# Patient Record
Sex: MALE | Race: WHITE
[De-identification: names, ages, dates, MRNs, and addresses within clinical notes are randomized per-mention and may not be internally consistent; named-entity substitution may affect disease eponyms.]

---

## 2023-04-27 ENCOUNTER — HOSPITAL ENCOUNTER (EMERGENCY)
Dept: HOSPITAL 97 - ER | Age: 63
Discharge: HOME | End: 2023-04-27
Payer: COMMERCIAL

## 2023-04-27 VITALS — SYSTOLIC BLOOD PRESSURE: 125 MMHG | OXYGEN SATURATION: 93 % | DIASTOLIC BLOOD PRESSURE: 91 MMHG

## 2023-04-27 VITALS — TEMPERATURE: 97 F
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
## 2023-04-27 DIAGNOSIS — R07.81: Primary | ICD-10-CM

## 2023-04-27 DIAGNOSIS — M54.9: ICD-10-CM

## 2023-04-27 LAB
BUN BLD-MCNC: 12 MG/DL (ref 7–18)
GLUCOSE SERPLBLD-MCNC: 115 MG/DL (ref 74–106)
HCT VFR BLD CALC: 47.1 % (ref 39.6–49)
LYMPHOCYTES # SPEC AUTO: 0.9 K/UL (ref 0.7–4.9)
MCV RBC: 84.9 FL (ref 80–100)
PMV BLD: 8.4 FL (ref 7.6–11.3)
POTASSIUM SERPL-SCNC: 4.8 MEQ/L (ref 3.5–5.1)
RBC # BLD: 5.54 M/UL (ref 4.33–5.43)

## 2023-04-27 PROCEDURE — 86850 RBC ANTIBODY SCREEN: CPT

## 2023-04-27 PROCEDURE — 80048 BASIC METABOLIC PNL TOTAL CA: CPT

## 2023-04-27 PROCEDURE — 36415 COLL VENOUS BLD VENIPUNCTURE: CPT

## 2023-04-27 PROCEDURE — 71250 CT THORAX DX C-: CPT

## 2023-04-27 PROCEDURE — 86900 BLOOD TYPING SEROLOGIC ABO: CPT

## 2023-04-27 PROCEDURE — 70450 CT HEAD/BRAIN W/O DYE: CPT

## 2023-04-27 PROCEDURE — 86901 BLOOD TYPING SEROLOGIC RH(D): CPT

## 2023-04-27 PROCEDURE — 85025 COMPLETE CBC W/AUTO DIFF WBC: CPT

## 2023-04-27 PROCEDURE — 72125 CT NECK SPINE W/O DYE: CPT

## 2023-04-27 NOTE — RAD REPORT
EXAM DESCRIPTION:  RAD - Ribs  Left - 4/27/2023 1:58 pm

 

CLINICAL HISTORY:  PAIN

 

COMPARISON:  No comparisons

 

TECHNIQUE:  Left ribs, 4 views.

 

FINDINGS:  No displaced rib fracture is evident.

 

No aggressive rib lesion. Suspected small left pleural effusion. No underlying pneumothorax,  infiltr
ate or pulmonary contusion.

 

IMPRESSION:  No acute displaced rib fractures. Suspected small left pleural effusion.

## 2023-04-27 NOTE — ER
Nurse's Notes                                                                                     

 [facility]                                                                 

Name: Blu Morrell                                                                              

Age: 62 yrs                                                                                       

Sex: Male                                                                                         

: 1960                                                                                   

MRN: [id number]                                                                                   

Arrival Date: 2023                                                                          

Time: 13:17                                                                                       

Account#: [id number]                                                                            

Bed DIS1                                                                                          

Private MD:                                                                                       

Diagnosis: Fall on same level, unspecified;Left sided rib pain;Back pain                          

                                                                                                  

Presentation:                                                                                     

                                                                                             

13:20 Chief complaint: Patient states: tripped and fell from standing position and hit L side ss  

      of ribs/ back on walker \T\ 0700 this am. Pt c/o L sided chest wall/ back pain.               

      Coronavirus screen: Client denies travel out of the [place] in the last 14 days. Ebola         

      Screen: Patient denies exposure to infectious person. Patient denies travel to an           

      Ebola-affected area in the 21 days before illness onset. Initial Sepsis Screen: Does        

      the patient meet any 2 criteria? No. Patient's initial sepsis screen is negative. Does      

      the patient have a suspected source of infection? No. Patient's initial sepsis screen       

      is negative. Risk Assessment: Do you want to hurt yourself or someone else? Patient         

      reports no desire to harm self or others. Onset of symptoms was 2023.             

13:20 Method Of Arrival: EMS: [facility]                                                     ss  

13:20 Acuity: OBI 3                                                                           ss  

                                                                                                  

Historical:                                                                                       

- Allergies:                                                                                      

13:25 No Known Allergies;                                                                     ss  

                                                                                                  

                                                                                                  

                                                                                                  

Screenin:35 [facility] ED Fall Risk Assessment (Adult) History of falling in the last 3 months,       nj1 

      including since admission Yes- single mechanical fall (1 pt) Confusion or                   

      Disorientation No (0 pts) Intoxicated or Sedated No (0 pts) Impaired Gait Yes (1 pt)        

      Mobility Assist Device Used Yes (1 pt) Altered Elimination No (0 pt) Score/Fall Risk        

      Level 3 or more points = High Risk Oriented to surroundings, Maintained a safe              

      environment, Educated pt \T\ family on fall prevention, incl call for assistance when       

      getting out of bed, Assessed \T\ reinforced patient's understanding of fall precautions,    

      Hourly rounding (assess needs \T\ fall precautionary measures) done, Offered frequent       

      toileting (1:1 observation).                                                                

13:35 Abuse screen: Denies threats or abuse. Denies injuries from another. Nutritional        nj1 

      screening: No deficits noted. Tuberculosis screening: No symptoms or risk factors           

      identified.                                                                                 

                                                                                                  

Assessment:                                                                                       

13:35 Reassessment:. General: Appears in no apparent distress. uncomfortable, Behavior is     nj1 

      calm, cooperative, appropriate for age.                                                     

13:35 Pain: Complains of pain in chest. Neuro: Level of Consciousness is awake, alert, obeys  nj1 

      commands, Oriented to person, place, time, situation. Cardiovascular: Reports chest         

      pain, Patient's skin is warm and dry. Respiratory: Airway is patent Respiratory effort      

      is even, unlabored.                                                                         

14:23 Reassessment: Patient appears in no apparent distress at this time. Patient and/or      nj1 

      family updated on plan of care and expected duration. Pain level reassessed. Patient is     

      alert, oriented x 3, equal unlabored respirations, skin warm/dry/pink. Pain: Complains      

      of pain in chest Pain currently is 10 out of 10 on a pain scale.                            

15:27 Reassessment: Patient appears in no apparent distress at this time. Patient and/or      nj1 

      family updated on plan of care and expected duration. Pain level reassessed. Patient is     

      alert, oriented x 3, equal unlabored respirations, skin warm/dry/pink. Pain: Complains      

      of pain in chest Pain currently is 7 out of 10 on a pain scale.                             

17:09 Reassessment: Patient appears in no apparent distress at this time. Pt asking if he can ss  

      be admitted to help him get into a nursing home. Dr. Olivares notified and states that          

      patient does not meet admission criteria at this time. Pt verbalizes understanding.         

      Reassessment: Pt placed in ER lobby in wheelchair awaiting for ride. Respiratory:           

      Airway is patent Respiratory effort is even, unlabored, Respiratory pattern is regular,     

      symmetrical. Derm: Skin is pink, warm \T\ dry. normal.                                      

                                                                                                  

Vital Signs:                                                                                      

13:20  / 83; Pulse 71; Resp 16; Temp 97(TE); Pulse Ox 99% on R/A; Weight 111.58 kg;     ss  

      Height 5 ft. 7 in. ; Pain 10/10;                                                            

14:48  / 91; Pulse 75; Resp 18; Pulse Ox 93% on R/A;                                    nj1 

15:27  / 91; Pulse 70; Resp 18; Pulse Ox 93% ; Pain 7/10;                               nj1 

13:20 Body Mass Index 38.53 (111.58 kg, 170.18 cm)                                              

13:20 Pain Scale: Adult                                                                       ss  

15:27 Pain Scale: Adult                                                                       nj1 

                                                                                                  

ED Course:                                                                                        

13:19 Patient arrived in ED.                                                                  ss  

13:21 Matthew Olivares DO is Attending Physician.                                                ms3 

13:25 Triage completed.                                                                       ss  

13:25 Arm band placed on right wrist.                                                         ss  

13:35 Patient has correct armband on for positive identification. Bed in low position. Call   nj1 

      light in reach. Side rails up X 1.                                                          

13:36 Gianna Quick, RN is Primary Nurse.                                                       nj1 

13:57 Radiology exam delayed due to pt unable to lay down for CT scan due to pain.            [id number]

14:01 Ribs Left XRAY In Process Unspecified.                                                  EDMS

14:17 Note: 22g diffusics to lt ac, labs drawn and sent by deborah in ct.                       sj  

14:40 CT Head C Spine In Process Unspecified.                                                 EDMS

14:40 Chest Wo Con CT In Process Unspecified.                                                 EDMS

14:44 Basic Metabolic Panel Sent.                                                             mb9 

14:44 Type And Screen Sent.                                                                   mb9 

15:51 Jamil Her DO is Referral Physician.                                                ms3 

17:09 No provider procedures requiring assistance completed. IV discontinued, intact,         ss  

      bleeding controlled, No redness/swelling at site. Pressure dressing applied.                

                                                                                                  

Administered Medications:                                                                         

13:39 Drug: HYDROcodone-acetaminophen PO 5 mg-325 mg 1 tabs Route: PO;                        nj1 

14:23 Follow up: Response: No adverse reaction; Pain is unchanged, physician notified         nj1 

14:23 Drug: morphine IVP or IV 4 mg Route: IVP; Infused Over: 4 mins; Site: left antecubital; nj1 

15:30 Follow up: Response: No adverse reaction; Pain is decreased                             nj1 

                                                                                                  

                                                                                                  

Outcome:                                                                                          

15:52 Discharge ordered by MD.                                                                ms3 

17:09 Discharged to home via wheelchair.                                                      ss  

17:09 Condition: good                                                                             

17:09 Discharge instructions given to patient, Instructed on discharge instructions, follow       

      up and referral plans. Demonstrated understanding of instructions, follow-up care,          

      Prescriptions given X 2.                                                                    

17:15 Patient left the ED.                                                                    ss  

                                                                                                  

Signatures:                                                                                       

Dispatcher [employer]                           Deborah Edwards Shelby, RN                      RN                                                      

Matthew Olivares DO                        DO   ms3                                                  

Reema Loera                               [id number]                                                 

Bertha Figueroa RN                 RN   mb9                                                  

Abdelrahman, Gianna, RN                         RN   nj1                                                  

                                                                                                  

Corrections: (The following items were deleted from the chart)                                    

14:52 14:48 Pulse 75bpm; Resp 18bpm; Pulse Ox 93% RA; nj1                                     nj1 

                                                                                                  

**************************************************************************************************

## 2023-04-27 NOTE — RAD REPORT
EXAM DESCRIPTION:  CT - CTHCSPWOC - 4/27/2023 2:38 pm

 

CLINICAL HISTORY:  TRAUMA

 

COMPARISON:  No comparisons

 

TECHNIQUE:  Axial thin cut noncontrast CT images of the head were obtained.

 

Axial thin cut noncontrast CT images of the cervical spine were obtained.

 

Multiplanar reformatted images were generated and reviewed.

 

All CT scans are performed using dose optimization technique as appropriate and may include automated
 exposure control or mA/KV adjustment according to patient size.

 

FINDINGS:  CT HEAD WITHOUT CONTRAST:

 

No acute hemorrhage, hydrocephalus or extra-axial collection is identified.No areas of brain edema or
 midline shift.

 

The paranasal sinuses and mastoids are clear.The calvarium is intact.

 

CT CERVICAL SPINE WITHOUT CONTRAST:

 

No fracture or subluxation.No prevertebral soft tissues swelling is identified.  Mild degenerative ch
anges with disc height loss and disc material mineralization at C4-5.

 

IMPRESSION:  No acute traumatic intracranial or cervical spine findings.

## 2023-04-27 NOTE — RAD REPORT
EXAM DESCRIPTION:  CT - Thorax Wo Con - 4/27/2023 2:38 pm

 

CLINICAL HISTORY:  Chest pain;Trauma

 

COMPARISON:  Head C Spine Mpr Wo Con dated 4/27/2023; Ribs  Left dated 4/27/2023

 

TECHNIQUE:  Axial thin cut images of the chest were obtained without IV contrast. Multiplanar reforma
ts were generated and reviewed.

 

All CT scans are performed using dose optimization technique as appropriate and may include automated
 exposure control or mA/KV adjustment according to patient size.

 

FINDINGS:  No mass or infiltrate in the lung parenchyma. Bibasilar atelectatic changes. No pleural th
ickening or pleural effusion. No pneumothorax.

 

No abnormal mediastinal or hilar masses or lymphadenopathy seen. No significant aortic or pulmonary a
rtery findings. Assessment is limited in the absence of IV contrast.

 

No chest wall mass or abnormal axillary lymphadenopathy.

 

Superior endplate compression deformities in the lower thoracic spine, with most severe height loss p
resent at T12. This is favored to be chronic given absence of prevertebral edematous changes.

 

Evaluation of the solid abdominal structures reveals no suspicious findings.

 

IMPRESSION:  No acute process within the chest.

 

Incidental findings as above.

## 2023-04-27 NOTE — XMS REPORT
Continuity of Care Document

                            Created on:2023



Patient:SAVAGE CONNORS

Sex:Male

:1960

External Reference #:[id number]





Demographics







                          Address                   [address]



                                                    [place] [address]

 

                          Home Phone                [phone]

 

                          Work Phone                [phone]

 

                          Mobile Phone              [phone]

 

                          Email Address             [email or web address]

 

                          Preferred Language        English

 

                          Marital Status            

 

                          Pentecostalism Affiliation     [id number]

 

                          Race                      White

 

                          Additional Race(s)        Unavailable



                                                    Other Race

 

                          Ethnic Group              Not  or 









Author







                          Organization              [facility]

t

 

                          Address                   [address]



                                                    [place] [address]

 

                          Phone                     [phone]









Support







                Name            Relationship    Address         Phone

 

                Luc Connors Mother          Unavailable     [phone]

[phone]

 

                JOHN CUEVAS OR              UNKNOWN         [phone]



                                                [place] [address] 

 

                ANDREW SHAFFER OT              UNKNOWN         [phone]



                                                [place] [id number] 

 

                REJI ALFARO               [address]  [phone]



                                                [place] [id number] 

 

                AMY SILVA R               Unavailable     [phone]

1

 

                SAVAGE CONNORS SA              [address] [phone]

71



                                                [address]          



                                                [place] [id number] 

 

                BHAVNA CONNORS                [address] [phone]

71



                                                [address] 



                                                [place] [id number] 

 

                BHAVNA MOSES OT              UNKNOWN         [phone]



                                                [place] [id number] 

 

                ALTON TRIPP   OR              [address] [phone]

[phone]



                                                [place] [id number] 

 

                MURIEL CONNORS CH              UNKNOWN         [phone]



                                                [place] [address] 

 

                UN              Unavailable     Unavailable     Unavailable

 

                Muriel Moses Friend          [address] Unava

ilable



                                                [place] [address] 

 

                Guille Griffin   Other           Unavailable     [phone]

 

                JOHN SHAFFER OtherRelationship [address]      Unavailable



                                                [place] [id number]  

 

                SAVAGE CONNORS Unavailable     [address]  [phone]



                                                [place] [id number] 









Care Team Providers







                    Name                Role                Phone

 

                    Pcp, Patient Does Not Have A Primary Care Physician [phone]



 

                    NONE, NONE          Attending Clinician Unavailable

 

                    AMARI MONTOYA        Attending Clinician Unavailable

 

                    Quentin Duenas      Attending Clinician Unavailable

 

                    LUAN MARES     Attending Clinician Unavailable

 

                    RAJAN DAVIS Attending Clinician Unavailable

 

                    DR VIJAY MARTINES    Attending Clinician Unavailable

 

                    MADI, DR CALLE   Attending Clinician Unavailable

 

                    ROHITH DAI      Attending Clinician Unavailable

 

                    TATE WAN     Attending Clinician Unavailable

 

                    Tate Wan MD  Attending Clinician [phone]

 

                    [email or web address]_A       Attending Clinician Unavailable

 

                    Gi EASTMAN, Marsha Attending Clinician [phone]

 

                    MARSHA TILLMAN    Attending Clinician Unavailable

 

                    MURIEL LANDAVERDE Attending Clinician Unavailable

 

                    TICO GUILLORY    Attending Clinician Unavailable

 

                    Steve BLANCO, Ayesha Fitzgerald Attending Clinician [phone]

[phone]

 

                    Boris BLANCO, Mustapha Strong Attending Clinician [phone]

 

                    Gretchen Wilson MD Attending Clinician [phone]

 

                    Benito Doran       Attending Clinician [phone]

 

                    Meliton DEGROOT, Suze   Attending Clinician Unavailable

 

                    Justus NP, Uzair Jasmine Attending Clinician [phone]

 

                    Rory EASTMAN, Kathia Song Attending Clinician [phone]

 

                    KATHIA AYON Attending Clinician Unavailable

 

                    [email or web address]_N  Attending Clinician Unavailable

 

                    Edgardo Fry MD Attending Clinician [phone]

 

                    EDGARDO FRY    Attending Clinician Unavailable

 

                    EDGARDO FRY    Attending Clinician Unavailable

 

                    YAN COLLADO        Attending Clinician Unavailable

 

                    Efren Dugan     Attending Clinician Unavailable

 

                    VICTORIANO MARIEE Attending Clinician Unavailable

 

                    KENDRA MEYERS   Attending Clinician Unavailable

 

                    Kole Ctaalan MD Attending Clinician [phone]

 

                    Anisa BLANCO, Arnaud Mckeon Attending Clinician [phone]

[phone]

 

                    Raymond Miller MD     Attending Clinician [phone]

 

                    BOBBY CAUSEY         Attending Clinician Unavailable

 

                    BENITO SHIPLEY      Attending Clinician Unavailable

 

                    Dylan RT, Amelia     Attending Clinician Unavailable

 

                    Georgina Pacheco MD Attending Clinician [phone]

 

                    GEORGINA PACHECO Attending Clinician Unavailable

 

                    Gilbert Haynes MD Attending Clinician [phone]

 

                    Gatito Hensley CRNA Attending Clinician [phone]

 

                    Antonella BLANCO, Gomez Waller Attending Clinician [phone]

 

                    GOMEZ BERMAN Attending Clinician Unavailable

 

                    Emilia Magallon MD Attending Clinician [phone]

 

                    Bertha White MD Attending Clinician [phone]

 

                    BERTHA WHITE Attending Clinician Unavailable

 

                    RYLEY FAIRCHILD    Attending Clinician Unavailable

 

                    Gilbert Casper MD Attending Clinician [phone]

 

                    GILBERT CASPER Attending Clinician Unavailable

 

                    Matthew Ortega DO Attending Clinician [phone]

 

                    MATTHEW ORTEGA   Attending Clinician Unavailable

 

                    Honorio Kahn DO      Attending Clinician [phone]

 

                    HE TAMAYO Attending Clinician Unavailable

 

                    AWA LORENZ (OOGA) Attending Clinician Unavailable

 

                    WHITNEY MEJIA   Attending Clinician Unavailable

 

                    MD ESTHER GREENFIELD Attending Clinician Unavailable

 

                    LAYLA JIMENEZ       Attending Clinician Unavailable

 

                    TAYA AUSTIN        Attending Clinician Unavailable

 

                    MD HUMBERTO RAM Attending Clinician Unavailable

 

                    ALDEN BENTON Attending Clinician Unavailable

 

                    KENNY MARRERO Attending Clinician Unavailable

 

                    KRISTIN HAIR       Attending Clinician Unavailable

 

                    AB BEDOYA       Attending Clinician Unavailable

 

                    DR BUDDY MCCLOUD   Attending Clinician Unavailable

 

                    DR RK TRIVEDI      Attending Clinician Unavailable

 

                    DR TOMY GUILLEN       Attending Clinician Unavailable

 

                    Figueroa            Attending Clinician Unavailable

 

                    DR JON NICOLAS Attending Clinician Unavailable

 

                    MR ELISABETH ARCEO Attending Clinician Unavailable

 

                    DR BERTHA FRENCH Attending Clinician Unavailable

 

                    DR ALICIA MCCLOUD     Attending Clinician Unavailable

 

                    DR LARISSA GREER Attending Clinician Unavailable

 

                    LISHA DELEON          Attending Clinician Unavailable

 

                    DR KOLE JOSHI  Attending Clinician Unavailable

 

                    DR PANCHITO BAPTISTE  Attending Clinician Unavailable

 

                    DR KAREY DO Attending Clinician Unavailable

 

                    DR JESS GRIFFIN    Attending Clinician Unavailable

 

                    DR JENNY YUN Attending Clinician Unavailable

 

                    VIKTORIYA, DR MCCOLLUM Attending Clinician Unavailable

 

                    KIRBY, DR ANGELES Attending Clinician Unavailable

 

                    AZAR, DR VEGA         Attending Clinician Unavailable

 

                    ABRAHAM, DR ELISABETH AMEZCUA Attending Clinician Unavailable

 

                    , DR ARANA    Attending Clinician Unavailable

 

                    VITO, DR YOGESH MCCLAIN Attending Clinician Unavailable

 

                    SAVANAH, DR PALOMINO  Attending Clinician Unavailable

 

                    XIN, DR HUMPHREYS Attending Clinician Unavailable

 

                    JESSE, DR SPEAR Attending Clinician Unavailable

 

                    , DR RUTH   Attending Clinician Unavailable

 

                    TOY, DR GONZALEZ    Attending Clinician Unavailable

 

                    NONE, NONE          Admitting Clinician Unavailable

 

                    LUAN MARES     Admitting Clinician Unavailable

 

                    RAJAN DAVIS Admitting Clinician Unavailable

 

                    CONRAD, DR VIJAY PETERSON    Admitting Clinician Unavailable

 

                    MADI, DR CALLE   Admitting Clinician Unavailable

 

                    ROHITH DAI      Admitting Clinician Unavailable

 

                    GC_[email or web address]_A       Admitting Clinician Unavailable

 

                    GRETCHEN WILSON Admitting Clinician Unavailable

 

                    [email or web address]_N  Admitting Clinician Unavailable

 

                    UNKNOWN             Admitting Clinician Unavailable

 

                    KENDRA MEYERS   Admitting Clinician Unavailable

 

                    ARNAUD RIDER     Admitting Clinician Unavailable

 

                    GEORGINA PACHECO Admitting Clinician Unavailable

 

                    LARISSA SKELTON   Admitting Clinician Unavailable

 

                    JOSEE ANTONIO      Admitting Clinician Unavailable

 

                    MD ESTHER GREENFIELD Admitting Clinician Unavailable

 

                    MAI CABRERA         Admitting Clinician Unavailable

 

                    MD MAI CABRERA      Admitting Clinician Unavailable

 

                    GAY OSBORNE   Admitting Clinician Unavailable

 

                    DAVID SKELTON Admitting Clinician Unavailable

 

                    DR BUDDY MCCLOUD   Admitting Clinician Unavailable

 

                    DR RK TRIVEDI      Admitting Clinician Unavailable

 

                    DR TOMY GUILLEN       Admitting Clinician Unavailable

 

                    Figueroa            Admitting Clinician Unavailable

 

                    DR JON NICOLAS Admitting Clinician Unavailable

 

                    MARIELOS, MR ELISABETH Admitting Clinician Unavailable

 

                    DR BERTHA FRENCH Admitting Clinician Unavailable

 

                    DR ALICIA MCCLOUD     Admitting Clinician Unavailable

 

                    DR LARISSA GREER Admitting Clinician Unavailable

 

                    DR KOLE JOSHI  Admitting Clinician Unavailable

 

                    DR PANCHITO BAPTISTE  Admitting Clinician Unavailable

 

                    DR KAREY DO Admitting Clinician Unavailable

 

                    DR JESS GRIFFIN    Admitting Clinician Unavailable

 

                    DR JENNY YUN Admitting Clinician Unavailable

 

                    VIKTORIYA, DR MCCOLLUM Admitting Clinician Unavailable

 

                    KIRBY, DR ANGELES Admitting Clinician Unavailable

 

                    AZAR, DR VEGA         Admitting Clinician Unavailable

 

                    ABRAHAM, DR ELISABETH AMEZCUA Admitting Clinician Unavailable

 

                    , DR ARANA    Admitting Clinician Unavailable

 

                    VITO, DR YOGESH MCCLAIN Admitting Clinician Unavailable

 

                    SAVANAH, DR PALOMINO  Admitting Clinician Unavailable

 

                    XIN, DR HUMPHREYS Admitting Clinician Unavailable

 

                    JESSE, DR SPEAR Admitting Clinician Unavailable

 

                    , DR RUTH   Admitting Clinician Unavailable

 

                    TOY, DR GONZALEZ    Admitting Clinician Unavailable









Payers







           Payer Name Policy Type Policy Number Effective Date Expiration Date S

hema

 

           0733                  [id number]  2020            



                                            00:00:00              

 

           [facility]            [id number]  2022            



           [employer]                       00:00:00              



           EXCEPT [facility]            [id number]  2015            



           PLAN                             00:00:00              

 

           [facility]            [id number]  2017            



           PLUS                             00:00:00              

 

           [employer]                [id number]                        



           [facility]                [id number]  2023            



           HEALTHCARE                       00:00:00              



           [facility] (MEDICAID                                             



           HMO)                                                   

 

           [facility]            [id number]  2015 



           [employer]                       00:00:00   00:00:00   

 

           UNITED     C1         [id number]                        Common



           HEALTHCARE                                             [facility]







Problems







       Condition Condition Condition Status Onset  Resolution Last   Treating Co

mments 

Source



       Name   Details Category        Date   Date   Treatment Clinician        



                                                 Date                 

 

       Major  Major  Problem Active                              [facility]



       depressive Depressive               4-20                               Me

dical



       disorder Disorder               00:00:                             



                                   00                                 

 

       Generalize Generalize Problem Active                              P

[facility]



       d anxiety d Anxiety               4-20                               Medi

franklin



       disorder Disorder               00:00:                             



                                   00                                 

 

       Congestive Congestive Problem Active                              P

[facility]



       heart  Heart                4-15                               Medical



       failure Failure               00:00:                             



                                   00                                 

 

       Anxiety Anxiety Problem Active                              [facility]



       disorder Disorder               4-15                               [facility]

l



                                   00:00:                             



                                   00                                 

 

       Depressive Depressive Problem Active                              P

[facility]



       disorder Disorder               4-15                               [facility]

l



                                   00:00:                             



                                   00                                 

 

       Chronic Chronic Disease Active                              [facility]



       low back low back               3-29                               st



       pain   pain                 00:00:                             Hospita



       without without               00                                 l



       sciatica, sciatica,                                                  



       unspecifie unspecifie                                                  



       d back d back                                                  



       pain   pain                                                    



       laterality laterality                                                  

 

       Back pain Back pain Disease Active                              Met

hodi



                                   3-29                               st



                                   00:00:                             Hospita



                                   00                                 l

 

       Right  Right  Disease Active                              [facility]



       sided  sided                9-20                               st



       numbness numbness               00:00:                             Hospit

a



                                   00                                 l

 

       Angina [facility] Disease Recurre                              [facility]

[facility]



       rest   rest          nce    [facility]                               [facility]



                                   [facility]



                                   [facility]                                 [facility]

 

       Shortness Shortness Disease Active                              [facility]

 St



       of breath of breath               7-14                               [facility]

s



                                   00[facility]:                             Medical



                                   00                                 Center

 

       Anasarca Anasarca Disease Active                              [facility]

t



                                   [facility]                               [facility]



                                   [facility]                             [facility]



                                   00                                 Center

 

       Acute deep Acute deep Disease Active                       Overview

: [facility]



       vein   vein                                         [facility]



       thrombosis thrombosis               00:00:                      g of this

 Hospita



       (DVT) of (DVT) of               00                          note   l



       femoral femoral                                           might be 



       vein of vein of                                           different 



       right  right                                            from the 



       lower  lower                                            original. 



       extremity extremity                                           Added  



                                                               automatic 



                                                               ally from 



                                                               request 



                                                               for    



                                                               surgery 



                                                               [id number] 

 

       Swelling Swelling Disease Active                       Overview: Me

thodi



       of     of                                           [facility]att[facility]



       extremity, extremity,               00:00:                      g of this

 Hospita



       right  right                00                          note   l



                                                               might be 



                                                               different 



                                                               from the 



                                                               original. 



                                                               Added  



                                                               automatic 



                                                               ally from 



                                                               request 



                                                               for    



                                                               surgery 



                                                               [id number] 

 

       Symptomati Symptomati Disease Active                              M

ethodi



       c anemia c anemia               5-30                               st



                                   00:00:                             Hospita



                                   00                                 l

 

       Abdominal Abdominal Disease Active                              Met

hodi



       wall   wall                 5-30                               st



       hematoma hematoma               00:00:                             Hospit

a



                                   00                                 l

 

       Retroperit Retroperit Disease Active                              M

ethodi



       valentin  valentin                5-24                               st



       hemorrhage hemorrhage               00:00:                             Ho

spita



       complicati complicati               00                                 l



       ng cardiac ng cardiac                                                  



       catheteriz catheteriz                                                  



       ation  ation                                                   

 

       CAD    CAD    Disease Active                              [facility]



       (coronary (coronary               5-24                               st



       artery artery               00:00:                             Hospita



       disease) disease)               00                                 l

 

       Atrial Atrial Disease Active                              [facility]



       fibrillati fibrillati               5-24                               st



       on     on                   00:00:                             Hospita



                                   00                                 l

 

       Essential Essential Disease Active                              Met

hodi



       hypertensi hypertensi               5-24                               st



       on     on                   00:00:                             Hospita



                                   00                                 l

 

       Hyperlipid Hyperlipid Disease Active                              M

ethodi



       emia   emia                 5-24                               st



                                   00:[facility]:                             [facility]



                                   00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             1{tbl}      1 tablet,         

  [facility]



     -acetaminop                                Oral, ONCE           i

ty of



     hen (NORCO)      04:45: 03:59                          NOW, 1           [facility]

as



      mg      00   :00                           dose, On           [facility]

l



     tablet 



     tablet                                         23 at           



                                                  2345,           



                                                  Routine           

 

     ketorolac      2023- No             15mg      15 mg,           Unive

rs



     (TORADOL)                                Slow IV           ity of



     injection      04:15: 03:20                          Push,           [place]



     15 mg      00   :00                           ONCE, 1           Medical



                                                  dose, On           [facility]



                                                  23 at           



                                                  2315,           



                                                  Routine           

 

     ipratropium      2023- No             3mL       3 mL,           Univ

ers



     -albuteroL                                Inhalation           it

y of



     (DUONEB)      04:00: 03:08                          , ONCE           [place]



     0.5 mg-3      00   :00                           NOW, 1           Medical



     mg(2.5 mg                                         dose, On           Branch



     base)/3 mL                                         Wed            



     nebulizer                                         23 at           



     solution 3                                         2300,           



     mL                                           Routine           

 

     dexamethaso       No             10mg      10 mg,           Uni

vers



     ne sod phos                                Slow IV           ity 

of



     PF        03:45: 03:50                          Push,           [place]



     injection      00   :00                           ONCE, 1           Medical



     10 mg                                         dose, On           Branch



                                                  23 at           



                                                  2245, 1 mL           

 

     furosemide      -0      Yes            20mg      Take 20 mg           U

nivers



     20 mg      4-26                               by mouth           ity of



     tablet      21:56:                               every           [place]



               02                                 morning           Medical



                                                  and            Branch



                                                  evening.           

 

     metoprolol      -0      Yes            25mg      Take 25 mg           U

nivers



     succinate      4-26                               by mouth 2           ity 

of



     XL 25 mg 24      21:56:                               (two)           [place]



     hr tablet      02                                 times           Medical



                                                  daily.           [facility]

 

     ARIPiprazol      -0      Yes            5mg       Take 5 mg           U

nivers



     e (ABILIFY)      4-26                               by mouth           ity 

of



     5 mg tablet      21:56:                               daily.           [place]

[facility]



                                                               [facility]



                                                                 [facility]

 

     clopidogrel      -0      Yes            75mg      Take 75 mg           

[facility]



     75 mg      4-26                               by mouth           ity of



     tablet      21:56:                               daily.           [address]

 

     amiodarone      2023- No             100mg QD   Take 1           [facility]



     (PACERONE)      4-05 04-05                          tablet           [facility]



     100 MG      20:47: 00:00                          (100 mg           [facility]



     tablet      05   :00                           total) by           Center



                                                  mouth in           



                                                  the            



                                                  morning.           

 

     amiodarone      0 - No             100mg QD   Take 1           [facility]



     (PACERONE)      4-05 04-05                          tablet           [facility]



     100 MG      20:47: 00:00                          (100 mg           [facility]



     tablet      05   :00                           total) by           Center



                                                  mouth in           



                                                  the            



                                                  morning.           

 

     apixaban      0      Yes            5mg  Q.5D Take 1           [facility]



     (Eliquis) 5      4-05                               tablet (5           [facility]

[facility]



     mg Tab      00:00:                               mg total)           [facility]

l



     tablet      00                                 by mouth           Center



                                                  in the           



                                                  morning           



                                                  and 1           



                                                  tablet (5           



                                                  mg total)           



                                                  before           



                                                  bedtime.           

 

     apixaban      0      Yes            5mg  Q.5D Take 1           [facility]



     (Eliquis) 5      4-05                               tablet (5           [facility]

[facility]



     mg Tab      00:00:                               mg total)           [facility]

l



     tablet      00                                 by mouth           Center



                                                  in the           



                                                  morning           



                                                  and 1           



                                                  tablet (5           



                                                  mg total)           



                                                  before           



                                                  bedtime.           

 

     amiodarone      2023- Yes            100mg QD   Take 1           [facility]



     (PACERONE)      -05                          tablet           [facility]



     100 MG      00:00: 23:59                          (100 mg           Medical



     tablet      00   :00                           total) by           Center



                                                  mouth in           



                                                  the            



                                                  morning           



                                                  for 30           



                                                  days.           

 

     DULoxetine      2023- Yes            40mg QD   Take 2           [facility]



     (CYMBALTA)       05-05                          capsules           [facility]

[facility]



     20 MG      00:00: 23:59                          (40 mg           Medical



     capsule      00   :00                           total) by           Center



                                                  mouth in           



                                                  the            



                                                  morning           



                                                  for 30           



                                                  days.           

 

     pregabalin      2023- Yes            300mg Q.5D Take 1           [facility]



     (LYRICA)      - 05-05                          capsule           [facility]



     300 MG      00:00: 23:59                          (300 mg           Medical



     capsule      00   :00                           total) by           Center



                                                  mouth in           



                                                  the            



                                                  morning           



                                                  and 1           



                                                  capsule           



                                                  (300 mg           



                                                  total)           



                                                  before           



                                                  bedtime.           



                                                  Do all           



                                                  this for           



                                                  30 days.           



                                                  Max Daily           



                                                  Amount:           



                                                  600 mg.           

 

     lidocaine      2023- Yes            1{patch Q24H Place 1           [facility]

[facility]



     (LIDODERM)       05-05                }         patch onto           [place]

[facility]



     5 % patch      00:00: 23:59                          the skin           Med

ical



               00   :00                           in the           Center



                                                  morning           



                                                  for 30           



                                                  days.           



                                                  Remove &           



                                                  Discard           



                                                  patch           



                                                  within 12           



                                                  hours or           



                                                  as             



                                                  directed           



                                                  by MD.           

 

     amiodarone      2023- Yes            100mg QD   Take 1           [facility]



     (PACERONE)                                tablet           [facility]



     100 MG      00:00: 23:59                          (100 mg           [facility]



     tablet      00   :00                           total) by           Center



                                                  mouth in           



                                                  the            



                                                  morning           



                                                  for 30           



                                                  days.           

 

     DULoxetine      2023- Yes            40mg QD   Take 2           [facility]



     (CYMBALTA)                                capsules           [facility]

[facility]



     20 MG      00:00: 23:59                          (40 mg           [facility]



     capsule      00   :00                           total) by           Center



                                                  mouth in           



                                                  the            



                                                  morning           



                                                  for 30           



                                                  days.           

 

     pregabalin      2023- Yes            300mg Q.5D Take 1           [facility]



     (LYRICA)                                capsule           [facility]



     300 MG      00:00: 23:59                          (300 mg           [facility]



     capsule      00   :00                           total) by           Center



                                                  mouth in           



                                                  the            



                                                  morning           



                                                  and 1           



                                                  capsule           



                                                  (300 mg           



                                                  total)           



                                                  before           



                                                  bedtime.           



                                                  Do all           



                                                  this for           



                                                  30 days.           



                                                  Max Daily           



                                                  Amount:           



                                                  600 mg.           

 

     lidocaine      2023- Yes            1{patch Q24H Place 1           [facility]



     (LIDODERM)                      }         patch onto           [place]

[facility]



     5 % patch      00:00: 23:59                          the skin           Med

ical



               00   :00                           in the           Center



                                                  morning           



                                                  for 30           



                                                  days.           



                                                  Remove &           



                                                  Discard           



                                                  patch           



                                                  within 12           



                                                  hours or           



                                                  as             



                                                  directed           



                                                  by MD.           

 

     acetaminoph      0      Yes            1000mg Q6H  Take 2           [facility]

[facility]



     en (Tylenol      3-31                               tablets           st



     Extra      00:00:                               (1,000 mg           [facility]



     Strength)      00                                 total) by           l



     500 MG                                         mouth           



     tablet                                         every 6           



                                                  (six)           



                                                  hours as           



                                                  needed for           



                                                  moderate           



                                                  pain for           



                                                  up to 20           



                                                  doses.           

 

     lidocaine 4      0      Yes                      Place 1           [facility]

[facility]



     %         3-31                               patch on           [facility]



               00:00:                               the skin           [facility]



               00                                 daily.           l



                                                  Remove and           



                                                  discard           



                                                  patch           



                                                  within 12           



                                                  hours or           



                                                  as             



                                                  directed           



                                                  by             



                                                  physician.           

 

     acetaminoph      0      Yes            1000mg Q6H  Take 2           [facility]

[facility]



     en (Tylenol      3-31                               tablets           st



     Extra      00:00:                               (1,000 mg           [facility]



     Strength)      00                                 total) by           l



     500 MG                                         mouth           



     tablet                                         every 6           



                                                  (six)           



                                                  hours as           



                                                  needed for           



                                                  moderate           



                                                  pain for           



                                                  up to 20           



                                                  doses.           

 

     lidocaine 4      -0      Yes                      Place 1           [facility]

[facility]



     %         3-31                               patch on           st



               00:00:                               the skin           [facility]



               00                                 daily.           l



                                                  Remove and           



                                                  discard           



                                                  patch           



                                                  within 12           



                                                  hours or           



                                                  as             



                                                  directed           



                                                  by             



                                                  physician.           

 

     bisacodyL      -0      Yes            10mg Q24H Insert 1           [facility]

[facility]



     (DULCOLAX)      3-29                               suppositor           st



     10 mg      13:35:                               y (10 mg           [facility]



     suppository      22                                 total)           l



                                                  into the           



                                                  rectum           



                                                  daily as           



                                                  needed for           



                                                  constipati           



                                                  on.            

 

     guaiFENesin      -0      Yes            600mg [id number] Take 1        

   [facility]



     (MUCINEX)      3-29                          [id number] tablet           st



     600 mg      13:35:                          3D   (600 mg           [facility]



     tablet      22                                 total) by           l



     extended                                         mouth 3           



     release                                         (three)           



     12hr                                         times a           



                                                  day as           



                                                  needed.           

 

     lactulose      -0      Yes            10g  Q24H Take 15 mL           [facility]

[facility]



     20 gram/30      3-                               (10 g           st



     mL solution      13:35:                               total) by           [facility]

osp[facility]



               22                                 mouth           l



                                                  daily as           



                                                  needed.           

 

     phenylephri      -0      Yes            10mg Q6H  Take 1           [facility]

[facility]



     ne (Sudafed      3-29                               tablet (10           st



     PE) 10 MG      13:35:                               mg total)           [facility]

[facility]



     tablet      22                                 by mouth           l



                                                  every 6           



                                                  (six)           



                                                  hours as           



                                                  needed for           



                                                  congestion           



                                                  .              

 

     albuterol      -0      Yes            2.5mg Q6H  Take 3 mL           [facility]

[facility]



     (ACCUNEB)      3-29                               (2.5 mg           st



     2.5 mg /3      13:35:                               total) by           [facility]

[facility]



     mL (0.083      22                                 nebulizati           l



     %)                                           on every 6           



     nebulizer                                         (six)           



     solution                                         hours as           



                                                  needed for           



                                                  wheezing.           

 

     magnesium      -0      Yes            148mL      Take 0.5           [facility]

[facility]



     citrate      3-29                               Bottles           st



     solution      13:35:                               (148 mL           [facility]

a



               22                                 total) by           l



                                                  mouth as           



                                                  needed.           

 

     benzonatate      -0      Yes            200mg [id number] Take 1        

   [facility]



     (TESSALON)      3-29                          [id number] capsule           

st



     200 MG      13:35:                          3D   (200 mg           [facility]



     capsule      22                                 total) by           l



                                                  mouth 3           



                                                  (three)           



                                                  times a           



                                                  day as           



                                                  needed for           



                                                  cough.           

 

     promethazin      -0      Yes            25mg Q6H  Take 1           [facility]

[facility]



     e         3-29                               tablet (25           st



     (PHENERGAN)      13:35:                               mg total)           [facility]

[facility]



     25 MG      22                                 by mouth           l



     tablet                                         every 6           



                                                  (six)           



                                                  hours as           



                                                  needed for           



                                                  nausea or           



                                                  vomiting.           

 

     bisacodyL      2023-0      Yes            10mg Q24H Insert 1           [facility]

[facility]



     (DULCOLAX)      3-29                               suppositor           st



     10 mg      13:35:                               y (10 mg           [facility]



     suppository      22                                 total)           l



                                                  into the           



                                                  rectum           



                                                  daily as           



                                                  needed for           



                                                  constipati           



                                                  on.            

 

     guaiFENesin      2023-0      Yes            600mg [id number] Take 1        

   [facility]



     (MUCINEX)      3-29                          [id number] tablet           st



     600 mg      13:35:                          3D   (600 mg           [facility]



     tablet      22                                 total) by           l



     extended                                         mouth 3           



     release                                         (three)           



     12hr                                         times a           



                                                  day as           



                                                  needed.           

 

     lactulose      3-0      Yes            10g  Q24H Take 15 mL           [facility]

[facility]



     20 gram/30      3-29                               (10 g           st



     mL solution      13:35:                               total) by           H

ospita



               22                                 mouth           l



                                                  daily as           



                                                  needed.           

 

     phenylephri      2023-0      Yes            10mg Q6H  Take 1           [facility]

[facility]



     ne (Sudafed      3-29                               tablet (10           st



     PE) 10 MG      13:35:                               mg total)           H[facility]

flo



     tablet      22                                 by mouth           l



                                                  every 6           



                                                  (six)           



                                                  hours as           



                                                  needed for           



                                                  congestion           



                                                  .              

 

     albuterol      3-0      Yes            2.5mg Q6H  Take 3 mL           [facility]

[facility]



     (ACCUNEB)      3-29                               (2.5 mg           st



     2.5 mg /3      13:35:                               total) by           [facility]

[facility]



     mL (0.083      22                                 nebulizati           l



     %)                                           on every 6           



     nebulizer                                         (six)           



     solution                                         hours as           



                                                  needed for           



                                                  wheezing.           

 

     magnesium      3-0      Yes            148mL      Take 0.5           [facility]

[facility]



     citrate      3-29                               Bottles           st



     solution      13:35:                               (148 mL           Hospit

a



               22                                 total) by           l



                                                  mouth as           



                                                  needed.           

 

     benzonatate      2023-0      Yes            200mg [id number] Take 1        

   [facility]



     (TESSALON)      3-                          [id number] capsule           

st



     200 MG      13:35:                          3D   (200 mg           [facility]



     capsule      22                                 total) by           l



                                                  mouth 3           



                                                  (three)           



                                                  times a           



                                                  day as           



                                                  needed for           



                                                  cough.           

 

     promethazin      2023-0      Yes            25mg Q6H  Take 1           [facility]

[facility]



     e         3-29                               tablet (25           st



     (PHENERGAN)      13:35:                               mg total)           H

[facility]



     25 MG      22                                 by mouth           l



     tablet                                         every 6           



                                                  (six)           



                                                  hours as           



                                                  needed for           



                                                  nausea or           



                                                  vomiting.           

 

     tamsulosin      2023- No             .4mg QD   Take 1           [facility]

[facility]



     (FLOMAX)      3-29 03-29                          capsule           st



     0.4 mg      13:04: 00:00                          (0.4 mg           [facility]



     capsule      51   :00                           total) by           l



                                                  mouth           



                                                  daily.           

 

     apixaban      2023- No             5mg  Q.5D Take 1           Method

i



     (ELIQUIS) 5      3-29 03-29                          tablet (5           st



     mg tablet      13:04: 00:00                          mg total)           [facility]

[facility]



               51   :00                           by mouth 2           l



                                                  (two)           



                                                  times a           



                                                  day.           

 

     atorvastati      2023- No             10mg QD   Take 1           [facility]

[facility]



     n (LIPITOR)      3-29 03-29                          tablet (10           s

t



     10 mg      13:04: 00:00                          mg total)           Hospit

a



     tablet      51   :00                           by mouth           l



                                                  daily.           

 

     aspirin      2023- No             81mg QD   Take 1           [facility]



     (ECOTRIN)      3-29 03-29                          tablet (81           st



     81 MG      13:04: 00:00                          mg total)           [facility]ospit

a



     enteric      51   :00                           by mouth           l



     coated                                         daily.           



     tablet                                                        

 

     ranolazine      2023- No             500mg Q.5D Take 1           [facility]

[facility]



     (RANEXA)      3-29 03-29                          tablet           st



     500 MG 12      13:04: 00:00                          (500 mg           [facility]



     hr ER      51   :00                           total) by           l



     tablet                                         mouth 2           



                                                  (two)           



                                                  times a           



                                                  day.           

 

     senna      2023- No             1{tbl} QD   Take 1           [facility]



     (SENOKOT)      3-29 03-29                          tablet by           st



     8.6 mg      13:04: 00:00                          mouth           [facility]



     tablet      51   :00                           daily.           l

 

     potassium      2023- No             10meq QD   Take 1           [facility]

[facility]



     chloride      3-29 03-29                          tablet (10           st



     (K-DUR) 10      13:04: 00:00                          mEq total)           

[facility]



     MEQ CR      51   :00                           by mouth           l



     tablet                                         daily.           

 

     calcium      2023- No                  Q.5D Chew 1           [facility]



     carbonate      3-29 03-29                          tablet           st



     (TUMS) 200      13:04: 00:00                          (500 mg of           

[facility]



     mg calcium      51   :00                           Calcium           l



     (500 mg)                                         Carbonate           



     chewable                                         total) 2           



     tablet                                         (two)           



                                                  times a           



                                                  day as           



                                                  needed for           



                                                  indigestio           



                                                  n or           



                                                  heartburn.           

 

     dicyclomine      2023- No             20mg Q.25D Take 1           [facility]

[facility]



     (BENTYL) 20      3-29 03-29                          tablet (20           s

t



     mg tablet      13:04: 00:00                          mg total)           [facility]

[facility]



               51   :00                           by mouth 4           l



                                                  (four)           



                                                  times a           



                                                  day.           

 

     ondansetron      2023- No             4mg  Q.5D Take 1           [facility]

[facility]



     (ZOFRAN) 4      3-29 03-29                          tablet (4           st



     MG tablet      13:04: 00:00                          mg total)           [facility]

[facility]



               51   :00                           by mouth 2           l



                                                  (two)           



                                                  times a           



                                                  day as           



                                                  needed for           



                                                  nausea or           



                                                  vomiting.           

 

     spironolact      2023- No             50mg QD   Take 1           [facility]

[facility]



     one       3-29 03-29                          tablet (50           st



     (ALDACTONE)      13:04: 00:00                          mg total)           

[facility]



     50 MG      51   :00                           by mouth           l



     tablet                                         daily.           

 

     cetirizine       No             10mg QD   Take 1           [facility]

[facility]



     (ZyrTEC) 10      3-29 03-29                          tablet (10           s

t



     MG tablet      13:04: 00:00                          mg total)           [facility]

[facility]



               51   :00                           by mouth           l



                                                  daily.           

 

     DULoxetine       No             40mg QD   Take 1           [facility]

[facility]



     (DRIZALMA)      3-29 03-29                          capsule           st



     40 mg      13:04: 00:00                          (40 mg           [facility]



     capsule      51   :00                           total) by           l



                                                  mouth           



                                                  daily.           

 

     morPHINE      2023- No        [id number] 15mg Q.5D Take 1           [facility]

[facility]



     (MS CONTIN)      3-29 03-29                          tablet (15           s

t



     15 MG 12 hr      13:04: 00:00                          mg total)           

[facility]



     tablet      51   :00                           by mouth 2           l



                                                  (two)           



                                                  times a           



                                                  day as           



                                                  needed for           



                                                  severe           



                                                  pain           



                                                  .chronic           



                                                  pain. Max           



                                                  Daily           



                                                  Amount: 30           



                                                  mg             

 

     pregabalin      2023- No             150mg Q.5D Take 2           [facility]

[facility]



     (LYRICA) 75      3-29 03-29                          capsules           st



     MG capsule      13:04: 00:00                          (150 mg           [facility]

[facility]



               51   :00                           total) by           l



                                                  mouth 2           



                                                  (two)           



                                                  times a           



                                                  day. Total           



                                                  300mg a           



                                                  day            

 

     HYDROcodone      2023- No        [id number] 1{tbl} Q4H  Take 1           

[facility]



     -acetaminop      3-29 03-29                          tablet by           st



     hen (NORCO)      13:04: 00:00                          mouth           [facility]



      mg      51   :00                           every 4           l



     per tablet                                         (four)           



                                                  hours           



                                                  .acute           



                                                  pain.           

 

     furosemide      2023- No             40mg Q.5D Take 1           [facility]

[facility]



     (LASIX) 40      3-29 03-29                          tablet (40           st



     mg tablet      13:04: 00:00                          mg total)           [facility]

[facility]



               51   :00                           by mouth 2           l



                                                  (two)           



                                                  times a           



                                                  day.           

 

     magnesium      2023- No             400mg QD   Take 1           [facility]

[facility]



     oxide      3-29 03-29                          tablet           st



     (MAG-OX)      13:04: 00:00                          (400 mg           [facility]

ta



     400 mg      51   :00                           total) by           l



     (241.3 mg                                         mouth           



     magnesium)                                         daily.           



     tablet                                                        

 

     fluocinolon      2023- No                  Q.5D 1              [facility]

[facility]



     e (SYNALAR)      3-29 03-29                          applicatio           s

t



     0.01 %      13:04: 00:00                          n 2 (two)           [facility]

ta



     external      51   :00                           times a           l



     solution                                         day.           

 

     alum-mag      2023- No             30mL Q.25D Take 30 mL           M

[facility]



     hydroxide-s      3-29 03-29                          by mouth 4           s

t



     imeth      13:04: 00:00                          (four)           [facility]



     (MAALOX      51   :00                           times a           l



     PLUS)                                         day as           



     200-200-20                                         needed for           



     mg/5 mL                                         heartburn.           



     suspension                                                        

 

     LORAZepam      2023- No             1mg  Q8H  Take 1           [facility]

[facility]



     (ATIVAN) 1      3-29 03-29                          tablet (1           st



     MG tablet      13:04: 00:00                          mg total)           [facility]

[facility]



               51   :00                           by mouth           l



                                                  every 8           



                                                  (eight)           



                                                  hours as           



                                                  needed for           



                                                  anxiety.           

 

     amiodarone      2023- No             1{tbl} QD   Take 1           [facility]

[facility]



     HCl       3-29 03-29                          tablet by           st



     (AMIODARONE      13:04: 00:00                          mouth           [facility]

[facility]



     ORAL)      51   :00                           daily.           l

 

     tamsulosin      2023- No             .4mg QD   Take 1           [facility]

[facility]



     (FLOMAX)      3-29 03-29                          capsule           st



     0.4 mg      13:04: 00:00                          (0.4 mg           [facility]



     capsule      51   :00                           total) by           l



                                                  mouth           



                                                  daily.           

 

     apixaban      2023- No             5mg  Q.5D Take 1           [facility]

i



     (ELIQUIS) 5      3-29 03-29                          tablet (5           st



     mg tablet      13:04: 00:00                          mg total)           [facility]

[facility]



               51   :00                           by mouth 2           l



                                                  (two)           



                                                  times a           



                                                  day.           

 

     atorvastati      2023- No             10mg QD   Take 1           [facility]

[facility]



     n (LIPITOR)      3-29 03-29                          tablet (10           s

t



     10 mg      13:04: 00:00                          mg total)           [facility]

a



     tablet      51   :00                           by mouth           l



                                                  daily.           

 

     aspirin      2023- No             81mg QD   Take 1           [facility]



     (ECOTRIN)      3-29 03-29                          tablet (81           st



     81 MG      13:04: 00:00                          mg total)           [facility]

a



     enteric      51   :00                           by mouth           l



     coated                                         daily.           



     tablet                                                        

 

     ranolazine      2023- No             500mg Q.5D Take 1           [facility]

[facility]



     (RANEXA)      3-29 03-29                          tablet           st



     500 MG 12      13:04: 00:00                          (500 mg           [facility]



     hr ER      51   :00                           total) by           l



     tablet                                         mouth 2           



                                                  (two)           



                                                  times a           



                                                  day.           

 

     senna      2023- No             1{tbl} QD   Take 1           [facility]



     (SENOKOT)      3-29 03-29                          tablet by           st



     8.6 mg      13:04: 00:00                          mouth           [facility]



     tablet      51   :00                           daily.           l

 

     potassium      2023- No             10meq QD   Take 1           [facility]

[facility]



     chloride      3-29 03-29                          tablet (10           st



     (K-DUR) 10      13:04: 00:00                          mEq total)           

[facility]



     MEQ CR      51   :00                           by mouth           l



     tablet                                         daily.           

 

     calcium      2023- No                  Q.5D Chew 1           [facility]



     carbonate      3-29 03-29                          tablet           st



     (TUMS) 200      13:04: 00:00                          (500 mg of           

[facility]



     mg calcium      51   :00                           Calcium           l



     (500 mg)                                         Carbonate           



     chewable                                         total) 2           



     tablet                                         (two)           



                                                  times a           



                                                  day as           



                                                  needed for           



                                                  indigestio           



                                                  n or           



                                                  heartburn.           

 

     dicyclomine      2023- No             20mg Q.25D Take 1           [facility]

[facility]



     (BENTYL) 20      3-29 03-29                          tablet (20           s

t



     mg tablet      13:04: 00:00                          mg total)           [facility]

[facility]



               51   :00                           by mouth 4           l



                                                  (four)           



                                                  times a           



                                                  day.           

 

     ondansetron      2023- No             4mg  Q.5D Take 1           [facility]

[facility]



     (ZOFRAN) 4      3-29 03-29                          tablet (4           st



     MG tablet      13:04: 00:00                          mg total)           [facility]

[facility]



               51   :00                           by mouth 2           l



                                                  (two)           



                                                  times a           



                                                  day as           



                                                  needed for           



                                                  nausea or           



                                                  vomiting.           

 

     spironolact      2023- No             50mg QD   Take 1           [facility]

[facility]



     one       3-29 03-29                          tablet (50           st



     (ALDACTONE)      13:04: 00:00                          mg total)           

[facility]



     50 MG      51   :00                           by mouth           l



     tablet                                         daily.           

 

     cetirizine       No             10mg QD   Take 1           [facility]

[facility]



     (ZyrTEC) 10      3-29 03-29                          tablet (10           s

t



     MG tablet      13:04: 00:00                          mg total)           [facility]

[facility]



               51   :00                           by mouth           l



                                                  daily.           

 

     DULoxetine       No             40mg QD   Take 1           [facility]

[facility]



     (DRIZALMA)      3-29 03-29                          capsule           st



     40 mg      13:04: 00:00                          (40 mg           [facility]



     capsule      51   :00                           total) by           l



                                                  mouth           



                                                  daily.           

 

     morPHINE      2023- No        [id number] 15mg Q.5D Take 1           [facility]

di



     (MS CONTIN)      3-29 03-29                          tablet (15           s

t



     15 MG 12 hr      13:04: 00:00                          mg total)           

[facility]osp[facility]



     tablet      51   :00                           by mouth 2           l



                                                  (two)           



                                                  times a           



                                                  day as           



                                                  needed for           



                                                  severe           



                                                  pain           



                                                  .chronic           



                                                  pain. Max           



                                                  Daily           



                                                  Amount: 30           



                                                  mg             

 

     pregabalin      2023- No             150mg Q.5D Take 2           [facility]

[facility]



     (LYRICA) 75      3-29 03-29                          capsules           st



     MG capsule      13:04: 00:00                          (150 mg           Hos

flo



               51   :00                           total) by           l



                                                  mouth 2           



                                                  (two)           



                                                  times a           



                                                  day. Total           



                                                  300mg a           



                                                  day            

 

     HYDROcodone      2023- No        37532 1{tbl} Q4H  Take 1           

[facility]



     -acetaminop      3-29 03-29                          tablet by           st



     hen (NORCO)      13:04: 00:00                          mouth           [facility]

[facility]



      mg      51   :00                           every 4           l



     per tablet                                         (four)           



                                                  hours           



                                                  .acute           



                                                  pain.           

 

     furosemide      2023- No             40mg Q.5D Take 1           [facility]

[facility]



     (LASIX) 40      3-29 03-29                          tablet (40           st



     mg tablet      13:04: 00:00                          mg total)           [facility]

[facility]



               51   :00                           by mouth 2           l



                                                  (two)           



                                                  times a           



                                                  day.           

 

     magnesium      2023- No             400mg QD   Take 1           [facility]

[facility]



     oxide      3-29 03-29                          tablet           st



     (MAG-OX)      13:04: 00:00                          (400 mg           [facility]

ta



     400 mg      51   :00                           total) by           l



     (241.3 mg                                         mouth           



     magnesium)                                         daily.           



     tablet                                                        

 

     fluocinolon      2023- No             1{appli Q.5D 1              Me

th[facility]



     e (SYNALAR)      3-29 03-29                cation}      applicatio         

  st



     0.01 %      13:04: 00:00                          n 2 (two)           [facility]

ta



     external      51   :00                           times a           l



     solution                                         day.           

 

     alum-mag      2023- No             30mL Q.25D Take 30 mL           [facility]

[facility]



     hydroxide-s      3-29 03-29                          by mouth 4           s

t



     imeth      13:04: 00:00                          (four)           [facility]osp[facility]



     (MAALOX      51   :00                           times a           l



     PLUS)                                         day as           



     200-200-20                                         needed for           



     mg/5 mL                                         heartburn.           



     suspension                                                        

 

     LORAZepam      2023- No             1mg  Q8H  Take 1           [facility]

[facility]



     (ATIVAN) 1      3-29 03-29                          tablet (1           st



     MG tablet      13:04: 00:00                          mg total)           [facility]

[facility]



               51   :00                           by mouth           l



                                                  every 8           



                                                  (eight)           



                                                  hours as           



                                                  needed for           



                                                  anxiety.           

 

     amiodarone      2023- No             1{tbl} QD   Take 1           [facility]

[facility]



     HCl       3-29 03-29                          tablet by           st



     (AMIODARONE      13:04: 00:00                          mouth           [facility]

[facility]



     ORAL)      51   :00                           daily.           l

 

     BUMETanide      -0 - No             2mg  QD   Take 1           [facility]

[facility]



     (BUMEX) 2      3-29 03-29                          tablet (2           st



     MG tablet      01:37: 00:00                          mg total)           [facility]

[facility]



               21   :00                           by mouth           l



                                                  daily.           

 

     BUMETanide      0 - No             2mg  QD   Take 1           [facility]

[facility]



     (BUMEX) 2      3-29 03-29                          tablet (2           st



     MG tablet      01:37: 00:00                          mg total)           [facility]

[facility]



               21   :00                           by mouth           l



                                                  daily.           

 

     morPHINE      2023-0      Yes       19760 15mg Q.5D Take 1           [facility]

i



     (MS CONTIN)      3-29                               tablet (15           st



     15 MG 12 hr      00:00:                               mg total)           H

ospita



     tablet      00                                 by mouth 2           l



                                                  (two)           



                                                  times a           



                                                  day as           



                                                  needed for           



                                                  severe           



                                                  pain           



                                                  .chronic           



                                                  pain. Max           



                                                  Daily           



                                                  Amount: 30           



                                                  mg             

 

     apixaban      2023-0      Yes            5mg  Q.5D Take 1           [facility]



     (ELIQUIS) 5      3-29                               tablet (5           st



     mg tablet      00:00:                               mg total)           [facility]

flo



               00                                 by mouth 2           l



                                                  (two)           



                                                  times a           



                                                  day.           

 

     HYDROcodone      2023-0      Yes       20939 1{tbl} Q4H  Take 1           M

[facility]



     -acetaminop      3-29                               tablet by           st



     hen (NORCO)      00:00:                               mouth           [facility]

ta



      mg      00                                 every 4           l



     per tablet                                         (four)           



                                                  hours           



                                                  .acute           



                                                  pain. Max           



                                                  Daily           



                                                  Amount: 6           



                                                  tablets           

 

     LORAZepam      2023-0      Yes            1mg  Q8H  Take 1           [facility]

i



     (ATIVAN) 1      3-29                               tablet (1           st



     MG tablet      00:00:                               mg total)           [facility]

[facility]



               00                                 by mouth           l



                                                  every 8           



                                                  (eight)           



                                                  hours as           



                                                  needed for           



                                                  anxiety.           

 

     pregabalin      2023-0      Yes            150mg Q.5D Take 1           [facility]

[facility]



     (LYRICA)      3-29                               capsule           st



     150 MG      00:00:                               (150 mg           [facility]



     capsule      00                                 total) by           l



                                                  mouth 2           



                                                  (two)           



                                                  times a           



                                                  day. Total           



                                                  300mg a           



                                                  day            

 

     morPHINE      2023-0      Yes       06174 15mg Q.5D Take 1           [facility]

i



     (MS CONTIN)      3-29                               tablet (15           st



     15 MG 12 hr      00:00:                               mg total)           H

ospita



     tablet      00                                 by mouth 2           l



                                                  (two)           



                                                  times a           



                                                  day as           



                                                  needed for           



                                                  severe           



                                                  pain           



                                                  .chronic           



                                                  pain. Max           



                                                  Daily           



                                                  Amount: 30           



                                                  mg             

 

     apixaban      2023-0      Yes            5mg  Q.5D Take 1           [facility]



     (ELIQUIS) 5      3-29                               tablet (5           st



     mg tablet      00:00:                               mg total)           [facility]

flo



               00                                 by mouth 2           l



                                                  (two)           



                                                  times a           



                                                  day.           

 

     HYDROcodone      2023-0      Yes       55359 1{tbl} Q4H  Take 1           [facility]

[facility]



     -acetaminop      3-29                               tablet by           st



     hen (NORCO)      00:00:                               mouth           [facility]

ta



      mg      00                                 every 4           l



     per tablet                                         (four)           



                                                  hours           



                                                  .acute           



                                                  pain. Max           



                                                  Daily           



                                                  Amount: 6           



                                                  tablets           

 

     LORAZepam            Yes            1mg  Q8H  Take 1           [facility]

i



     (ATIVAN) 1      3-29                               tablet (1           st



     MG tablet      00:00:                               mg total)           [facility]

[facility]



               00                                 by mouth           l



                                                  every 8           



                                                  (eight)           



                                                  hours as           



                                                  needed for           



                                                  anxiety.           

 

     pregabalin            Yes            150mg Q.5D Take 1           [facility]

[facility]



     (LYRICA)      3-29                               capsule           st



     150 MG      00:00:                               (150 mg           [facility]



     capsule      00                                 total) by           l



                                                  mouth 2           



                                                  (two)           



                                                  times a           



                                                  day. Total           



                                                  300mg a           



                                                  day            

 

     calcium      2023- Yes                 Q.5D Chew 1           [facility]



     carbonate      3-29 04-29                          tablet           st



     (TUMS) 200      00:00: 04:59                          (500 mg of           

[facility]osp[facility]



     mg calcium      00   :00                           Calcium           l



     (500 mg)                                         Carbonate           



     chewable                                         total) 2           



     tablet                                         (two)           



                                                  times a           



                                                  day as           



                                                  needed for           



                                                  indigestio           



                                                  n or           



                                                  heartburn           



                                                  for up to           



                                                  30 days.           

 

     cetirizine      2023- Yes            10mg QD   Take 1           [facility]

[facility]



     (ZyrTEC) 10      3-29 04-29                          tablet (10           s

t



     MG tablet      00:00: 04:59                          mg total)           [facility]

[facility]



               00   :00                           by mouth           l



                                                  daily for           



                                                  30 days.           

 

     dicyclomine      2023- Yes            20mg Q.25D Take 1           [facility]

[facility]



     (BENTYL) 20      3-29 04-29                          tablet (20           s

t



     mg tablet      00:00: 04:59                          mg total)           [facility]

[facility]



               00   :00                           by mouth 4           l



                                                  (four)           



                                                  times a           



                                                  day for 30           



                                                  days.           

 

     fluocinolon      2023- Yes                 Q.5D Apply 1           [facility]

[facility]



     e (SYNALAR)      3-29 04-29                          applicatio           s

t



     0.01 %      00:00: 04:59                          n.             [facility]



     external      00   :00                           topically           l



     solution                                         2 (two)           



                                                  times a           



                                                  day for 30           



                                                  days.           

 

     magnesium      2023- Yes            400mg QD   Take 1           [facility]

[facility]



     oxide      3-29 04-29                          tablet           st



     (MAG-OX)      00:00: 04:59                          (400 mg           [facility]

ta



     400 mg      00   :00                           total) by           l



     (241.3 mg                                         mouth           



     magnesium)                                         daily for           



     tablet                                         30 days.           

 

     ondansetron      2023- Yes            4mg  Q.5D Take 1           [facility]

[facility]



     (ZOFRAN) 4      3-29 04-29                          tablet (4           st



     MG tablet      00:00: 04:59                          mg total)           [facility]

[facility]



               00   :00                           by mouth 2           l



                                                  (two)           



                                                  times a           



                                                  day as           



                                                  needed for           



                                                  nausea or           



                                                  vomiting           



                                                  for up to           



                                                  30 days.           

 

     potassium      -2023- Yes            10meq QD   Take 1           [facility]

[facility]



     chloride      3-29 04-29                          tablet (10           st



     (K-DUR) 10      00:00: 04:59                          mEq total)           

[facility]



     MEQ CR      00   :00                           by mouth           l



     tablet                                         daily for           



                                                  30 days.           

 

     spironolact      2023- Yes            50mg QD   Take 1           [facility]

[facility]



     one       3-29 04-29                          tablet (50           st



     (ALDACTONE)      00:00: 04:59                          mg total)           

[facility]



     50 MG      00   :00                           by mouth           l



     tablet                                         daily for           



                                                  30 days.           

 

     amIODarone      2023- Yes            200mg QD   Take 1           [facility]

[facility]



     (PACERONE)      3-29 04-29                          tablet           st



     200 MG      00:00: 04:59                          (200 mg           [facility]



     tablet      00   :00                           total) by           l



                                                  mouth           



                                                  daily for           



                                                  30 days.           

 

     aspirin      2023- Yes            81mg QD   Take 1           [facility]



     (ECOTRIN)      3-29 04-29                          tablet (81           st



     81 MG      00:00: 04:59                          mg total)           Hospit

a



     enteric      00   :00                           by mouth           l



     coated                                         daily for           



     tablet                                         30 days.           

 

     atorvastati      2023- Yes            10mg QD   Take 1           [facility]

[facility]



     n (LIPITOR)      3-29 04-29                          tablet (10           s

t



     10 mg      00:00: 04:59                          mg total)           Hospit

a



     tablet      00   :00                           by mouth           l



                                                  daily for           



                                                  30 days.           

 

     DULoxetine      2023- Yes            40mg QD   Take 1           [facility]

[facility]



     (DRIZALMA)      3-29 04-29                          capsule           st



     40 mg      00:00: 04:59                          (40 mg           [facility]



     capsule      00   :00                           total) by           l



                                                  mouth           



                                                  daily for           



                                                  30 days.           

 

     furosemide      2023- Yes            40mg Q.5D Take 1           [facility]

[facility]



     (LASIX) 40      3-29 04-                          tablet (40           st



     mg tablet      00:00: 04:59                          mg total)           [facility]

[facility]



               00   :00                           by mouth 2           l



                                                  (two)           



                                                  times a           



                                                  day for 30           



                                                  days.           

 

     ranolazine      2023- Yes            500mg Q.5D Take 1           [facility]

[facility]



     (RANEXA)      3-29 04-29                          tablet           st



     500 MG 12      00:00: 04:59                          (500 mg           [facility]

[facility]



     hr ER      00   :00                           total) by           l



     tablet                                         mouth 2           



                                                  (two)           



                                                  times a           



                                                  day for 30           



                                                  days.           

 

     senna      2023- Yes            1{tbl} QD   Take 1           [facility]



     (SENOKOT)      3-29 04-29                          tablet by           st



     8.6 mg      00:00: 04:59                          mouth           [facility]



     tablet      00   :00                           daily for           l



                                                  30 days.           

 

     tamsulosin      2023- Yes            .4mg QD   Take 1           [facility]

[facility]



     (FLOMAX)      3-29 04-29                          capsule           st



     0.4 mg      00:00: :59                          (0.4 mg           [facility]



     capsule      00   :00                           total) by           l



                                                  mouth           



                                                  daily for           



                                                  30 days.           

 

     calcium      2023- Yes                 Q.5D Chew 1           [facility]



     carbonate      3-29 04-29                          tablet           st



     (TUMS) 200      00:00: :59                          (500 mg of           

[facility]osp[facility]



     mg calcium      00   :00                           Calcium           l



     (500 mg)                                         Carbonate           



     chewable                                         total) 2           



     tablet                                         (two)           



                                                  times a           



                                                  day as           



                                                  needed for           



                                                  indigestio           



                                                  n or           



                                                  heartburn           



                                                  for up to           



                                                  30 days.           

 

     cetirizine      2023- Yes            10mg QD   Take 1           [facility]

[facility]



     (ZyrTEC) 10      3-29 04-29                          tablet (10           s

t



     MG tablet      00:00: 04:59                          mg total)           [facility]

[facility]



               00   :00                           by mouth           l



                                                  daily for           



                                                  30 days.           

 

     dicyclomine      2023- Yes            20mg Q.25D Take 1           [facility]

[facility]



     (BENTYL) 20      3-29 04-29                          tablet (20           s

t



     mg tablet      00:00: 04:59                          mg total)           [facility]

[facility]



               00   :00                           by mouth 4           l



                                                  (four)           



                                                  times a           



                                                  day for 30           



                                                  days.           

 

     fluocinolon      2023- Yes            1{appli Q.5D Apply 1          

 [facility]



     e (SYNALAR)      3-29 04-29                cation}      applicatio         

  st



     0.01 %      00:00: 04:59                          n.             [facility]osp[facility]



     external      00   :00                           topically           l



     solution                                         2 (two)           



                                                  times a           



                                                  day for 30           



                                                  days.           

 

     magnesium      2023- Yes            400mg QD   Take 1           [facility]

[facility]



     oxide      3-29 04-29                          tablet           st



     (MAG-OX)      00:00: 04:59                          (400 mg           [facility]

ta



     400 mg      00   :00                           total) by           l



     (241.3 mg                                         mouth           



     magnesium)                                         daily for           



     tablet                                         30 days.           

 

     ondansetron      2023- Yes            4mg  Q.5D Take 1           [facility]

[facility]



     (ZOFRAN) 4      3-29 04-29                          tablet (4           st



     MG tablet      00:00: 04:59                          mg total)           [facility]

[facility]



               00   :00                           by mouth 2           l



                                                  (two)           



                                                  times a           



                                                  day as           



                                                  needed for           



                                                  nausea or           



                                                  vomiting           



                                                  for up to           



                                                  30 days.           

 

     potassium      2023- Yes            10meq QD   Take 1           [facility]

[facility]



     chloride      3-29 04-29                          tablet (10           st



     (K-DUR) 10      00:00: 04:59                          mEq total)           

[facility]osp[facility]



     MEQ CR      00   :00                           by mouth           l



     tablet                                         daily for           



                                                  30 days.           

 

     spironolact      2023- Yes            50mg QD   Take 1           [facility]

[facility]



     one       3-29 04-29                          tablet (50           st



     (ALDACTONE)      00:00: 04:59                          mg total)           

[facility]osp[facility]



     50 MG      00   :00                           by mouth           l



     tablet                                         daily for           



                                                  30 days.           

 

     amIODarone      2023- Yes            200mg QD   Take 1           [facility]

[facility]



     (PACERONE)      3-29 04-29                          tablet           st



     200 MG      00:00: 04:59                          (200 mg           [facility]



     tablet      00   :00                           total) by           l



                                                  mouth           



                                                  daily for           



                                                  30 days.           

 

     aspirin      2023- Yes            81mg QD   Take 1           [facility]



     (ECOTRIN)      3-29 04-29                          tablet (81           st



     81 MG      00:00: 04:59                          mg total)           Hospit

a



     enteric      00   :00                           by mouth           l



     coated                                         daily for           



     tablet                                         30 days.           

 

     atorvastati      2023- Yes            10mg QD   Take 1           [facility]

[facility]



     n (LIPITOR)      3-29 04-29                          tablet (10           s

t



     10 mg      00:00: 04:59                          mg total)           Hospit

a



     tablet      00   :00                           by mouth           l



                                                  daily for           



                                                  30 days.           

 

     DULoxetine      2023- Yes            40mg QD   Take 1           [facility]

[facility]



     (DRIZALMA)      3-29 04-29                          capsule           st



     40 mg      00:00: 04:59                          (40 mg           [facility]



     capsule      00   :00                           total) by           l



                                                  mouth           



                                                  daily for           



                                                  30 days.           

 

     furosemide      2023- Yes            40mg Q.5D Take 1           [facility]

[facility]



     (LASIX) 40      3-29 04-29                          tablet (40           st



     mg tablet      00:00: 04:59                          mg total)           [facility]

[facility]



               00   :00                           by mouth 2           l



                                                  (two)           



                                                  times a           



                                                  day for 30           



                                                  days.           

 

     ranolazine      2023- Yes            500mg Q.5D Take 1           [facility]

[facility]



     (RANEXA)      3-29 04-29                          tablet           st



     500 MG 12      00:00: 04:59                          (500 mg           [facility]

[facility]



     hr ER      00   :00                           total) by           l



     tablet                                         mouth 2           



                                                  (two)           



                                                  times a           



                                                  day for 30           



                                                  days.           

 

     senna      2023- Yes            1{tbl} QD   Take 1           [facility]



     (SENOKOT)      3-29 04-29                          tablet by           st



     8.6 mg      00:00: 04:59                          mouth           [facility]osp[facility]



     tablet      00   :00                           daily for           l



                                                  30 days.           

 

     tamsulosin      2023- Yes            .4mg QD   Take 1           [facility]

[facility]



     (FLOMAX)      3-29 04-29                          capsule           st



     0.4 mg      00:00: 04:59                          (0.4 mg           [facility]



     capsule      00   :00                           total) by           l



                                                  mouth           



                                                  daily for           



                                                  30 days.           

 

     alum-mag      2023- No             30mL Q.25D Take 30 mL           M

[facility]i



     hydroxide-s      3-29 04-09                          by mouth 4           s

t



     imeth      00:00: 04:59                          (four)           Hospita



     (MAALOX      00   :00                           times a           l



     PLUS)                                         day as           



     200-200-20                                         needed for           



     mg/5 mL                                         heartburn           



     suspension                                         for up to           



                                                  10 days.           

 

     alum-mag      2023- No             30mL Q.25D Take 30 mL           M

ethodi



     hydroxide-s      3-29 04-09                          by mouth 4           s

t



     imeth      00:00: 04:59                          (four)           [facility]osp[facility]



     (MAALOX      00   :00                           times a           l



     PLUS)                                         day as           



     200-200-20                                         needed for           



     mg/5 mL                                         heartburn           



     suspension                                         for up to           



                                                  10 days.           

 

     tamsulosin      0      Yes            .4mg QD   Take 1           [facility]

[facility]



     (FLOMAX)      3-27                               capsule           [facility]



     0.4 mg Cap      16:29:                               (0.4 mg           [facility]

[facility]



     24 hr      58                                 total) by           [facility]



     capsule                                         mouth in           



                                                  the            



                                                  morning.           

 

     levothyroxi      0      Yes            50ug      Take 1           [facility]



     ne        3-27                               tablet (50           [facility]



     (SYNTHROID,      16:29:                               mcg total)           

[facility]



     LEVOTHROID)      58                                 by mouth.           [facility]

[facility]



     50 MCG                                                        



     tablet                                                        

 

     HYDROcodone      3-0      Yes            1{tbl}      Take 1           [facility]



     -acetaminop      3-27                               tablet by           [facility]

[facility]



     hen (NORCO      16:29:                               mouth           [facility]

l



     )      58                                 every 6           [facility]



      mg                                         (six)           



     per tablet                                         hours as           



                                                  needed for           



                                                  Pain.           

 

     tamsulosin      3-0      Yes            .4mg QD   Take 1           [facility]

[facility]



     (FLOMAX)      3-27                               capsule           [facility]



     0.4 mg Cap      16:29:                               (0.4 mg           [facility]

[facility]



     24 hr      58                                 total) by           Center



     capsule                                         mouth in           



                                                  the            



                                                  morning.           

 

     levothyroxi      2023-0      Yes            50ug      Take 1           [facility]



     ne        3-27                               tablet (50           [facility]



     (SYNTHROID,      16:29:                               mcg total)           

[facility]



     LEVOTHROID)      58                                 by mouth.           [facility]

ter



     50 MCG                                                        



     tablet                                                        

 

     HYDROcodone      2023-0      Yes            1{tbl}      Take 1           [facility]



     -acetaminop      3-27                               tablet by           [facility]

[facility]



     hen (NORCO      16:29:                               mouth           [facility]

l



     )      58                                 every 6           [facility]



      mg                                         (six)           



     per tablet                                         hours as           



                                                  needed for           



                                                  Pain.           

 

     cyclobenzap      2023-0 2023- No             10mg      Take 1           [facility]



     rine      3-27 04-01                          tablet (10           [facility]



     (FLEXERIL)      00:00: 23:59                          mg total)           [facility]

[facility]



     10 MG      00   :00                           by mouth 3           Center



     tablet                                         (three)           



                                                  times           



                                                  daily as           



                                                  needed for           



                                                  Muscle           



                                                  spasms for           



                                                  up to 5           



                                                  days.           

 

     cyclobenzap      2023-0 2023- No             10mg      Take 1           [facility]



     rine      3-27 04-01                          tablet (10           [facility]



     (FLEXERIL)      00:00: 23:59                          mg total)           [facility]

[facility]



     10 MG      00   :00                           by mouth 3           Center



     tablet                                         (three)           



                                                  times           



                                                  daily as           



                                                  needed for           



                                                  Muscle           



                                                  spasms for           



                                                  up to 5           



                                                  days.           

 

     guaifenesin      2023-0      Yes            600mg      Take 1           [place]

[facility]



     (MUCINEX)      3-07                               Tablet by           [facility]

ge



     600 MG SR      11:53:                               mouth two           of



     tablet      04                                 times           [facility]



                                                  daily.           e

 

     diphenhydrA      3-0      Yes            10mg      Take 10 mg           

[facility]



     MINE-Phenyl      3-07                               by mouth           [facility]

e[facility]



     ephrine      11:53:                               as needed.           of



     (SUDAFED PE      04                                                [facility]



     DAY & NIGHT                                                        e



     OR)                                                         

 

     hydrocodone            Yes            1{tbl}      Take 1           [facility]

[facility]



     -acetaminop      3-07                               Tablet by           [facility]

[facility]



     hen (NORCO)      11:53:                               mouth           of



      MG      04                                 every 4           [facility]



     per tablet                                         hours as           e



                                                  needed for           



                                                  Pain.           

 

     Tamsulosin            Yes            .4mg      Take 0.4           [facility]

[facility]



     HCl 0.4 MG      3-07                               mg by           [place]



     CAPS      11:53:                               mouth           of



               04                                 daily.           [facility]



                                                                 e

 

     cetirizine,            Yes            10mg      Take 1           [facility]

[facility]



     ZYRTEC, 10      3-07                               Tablet by           [facility]

ege



     MG tablet      11:53:                               mouth           of



               04                                 once.           [facility]



                                                                 e

 

     cetirizine,      2022      Yes            10mg      Take 10 mg           

[facility]



     ZYRTEC,      1-08                               by mouth           [place]



     (ZYRTEC      08:21:                               once.           of



     ALLERGY) 10      29                                                [facility]



     MG tablet                                                        e

 

     guaifenesin      2022      Yes            600mg      Take 600           [facility]

[facility]



     (MUCINEX)      1-08                               mg by           [place]



     600 MG SR      08:20:                               mouth two           of



     tablet      45                                 times           [facility]



                                                  daily.           e

 

     diphenhydrA      2022      Yes            10mg      Take 10 mg           

[place]



     MINE-Phenyl      1-08                               by mouth           [facility]

[facility]



     ephrine      08:20:                               as needed.           of



     (SUDAFED PE      45                                                [facility]



     DAY & NIGHT                                                        e



     OR)                                                         

 

     hydrocodone      2022      Yes            1{tbl}      Take 1           [facility]

[facility]



     -acetaminop      1-08                               Tablet by           [facility]

lege



     hen (NORCO)      08:20:                               mouth           of



      MG      45                                 every 6           [facility]



     per tablet                                         hours as           e



                                                  needed for           



                                                  Pain.           

 

     Tamsulosin      2022      Yes            .4mg      Take 0.4           [facility]

[facility]



     HCl       1-08                               mg by           [place]



     (FLOMAX)      08:20:                               mouth           of



     0.4 MG CAPS      45                                 daily.           [facility]

n



                                                                 e

 

     carvedilol      2022      Yes            6.25mg      Take 6.25           

[facility]



     (COREG)      0-13                               mg by           [place]



     6.25 MG      00:00:                               mouth 2           of



     tablet      00                                 times           [facility]



                                                  daily           e



                                                  (with           



                                                  meals).           

 

     carvedilol      2022      Yes            6.25mg      Take 1           [place]

[facility]



     (COREG)      0-13                               Tablet by           [place]



     6.25 MG      00:00:                               mouth 2           of



     tablet      00                                 times           [facility]



                                                  daily           e



                                                  (with           



                                                  meals).           

 

     lamotrigine      2022      Yes            25mg      Take 25 mg           

[facility]



     (LAMICTAL)      0-09                               by mouth           [facility]

ge



     25 MG      00:00:                               daily.           of



     tablet      00                                                [facility]



                                                                 e

 

     lamotrigine      2022      Yes            25mg      Take 1           [facility]

[facility]



     (LAMICTAL)      0-09                               Tablet by           [facility]

e[facility]



     25 MG      00:00:                               mouth           of



     tablet      00                                 daily.           [facility]



                                                                 e

 

     lorazepam      2022      Yes            .5mg      Take 0.5           [facility]

[facility]



     (ATIVAN)      0-06                               mg by           [facility]



     tablet      00:00:                               mouth           of



               00                                 Every 6           [facility]



                                                  hours.           e

 

     lorazepam      2022      Yes            .5mg      Take 1           [facility]



     (ATIVAN)      0-06                               Tablet by           [facility]

e



     tablet      00:00:                               mouth           of



               00                                 Every 6           [facility]



                                                  hours.           e

 

     dicyclomine            Yes            20mg Q.25D Take 1           [facility]

[facility]



     (BENTYL) 20      -23                               tablet (20           st



     mg tablet      15:45:                               mg total)           Hos

flo



               04                                 by mouth 4           l



                                                  (four)           



                                                  times a           



                                                  day.           

 

     BUMETanide      0      Yes            2mg  QD   Take 1           [facility]

[facility]



     (BUMEX) 2      -23                               tablet (2           st



     MG tablet      15:45:                               mg total)           Hos

flo



               04                                 by mouth           l



                                                  daily.           

 

     ondansetron      0      Yes            4mg  Q.5D Take 1           [facility]

[facility]



     (ZOFRAN) 4      -23                               tablet (4           st



     MG tablet      15:45:                               mg total)           Hos

flo



               04                                 by mouth 2           l



                                                  (two)           



                                                  times a           



                                                  day as           



                                                  needed for           



                                                  nausea or           



                                                  vomiting.           

 

     spironolact      0      Yes            50mg QD   Take 1           [facility]

[facility]



     one       -                               tablet (50           st



     (ALDACTONE)      15:45:                               mg total)           [facility]

[facility]



     50 MG      04                                 by mouth           l



     tablet                                         daily.           

 

     cetirizine      0      Yes            10mg QD   Take 1           [facility]

[facility]



     (ZyrTEC) 10      -23                               tablet (10           st



     MG tablet      15:45:                               mg total)           [facility]

flo



               04                                 by mouth           l



                                                  daily.           

 

     magnesium      0      Yes            148mL      Take 0.5           [facility]

[facility]



     citrate                                     Bottles           st



     solution      15:45:                               (148 mL           [facility]

a



               04                                 total) by           l



                                                  mouth as           



                                                  needed.           

 

     DULoxetine      0      Yes            40mg QD   Take 1           [facility]

[facility]



     (DRIZALMA)                                     capsule           st



     40 mg      15:45:                               (40 mg           [facility]



     capsule      04                                 total) by           l



                                                  mouth           



                                                  daily.           

 

     benzonatate      -0      Yes            200mg [id number] Take 1        

   [facility]



     (TESSALON)                                [id number] capsule           

st



     200 MG      15:45:                          3D   (200 mg           [facility]



     capsule      04                                 total) by           l



                                                  mouth 3           



                                                  (three)           



                                                  times a           



                                                  day as           



                                                  needed for           



                                                  cough.           

 

     morPHINE      -0      Yes       48198 15mg Q.5D Take 1           [facility]

i



     (MS CONTIN)                                     tablet (15           st



     15 MG 12 hr      15:45:                               mg total)           [facility]

[facility]



     tablet      04                                 by mouth 2           l



                                                  (two)           



                                                  times a           



                                                  day as           



                                                  needed for           



                                                  severe           



                                                  pain           



                                                  .chronic           



                                                  pain. Max           



                                                  Daily           



                                                  Amount: 30           



                                                  mg             

 

     pregabalin      -0      Yes            75mg Q.5D Take 1           [facility]

[facility]



     (LYRICA) 75                                     capsule           st



     MG capsule      15:45:                               (75 mg           [facility]

ta



               04                                 total) by           l



                                                  mouth 2           



                                                  (two)           



                                                  times a           



                                                  day.           

 

     HYDROcodone      -0      Yes       50685 1{tbl} Q4H  Take 1           [facility]

[facility]



     -acetaminop                                     tablet by           st



     hen (NORCO)      15:45:                               mouth           [facility]

ta



      mg      04                                 every 4           l



     per tablet                                         (four)           



                                                  hours           



                                                  .acute           



                                                  pain. Max           



                                                  Daily           



                                                  Amount: 6           



                                                  tablets           

 

     furosemide      -0      Yes            40mg Q.5D Take 1           [facility]

[facility]



     (LASIX) 40                                     tablet (40           st



     mg tablet      15:45:                               mg total)           [facility]

flo



               04                                 by mouth 2           l



                                                  (two)           



                                                  times a           



                                                  day.           

 

     magnesium      -0      Yes            400mg QD   Take 1           [facility]

[facility]



     oxide                                     tablet           st



     (MAG-OX)      15:45:                               (400 mg           [facility]

a



     400 mg      04                                 total) by           l



     (241.3 mg                                         mouth           



     magnesium)                                         daily.           



     tablet                                                        

 

     fluocinolon      -0      Yes            1{appli Q.5D 1              [facility]

[facility]



     e (SYNALAR)                           cation}      applicatio          

 st



     0.01 %      15:45:                               n 2 (two)           [facility]

a



     external      04                                 times a           l



     solution                                         day.           

 

     alum-mag      -0      Yes            30mL Q.25D Take 30 mL           [facility]

[facility]



     hydroxide-s                                     by mouth 4           st



     imeth      15:45:                               (four)           [facility]



     (MAALOX      04                                 times a           l



     PLUS)                                         day as           



     200-200-20                                         needed for           



     mg/5 mL                                         heartburn.           



     suspension                                                        

 

     promethazin      -0      Yes            25mg Q6H  Take 1           [facility]

[facility]



     e         9-23                               tablet (25           st



     (PHENERGAN)      15:45:                               mg total)           [facility]

[facility]



     25 MG      04                                 by mouth           l



     tablet                                         every 6           



                                                  (six)           



                                                  hours as           



                                                  needed for           



                                                  nausea or           



                                                  vomiting.           

 

     LORAZepam      -0      Yes            1mg  Q8H  Take 1           [facility]

i



     (ATIVAN) 1      9-23                               tablet (1           st



     MG tablet      15:45:                               mg total)           [facility]

flo



               04                                 by mouth           l



                                                  every 8           



                                                  (eight)           



                                                  hours as           



                                                  needed for           



                                                  anxiety.           

 

     amiodarone      -0      Yes            1{tbl} QD   Take 1           [facility]

[facility]



     HCl       9-23                               tablet by           st



     (AMIODARONE      15:45:                               mouth           [facility]

ta



     ORAL)      04                                 daily.           l

 

     dicyclomine      -0      Yes            20mg Q.25D Take 1           [facility]

[facility]



     (BENTYL) 20      -23                               tablet (20           st



     mg tablet      15:45:                               mg total)           [facility]

flo



               04                                 by mouth 4           l



                                                  (four)           



                                                  times a           



                                                  day.           

 

     BUMETanide      -0      Yes            2mg  QD   Take 1           [facility]

[facility]



     (BUMEX) 2      -23                               tablet (2           st



     MG tablet      15:45:                               mg total)           [facility]

flo



               04                                 by mouth           l



                                                  daily.           

 

     ondansetron      -0      Yes            4mg  Q.5D Take 1           [facility]

[facility]



     (ZOFRAN) 4      -23                               tablet (4           st



     MG tablet      15:45:                               mg total)           [facility]

flo



               04                                 by mouth 2           l



                                                  (two)           



                                                  times a           



                                                  day as           



                                                  needed for           



                                                  nausea or           



                                                  vomiting.           

 

     spironolact      -0      Yes            50mg QD   Take 1           [facility]

[facility]



     one       -23                               tablet (50           st



     (ALDACTONE)      15:45:                               mg total)           [facility]

[facility]



     50 MG      04                                 by mouth           l



     tablet                                         daily.           

 

     cetirizine      2-0      Yes            10mg QD   Take 1           [facility]

di



     (ZyrTEC) 10      -23                               tablet (10           st



     MG tablet      15:45:                               mg total)           [facility]

flo



               04                                 by mouth           l



                                                  daily.           

 

     magnesium      -0      Yes            148mL      Take 0.5           [facility]

[facility]



     citrate      9-23                               Bottles           st



     solution      15:45:                               (148 mL           [facility]

a



               04                                 total) by           l



                                                  mouth as           



                                                  needed.           

 

     DULoxetine      -0      Yes            40mg QD   Take 1           [facility]

di



     (DRIZALMA)                                     capsule           st



     40 mg      15:45:                               (40 mg           [facility]



     capsule      04                                 total) by           l



                                                  mouth           



                                                  daily.           

 

     benzonatate      -0      Yes            200mg [id number] Take 1        

   [facility]



     (TESSALON)                                [id number] capsule           

st



     200 MG      15:45:                          3D   (200 mg           [facility]



     capsule      04                                 total) by           l



                                                  mouth 3           



                                                  (three)           



                                                  times a           



                                                  day as           



                                                  needed for           



                                                  cough.           

 

     morPHINE      -0      Yes       97065 15mg Q.5D Take 1           [facility]

i



     (MS CONTIN)                                     tablet (15           st



     15 MG 12 hr      15:45:                               mg total)           [facility]

[facility]



     tablet      04                                 by mouth 2           l



                                                  (two)           



                                                  times a           



                                                  day as           



                                                  needed for           



                                                  severe           



                                                  pain           



                                                  .chronic           



                                                  pain. Max           



                                                  Daily           



                                                  Amount: 30           



                                                  mg             

 

     pregabalin      -0      Yes            75mg Q.5D Take 1           [facility]

[facility]



     (LYRICA) 75                                     capsule           st



     MG capsule      15:45:                               (75 mg           [facility]

ta



               04                                 total) by           l



                                                  mouth 2           



                                                  (two)           



                                                  times a           



                                                  day.           

 

     HYDROcodone      -0      Yes       18976 1{tbl} Q4H  Take 1           [facility]

[facility]



     -acetaminop                                     tablet by           st



     hen (NORCO)      15:45:                               mouth           [facility]

ta



      mg      04                                 every 4           l



     per tablet                                         (four)           



                                                  hours           



                                                  .acute           



                                                  pain. Max           



                                                  Daily           



                                                  Amount: 6           



                                                  tablets           

 

     furosemide      -0      Yes            40mg Q.5D Take 1           [facility]

[facility]



     (LASIX) 40                                     tablet (40           st



     mg tablet      15:45:                               mg total)           [facility]

flo



               04                                 by mouth 2           l



                                                  (two)           



                                                  times a           



                                                  day.           

 

     magnesium      -0      Yes            400mg QD   Take 1           [facility]

[facility]



     oxide                                     tablet           st



     (MAG-OX)      15:45:                               (400 mg           [facility]

a



     400 mg      04                                 total) by           l



     (241.3 mg                                         mouth           



     magnesium)                                         daily.           



     tablet                                                        

 

     fluocinolon      -0      Yes            1{appli Q.5D 1              [facility]

[facility]



     e (SYNALAR)                           cation}      applicatio          

 st



     0.01 %      15:45:                               n 2 (two)           [facility]

a



     external      04                                 times a           l



     solution                                         day.           

 

     alum-mag      -0      Yes            30mL Q.25D Take 30 mL           [facility]

[facility]



     hydroxide-s      -23                               by mouth 4           st



     imeth      15:45:                               (four)           [facility]



     (MAALOX      04                                 times a           l



     PLUS)                                         day as           



     200-200-20                                         needed for           



     mg/5 mL                                         heartburn.           



     suspension                                                        

 

     promethazin      -0      Yes            25mg Q6H  Take 1           [facility]

[facility]



     e         9-23                               tablet (25           st



     (PHENERGAN)      15:45:                               mg total)           [facility]

[facility]



     25 MG      04                                 by mouth           l



     tablet                                         every 6           



                                                  (six)           



                                                  hours as           



                                                  needed for           



                                                  nausea or           



                                                  vomiting.           

 

     LORAZepam      -0      Yes            1mg  Q8H  Take 1           [facility]

i



     (ATIVAN) 1      9-23                               tablet (1           st



     MG tablet      15:45:                               mg total)           [facility]

flo



               04                                 by mouth           l



                                                  every 8           



                                                  (eight)           



                                                  hours as           



                                                  needed for           



                                                  anxiety.           

 

     amiodarone      -0      Yes            1{tbl} QD   Take 1           [facility]

[facility]



     HCl       9-23                               tablet by           st



     (AMIODARONE      15:45:                               mouth           [facility]

ta



     ORAL)      04                                 daily.           l

 

     dicyclomine      -0      Yes            20mg Q.25D Take 1           [facility]

[facility]



     (BENTYL) 20      9-23                               tablet (20           st



     mg tablet      15:45:                               mg total)           [facility]

flo



               04                                 by mouth 4           l



                                                  (four)           



                                                  times a           



                                                  day.           

 

     BUMETanide      -0      Yes            2mg  QD   Take 1           [facility]

[facility]



     (BUMEX) 2      -23                               tablet (2           st



     MG tablet      15:45:                               mg total)           [facility]

[facility]



               04                                 by mouth           l



                                                  daily.           

 

     ondansetron      -0      Yes            4mg  Q.5D Take 1           [facility]

[facility]



     (ZOFRAN) 4      9-23                               tablet (4           st



     MG tablet      15:45:                               mg total)           [facility]

flo



               04                                 by mouth 2           l



                                                  (two)           



                                                  times a           



                                                  day as           



                                                  needed for           



                                                  nausea or           



                                                  vomiting.           

 

     spironolact      -0      Yes            50mg QD   Take 1           [facility]

[facility]



     one       9-23                               tablet (50           st



     (ALDACTONE)      15:45:                               mg total)           [facility]

[facility]



     50 MG      04                                 by mouth           l



     tablet                                         daily.           

 

     cetirizine      -0      Yes            10mg QD   Take 1           [facility]

[facility]



     (ZyrTEC) 10      9-23                               tablet (10           st



     MG tablet      15:45:                               mg total)           [facility]

flo



               04                                 by mouth           l



                                                  daily.           

 

     magnesium      -0      Yes            148mL      Take 0.5           [facility]

[facility]



     citrate      9-23                               Bottles           st



     solution      15:45:                               (148 mL           [facility]

a



               04                                 total) by           l



                                                  mouth as           



                                                  needed.           

 

     DULoxetine      -0      Yes            40mg QD   Take 1           [facility]

di



     (DRIZALMA)                                     capsule           st



     40 mg      15:45:                               (40 mg           [facility]



     capsule      04                                 total) by           l



                                                  mouth           



                                                  daily.           

 

     benzonatate      2-0      Yes            200mg Q.[id number] Take 1        

   [facility]



     (TESSALON)                                [id number] capsule           

st



     200 MG      15:45:                          3D   (200 mg           [facility]



     capsule      04                                 total) by           l



                                                  mouth 3           



                                                  (three)           



                                                  times a           



                                                  day as           



                                                  needed for           



                                                  cough.           

 

     morPHINE      -0      Yes       13532 15mg Q.5D Take 1           [facility]

i



     (MS CONTIN)                                     tablet (15           st



     15 MG 12 hr      15:45:                               mg total)           [facility]

[facility]



     tablet      04                                 by mouth 2           l



                                                  (two)           



                                                  times a           



                                                  day as           



                                                  needed for           



                                                  severe           



                                                  pain           



                                                  .chronic           



                                                  pain. Max           



                                                  Daily           



                                                  Amount: 30           



                                                  mg             

 

     pregabalin      2-0      Yes            75mg Q.5D Take 1           [facility]

[facility]



     (LYRICA) 75                                     capsule           st



     MG capsule      15:45:                               (75 mg           [facility]

ta



               04                                 total) by           l



                                                  mouth 2           



                                                  (two)           



                                                  times a           



                                                  day.           

 

     HYDROcodone      -0      Yes       79142 1{tbl} Q4H  Take 1           [facility]

[facility]



     -acetaminop                                     tablet by           st



     hen (NORCO)      15:45:                               mouth           [facility]

ta



      mg      04                                 every 4           l



     per tablet                                         (four)           



                                                  hours           



                                                  .acute           



                                                  pain. Max           



                                                  Daily           



                                                  Amount: 6           



                                                  tablets           

 

     furosemide      2-0      Yes            40mg Q.5D Take 1           [facility]

[facility]



     (LASIX) 40                                     tablet (40           st



     mg tablet      15:45:                               mg total)           H[facility]

flo



               04                                 by mouth 2           l



                                                  (two)           



                                                  times a           



                                                  day.           

 

     magnesium      -0      Yes            400mg QD   Take 1           [facility]

[facility]



     oxide                                     tablet           st



     (MAG-OX)      15:45:                               (400 mg           [facility]

a



     400 mg      04                                 total) by           l



     (241.3 mg                                         mouth           



     magnesium)                                         daily.           



     tablet                                                        

 

     fluocinolon      -0      Yes            1{appli Q.5D 1              [facility]

[facility]



     e (SYNALAR)                           cation}      applicatio          

 st



     0.01 %      15:45:                               n 2 (two)           [facility]

a



     external      04                                 times a           l



     solution                                         day.           

 

     alum-mag      -0      Yes            30mL Q.25D Take 30 mL           [facility]

[facility]



     hydroxide-s                                     by mouth 4           st



     imeth      15:45:                               (four)           [facility]



     (MAALOX      04                                 times a           l



     PLUS)                                         day as           



     200-200-20                                         needed for           



     mg/5 mL                                         heartburn.           



     suspension                                                        

 

     promethazin      -0      Yes            25mg Q6H  Take 1           [facility]

k[facility]



     e         9-23                               tablet (25           st



     (PHENERGAN)      15:45:                               mg total)           [facility]

ospita



     25 MG      04                                 by mouth           l



     tablet                                         every 6           



                                                  (six)           



                                                  hours as           



                                                  needed for           



                                                  nausea or           



                                                  vomiting.           

 

     LORAZepam      -0      Yes            1mg  Q8H  Take 1           [facility]

i



     (ATIVAN) 1      9-23                               tablet (1           st



     MG tablet      15:45:                               mg total)           [facility]

flo



               04                                 by mouth           l



                                                  every 8           



                                                  (eight)           



                                                  hours as           



                                                  needed for           



                                                  anxiety.           

 

     amiodarone      -0      Yes            1{tbl} QD   Take 1           [facility]

[facility]



     HCl       9-23                               tablet by           st



     (AMIODARONE      15:45:                               mouth           [facility]

ta



     ORAL)      04                                 daily.           l

 

     dicyclomine      -0      Yes            20mg Q.25D Take 1           [facility]

[facility]



     (BENTYL) 20      9-23                               tablet (20           st



     mg tablet      15:45:                               mg total)           [facility]

flo



               04                                 by mouth 4           l



                                                  (four)           



                                                  times a           



                                                  day.           

 

     BUMETanide      -0      Yes            2mg  QD   Take 1           [facility]

[facility]



     (BUMEX) 2      9-23                               tablet (2           st



     MG tablet      15:45:                               mg total)           [facility]

flo



               04                                 by mouth           l



                                                  daily.           

 

     ondansetron      -0      Yes            4mg  Q.5D Take 1           [facility]

[facility]



     (ZOFRAN) 4      9-23                               tablet (4           st



     MG tablet      15:45:                               mg total)           [facility]

flo



               04                                 by mouth 2           l



                                                  (two)           



                                                  times a           



                                                  day as           



                                                  needed for           



                                                  nausea or           



                                                  vomiting.           

 

     spironolact      -0      Yes            50mg QD   Take 1           [facility]

[facility]



     one       9-23                               tablet (50           st



     (ALDACTONE)      15:45:                               mg total)           [facility]

[facility]



     50 MG      04                                 by mouth           l



     tablet                                         daily.           

 

     cetirizine      -0      Yes            10mg QD   Take 1           [facility]

[facility]



     (ZyrTEC) 10      9-23                               tablet (10           st



     MG tablet      15:45:                               mg total)           [facility]

flo



               04                                 by mouth           l



                                                  daily.           

 

     magnesium      -0      Yes            148mL      Take 0.5           [facility]

[facility]



     citrate      9-23                               Bottles           st



     solution      15:45:                               (148 mL           [facility]

a



               04                                 total) by           l



                                                  mouth as           



                                                  needed.           

 

     DULoxetine      -0      Yes            40mg QD   Take 1           [facility]

[facility]



     (DRIZALMA)                                     capsule           st



     40 mg      15:45:                               (40 mg           [facility]



     capsule      04                                 total) by           l



                                                  mouth           



                                                  daily.           

 

     benzonatate      2-0      Yes            200mg Q.[id number] Take 1        

   [facility]



     (TESSALON)                                [id number] capsule           

st



     200 MG      15:45:                          3D   (200 mg           [facility]



     capsule      04                                 total) by           l



                                                  mouth 3           



                                                  (three)           



                                                  times a           



                                                  day as           



                                                  needed for           



                                                  cough.           

 

     morPHINE      -0      Yes       62322 15mg Q.5D Take 1           [facility]

i



     (MS CONTIN)                                     tablet (15           st



     15 MG 12 hr      15:45:                               mg total)           [facility]

[facility]



     tablet      04                                 by mouth 2           l



                                                  (two)           



                                                  times a           



                                                  day as           



                                                  needed for           



                                                  severe           



                                                  pain           



                                                  .chronic           



                                                  pain. Max           



                                                  Daily           



                                                  Amount: 30           



                                                  mg             

 

     pregabalin      -0      Yes            75mg Q.5D Take 1           [facility]

[facility]



     (LYRICA) 75                                     capsule           st



     MG capsule      15:45:                               (75 mg           [facility]

ta



               04                                 total) by           l



                                                  mouth 2           



                                                  (two)           



                                                  times a           



                                                  day.           

 

     HYDROcodone      -0      Yes       67212 1{tbl} Q4H  Take 1           [facility]

[facility]



     -acetaminop                                     tablet by           st



     hen (NORCO)      15:45:                               mouth           [facility]

ta



      mg      04                                 every 4           l



     per tablet                                         (four)           



                                                  hours           



                                                  .acute           



                                                  pain. Max           



                                                  Daily           



                                                  Amount: 6           



                                                  tablets           

 

     furosemide      -0      Yes            40mg Q.5D Take 1           [facility]

[facility]



     (LASIX) 40                                     tablet (40           st



     mg tablet      15:45:                               mg total)           [facility]

flo



               04                                 by mouth 2           l



                                                  (two)           



                                                  times a           



                                                  day.           

 

     magnesium      -0      Yes            400mg QD   Take 1           [facility]

[facility]



     oxide                                     tablet           st



     (MAG-OX)      15:45:                               (400 mg           [facility]

a



     400 mg      04                                 total) by           l



     (241.3 mg                                         mouth           



     magnesium)                                         daily.           



     tablet                                                        

 

     fluocinolon      2-0      Yes            1{appli Q.5D 1              [facility]

[facility]



     e (SYNALAR)                           cation}      applicatio          

 st



     0.01 %      15:45:                               n 2 (two)           [facility]

a



     external      04                                 times a           l



     solution                                         day.           

 

     alum-mag      -0      Yes            30mL Q.25D Take 30 mL           [facility]

[facility]



     hydroxide-s                                     by mouth 4           st



     imeth      15:45:                               (four)           [facility]



     (MAALOX      04                                 times a           l



     PLUS)                                         day as           



     200-200-20                                         needed for           



     mg/5 mL                                         heartburn.           



     suspension                                                        

 

     promethazin      -0      Yes            25mg Q6H  Take 1           [facility]

[facility]



     e         9-23                               tablet (25           st



     (PHENERGAN)      15:45:                               mg total)           [facility]

[facility]



     25 MG      04                                 by mouth           l



     tablet                                         every 6           



                                                  (six)           



                                                  hours as           



                                                  needed for           



                                                  nausea or           



                                                  vomiting.           

 

     LORAZepam      -0      Yes            1mg  Q8H  Take 1           [facility]

i



     (ATIVAN) 1      9-23                               tablet (1           st



     MG tablet      15:45:                               mg total)           [facility]

flo



               04                                 by mouth           l



                                                  every 8           



                                                  (eight)           



                                                  hours as           



                                                  needed for           



                                                  anxiety.           

 

     amiodarone      -0      Yes            1{tbl} QD   Take 1           [facility]

[facility]



     HCl       9-23                               tablet by           st



     (AMIODARONE      15:45:                               mouth           [facility]

ta



     ORAL)      04                                 daily.           l

 

     dicyclomine      -0      Yes            20mg Q.25D Take 1           [facility]

[facility]



     (BENTYL) 20      9-23                               tablet (20           st



     mg tablet      15:45:                               mg total)           [facility]

flo



               04                                 by mouth 4           l



                                                  (four)           



                                                  times a           



                                                  day.           

 

     BUMETanide      -0      Yes            2mg  QD   Take 1           [facility]

[facility]



     (BUMEX) 2      9-23                               tablet (2           st



     MG tablet      15:45:                               mg total)           [facility]

flo



               04                                 by mouth           l



                                                  daily.           

 

     ondansetron      -0      Yes            4mg  Q.5D Take 1           [facility]

[facility]



     (ZOFRAN) 4      9-23                               tablet (4           st



     MG tablet      15:45:                               mg total)           [facility]

flo



               04                                 by mouth 2           l



                                                  (two)           



                                                  times a           



                                                  day as           



                                                  needed for           



                                                  nausea or           



                                                  vomiting.           

 

     spironolact      -0      Yes            50mg QD   Take 1           [facility]

[facility]



     one       9-23                               tablet (50           st



     (ALDACTONE)      15:45:                               mg total)           [facility]

[facility]



     50 MG      04                                 by mouth           l



     tablet                                         daily.           

 

     cetirizine      -0      Yes            10mg QD   Take 1           [facility]

[facility]



     (ZyrTEC) 10      9-23                               tablet (10           st



     MG tablet      15:45:                               mg total)           [facility]

flo



               04                                 by mouth           l



                                                  daily.           

 

     magnesium      -0      Yes            148mL      Take 0.5           [facility]

[facility]



     citrate      9-23                               Bottles           st



     solution      15:45:                               (148 mL           [facility]

a



               04                                 total) by           l



                                                  mouth as           



                                                  needed.           

 

     DULoxetine      -0      Yes            40mg QD   Take 1           [facility]

[facility]



     (DRIZALMA)                                     capsule           st



     40 mg      15:45:                               (40 mg           [facility]



     capsule      04                                 total) by           l



                                                  mouth           



                                                  daily.           

 

     benzonatate      -0      Yes            200mg Q.[id number] Take 1        

   [facility]



     (TESSALON)                                [id number] capsule           

st



     200 MG      15:45:                          3D   (200 mg           [facility]



     capsule      04                                 total) by           l



                                                  mouth 3           



                                                  (three)           



                                                  times a           



                                                  day as           



                                                  needed for           



                                                  cough.           

 

     morPHINE      -0      Yes       28964 15mg Q.5D Take 1           [facility]

i



     (MS CONTIN)                                     tablet (15           st



     15 MG 12 hr      15:45:                               mg total)           [facility]

[facility]



     tablet      04                                 by mouth 2           l



                                                  (two)           



                                                  times a           



                                                  day as           



                                                  needed for           



                                                  severe           



                                                  pain           



                                                  .chronic           



                                                  pain. Max           



                                                  Daily           



                                                  Amount: 30           



                                                  mg             

 

     pregabalin      -0      Yes            75mg Q.5D Take 1           [facility]

[facility]



     (LYRICA) 75                                     capsule           st



     MG capsule      15:45:                               (75 mg           [facility]

ta



               04                                 total) by           l



                                                  mouth 2           



                                                  (two)           



                                                  times a           



                                                  day.           

 

     HYDROcodone      -0      Yes       22097 1{tbl} Q4H  Take 1           [facility]

[facility]



     -acetaminop                                     tablet by           st



     hen (NORCO)      15:45:                               mouth           [facility]

ta



      mg      04                                 every 4           l



     per tablet                                         (four)           



                                                  hours           



                                                  .acute           



                                                  pain. Max           



                                                  Daily           



                                                  Amount: 6           



                                                  tablets           

 

     furosemide      -0      Yes            40mg Q.5D Take 1           [facility]

[facility]



     (LASIX) 40                                     tablet (40           st



     mg tablet      15:45:                               mg total)           [facility]

flo



               04                                 by mouth 2           l



                                                  (two)           



                                                  times a           



                                                  day.           

 

     magnesium      -0      Yes            400mg QD   Take 1           [facility]

di



     oxide                                     tablet           st



     (MAG-OX)      15:45:                               (400 mg           [facility]

a



     400 mg      04                                 total) by           l



     (241.3 mg                                         mouth           



     magnesium)                                         daily.           



     tablet                                                        

 

     fluocinolon      -0      Yes            1{appli Q.5D 1              [facility]

[facility]



     e (SYNALAR)                           cation}      applicatio          

 st



     0.01 %      15:45:                               n 2 (two)           [facility]

a



     external      04                                 times a           l



     solution                                         day.           

 

     alum-mag      -0      Yes            30mL Q.25D Take 30 mL           [facility]

[facility]



     hydroxide-s      9-23                               by mouth 4           st



     imeth      15:45:                               (four)           [facility]



     (MAALOX      04                                 times a           l



     PLUS)                                         day as           



     200-200-20                                         needed for           



     mg/5 mL                                         heartburn.           



     suspension                                                        

 

     promethazin      -0      Yes            25mg Q6H  Take 1           [facility]

[facility]



     e         -                               tablet (25           st



     (PHENERGAN)      15:45:                               mg total)           [facility]

ospita



     25 MG      04                                 by mouth           l



     tablet                                         every 6           



                                                  (six)           



                                                  hours as           



                                                  needed for           



                                                  nausea or           



                                                  vomiting.           

 

     LORAZepam      -0      Yes            1mg  Q8H  Take 1           [facility]

i



     (ATIVAN) 1                                     tablet (1           st



     MG tablet      15:45:                               mg total)           H[facility]

flo



               04                                 by mouth           l



                                                  every 8           



                                                  (eight)           



                                                  hours as           



                                                  needed for           



                                                  anxiety.           

 

     amiodarone      -0      Yes            1{tbl} QD   Take 1           [facility]

[facility]



     HCl       -                               tablet by           st



     (AMIODARONE      15:45:                               mouth           [facility]

ta



     ORAL)      04                                 daily.           l

 

     bisacodyL      -0      Yes            10mg Q24H Insert 1           [facility]

[facility]



     (DULCOLAX)                                     suppositor           st



     10 mg      15:45:                               y (10 mg           [facility]



     suppository      03                                 total)           l



                                                  into the           



                                                  rectum           



                                                  daily as           



                                                  needed for           



                                                  constipati           



                                                  on.            

 

     guaiFENesin      -0      Yes            600mg [id number] Take 1        

   [facility]



     (MUCINEX)                                [id number] tablet           st



     600 mg      15:45:                          3D   (600 mg           [facility]



     tablet      03                                 total) by           l



     extended                                         mouth 3           



     release                                         (three)           



     12hr                                         times a           



                                                  day as           



                                                  needed.           

 

     lactulose      -0      Yes            10g  Q24H Take 15 mL           [facility]

[facility]



     20 gram/30                                     (10 g           st



     mL solution      15:45:                               total) by           [facility]

[facility]



               03                                 mouth           l



                                                  daily as           



                                                  needed.           

 

     phenylephri      -0      Yes            10mg Q6H  Take 1           [facility]

[facility]



     ne (Sudafed      -                               tablet (10           st



     PE) 10 MG      15:45:                               mg total)           [facility]

flo



     tablet      03                                 by mouth           l



                                                  every 6           



                                                  (six)           



                                                  hours as           



                                                  needed for           



                                                  congestion           



                                                  .              

 

     ranolazine      -0      Yes            500mg Q.5D Take 1           [facility]

[facility]



     (RANEXA)      -                               tablet           st



     500 MG 12      15:45:                               (500 mg           [facility]

ta



     hr ER      03                                 total) by           l



     tablet                                         mouth 2           



                                                  (two)           



                                                  times a           



                                                  day.           

 

     albuterol      -0      Yes            2.5mg Q6H  Take 3 mL           [facility]

[facility]



     (ACCUNEB)                                     (2.5 mg           st



     2.5 mg /3      15:45:                               total) by           [facility]

flo



     mL (0.083      03                                 nebulizati           l



     %)                                           on every 6           



     nebulizer                                         (six)           



     solution                                         hours as           



                                                  needed for           



                                                  wheezing.           

 

     senna      202-0      Yes            1{tbl} QD   Take 1           [facility]



     (SENOKOT)                                     tablet by           st



     8.6 mg      15:45:                               mouth           [facility]



     tablet      03                                 daily.           l

 

     potassium      2022-0      Yes            10meq QD   Take 1           [facility]

[facility]



     chloride                                     tablet (10           st



     (K-DUR) 10      15:45:                               mEq total)           [facility]

[facility]



     MEQ CR      03                                 by mouth           l



     tablet                                         daily.           

 

     calcium      2022-0      Yes                 Q.5D Chew 1           [facility]



     carbonate                                     tablet           st



     (TUMS) 200      15:45:                               (500 mg of           H

osp[facility]



     mg calcium      03                                 Calcium           l



     (500 mg)                                         Carbonate           



     chewable                                         total) 2           



     tablet                                         (two)           



                                                  times a           



                                                  day as           



                                                  needed for           



                                                  indigestio           



                                                  n or           



                                                  heartburn.           

 

     bisacodyL      -0      Yes            10mg Q24H Insert 1           [facility]

[facility]



     (DULCOLAX)                                     suppositor           st



     10 mg      15:45:                               y (10 mg           [facility]



     suppository      03                                 total)           l



                                                  into the           



                                                  rectum           



                                                  daily as           



                                                  needed for           



                                                  constipati           



                                                  on.            

 

     guaiFENesin      -0      Yes            600mg [id number] Take 1        

   [facility]



     (MUCINEX)                                [id number] tablet           st



     600 mg      15:45:                          3D   (600 mg           [facility]



     tablet      03                                 total) by           l



     extended                                         mouth 3           



     release                                         (three)           



     12hr                                         times a           



                                                  day as           



                                                  needed.           

 

     lactulose      -0      Yes            10g  Q24H Take 15 mL           [facility]

[facility]



     20 gram/30                                     (10 g           st



     mL solution      15:45:                               total) by           [facility]

[facility]



               03                                 mouth           l



                                                  daily as           



                                                  needed.           

 

     phenylephri      2022-0      Yes            10mg Q6H  Take 1           [facility]

[facility]



     ne (Sudafed                                     tablet (10           st



     PE) 10 MG      15:45:                               mg total)           [facility]

flo



     tablet      03                                 by mouth           l



                                                  every 6           



                                                  (six)           



                                                  hours as           



                                                  needed for           



                                                  congestion           



                                                  .              

 

     ranolazine      2022-0      Yes            500mg Q.5D Take 1           [facility]

[facility]



     (RANEXA)      9-23                               tablet           st



     500 MG 12      15:45:                               (500 mg           [facility]

ta



     hr ER      03                                 total) by           l



     tablet                                         mouth 2           



                                                  (two)           



                                                  times a           



                                                  day.           

 

     albuterol      -0      Yes            2.5mg Q6H  Take 3 mL           [facility]

[facility]



     (ACCUNEB)                                     (2.5 mg           st



     2.5 mg /3      15:45:                               total) by           [facility]

flo



     mL (0.083      03                                 nebulizati           l



     %)                                           on every 6           



     nebulizer                                         (six)           



     solution                                         hours as           



                                                  needed for           



                                                  wheezing.           

 

     senna      -0      Yes            1{tbl} QD   Take 1           [facility]



     (SENOKOT)                                     tablet by           st



     8.6 mg      15:45:                               mouth           [facility]



     tablet      03                                 daily.           l

 

     potassium      -0      Yes            10meq QD   Take 1           [facility]

di



     chloride                                     tablet (10           st



     (K-DUR) 10      15:45:                               mEq total)           [facility]

[facility]



     MEQ CR      03                                 by mouth           l



     tablet                                         daily.           

 

     calcium      -0      Yes                 Q.5D Chew 1           [facility]



     carbonate                                     tablet           st



     (TUMS) 200      15:45:                               (500 mg of           [facility]

[facility]



     mg calcium      03                                 Calcium           l



     (500 mg)                                         Carbonate           



     chewable                                         total) 2           



     tablet                                         (two)           



                                                  times a           



                                                  day as           



                                                  needed for           



                                                  indigestio           



                                                  n or           



                                                  heartburn.           

 

     bisacodyL      -0      Yes            10mg Q24H Insert 1           [facility]

[facility]



     (DULCOLAX)                                     suppositor           st



     10 mg      15:45:                               y (10 mg           [facility]



     suppository      03                                 total)           l



                                                  into the           



                                                  rectum           



                                                  daily as           



                                                  needed for           



                                                  constipati           



                                                  on.            

 

     guaiFENesin      -0      Yes            600mg [id number] Take 1        

   [facility]



     (MUCINEX)                                [id number] tablet           st



     600 mg      15:45:                          3D   (600 mg           [facility]



     tablet      03                                 total) by           l



     extended                                         mouth 3           



     release                                         (three)           



     12hr                                         times a           



                                                  day as           



                                                  needed.           

 

     lactulose      -0      Yes            10g  Q24H Take 15 mL           [facility]

[facility]



     20 gram/30                                     (10 g           st



     mL solution      15:45:                               total) by           [facility]

[facility]



               03                                 mouth           l



                                                  daily as           



                                                  needed.           

 

     phenylephri      -0      Yes            10mg Q6H  Take 1           [facility]

[facility]



     ne (Sudafed                                     tablet (10           st



     PE) 10 MG      15:45:                               mg total)           [facility]

flo



     tablet      03                                 by mouth           l



                                                  every 6           



                                                  (six)           



                                                  hours as           



                                                  needed for           



                                                  congestion           



                                                  .              

 

     ranolazine      202-0      Yes            500mg Q.5D Take 1           [facility]

[facility]



     (RANEXA)      -                               tablet           st



     500 MG 12      15:45:                               (500 mg           [facility]

ta



     hr ER      03                                 total) by           l



     tablet                                         mouth 2           



                                                  (two)           



                                                  times a           



                                                  day.           

 

     albuterol      -0      Yes            2.5mg Q6H  Take 3 mL           [facility]

[facility]



     (ACCUNEB)                                     (2.5 mg           st



     2.5 mg /3      15:45:                               total) by           [facility]

flo



     mL (0.083      03                                 nebulizati           l



     %)                                           on every 6           



     nebulizer                                         (six)           



     solution                                         hours as           



                                                  needed for           



                                                  wheezing.           

 

     senna      -0      Yes            1{tbl} QD   Take 1           [facility]



     (SENOKOT)                                     tablet by           st



     8.6 mg      15:45:                               mouth           [facility]



     tablet      03                                 daily.           l

 

     potassium      -0      Yes            10meq QD   Take 1           [facility]

[facility]



     chloride                                     tablet (10           st



     (K-DUR) 10      15:45:                               mEq total)           [facility]

[facility]



     MEQ CR      03                                 by mouth           l



     tablet                                         daily.           

 

     calcium      202-0      Yes                 Q.5D Chew 1           [facility]



     carbonate                                     tablet           st



     (TUMS) 200      15:45:                               (500 mg of           [facility]

[facility]



     mg calcium      03                                 Calcium           l



     (500 mg)                                         Carbonate           



     chewable                                         total) 2           



     tablet                                         (two)           



                                                  times a           



                                                  day as           



                                                  needed for           



                                                  indigestio           



                                                  n or           



                                                  heartburn.           

 

     bisacodyL      -0      Yes            10mg Q24H Insert 1           [facility]

[facility]



     (DULCOLAX)                                     suppositor           st



     10 mg      15:45:                               y (10 mg           [facility]



     suppository      03                                 total)           l



                                                  into the           



                                                  rectum           



                                                  daily as           



                                                  needed for           



                                                  constipati           



                                                  on.            

 

     guaiFENesin      -0      Yes            600mg Q.[id number] Take 1        

   [facility]



     (MUCINEX)      -                          [id number] tablet           st



     600 mg      15:45:                          3D   (600 mg           [facility]



     tablet      03                                 total) by           l



     extended                                         mouth 3           



     release                                         (three)           



     12hr                                         times a           



                                                  day as           



                                                  needed.           

 

     lactulose      -0      Yes            10g  Q24H Take 15 mL           [facility]

[facility]



     20 gram/30                                     (10 g           st



     mL solution      15:45:                               total) by           [facility]

[facility]



               03                                 mouth           l



                                                  daily as           



                                                  needed.           

 

     phenylephri      202-0      Yes            10mg Q6H  Take 1           [facility]

[facility]



     ne (Sudafed      -23                               tablet (10           st



     PE) 10 MG      15:45:                               mg total)           [facility]

[facility]



     tablet      03                                 by mouth           l



                                                  every 6           



                                                  (six)           



                                                  hours as           



                                                  needed for           



                                                  congestion           



                                                  .              

 

     ranolazine      2022-0      Yes            500mg Q.5D Take 1           [facility]

[facility]



     (RANEXA)      -                               tablet           st



     500 MG 12      15:45:                               (500 mg           [facility]

ta



     hr ER      03                                 total) by           l



     tablet                                         mouth 2           



                                                  (two)           



                                                  times a           



                                                  day.           

 

     albuterol      2022-0      Yes            2.5mg Q6H  Take 3 mL           [facility]

[facility]



     (ACCUNEB)                                     (2.5 mg           st



     2.5 mg /3      15:45:                               total) by           [facility]

flo



     mL (0.083      03                                 nebulizati           l



     %)                                           on every 6           



     nebulizer                                         (six)           



     solution                                         hours as           



                                                  needed for           



                                                  wheezing.           

 

     senna      -0      Yes            1{tbl} QD   Take 1           [facility]



     (SENOKOT)                                     tablet by           st



     8.6 mg      15:45:                               mouth           [facility]



     tablet      03                                 daily.           l

 

     potassium      2022-0      Yes            10meq QD   Take 1           [facility]

di



     chloride      -                               tablet (10           st



     (K-DUR) 10      15:45:                               mEq total)           [facility]

[facility]



     MEQ CR      03                                 by mouth           l



     tablet                                         daily.           

 

     calcium      2022-0      Yes                 Q.5D Chew 1           [facility]



     carbonate      -                               tablet           st



     (TUMS) 200      15:45:                               (500 mg of           [facility]

[facility]



     mg calcium      03                                 Calcium           l



     (500 mg)                                         Carbonate           



     chewable                                         total) 2           



     tablet                                         (two)           



                                                  times a           



                                                  day as           



                                                  needed for           



                                                  indigestio           



                                                  n or           



                                                  heartburn.           

 

     bisacodyL      2022-0      Yes            10mg Q24H Insert 1           [facility]

[facility]



     (DULCOLAX)                                     suppositor           st



     10 mg      15:45:                               y (10 mg           [facility]



     suppository      03                                 total)           l



                                                  into the           



                                                  rectum           



                                                  daily as           



                                                  needed for           



                                                  constipati           



                                                  on.            

 

     guaiFENesin      2-0      Yes            600mg [id number] Take 1        

   [facility]



     (MUCINEX)                                [id number] tablet           st



     600 mg      15:45:                          3D   (600 mg           [facility]



     tablet      03                                 total) by           l



     extended                                         mouth 3           



     release                                         (three)           



     12hr                                         times a           



                                                  day as           



                                                  needed.           

 

     lactulose      2022-0      Yes            10g  Q24H Take 15 mL           Me

[facility]



     20 gram/30      -                               (10 g           st



     mL solution      15:45:                               total) by           [facility]

ospita



               03                                 mouth           l



                                                  daily as           



                                                  needed.           

 

     phenylephri      2022-0      Yes            10mg Q6H  Take 1           [facility]

[facility]



     ne (Sudafed      -                               tablet (10           st



     PE) 10 MG      15:45:                               mg total)           H[facility]

flo



     tablet      03                                 by mouth           l



                                                  every 6           



                                                  (six)           



                                                  hours as           



                                                  needed for           



                                                  congestion           



                                                  .              

 

     ranolazine      -0      Yes            500mg Q.5D Take 1           [facility]

[facility]



     (RANEXA)      -                               tablet           st



     500 MG 12      15:45:                               (500 mg           [facility]

ta



     hr ER      03                                 total) by           l



     tablet                                         mouth 2           



                                                  (two)           



                                                  times a           



                                                  day.           

 

     albuterol      -0      Yes            2.5mg Q6H  Take 3 mL           [facility]

[facility]



     (ACCUNEB)                                     (2.5 mg           st



     2.5 mg /3      15:45:                               total) by           H[facility]

flo



     mL (0.083      03                                 nebulizati           l



     %)                                           on every 6           



     nebulizer                                         (six)           



     solution                                         hours as           



                                                  needed for           



                                                  wheezing.           

 

     senna      -0      Yes            1{tbl} QD   Take 1           [facility]



     (SENOKOT)      -                               tablet by           st



     8.6 mg      15:45:                               mouth           [facility]



     tablet      03                                 daily.           l

 

     potassium      -0      Yes            10meq QD   Take 1           [facility]

[facility]



     chloride      -                               tablet (10           st



     (K-DUR) 10      15:45:                               mEq total)           [facility]

[facility]



     MEQ CR      03                                 by mouth           l



     tablet                                         daily.           

 

     calcium      -0      Yes                 Q.5D Chew 1           [facility]



     carbonate      -                               tablet           st



     (TUMS) 200      15:45:                               (500 mg of           [facility]

[facility]ita



     mg calcium      03                                 Calcium           l



     (500 mg)                                         Carbonate           



     chewable                                         total) 2           



     tablet                                         (two)           



                                                  times a           



                                                  day as           



                                                  needed for           



                                                  indigestio           



                                                  n or           



                                                  heartburn.           

 

     albuterol      -0      Yes            2.5mg      2.5 mg by           [facility]

[facility]



     (PROVENTIL)      -                               Inhalation           [facility]

l[facility]



     (2.5 mg/3      00:00:                               route           of



     mL) 0.083%      00                                 once.           [facility]



     nebulizer                                                        e



     solution                                                        

 

     potassium      -0      Yes            10meq      Take 10           [facility]

[facility]



     chloride SA      -                               mEq by           [facility]

e



     (K-DUR,      00:00:                               mouth           of



     KLOR-CON      00                                 daily.           [facility]



     M10) 10 MEQ                                                        e



     tablet                                                        

 

     ondansetron      0      Yes            4mg       Take 4 mg           [facility]

[facility]



     (ZOFRAN) 4      -                               by mouth           [facility]

ge



     MG tablet      00:00:                               every 8           of



               00                                 hours as           [facility]



                                                  needed.           e

 

     DULoxetine      -0      Yes            40mg      Take 40 mg           [facility]

[facility]



     HCl 40 MG      9-23                               by mouth           [facility]

e



     CSDR      00:00:                               daily.           of



               00                                                [facility]



                                                                 e

 

     pregabalin      -0      Yes            75mg      Take 75 mg           [facility]

[facility]



     (LYRICA) 75      -23                               by mouth           [facility]

e[facility]



     MG capsule      00:00:                               two times           of



               00                                 daily.           [facility]



                                                                 e

 

     furosemide      -0      Yes            40mg      Take 40 mg           [facility]

[facility]



     (LASIX) 40      -                               by mouth           [facility]

ge



     MG tablet      00:00:                               daily.           of



               00                                                [facility]



                                                                 e

 

     magnesium      0      Yes            400mg      Take 400           [facility]

[facility]



     oxide      9-23                               mg by           [place]



     (MAG-OX)      00:00:                               mouth           of



     400 MG      00                                 daily.           [facility]



     tablet                                                        e

 

     fluocinolon      -0      Yes            1{appli      1              [place]

[facility]



     e acetonide      -                     cation}      applicatio          

 [place]



     (SYNALAR)      00:00:                               n two           of



     0.01 %      00                                 times           [facility]



     external                                         daily.           e



     solution                                                        

 

     aluminum &      -0      Yes            30mL      Take 30 mL           [facility]

[facility]



     magnesium      -                               by mouth           [facility]

e



     hydroxide      00:00:                               every 6           of



     simethicone      00                                 hours as           [facility]

[facility]



     (MAALOX)                                         needed.           e



     suspension                                                        

 

     promethazin      0      Yes            25mg      Take 25 mg           

[facility]



     e         9-23                               by mouth           [place]



     (PHENERGAN)      00:00:                               every 6           of



     25 MG      00                                 hours as           [facility]



     tablet                                         needed.           e

 

     Ranolazine      0      Yes            500mg      Take 500           [facility]

[facility]



     500 MG TB12      9-23                               mg by           [place]



               00:00:                               mouth two           of



               00                                 times           [facility]



                                                  daily.           e

 

     senna      -0      Yes            8.6mg      Take 8.6           [facility]



     (SENOKOT)      9-23                               mg by           [place]



     8.6 MG      00:00:                               mouth           of



     tablet      00                                 daily.           [facility]



                                                                 e

 

     albuterol      -0      Yes            2.5mg      1 Ampule           [place]

[facility]



     (PROVENTIL)      9-23                               by             [place]



     (2.5 mg/3      00:00:                               Inhalation           of



     mL) 0.083%      00                                 route           [facility]



     nebulizer                                         once.           e



     solution                                                        

 

     potassium      -0      Yes            10meq      Take 1           [facility]

r



     chloride SA      9-23                               Tablet by           [facility]

lege



     (K-DUR,      00:00:                               mouth           of



     KLOR-CON      00                                 daily.           [facility]



     M10) 10 MEQ                                                        e



     tablet                                                        

 

     ondansetron      -0      Yes            4mg       Take 1           [facility]

[facility]



     (ZOFRAN) 4      9-23                               Tablet by           [facility]

e[facility]



     MG tablet      00:00:                               mouth           of



               00                                 every 8           [facility]



                                                  hours as           e



                                                  needed.           

 

     DULoxetine      -0      Yes            40mg      Take 40 mg           [facility]

[facility]



     HCl 40 MG      9-23                               by mouth           [facility]

e



     CSDR      00:00:                               daily.           of



               00                                                [facility]



                                                                 e

 

     pregabalin      -0      Yes            75mg      Take 1           [facility]

[facility]



     (LYRICA) 75      9-23                               capsule by           Co

llege



     MG capsule      00:00:                               mouth two           of



               00                                 times           [facility]



                                                  daily.           e

 

     furosemide      -0      Yes            40mg      Take 1           [facility]

r



     (LASIX) 40      9-23                               Tablet by           [facility]

e[facility]



     MG tablet      00:00:                               mouth           of



               00                                 daily.           [facility]



                                                                 e

 

     magnesium      0      Yes            400mg      Take 1           [facility]

r



     oxide      9-23                               Tablet by           [place]



     (MAG-OX)      00:00:                               mouth           of



     400 MG      00                                 daily.           [facility]



     tablet                                                        e

 

     fluocinolon      -0      Yes            1{appli      1              [place]

[facility]



     e acetonide      9-23                     cation}      applicatio          

 [place]



     (SYNALAR)      00:00:                               n two           of



     0.01 %      00                                 times           [facility]



     external                                         daily.           e



     solution                                                        

 

     aluminum &      -0      Yes            30mL      Take 30 mL           [facility]

[facility]



     magnesium      9-23                               by mouth           [facility]

e



     hydroxide      00:00:                               every 6           of



     simethicone      00                                 hours as           [facility]

rio



     (MAALOX)                                         needed.           e



     suspension                                                        

 

     promethazin      0      Yes            25mg      Take 1           [facility]

or



     e         9-23                               Tablet by           [place]



     (PHENERGAN)      00:00:                               mouth           of



     25 MG      00                                 every 6           [facility]



     tablet                                         hours as           e



                                                  needed.           

 

     Ranolazine      -0      Yes            500mg      Take 500           [facility]

[facility]



     500 MG TB12      9-23                               mg by           [place]



               00:00:                               mouth two           of



               00                                 times           [facility]



                                                  daily.           e

 

     senna      -0      Yes            8.6mg      Take 8.6           [facility]



     (SENOKOT)      9-23                               mg by           [place]



     8.6 MG      00:00:                               mouth           of



     tablet      00                                 daily.           [facility]



                                                                 e

 

     tamsulosin      2022-0      Yes            .4mg QD   Take 0.4           [facility]

[facility]



     (FLOMAX)      9-22                               mg by           st



     0.4 mg      15:46:                               mouth           [facility]



     capsule      13                                 daily.           l

 

     apixaban      2022-0      Yes            5mg  Q.5D Take 5 mg           [facility]

[facility]



     (ELIQUIS) 5      9-22                               by mouth 2           st



     mg tablet      15:46:                               (two)           [facility]



               13                                 times a           l



                                                  day.           

 

     atorvastati      2022-0      Yes            10mg QD   Take 10 mg           

[facility]



     n (LIPITOR)      9-22                               by mouth           st



     10 mg      15:46:                               daily.           [facility]



     tablet      13                                                l

 

     aspirin      2022-0      Yes            81mg QD   Take 81 mg           [facility]

[facility]



     (ECOTRIN)      9-22                               by mouth           st



     81 MG      15:46:                               daily.           [facility]



     enteric      13                                                l



     coated                                                        



     tablet                                                        

 

     tamsulosin      2022-0      Yes            .4mg QD   Take 0.4           [facility]

[facility]



     (FLOMAX)      9-22                               mg by           st



     0.4 mg      15:46:                               mouth           [facility]



     capsule      13                                 daily.           l

 

     apixaban      2022-0      Yes            5mg  Q.5D Take 5 mg           [facility]

[facility]



     (ELIQUIS) 5      9-22                               by mouth 2           st



     mg tablet      15:46:                               (two)           [facility]



               13                                 times a           l



                                                  day.           

 

     atorvastati      2022-0      Yes            10mg QD   Take 10 mg           

[facility]



     n (LIPITOR)      9-22                               by mouth           st



     10 mg      15:46:                               daily.           [facility]



     tablet      13                                                l

 

     aspirin      2022-0      Yes            81mg QD   Take 81 mg           [facility]

[facility]



     (ECOTRIN)      9-22                               by mouth           st



     81 MG      15:46:                               daily.           [facility]



     enteric      13                                                l



     coated                                                        



     tablet                                                        

 

     tamsulosin      2022-0      Yes            .4mg QD   Take 0.4           [facility]

[facility]



     (FLOMAX)      9-22                               mg by           st



     0.4 mg      15:46:                               mouth           [facility]



     capsule      13                                 daily.           l

 

     apixaban      2022-0      Yes            5mg  Q.5D Take 5 mg           [facility]

[facility]



     (ELIQUIS) 5      9-22                               by mouth 2           st



     mg tablet      15:46:                               (two)           [facility]



               13                                 times a           l



                                                  day.           

 

     atorvastati      2022-0      Yes            10mg QD   Take 10 mg           

[facility]



     n (LIPITOR)      9-22                               by mouth           st



     10 mg      15:46:                               daily.           [facility]



     tablet      13                                                l

 

     aspirin      2022-0      Yes            81mg QD   Take 81 mg           [facility]

[facility]



     (ECOTRIN)      9-22                               by mouth           st



     81 MG      15:46:                               daily.           [facility]



     enteric      13                                                l



     coated                                                        



     tablet                                                        

 

     tamsulosin      2-0      Yes            .4mg QD   Take 0.4           [facility]

[facility]



     (FLOMAX)      9-22                               mg by           st



     0.4 mg      15:46:                               mouth           [facility]



     capsule      13                                 daily.           l

 

     apixaban      2022-0      Yes            5mg  Q.5D Take 5 mg           [facility]

[facility]



     (ELIQUIS) 5      9-22                               by mouth 2           st



     mg tablet      15:46:                               (two)           [facility]



               13                                 times a           l



                                                  day.           

 

     atorvastati      2022-0      Yes            10mg QD   Take 10 mg           

[facility]



     n (LIPITOR)      9-22                               by mouth           st



     10 mg      15:46:                               daily.           [facility]



     tablet      13                                                l

 

     aspirin      2-0      Yes            81mg QD   Take 81 mg           [facility]

[facility]



     (ECOTRIN)      9-22                               by mouth           st



     81 MG      15:46:                               daily.           [facility]



     enteric      13                                                l



     coated                                                        



     tablet                                                        

 

     tamsulosin      2-0      Yes            .4mg QD   Take 0.4           [facility]

[facility]



     (FLOMAX)      9-22                               mg by           st



     0.4 mg      15:46:                               mouth           [facility]



     capsule      13                                 daily.           l

 

     apixaban      2-0      Yes            5mg  Q.5D Take 5 mg           [facility]

[facility]



     (ELIQUIS) 5      9-22                               by mouth 2           st



     mg tablet      15:46:                               (two)           [facility]



               13                                 times a           l



                                                  day.           

 

     atorvastati      2-0      Yes            10mg QD   Take 10 mg           

[facility]



     n (LIPITOR)      9-22                               by mouth           st



     10 mg      15:46:                               daily.           [facility]



     tablet      13                                                l

 

     aspirin      2-0      Yes            81mg QD   Take 81 mg           [facility]

[facility]



     (ECOTRIN)      9-22                               by mouth           st



     81 MG      15:46:                               daily.           [facility]



     enteric      13                                                l



     coated                                                        



     tablet                                                        

 

     Aspirin 81      2-0      Yes            81mg      Take 81 mg           [facility]

[facility]



     MG tablet      9-22                               by mouth           [facility]

[facility]



               00:00:                               once.           of



                                                               [facility]



                                                                 e

 

     Aspirin 81      2-0      Yes            81mg      Take 81 mg           [facility]

[facility]



     MG tablet      -22                               by mouth           [facility]

e



               00:00:                               once.           of



               00                                                [facility]



                                                                 e

 

     losartan      -0 2- No             25mg QD   Take 25 mg           [facility]

[facility]



     (COZAAR) 25      9-20 09-20                          by mouth           st



     MG tablet      20:18: 00:00                          daily.           [facility]

ta



               14   :00                                          l

 

     losartan      -2022- No             25mg QD   Take 25 mg           [facility]

[facility]



     (COZAAR) 25      9- 09-20                          by mouth           st



     MG tablet      20:18: 00:00                          daily.           [facility]

ta



               14   :00                                          l

 

     losartan      -0 2- No             25mg QD   Take 25 mg           [facility]

[facility]



     (COZAAR) 25      9-20 09-20                          by mouth           st



     MG tablet      20:18: 00:00                          daily.           [facility]

ta



               14   :00                                          l

 

     losartan      -0 - No             25mg QD   Take 25 mg           [facility]

[facility]



     (COZAAR) 25      9- 09-20                          by mouth           st



     MG tablet      20:18: 00:00                          daily.           [facility]

ta



               14   :00                                          l

 

     losartan      -2022- No             25mg QD   Take 25 mg           [facility]

[facility]



     (COZAAR) 25      9- 09-20                          by mouth           st



     MG tablet      20:18: 00:00                          daily.           [facility]

ta



               14   :00                                          l

 

     losartan      -2022- No             25mg QD   Take 25 mg           [facility]

[facility]



     (COZAAR) 25      9--20                          by mouth           st



     MG tablet      20:18: 00:00                          daily.           [facility]

ta



               14   :00                                          l

 

     losartan      -0 - No             25mg QD   Take 25 mg           [facility]

[facility]



     (COZAAR) 25      9- 09-20                          by mouth           st



     MG tablet      20:18: 00:00                          daily.           [facility]

ta



               14   :00                                          l

 

     LORAZepam      -2022- No             1mg  Q.5D Take 1 mg           [facility]

[facility]



     (ATIVAN) 1      -20                          by mouth 2           st



     MG tablet      20:18: 00:00                          (two)           [facility]

a



               11   :00                           times a           l



                                                  day as           



                                                  needed for           



                                                  anxiety.           

 

     LORAZepam      -2022- No             1mg  Q.5D Take 1 mg           [facility]

[facility]



     (ATIVAN) 1      --20                          by mouth 2           st



     MG tablet      20:18: 00:00                          (two)           [facility]

a



               11   :00                           times a           l



                                                  day as           



                                                  needed for           



                                                  anxiety.           

 

     LORAZepam      -0 2- No             1mg  Q.5D Take 1 mg           [facility]

[facility]



     (ATIVAN) 1      --20                          by mouth 2           st



     MG tablet      20:18: 00:00                          (two)           [facility]

a



               11   :00                           times a           l



                                                  day as           



                                                  needed for           



                                                  anxiety.           

 

     LORAZepam      2022- No             1mg  Q.5D Take 1 mg           [facility]

[facility]



     (ATIVAN) 1      --20                          by mouth 2           st



     MG tablet      20:18: 00:00                          (two)           [facility]

a



               11   :00                           times a           l



                                                  day as           



                                                  needed for           



                                                  anxiety.           

 

     LORAZepam      2022- No             1mg  Q.5D Take 1 mg           [facility]

[facility]



     (ATIVAN) 1      --20                          by mouth 2           st



     MG tablet      20:18: 00:00                          (two)           [facility]

a



               11   :00                           times a           l



                                                  day as           



                                                  needed for           



                                                  anxiety.           

 

     LORAZepam      2022- No             1mg  Q.5D Take 1 mg           [facility]

[facility]



     (ATIVAN) 1      --20                          by mouth 2           st



     MG tablet      20:18: 00:00                          (two)           [facility]

a



               11   :00                           times a           l



                                                  day as           



                                                  needed for           



                                                  anxiety.           

 

     LORAZepam      2022- No             1mg  Q.5D Take 1 mg           [facility]

[facility]



     (ATIVAN) 1      -20                          by mouth 2           st



     MG tablet      20:18: 00:00                          (two)           [facility]

a



               11   :00                           times a           l



                                                  day as           



                                                  needed for           



                                                  anxiety.           

 

     levothyroxi      -2022- No             50ug QD   Take 50           [facility]

[facility]



     ne        9-20 09-20                          mcg by           st



     (SYNTHROID)      20:18: 00:00                          mouth           [facility]

[facility]



     50 mcg      08   :00                           daily.           l



     tablet                                                        

 

     levothyroxi      2022- No             50ug QD   Take 50           [facility]

[facility]



     ne        9-20 09-20                          mcg by           st



     (SYNTHROID)      20:18: 00:00                          mouth           [facility]

[facility]



     50 mcg      08   :00                           daily.           l



     tablet                                                        

 

     levothyroxi      -2022- No             50ug QD   Take 50           [facility]

[facility]



     ne        9-20 09-20                          mcg by           st



     (SYNTHROID)      20:18: 00:00                          mouth           [facility]

[facility]



     50 mcg      08   :00                           daily.           l



     tablet                                                        

 

     levothyroxi      -0 - No             50ug QD   Take 50           [facility]

[facility]



     ne        9-20 09-20                          mcg by           st



     (SYNTHROID)      20:18: 00:00                          mouth           [facility]

[facility]



     50 mcg      08   :00                           daily.           l



     tablet                                                        

 

     levothyroxi      -2022- No             50ug QD   Take 50           [facility]

[facility]



     ne        9-20 09-20                          mcg by           st



     (SYNTHROID)      20:18: 00:00                          mouth           [facility]

[facility]



     50 mcg      08   :00                           daily.           l



     tablet                                                        

 

     levothyroxi      -2022- No             50ug QD   Take 50           [facility]

[facility]



     ne        9-20 09-20                          mcg by           st



     (SYNTHROID)      20:18: 00:00                          mouth           [facility]

[facility]



     50 mcg      08   :00                           daily.           l



     tablet                                                        

 

     levothyroxi      -2- No             50ug QD   Take 50           [facility]

[facility]



     ne        9-20 09-20                          mcg by           st



     (SYNTHROID)      20:18: 00:00                          mouth           [facility]

[facility]



     50 mcg      08   :00                           daily.           l



     tablet                                                        

 

     carvediloL      2022- No             6.25mg Q.5D Take 6.25          

 [facility]



     (COREG)      9-20 09-20                          mg by           st



     6.25 MG      20:18: 00:00                          mouth 2           [facility]

a



     tablet      05   :00                           (two)           l



                                                  times a           



                                                  day with           



                                                  meals.           

 

     carvediloL      -2022- No             6.25mg Q.5D Take 6.25          

 [facility]



     (COREG)      9-20 09-20                          mg by           st



     6.25 MG      20:18: 00:00                          mouth 2           [facility]

a



     tablet      05   :00                           (two)           l



                                                  times a           



                                                  day with           



                                                  meals.           

 

     carvediloL      -2022- No             6.25mg Q.5D Take 6.25          

 [facility]



     (COREG)      9-20 09-20                          mg by           st



     6.25 MG      20:18: 00:00                          mouth 2           [facility]

a



     tablet      05   :00                           (two)           l



                                                  times a           



                                                  day with           



                                                  meals.           

 

     carvediloL      2022- No             6.25mg Q.5D Take 6.25          

 [facility]



     (COREG)      9-20 09-20                          mg by           st



     6.25 MG      20:18: 00:00                          mouth 2           [facility]

a



     tablet      05   :00                           (two)           l



                                                  times a           



                                                  day with           



                                                  meals.           

 

     carvediloL      -2022- No             6.25mg Q.5D Take 6.25          

 [facility]



     (COREG)      9-20 09-20                          mg by           st



     6.25 MG      20:18: 00:00                          mouth 2           [facility]

a



     tablet      05   :00                           (two)           l



                                                  times a           



                                                  day with           



                                                  meals.           

 

     carvediloL      2022- No             6.25mg Q.5D Take 6.25          

 [facility]



     (COREG)      9-20 09-20                          mg by           st



     6.25 MG      20:18: 00:00                          mouth 2           [facility]

a



     tablet      05   :00                           (two)           l



                                                  times a           



                                                  day with           



                                                  meals.           

 

     carvediloL      2022- No             6.25mg Q.5D Take 6.25          

 [facility]



     (COREG)       09-20                          mg by           st



     6.25 MG      20:18: 00:00                          mouth 2           [facility]

a



     tablet      05   :00                           (two)           l



                                                  times a           



                                                  day with           



                                                  meals.           

 

     busPIRone      2022- No             5mg  [id number] Take 5 mg       

    [facility]



     (BUSPAR) 5      --                     [id number] by mouth 3       

    st



     MG tablet      20:17: 00:00                     3D   (three)           [facility]

[facility]



               59   :00                           times a           l



                                                  day.           

 

     busPIRone      2022- No             5mg  [id number] Take 5 mg       

    [facility]



     (BUSPAR) 5      --                     [id number] by mouth 3       

    st



     MG tablet      20:17: 00:00                     3D   (three)           [facility]

[facility]



               59   :00                           times a           l



                                                  day.           

 

     busPIRone      -2022- No             5mg  [id number] Take 5 mg       

    [facility]



     (BUSPAR) 5      --                     [id number] by mouth 3       

    st



     MG tablet      20:17: 00:00                     3D   (three)           [facility]

[facility]



               59   :00                           times a           l



                                                  day.           

 

     busPIRone      2022- No             5mg  [id number] Take 5 mg       

    [facility]



     (BUSPAR) 5      --                     [id number] by mouth 3       

    st



     MG tablet      20:17: 00:00                     3D   (three)           [facility]

[facility]



               59   :00                           times a           l



                                                  day.           

 

     busPIRone      2022- No             5mg  [id number] Take 5 mg       

    [facility]



     (BUSPAR) 5      --                     [id number] by mouth 3       

    st



     MG tablet      20:17: 00:00                     3D   (three)           [facility]

[facility]



               59   :00                           times a           l



                                                  day.           

 

     busPIRone      -2022- No             5mg  [id number] Take 5 mg       

    [facility]



     (BUSPAR) 5      --                     [id number] by mouth 3       

    st



     MG tablet      20:17: 00:00                     3D   (three)           [facility]

[facility]



               59   :00                           times a           l



                                                  day.           

 

     busPIRone      -0 - No             5mg  [id number] Take 5 mg       

    [facility]



     (BUSPAR) 5      9-20 09-20                     [id number] by mouth 3       

    st



     MG tablet      20:17: 00:00                     3D   (three)           [facility]

[facility]



               59   :00                           times a           l



                                                  day.           

 

     pregabalin      -0 2022- No             300mg Q.5D Take 300           [facility]

[facility]



     (LYRICA)      9-20 09-20                          mg by           st



     300 MG      20:14: 00:00                          mouth 2           [facility]



     capsule      05   :00                           (two)           l



                                                  times a           



                                                  day.           

 

     pregabalin      -0 - No             300mg Q.5D Take 300           [facility]

[facility]



     (LYRICA)      9-20 09-20                          mg by           st



     300 MG      20:14: 00:00                          mouth 2           [facility]



     capsule      05   :00                           (two)           l



                                                  times a           



                                                  day.           

 

     pregabalin      -0 - No             300mg Q.5D Take 300           [facility]

[facility]



     (LYRICA)      9-20 09-20                          mg by           st



     300 MG      20:14: 00:00                          mouth 2           [facility]



     capsule      05   :00                           (two)           l



                                                  times a           



                                                  day.           

 

     pregabalin      -0 - No             300mg Q.5D Take 300           [facility]

[facility]



     (LYRICA)      9-20 09-20                          mg by           st



     300 MG      20:14: 00:00                          mouth 2           [facility]



     capsule      05   :00                           (two)           l



                                                  times a           



                                                  day.           

 

     pregabalin      -0 2- No             300mg Q.5D Take 300           [facility]

[facility]



     (LYRICA)      9-20 09-20                          mg by           st



     300 MG      20:14: 00:00                          mouth 2           [facility]



     capsule      05   :00                           (two)           l



                                                  times a           



                                                  day.           

 

     pregabalin      -0 2- No             300mg Q.5D Take 300           [facility]

[facility]



     (LYRICA)      9-20 09-20                          mg by           st



     300 MG      20:14: 00:00                          mouth 2           [facility]



     capsule      05   :00                           (two)           l



                                                  times a           



                                                  day.           

 

     pregabalin      -0 2- No             300mg Q.5D Take 300           [facility]

[facility]



     (LYRICA)      9-20 09-20                          mg by           st



     300 MG      20:14: 00:00                          mouth 2           [facility]



     capsule      05   :00                           (two)           l



                                                  times a           



                                                  day.           

 

     HYDROcodone      2022-0      Yes            1{tbl}      Take 1           [facility]



     -acetaminop      9-09                               tablet by           [facility]

[facility]



     hen ([facility]      11:17:                               mouth           [facility]

[facility]



     )      03                                 every 6           [facility]



      mg                                         (six)           



     per tablet                                         hours as           



                                                  needed for           



                                                  Pain.           

 

     amiodarone      0      Yes            100mg QD   Take 100           [facility]



     (PACERONE)      9-09                               mg by           [facility]



     100 MG      11:17:                               mouth           [facility]



     tablet      03                                 daily.           [facility]

 

     POTASSIUM            Yes            10ug QD   Take 10           [facility]

[facility]



     CHLORIDE      9-09                               mcg by           [facility]



     ORAL      11:17:                               mouth           [facility]



               03                                 daily.           [facility]

 

     HYDROcodone            Yes            1{tbl}      Take 1           [facility]



     -acetaminop      9-09                               tablet by           [facility]

[facility]



     hen ([facility]CO      11:17:                               mouth           [facility]

l



     )      03                                 every 6           [facility]



      mg                                         (six)           



     per tablet                                         hours as           



                                                  needed for           



                                                  Pain.           

 

     amiodarone            Yes            100mg QD   Take 100           [facility]



     (PACERONE)      9-09                               mg by           [facility]



     100 MG      11:17:                               mouth           [facility]



     tablet      03                                 daily.           Center

 

     POTASSIUM            Yes            10ug QD   Take 10           [facility]

[facility]



     CHLORIDE      9-09                               mcg by           [facility]



     ORAL      11:17:                               mouth           [facility]



               03                                 daily.           Center

 

     HYDROcodone            Yes            1{tbl}      Take 1           [facility]



     -acetaminop      9-09                               tablet by           [facility]

[facility]



     hen ([facility]      11:17:                               mouth           [facility]

l



     )      03                                 every 6           [facility]



      mg                                         (six)           



     per tablet                                         hours as           



                                                  needed for           



                                                  Pain.           

 

     amiodarone            Yes            100mg QD   Take 100           [facility]



     (PACERONE)      9-09                               mg by           [facility]



     100 MG      11:17:                               mouth           [facility]



     tablet      03                                 daily.           Center

 

     POTASSIUM            Yes            10ug QD   Take 10           [facility]

t



     CHLORIDE      9-09                               mcg by           [facility]



     ORAL      11:17:                               mouth           [facility]



               03                                 daily.           Center

 

     HYDROcodone      0      Yes            1{tbl}      Take 1           [facility]



     -acetaminop      9-09                               tablet by           [facility]

[facility]



     hen ([facility]      11:17:                               mouth           [facility]

l



     )      03                                 every 6           Center



      mg                                         (six)           



     per tablet                                         hours as           



                                                  needed for           



                                                  Pain.           

 

     amiodarone      0      Yes            100mg QD   Take 100           [facility]



     (PACERONE)      9-09                               mg by           [facility]



     100 MG      11:17:                               mouth           [facility]



     tablet      03                                 daily.           [place]

 

     POTASSIUM            Yes            10ug QD   Take 10           [facility]



     CHLORIDE      9-09                               mcg by           [facility]



     ORAL      11:17:                               mouth           [facility]



               03                                 daily.           [place]

 

     HYDROcodone            Yes            1{tbl}      Take 1           [facility]



     -acetaminop      9-09                               tablet by           [facility]

[facility]



     hen ([facility]      11:17:                               mouth           [facility]

l



     )      03                                 every 6           [facility]



      mg                                         (six)           



     per tablet                                         hours as           



                                                  needed for           



                                                  Pain.           

 

     amiodarone            Yes            100mg QD   Take 100           [facility]



     (PACERONE)      9-09                               mg by           [facility]



     100 MG      11:17:                               mouth           [facility]



     tablet      03                                 daily.           [place]

 

     POTASSIUM            Yes            10ug QD   Take 10           [facility]

[facility]



     CHLORIDE      9-09                               mcg by           [facility]



     ORAL      11:17:                               mouth           [facility]



               03                                 daily.           [place]

 

     HYDROcodone            Yes            1{tbl}      Take 1           [facility]



     -acetaminop      9-09                               tablet by           [facility]

[facility]



     hen (NORCO      11:17:                               mouth           [facility]

l



     )      03                                 every 6           [facility]



      mg                                         (six)           



     per tablet                                         hours as           



                                                  needed for           



                                                  Pain.           

 

     amiodarone            Yes            100mg QD   Take 100           [facility]



     (PACERONE)      9-09                               mg by           [facility]



     100 MG      11:17:                               mouth           [facility]



     tablet      03                                 daily.           [place]

 

     POTASSIUM            Yes            10ug QD   Take 10           [facility]

[facility]



     CHLORIDE      9-09                               mcg by           [facility]



     ORAL      11:17:                               mouth           [facility]



               03                                 daily.           [place]

 

     POTASSIUM            Yes            10ug QD   Take 10           [facility]

[facility]



     CHLORIDE      9-09                               mcg by           [facility]



     ORAL      11:17:                               mouth           [facility]



               03                                 daily.           [place]

 

     POTASSIUM            Yes            10ug QD   Take 10           [facility]

[facility]



     CHLORIDE      9-09                               mcg by           [facility]



     ORAL      11:17:                               mouth           [facility]



               03                                 daily.           [place]

 

     tamsulosin            Yes            .4mg QD   Take 0.4           [facility]



     (FLOMAX)      9-08                               mg by           [facility]



     0.4 mg Cap      11:19:                               mouth           [facility]

[facility]



     24 hr      03                                 daily.           [place]



     capsule                                                        

 

     levothyroxi            Yes            50ug      Take 50           [facility]



     ne        9-08                               mcg by           [facility]



     (SYNTHROID,      11:19:                               mouth.           [facility]

[facility]



     LEVOTHROID)      03                                                Center



     50 MCG                                                        



     tablet                                                        

 

     tamsulosin      2022-0      Yes            .4mg QD   Take 0.4           [facility]



     (FLOMAX)      9-08                               mg by           [facility]



     0.4 mg Cap      11:19:                               mouth           [facility]

l



     24 hr      03                                 daily.           [facility]



     capsule                                                        

 

     levothyroxi      2-0      Yes            50ug      Take 50           [facility]



     ne        9-08                               mcg by           [facility]



     (SYNTHROID,      11:19:                               mouth.           [facility]

[facility]



     LEVOTHROID)      03                                                [facility]



     50 MCG                                                        



     tablet                                                        

 

     tamsulosin      2-0      Yes            .4mg QD   Take 0.4           [facility]



     (FLOMAX)      9-08                               mg by           [facility]



     0.4 mg Cap      11:19:                               mouth           [facility]

l



     24 hr      03                                 daily.           [facility]



     capsule                                                        

 

     levothyroxi      2-0      Yes            50ug      Take 50           [facility]



     ne        9-08                               mcg by           [facility]



     (SYNTHROID,      11:19:                               mouth.           [facility]

[facility]



     LEVOTHROID)      03                                                Center



     50 MCG                                                        



     tablet                                                        

 

     tamsulosin      2-0      Yes            .4mg QD   Take 0.4           [facility]



     (FLOMAX)      9-08                               mg by           [facility]



     0.4 mg Cap      11:19:                               mouth           [facility]

l



     24 hr      03                                 daily.           [facility]



     capsule                                                        

 

     levothyroxi      2-0      Yes            50ug      Take 50           [facility]



     ne        9-08                               mcg by           [facility]



     (SYNTHROID,      11:19:                               mouth.           [facility]

[facility]



     LEVOTHROID)      03                                                Center



     50 MCG                                                        



     tablet                                                        

 

     tamsulosin      2-0      Yes            .4mg QD   Take 0.4           [facility]



     (FLOMAX)      9-08                               mg by           [facility]



     0.4 mg Cap      11:19:                               mouth           [facility]

l



     24 hr      03                                 daily.           [facility]



     capsule                                                        

 

     levothyroxi      2-0      Yes            50ug      Take 50           [facility]



     ne        9-08                               mcg by           [facility]



     (SYNTHROID,      11:19:                               mouth.           [facility]

[facility]



     LEVOTHROID)      03                                                Center



     50 MCG                                                        



     tablet                                                        

 

     tamsulosin      2-0      Yes            .4mg QD   Take 0.4           [facility]



     (FLOMAX)      9-08                               mg by           [facility]



     0.4 mg Cap      11:19:                               mouth           [facility]

l



     24 hr      03                                 daily.           [facility]



     capsule                                                        

 

     levothyroxi      2-0      Yes            50ug      Take 50           [facility]



     ne        9-08                               mcg by           [facility]



     (SYNTHROID,      11:19:                               mouth.           [facility]

[facility]



     LEVOTHROID)      03                                                Center



     50 MCG                                                        



     tablet                                                        

 

     levothyroxi      2-0      Yes            50ug      Take 50           [place]

[facility]



     ne        9-08                               mcg by           [facility]



     (SYNTHROID)      00:00:                               mouth           of



     50 MCG      00                                 daily.           [facility]



     tablet                                                        e

 

     levothyroxi      2022-0      Yes            50ug      Take 1           [facility]

[facility]



     ne                                       Tablet by           [place]



     (SYNTHROID)      00:00:                               mouth           of



     50 MCG      00                                 daily.           [facility]



     tablet                                                        e

 

     lactulose      -0      Yes            30g  Q.[id number] Take 45          

 [facility]



     (CHRONULAC)      -                          [id number] mLs (30 g        

   [facility]



     10 gram/15      00:00:                          3D   total) by           [facility]

[facility]



     mL solution      00                                 mouth 3           [facility]

[facility]



                                                  (three)           



                                                  times           



                                                  daily           



                                                  Until           



                                                  patient           



                                                  has a bm.           

 

     lactulose      -0      Yes            30g  Q.[id number] Take 45          

 [facility]



     (CHRONULAC)      -                          [id number] mLs (30 g        

   [facility]



     10 gram/15      00:00:                          3D   total) by           [facility]

[facility]



     mL solution      00                                 mouth 3           [facility]

[facility]



                                                  (three)           



                                                  times           



                                                  daily           



                                                  Until           



                                                  patient           



                                                  has a bm.           

 

     lactulose      -0      Yes            30g  Q.[id number] Take 45          

 [facility]



     (CHRONULAC)      -                          [id number] mLs (30 g        

   [facility]



     10 gram/15      00:00:                          3D   total) by           [facility]

[facility]



     mL solution      00                                 mouth 3           [facility]

[facility]



                                                  (three)           



                                                  times           



                                                  daily           



                                                  Until           



                                                  patient           



                                                  has a bm.           

 

     lactulose      -0      Yes            30g  Q.[id number] Take 45          

 [facility]



     (CHRONULAC)      -                          [id number] mLs (30 g        

   [facility]



     10 gram/15      00:00:                          3D   total) by           [facility]

[facility]



     mL solution      00                                 mouth 3           [facility]

[facility]



                                                  (three)           



                                                  times           



                                                  daily           



                                                  Until           



                                                  patient           



                                                  has a bm.           

 

     lactulose      -0      Yes            30g  Q.[id number] Take 45          

 [facility]



     (CHRONULAC)      -                          [id number] mLs (30 g        

   [facility]



     10 gram/15      00:00:                          3D   total) by           [facility]

[facility]



     mL solution      00                                 mouth 3           [facility]

[facility]



                                                  (three)           



                                                  times           



                                                  daily           



                                                  Until           



                                                  patient           



                                                  has a bm.           

 

     lactulose      -0      Yes            30g  Q.[id number] Take 45          

 [facility]



     (CHRONULAC)      -                          [id number] mLs (30 g        

   [facility]



     10 gram/15      00:00:                          3D   total) by           [facility]

[facility]



     mL solution      00                                 mouth 3           [facility]

[facility]



                                                  (three)           



                                                  times           



                                                  daily           



                                                  Until           



                                                  patient           



                                                  has a bm.           

 

     lactulose      -0      Yes            30g  Q.[id number] Take 45          

 [facility]



     (CHRONULAC)      9-                          [id number] mLs (30 g        

   [facility]



     10 gram/15      00:00:                          3D   total) by           [facility]

[facility]



     mL solution      00                                 mouth 3           [facility]

[facility]



                                                  (three)           



                                                  times           



                                                  daily           



                                                  Until           



                                                  patient           



                                                  has a bm.           

 

     lactulose            Yes            30g  Q.[id number] Take 45          

 [facility]



     (CHRONULAC)      9-05                          [id number] mLs (30 g        

   [facility]



     10 gram/15      00:00:                          3D   total) by           [facility]

[facility]



     mL solution      00                                 mouth 3           [facility]

[facility]



                                                  (three)           



                                                  times           



                                                  daily           



                                                  Until           



                                                  patient           



                                                  has a bm.           

 

     polymyxin B      2022- No             1[drp]      Apply 1           

[facility]



     sulf-trimet      --11                          drop to           [facility]



     hoprim      00:00: 23:59                          eye(s)           [facility]



     10,000      00   :00                           every 4           Center



     unit- 1                                         (four)           



     mg/mL Drop                                         hours for           



                                                  7 days.           

 

     polymyxin B      2022- No             1[drp]      Apply 1           

[facility]



     sulf-trimet      --11                          drop to           [facility]



     hoprim      00:00: 23:59                          eye(s)           [facility]



     10,000      00   :00                           every 4           [facility]



     unit- 1                                         (four)           



     mg/mL Drop                                         hours for           



                                                  7 days.           

 

     polymyxin B      2022- No             1[drp]      Apply 1           

[facility]



     sulf-trimet      --11                          drop to           [facility]



     hoprim      00:00: 23:59                          eye(s)           [facility]



     10,000      00   :00                           every 4           [facility]



     unit- 1                                         (four)           



     mg/mL Drop                                         hours for           



                                                  7 days.           

 

     polymyxin B      2022- No             1[drp]      Apply 1           

[facility]



     sulf-trimet      --11                          drop to           [facility]



     [facility]rim      00:00: 23:59                          eye(s)           [facility]



     10,000      00   :00                           every 4           [facility]



     unit- 1                                         (four)           



     mg/mL Drop                                         hours for           



                                                  7 days.           

 

     polymyxin B      2022- No             1[drp]      Apply 1           

[facility]



     sulf-trimet      --11                          drop to           [facility]



     hoprim      00:00: 23:59                          eye(s)           [facility]



     10,000      00   :00                           every 4           [facility]



     unit- 1                                         (four)           



     mg/mL Drop                                         hours for           



                                                  7 days.           

 

     polymyxin B      2022- No             1[drp]      Apply 1           

[facility]



     sulf-trimet      --11                          drop to           [facility]



     hoprim      00:00: 23:59                          eye(s)           [facility]



     10,000      00   :00                           every 4           [facility]



     unit- 1                                         (four)           



     mg/mL Drop                                         hours for           



                                                  7 days.           

 

     polymyxin B      2022- No             1[drp]      Apply 1           

[facility]



     sulf-trimet      --                          drop to           [facility]



     hoprim      00:00: 23:59                          eye(s)           [facility]



     10,000      00   :00                           every 4           [facility]



     unit- 1                                         (four)           



     mg/mL Drop                                         hours for           



                                                  7 days.           

 

     polymyxin B      0 - No             1[drp]      Apply 1           

[facility]



     sulf-trimet      --                          drop to           [facility]



     hoprim      00:00: 23:59                          eye(s)           [facility]



     10,000      00   :00                           every 4           [facility]



     unit- 1                                         (four)           



     mg/mL Drop                                         hours for           



                                                  7 days.           

 

     promethazin      2022- No             25mg      Take 25 mg          

 [facility]



     e                                   by mouth           [facility]



     (PHENERGAN)      12:08: 00:00                          every 6           [facility]

[facility]



     25 MG      52   :00                           (six)           Center



     tablet                                         hours as           



                                                  needed for           



                                                  Nausea.           

 

     promethazin      -2022- No             25mg      Take 25 mg          

 [facility]



     e                                   by mouth           [facility]



     (PHENERGAN)      12:08: 00:00                          every 6           [facility]

[facility]



     25 MG      52   :00                           (six)           Center



     tablet                                         hours as           



                                                  needed for           



                                                  Nausea.           

 

     promethazin      2022- No             25mg      Take 25 mg          

 [facility]



     e                                   by mouth           [facility]



     (PHENERGAN)      12:08: 00:00                          every 6           [facility]

[facility]



     25 MG      52   :00                           (six)           Center



     tablet                                         hours as           



                                                  needed for           



                                                  Nausea.           

 

     promethazin      -2022- No             25mg      Take 25 mg          

 [facility]



     e                                   by mouth           [facility]



     (PHENERGAN)      12:08: 00:00                          every 6           [facility]

[facility]



     25 MG      52   :00                           (six)           [facility]



     tablet                                         hours as           



                                                  needed for           



                                                  Nausea.           

 

     promethazin      2022- No             25mg      Take 25 mg          

 [facility]



     e                                   by mouth           [facility]



     (PHENERGAN)      12:08: 00:00                          every 6           [facility]

[facility]



     25 MG      52   :00                           (six)           [facility]



     tablet                                         hours as           



                                                  needed for           



                                                  Nausea.           

 

     promethazin      2-0 2022- No             25mg      Take 25 mg          

 [facility]



     e                                   by mouth           [facility]



     (PHENERGAN)      12:08: 00:00                          every 6           [facility]

[facility]



     25 MG      52   :00                           (six)           Center



     tablet                                         hours as           



                                                  needed for           



                                                  Nausea.           

 

     promethazin      2-0 2022- No             25mg      Take 25 mg          

 [facility]



     e                                   by mouth           [facility]



     (PHENERGAN)      12:08: 00:00                          every 6           [facility]

[facility]



     25 MG      52   :00                           (six)           Center



     tablet                                         hours as           



                                                  needed for           



                                                  Nausea.           

 

     promethazin      2-0 2022- No             25mg      Take 25 mg          

 [facility]



     e                                   by mouth           [facility]



     (PHENERGAN)      12:08: 00:00                          every 6           Me

dical



     25 MG      52   :00                           (six)           Center



     tablet                                         hours as           



                                                  needed for           



                                                  Nausea.           

 

     losartan      2-0 2022- No             25mg      Take 25 mg           [facility]



     (COZAAR) 25                                by mouth.           [facility]



     MG tablet      12:08: 00:00                                         Medical



               43   :00                                          [place]

 

     losartan      2022-0 2022- No             25mg      Take 25 mg           [facility]



     (COZAAR) 25                                by mouth.           [facility]



     MG tablet      12:08: 00:00                                         Medical



               43   :00                                          [place]

 

     losartan      2022-0 2022- No             25mg      Take 25 mg           [facility]



     (COZAAR) 25                                by mouth.           [facility]



     MG tablet      12:08: 00:00                                         Medical



               43   :00                                          [place]

 

     losartan      2022-0 2022- No             25mg      Take 25 mg           [facility]



     (COZAAR) 25                                by mouth.           [facility]



     MG tablet      12:08: 00:00                                         Medical



               43   :00                                          [place]

 

     losartan      2022-0 2022- No             25mg      Take 25 mg           [facility]



     (COZAAR) 25                                by mouth.           [facility]



     MG tablet      12:08: 00:00                                         Medical



               43   :00                                          [place]

 

     losartan      2022-0 2022- No             25mg      Take 25 mg           [facility]



     (COZAAR) 25                                by mouth.           [facility]



     MG tablet      12:08: 00:00                                         Medical



               43   :00                                          Center

 

     losartan      -0 - No             25mg      Take 25 mg           [facility]



     (COZAAR)                           by mouth.           [facility]



     MG tablet      12:08: 00:00                                         Medical



               43   :00                                          Center

 

     losartan      -0 - No             25mg      Take 25 mg           [facility]



     (COZAAR) 25                                by mouth.           [facility]



     MG tablet      12:08: 00:00                                         Medical



               43   :00                                          Center

 

     methylPREDN      -0 - No                       follow           [facility]



     ISolone      2-26 03-05                          package           [facility]



     (MEDROL      00:00: 23:59                          directions           Med

ical



     DOSEPACK) 4      00   :00                           .              Center



     mg tablet                                                        

 

     methylPREDN      -0 - No                       follow           [facility]



     ISolone      2-26 03-05                          package           [facility]



     (MEDROL      00:00: 23:59                          directions           Med

ical



     DOSEPACK) 4      00   :00                           .              Center



     mg tablet                                                        

 

     methylPREDN      -0 - No                       follow           [facility]



     ISolone      2-26 03-05                          package           [facility]



     (MEDROL      00:00: 23:59                          directions           Med

ical



     DOSEPACK) 4      00   :00                           .              Center



     mg tablet                                                        

 

     methylPREDN      -0 - No                       follow           [facility]



     ISolone      2-26 03-05                          package           [facility]



     (MEDROL      00:00: 23:59                          directions           Med

ical



     DOSEPACK) 4      00   :00                           .              Center



     mg tablet                                                        

 

     methylPREDN      2-0 - No                       follow           [facility]



     ISolone      2-26 03-05                          package           [facility]



     (MEDROL      00:00: 23:59                          directions           Med

ical



     DOSEPACK) 4      00   :00                           .              Center



     mg tablet                                                        

 

     HYDROcodone      -2022- No             1{tbl}      Take 1           [facility]



     -acetaminop      --                          tablet by           [facility] ([facility]      00:00: 23:59                          mouth           Medic

al



     )      00   :00                           every 4           Center



      mg                                         (four)           



     per tablet                                         hours as           



                                                  needed for           



                                                  Pain for           



                                                  up to 3           



                                                  days. Max           



                                                  Daily           



                                                  Amount: 6           



                                                  tablets           

 

     HYDROcodone      -0 - No             1{tbl}      Take 1           [facility]



     -acetaminop      --                          tablet by           [facility] ([facility]CO      00:00: 23:59                          mouth           Medic

al



     )      00   :00                           every 4           Center



      mg                                         (four)           



     per tablet                                         hours as           



                                                  needed for           



                                                  Pain for           



                                                  up to 3           



                                                  days. Max           



                                                  Daily           



                                                  Amount: 6           



                                                  tablets           

 

     HYDROcodone      2022- No             1{tbl}      Take 1           [facility]



     -acetaminop                                tablet by           [facility] ([facility]      00:00: 23:59                          mouth           Medic

al



     )      00   :00                           every 4           Center



      mg                                         (four)           



     per tablet                                         hours as           



                                                  needed for           



                                                  Pain for           



                                                  up to 3           



                                                  days. Max           



                                                  Daily           



                                                  Amount: 6           



                                                  tablets           

 

     HYDROcodone      2022- No             1{tbl}      Take 1           [facility]



     -acetaminop                                tablet by           [facility] ([facility]      00:00: 23:59                          mouth           Medic

al



     )      00   :00                           every 4           Center



      mg                                         (four)           



     per tablet                                         hours as           



                                                  needed for           



                                                  Pain for           



                                                  up to 3           



                                                  days. Max           



                                                  Daily           



                                                  Amount: 6           



                                                  tablets           

 

     atorvastati      2021      Yes                                     [facility]



     n (LIPITOR)      0-13                                              [facility]



     10 MG      00:00:                                              Medical



     tablet      00                                                [place]

 

     atorvastati      2021      Yes                                     [facility]



     n (LIPITOR)      0-13                                              [facility]



     10 MG      00:00:                                              Medical



     tablet      00                                                [place]

 

     atorvastati      2021      Yes                                     [facility]



     n (LIPITOR)      0-13                                              [facility]



     10 MG      00:00:                                              Medical



     tablet      00                                                [place]

 

     atorvastati      2021      Yes                                     [facility]



     n (LIPITOR)      0-13                                              [facility]



     10 MG      00:00:                                              Medical



     tablet      00                                                [place]

 

     atorvasta      2021      Yes                                     [facility]



     n (LIPITOR)      0-13                                              [facility]



     10 MG      00:00:                                              Medical



     tablet      00                                                [place]

 

     atorvastati      2021      Yes                                     [facility]



     n (LIPITOR)      0-13                                              [facility]



     10 MG      00:00:                                              Medical



     tablet      00                                                [place]

 

     atorvastati      2021      Yes                                     [facility]



     n (LIPITOR)      0-13                                              [facility]



     10 MG      00:00:                                              Medical



     tablet      00                                                [place]

 

     atorvastati      2021      Yes                                     [facility]



     n (LIPITOR)      0-13                                              [facility]



     10 MG      00:00:                                              Medical



     tablet      00                                                [place]

 

     atorvastati      2021      Yes            10mg      Take 10 mg           

[facility]



     n (LIPITOR)      0-13                               by mouth           [facility]

[facility]



     10 MG      00:00:                               daily.           of



     tablet      00                                                [facility]



                                                                 e

 

     [facility]      2021-1      Yes            10mg      Take 1           [facility]

or



     n (LIPITOR)      0-13                               Tablet by           [facility]

lege



     10 MG      00:00:                               mouth           of



     tablet      00                                 daily.           [facility]



                                                                 e

 

     bumetanide      2021- No             2mg  QD   Take 1           [facility]



     (BUMEX) 2      0-13 10-13                          tablet (2           [facility]

[facility]



     MG tablet      00:00: 23:59                          mg total)           Me

dical



               00   :00                           by mouth           Center



                                                  daily.           

 

     carvediloL      2021- No             25mg      Take 1           [facility]



     (COREG) 25      0-13 10-13                          tablet (25           [facility]



     MG tablet      00:00: 23:59                          mg total)           Me

dical



               00   :00                           by mouth 2           Center



                                                  (two)           



                                                  times           



                                                  daily with           



                                                  breakfast           



                                                  and            



                                                  dinner.           

 

     spironolact      2021- No             25mg QD   Take 1           [facility]



     one       0-13 10-13                          tablet (25           [facility]



     (ALDACTONE)      00:00: 23:59                          mg total)           

Medical



     25 MG      00   :00                           by mouth           Center



     tablet                                         daily.           

 

     bumetanide      2021- No             2mg  QD   Take 1           [facility]



     (BUMEX) 2      0-13 10-13                          tablet (2           [facility]

[facility]



     MG tablet      00:00: 23:59                          mg total)           Me

dical



               00   :00                           by mouth           Center



                                                  daily.           

 

     carvediloL      2021- No             25mg      Take 1           [facility]



     (COREG) 25      0-13 10-13                          tablet (25           [facility]



     MG tablet      00:00: 23:59                          mg total)           Me

dical



               00   :00                           by mouth 2           Center



                                                  (two)           



                                                  times           



                                                  daily with           



                                                  breakfast           



                                                  and            



                                                  dinner.           

 

     spironolact      2021- No             25mg QD   Take 1           [facility]



     one       0-13 10-13                          tablet (25           [facility]



     (ALDACTONE)      00:00: 23:59                          mg total)           

Medical



     25 MG      00   :00                           by mouth           Center



     tablet                                         daily.           

 

     bumetanide      2021- No             2mg  QD   Take 1           [facility]



     (BUMEX) 2      0-13 10-13                          tablet (2           [facility]

[facility]



     MG tablet      00:00: 23:59                          mg total)           Me

dical



               00   :00                           by mouth           Center



                                                  daily.           

 

     carvediloL      2021- No             25mg      Take 1           [facility]



     (COREG) 25      0-13 10-13                          tablet (25           [facility]



     MG tablet      00:00: 23:59                          mg total)           Me

dical



               00   :00                           by mouth 2           Center



                                                  (two)           



                                                  times           



                                                  daily with           



                                                  breakfast           



                                                  and            



                                                  dinner.           

 

     spironolact      2021- No             25mg QD   Take 1           [facility]



     one       0-13 10-13                          tablet (25           [facility]



     (ALDACTONE)      00:00: 23:59                          mg total)           

Medical



     25 MG      00   :00                           by mouth           Center



     tablet                                         daily.           

 

     bumetanide      2021- No             2mg  QD   Take 1           [facility]



     (BUMEX) 2      0-13 10-13                          tablet (2           [facility]

[facility]



     MG tablet      00:00: 23:59                          mg total)           Me

dical



               00   :00                           by mouth           Center



                                                  daily.           

 

     carvediloL      2021- No             25mg      Take 1           [facility]



     (COREG) 25      0-13 10-13                          tablet (25           [facility]

[facility]



     MG tablet      00:00: 23:59                          mg total)           Me

dical



               00   :00                           by mouth 2           Center



                                                  (two)           



                                                  times           



                                                  daily with           



                                                  breakfast           



                                                  and            



                                                  dinner.           

 

     spironolact      2021- No             25mg QD   Take 1           [facility]



     one       0-13 10-13                          tablet (25           [facility]



     (ALDACTONE)      00:00: 23:59                          mg total)           

Medical



     25 MG      00   :00                           by mouth           Center



     tablet                                         daily.           

 

     bumetanide      2021- No             2mg  QD   Take 1           [facility]



     (BUMEX) 2      0-13 10-13                          tablet (2           [facility]

[facility]



     MG tablet      00:00: 23:59                          mg total)           Me

dical



               00   :00                           by mouth           Center



                                                  daily.           

 

     carvediloL      2021- No             25mg      Take 1           [facility]



     (COREG) 25      0-13 10-13                          tablet (25           [facility]



     MG tablet      00:00: 23:59                          mg total)           Me

dical



               00   :00                           by mouth 2           Center



                                                  (two)           



                                                  times           



                                                  daily with           



                                                  breakfast           



                                                  and            



                                                  dinner.           

 

     spironolact      2021- No             25mg QD   Take 1           [facility]



     one       0-13 10-13                          tablet (25           [facility]



     (ALDACTONE)      00:00: 23:59                          mg total)           

Medical



     25 MG      00   :00                           by mouth           Center



     tablet                                         daily.           

 

     bumetanide      2021- No             2mg  QD   Take 1           [facility]



     (BUMEX) 2      0-13 10-13                          tablet (2           [facility]

[facility]



     MG tablet      00:00: 23:59                          mg total)           Me

dical



               00   :00                           by mouth           Center



                                                  daily.           

 

     carvediloL      2021- No             25mg      Take 1           [facility]



     (COREG) 25      0-13 10-13                          tablet (25           [facility]



     MG tablet      00:00: 23:59                          mg total)           Me

dical



               00   :00                           by mouth 2           Center



                                                  (two)           



                                                  times           



                                                  daily with           



                                                  breakfast           



                                                  and            



                                                  dinner.           

 

     spironolact      2021- No             25mg QD   Take 1           [facility]



     one       0-13 10-13                          tablet (25           [facility]



     (ALDACTONE)      00:00: 23:59                          mg total)           

Medical



     25 MG      00   :00                           by mouth           Center



     tablet                                         daily.           

 

     bumetanide      2021- No             2mg  QD   Take 1           [facility]



     (BUMEX) 2      0-13 10-13                          tablet (2           [facility]

[facility]



     MG tablet      00:00: 23:59                          mg total)           Me

dical



               00   :00                           by mouth           Center



                                                  daily.           

 

     carvediloL      2021- No             25mg      Take 1           [facility]



     (COREG) 25      0-13 10-13                          tablet (25           [facility]



     MG tablet      00:00: 23:59                          mg total)           Me

dical



               00   :00                           by mouth 2           Center



                                                  (two)           



                                                  times           



                                                  daily with           



                                                  breakfast           



                                                  and            



                                                  dinner.           

 

     spironolact      2021- No             25mg QD   Take 1           [facility]



     one       0-13 10-13                          tablet (25           [facility]



     (ALDACTONE)      00:00: 23:59                          mg total)           

Medical



     25 MG      00   :00                           by mouth           Center



     tablet                                         daily.           

 

     bumetanide      2021- No             2mg  QD   Take 1           [facility]



     (BUMEX) 2      0-13 10-13                          tablet (2           [facility]

[facility]



     MG tablet      00:00: 23:59                          mg total)           Me

dical



               00   :00                           by mouth           Center



                                                  daily.           

 

     carvediloL      2021- No             25mg      Take 1           [facility]



     (COREG) 25      0-13 10-13                          tablet (25           [facility]

[facility]



     MG tablet      00:00: 23:59                          mg total)           Me

dical



               00   :00                           by mouth 2           [facility]



                                                  (two)           



                                                  times           



                                                  daily with           



                                                  breakfast           



                                                  and            



                                                  dinner.           

 

     spironolact      2021- No             25mg QD   Take 1           [facility]



     one       0-13 10-13                          tablet (25           [facility]



     (ALDACTONE)      00:00: 23:59                          mg total)           

Medical



     25 MG      00   :00                           by mouth           [facility]



     tablet                                         daily.           

 

     DULoxetine      2021      Yes                                     [facility]



     (CYMBALTA)      0-11                                              [facility]



     20 MG      00:00:                                              Medical



     capsule      00                                                [place]

 

     DULoxetine      2021      Yes                                     [facility]



     (CYMBALTA)      0-11                                              [facility]



     20 MG      00:00:                                              Medical



     capsule      00                                                [place]

 

     DULoxetine      2021      Yes                                     [facility]



     (CYMBALTA)      0-11                                              [facility]



     20 MG      00:00:                                              Medical



     capsule      00                                                [place]

 

     DULoxetine      2021      Yes                                     [facility]



     (CYMBALTA)      0-11                                              [facility]



     20 MG      00:00:                                              Medical



     capsule      00                                                [place]

 

     DULoxetine      2021      Yes                                     [facility]



     (CYMBALTA)      0-11                                              [facility]



     20 MG      00:00:                                              Medical



     capsule      00                                                [place]

 

     DULoxetine      2021      Yes                                     [facility]



     (CYMBALTA)      0-11                                              [facility]



     20 MG      00:00:                                              Medical



     capsule      00                                                [place]

 

     DULoxetine      2021- No                                      [facility]



     (CYMBALTA)      0-11 04-05                                         [facility]



     20 MG      00:00: 00:00                                         Medical



     capsule      00   :00                                          [place]

 

     DULoxetine      2021- No                                      [facility]



     (CYMBALTA)      0-11 04-05                                         [facility]



     20 MG      00:00: 00:00                                         Medical



     capsule      00   :00                                          [place]

 

     HYDROcodone      2021- No                                      [facility]

[facility]



     -acetaminop      0-11 02-26                                         [facility]



     [facility] ([facility]      00:00: 00:00                                         [facility]

l



     )      00   :00                                          [place]



      mg                                                        



     per tablet                                                        

 

     HYDROcodone      2021- No                                      [facility]

[facility]



     -acetaminop      0-11 02-26                                         [facility]



     [facility] ([facility]      00:00: 00:00                                         [facility]

l



     )      00   :00                                          [facility]



      mg                                                        



     per tablet                                                        

 

     HYDROcodone      2021- No                                      [facility]

[facility]



     -acetaminop      0-11 02-26                                         [facility]



     [facility] ([facility]      00:00: 00:00                                         [facility]

l



     )      00   :00                                          [facility]



      mg                                                        



     per tablet                                                        

 

     HYDROcodone      2021- No                                      [facility]

[facility]



     -acetaminop      0-11 02-26                                         [facility]



     [facility] ([facility]      00:00: 00:00                                         [facility]

l



     )      00   :00                                          [place]



      mg                                                        



     per tablet                                                        

 

     lactulose      2021- No                       as needed           [facility]



     (CHRONULAC)      0-07 09-05                          .              [facility]



     10 gram/15      00:00: 00:00                                         [facility]

l



     mL solution      00   :00                                          [facility]

 

     lactulose      2021- No                       as needed           [facility]



     (CHRONULAC)      0- 09-05                          .              [facility]



     10 gram/15      00:00: 00:00                                         [facility]

l



     mL solution      00   :00                                          [facility]

 

     lactulose      2021- No                       as needed           [facility]



     (CHRONULAC)      0-07 09-05                          .              [facility]



     10 gram/15      00:00: 00:00                                         [facility]

l



     mL solution      00   :00                                          [facility]

 

     lactulose      2021- No                       as needed           [facility]



     (CHRONULAC)      0-07 09-05                          .              [facility]



     10 gram/15      00:00: 00:00                                         [facility]

l



     mL solution      00   :00                                          [facility]

 

     lactulose      2021- No                       as needed           [facility]



     (CHRONULAC)      0 09-05                          .              [facility]



     10 gram/15      00:00: 00:00                                         [facility]

l



     mL solution      00   :00                                          [facility]

 

     lactulose      2021- No                       as needed           [facility]



     (CHRONULAC)      0 09-05                          .              [facility]



     10 gram/15      00:00: 00:00                                         [facility]

l



     mL solution      00   :00                                          [facility]

 

     lactulose      2021- No                       as needed           [facility]



     (CHRONULAC)      0 09-05                          .              [facility]



     10 gram/15      00:00: 00:00                                         [facility]

l



     mL solution      00   :00                                          [facility]

 

     lactulose      2021- No                       as needed           [facility]



     (CHRONULAC)      0 09-05                          .              [facility]



     10 gram/15      00:00: 00:00                                         [facility]

l



     mL solution      00   :00                                          [place]

 

     ranolazine      2022- No             500mg Q.5D Take 1           [facility]



     (RANEXA)      7-20 07-20                          tablet           [facility]



     500 MG 12      00:00: 23:59                          (500 mg           [facility]

[facility]



     hr tablet      00   :00                           total) by           [facility]

[facility]



                                                  mouth 2           



                                                  (two)           



                                                  times           



                                                  daily.           

 

     ranolazine      2022- No             500mg Q.5D Take 1           [facility]



     (RANEXA)      7-20 07-20                          tablet           [facility]



     500 MG 12      00:00: 23:59                          (500 mg           [facility]

[facility]



     hr tablet      00   :00                           total) by           [facility]

[facility]



                                                  mouth 2           



                                                  (two)           



                                                  times           



                                                  daily.           

 

     ranolazine      2022- No             500mg Q.5D Take 1           [facility]



     (RANEXA)      7-20 07-20                          tablet           [facility]



     500 MG 12      00:00: 23:59                          (500 mg           [facility]

[facility]



     hr tablet      00   :00                           total) by           [facility]

[facility]



                                                  mouth 2           



                                                  (two)           



                                                  times           



                                                  daily.           

 

     ranolazine      2022- No             500mg Q.5D Take 1           [facility]



     (RANEXA)      7-20 07-20                          tablet           [facility]



     500 MG 12      00:00: 23:59                          (500 mg           [facility]

[facility]



     hr tablet      00   :00                           total) by           [facility]

[facility]



                                                  mouth 2           



                                                  (two)           



                                                  times           



                                                  daily.           

 

     ranolazine      2022- No             500mg Q.5D Take 1           [facility]



     (RANEXA)      7-20 07-20                          tablet           [facility]



     500 MG 12      00:00: 23:59                          (500 mg           [facility]

[facility]



     hr tablet      00   :00                           total) by           [facility]

[facility]



                                                  mouth 2           



                                                  (two)           



                                                  times           



                                                  daily.           

 

     ranolazine      2022- No             500mg Q.5D Take 1           [facility]



     (RANEXA)      7-20 07-20                          tablet           [facility]



     500 MG 12      00:00: 23:59                          (500 mg           [facility]

[facility]



     hr tablet      00   :00                           total) by           [facility]

[facility]



                                                  mouth 2           



                                                  (two)           



                                                  times           



                                                  daily.           

 

     ranolazine      2022- No             500mg Q.5D Take 1           [facility]



     (RANEXA)      7-20 07-20                          tablet           [facility]



     500 MG 12      00:00: 23:59                          (500 mg           [facility]

[facility]



     hr tablet      00   :00                           total) by           [facility]

[facility]



                                                  mouth 2           



                                                  (two)           



                                                  times           



                                                  daily.           

 

     ranolazine      2022- No             500mg Q.5D Take 1           [facility]



     (RANEXA)      7-20 07-20                          tablet           [facility]



     500 MG 12      00:00: 23:59                          (500 mg           [facility]

[facility]



     hr tablet      00   :00                           total) by           [facility]

[facility]



                                                  mouth 2           



                                                  (two)           



                                                  times           



                                                  daily.           

 

     LORazepam            Yes                      TAKE ONE           [facility]



     (ATIVAN) 1      2-09                               (1) TABLET           [facility]

[facility]



     MG tablet      00:00:                               BY MOUTH           [facility]

[facility]



               00                                 EVERY 12           [facility]



                                                  HOURS AS           



                                                  NEEDED FOR           



                                                  ANXIETY           



                                                  (MAX DAILY           



                                                  AMOUNT           



                                                  2MG)           

 

     LORazepam            Yes                      TAKE ONE           [facility]



     (ATIVAN) 1      2-09                               (1) TABLET           [facility]

[facility]



     MG tablet      00:00:                               BY MOUTH           [facility]

[facility]



               00                                 EVERY 12           [facility]



                                                  HOURS AS           



                                                  NEEDED FOR           



                                                  ANXIETY           



                                                  (MAX DAILY           



                                                  AMOUNT           



                                                  2MG)           

 

     LORazepam      -0      Yes                      TAKE ONE           [facility]



     (ATIVAN) 1      2-09                               (1) TABLET           [facility]

[facility]



     MG tablet      00:00:                               BY MOUTH           [facility]

[facility]



               00                                 EVERY 12           [facility]



                                                  HOURS AS           



                                                  NEEDED FOR           



                                                  ANXIETY           



                                                  (MAX DAILY           



                                                  AMOUNT           



                                                  2MG)           

 

     LORazepam      -0      Yes                      TAKE ONE           [facility]



     (ATIVAN) 1      2-09                               (1) TABLET           [facility]

[facility]



     MG tablet      00:00:                               BY MOUTH           [facility]

[facility]



               00                                 EVERY 12           [facility]



                                                  HOURS AS           



                                                  NEEDED FOR           



                                                  ANXIETY           



                                                  (MAX DAILY           



                                                  AMOUNT           



                                                  2MG)           

 

     LORazepam      -0      Yes                      TAKE ONE           [facility]



     (ATIVAN) 1      2-                               (1) TABLET           [facility]

[facility]



     MG tablet      00:00:                               BY MOUTH           [facility]

[facility]



               00                                 EVERY 12           [facility]



                                                  HOURS AS           



                                                  NEEDED FOR           



                                                  ANXIETY           



                                                  (MAX DAILY           



                                                  AMOUNT           



                                                  2MG)           

 

     LORazepam      -0      Yes                      TAKE ONE           [facility]



     (ATIVAN) 1      2-                               (1) TABLET           [facility]

[facility]



     MG tablet      00:00:                               BY MOUTH           [facility]

[facility]



               00                                 EVERY 12           [facility]



                                                  HOURS AS           



                                                  NEEDED FOR           



                                                  ANXIETY           



                                                  (MAX DAILY           



                                                  AMOUNT           



                                                  2MG)           

 

     LORazepam      -0      Yes                      TAKE ONE           [facility]



     (ATIVAN) 1      2-                               (1) TABLET           [facility]

[facility]



     MG tablet      00:00:                               BY MOUTH           [facility]

[facility]



               00                                 EVERY 12           Center



                                                  HOURS AS           



                                                  NEEDED FOR           



                                                  ANXIETY           



                                                  (MAX DAILY           



                                                  AMOUNT           



                                                  2MG)           

 

     LORazepam      -0      Yes                      TAKE ONE           [facility]



     (ATIVAN) 1      2-09                               (1) TABLET           [facility]

[facility]



     MG tablet      00:00:                               BY MOUTH           [facility]

[facility]



               00                                 EVERY 12           [facility]



                                                  HOURS AS           



                                                  NEEDED FOR           



                                                  ANXIETY           



                                                  (MAX DAILY           



                                                  AMOUNT           



                                                  2MG)           

 

     pregabalin      -0      Yes            300mg Q.5D Take 1           [facility]



     (LYRICA)      1-25                               capsule           [facility]



     300 MG      00:00:                               (300 mg           [facility]



     capsule      00                                 total) by           [facility]



                                                  mouth 2           



                                                  (two)           



                                                  times           



                                                  daily. Max           



                                                  Daily           



                                                  Amount:           



                                                  600 mg           

 

     pregabalin      2021-0      Yes            300mg Q.5D Take 1           [facility]



     (LYRICA)      1-25                               capsule           [facility]



     300 MG      00:00:                               (300 mg           [facility]



     capsule      00                                 total) by           [facility]



                                                  mouth 2           



                                                  (two)           



                                                  times           



                                                  daily. Max           



                                                  Daily           



                                                  Amount:           



                                                  600 mg           

 

     pregabalin      2021-0      Yes            300mg Q.5D Take 1           [facility]



     (LYRICA)      1-25                               capsule           [facility]



     300 MG      00:00:                               (300 mg           [facility]



     capsule      00                                 total) by           [facility]



                                                  mouth 2           



                                                  (two)           



                                                  times           



                                                  daily. Max           



                                                  Daily           



                                                  Amount:           



                                                  600 mg           

 

     pregabalin      2021-0      Yes            300mg Q.5D Take 1           [facility]



     (LYRICA)      1-25                               capsule           [facility]



     300 MG      00:00:                               (300 mg           [facility]



     capsule      00                                 total) by           [facility]



                                                  mouth 2           



                                                  (two)           



                                                  times           



                                                  daily. Max           



                                                  Daily           



                                                  Amount:           



                                                  600 mg           

 

     pregabalin      2021-0      Yes            300mg Q.5D Take 1           [facility]



     (LYRICA)      1-25                               capsule           [facility]



     300 MG      00:00:                               (300 mg           [facility]



     capsule      00                                 total) by           [facility]



                                                  mouth 2           



                                                  (two)           



                                                  times           



                                                  daily. Max           



                                                  Daily           



                                                  Amount:           



                                                  600 mg           

 

     pregabalin      2021-0      Yes            300mg Q.5D Take 1           [facility]



     (LYRICA)      1-25                               capsule           [facility]



     300 MG      00:00:                               (300 mg           [facility]



     capsule      00                                 total) by           Center



                                                  mouth 2           



                                                  (two)           



                                                  times           



                                                  daily. Max           



                                                  Daily           



                                                  Amount:           



                                                  600 mg           

 

     pregabalin      2021-0 2023- No             300mg Q.5D Take 1           [facility]



     (LYRICA)      1-25 04-05                          capsule           [facility]



     300 MG      00:00: 00:00                          (300 mg           [facility]



     capsule      00   :00                           total) by           Center



                                                  mouth 2           



                                                  (two)           



                                                  times           



                                                  daily. Max           



                                                  Daily           



                                                  Amount:           



                                                  600 mg           

 

     pregabalin      2021-0 2023- No             300mg Q.5D Take 1           [facility]



     (LYRICA)      1-25 04-05                          capsule           [facility]



     300 MG      00:00: 00:00                          (300 mg           [facility]



     capsule      00   :00                           total) by           Center



                                                  mouth 2           



                                                  (two)           



                                                  times           



                                                  daily. Max           



                                                  Daily           



                                                  Amount:           



                                                  600 mg           

 

     apixaban      2021-0      Yes            5mg  Q.5D Take 1           [facility]



     (Eliquis) 5      1-18                               tablet (5           [facility]

[facility]



     mg Tab      00:00:                               mg total)           [facility]

l



     tablet      00                                 by mouth 2           [facility]



                                                  (two)           



                                                  times           



                                                  daily.           

 

     apixaban      2021-0      Yes            5mg  Q.5D Take 1           [facility]



     (Eliquis) 5      1-18                               tablet (5           [facility]

[facility]



     mg Tab      00:00:                               mg total)           [facility]

l



     tablet      00                                 by mouth 2           [facility]



                                                  (two)           



                                                  times           



                                                  daily.           

 

     apixaban      2021-0      Yes            5mg  Q.5D Take 1           [facility]



     (Eliquis) 5      1-18                               tablet (5           [facility]

[facility]



     mg Tab      00:00:                               mg total)           [facility]

l



     tablet      00                                 by mouth 2           [facility]



                                                  (two)           



                                                  times           



                                                  daily.           

 

     apixaban      2021-0      Yes            5mg  Q.5D Take 1           [facility]



     (Eliquis) 5      1-18                               tablet (5           [facility]

[facility]



     mg Tab      00:00:                               mg total)           [facility]

l



     tablet      00                                 by mouth 2           [facility]



                                                  (two)           



                                                  times           



                                                  daily.           

 

     apixaban            Yes            5mg  Q.5D Take 1           [facility]



     (Eliquis) 5      1-18                               tablet (5           [facility]

[facility]



     mg Tab      00:00:                               mg total)           [facility]

l



     tablet      00                                 by mouth 2           [facility]



                                                  (two)           



                                                  times           



                                                  daily.           

 

     apixaban            Yes            5mg  Q.5D Take 1           [facility]



     (Eliquis) 5      1-18                               tablet (5           [facility]

[facility]



     mg Tab      00:00:                               mg total)           [facility]

l



     tablet      00                                 by mouth 2           [facility]



                                                  (two)           



                                                  times           



                                                  daily.           

 

     apixaban      2023- No             5mg  Q.5D Take 1           [facility]



     (Eliquis) 5      1-18 04-05                          tablet (5           [place]

[facility]



     mg Tab      00:00: 00:00                          mg total)           [facility]

al



     tablet      00   :00                           by mouth 2           [facility]



                                                  (two)           



                                                  times           



                                                  daily.           

 

     apixaban      2023- No             5mg  Q.5D Take 1           [facility]



     (Eliquis) 5      1-18 04-05                          tablet (5           [place]

[facility]



     mg Tab      00:00: 00:00                          mg total)           [facility]

al



     tablet      00   :00                           by mouth 2           [facility]



                                                  (two)           



                                                  times           



                                                  daily.           

 

     aspirin 81      -      Yes            81mg QD   Take 1           [facility]

[facility]



     MG EC      2-04                               tablet (81           [facility]



     tablet      00:00:                               mg total)           [facility]

l



               00                                 by mouth           Center



                                                  daily.           

 

     aspirin 81      2020      Yes            81mg QD   Take 1           [facility]

[facility]



     MG EC      2-04                               tablet (81           [facility]



     tablet      00:00:                               mg total)           [facility]

l



               00                                 by mouth           Center



                                                  daily.           

 

     aspirin 81      2020-      Yes            81mg QD   Take 1           [facility]

t



     MG EC      2-04                               tablet (81           [facility]



     tablet      00:00:                               mg total)           [facility]

[facility]



               00                                 by mouth           Center



                                                  daily.           

 

     aspirin 81      2020-      Yes            81mg QD   Take 1           [facility]

[facility]



     MG EC      2-04                               tablet (81           [facility]



     tablet      00:00:                               mg total)           [facility]

l



               00                                 by mouth           Center



                                                  daily.           

 

     aspirin 81      2020-      Yes            81mg QD   Take 1           [facility]

t



     MG EC      2-04                               tablet (81           [facility]



     tablet      00:00:                               mg total)           [facility]

l



               00                                 by mouth           Center



                                                  daily.           

 

     aspirin 81      2020-      Yes            81mg QD   Take 1           [facility]

t



     MG EC      2-04                               tablet (81           [facility]



     tablet      00:00:                               mg total)           [facility]

l



               00                                 by mouth           Center



                                                  daily.           

 

     aspirin 81      2020      Yes            81mg QD   Take 1           [facility]

[facility]



     MG EC      2-04                               tablet (81           [facility]



     tablet      00:00:                               mg total)           [facility]

[facility]



               00                                 by mouth           Center



                                                  in the           



                                                  morning.           

 

     aspirin 81      2020      Yes            81mg QD   Take 1           [facility]

t



     MG EC      2-04                               tablet (81           [facility]



     tablet      00:00:                               mg total)           [facility]

[facility]



               00                                 by mouth           Center



                                                  in the           



                                                  morning.           

 

     ARIPiprazol      2020- No             5mg  QD   Take 5 mg           

[facility]



     e (ABILIFY)                                by mouth           [facility]

[facility]



     5 MG tablet      00:00: 00:00                          daily .           Me

dical



               00   :00                                          [place]

 

     ARIPiprazol      2020- No             5mg  QD   Take 5 mg           

[facility]



     e (ABILIFY)                                by mouth           [facility]

[facility]



     5 MG tablet      00:00: 00:00                          daily .           Me

dical



               00   :00                                          [place]

 

     ARIPiprazol      2020- No             5mg  QD   Take 5 mg           

[facility]



     e (ABILIFY)                                by mouth           [place]

[facility]



     5 MG tablet      00:00: 00:00                          daily .           Me

dical



               00   :00                                          [place]

 

     ARIPiprazol      2020- No             5mg  QD   Take 5 mg           

[facility]



     e (ABILIFY)                                by mouth           [place]

[facility]



     5 MG tablet      00:00: 00:00                          daily .           Me

dical



               00   :00                                          [place]

 

     ARIPiprazol      2020- No             5mg  QD   Take 5 mg           

[facility]



     e (ABILIFY)                                by mouth           [place]

[facility]



     5 MG tablet      00:00: 00:00                          daily .           Me

dical



               00   :00                                          [place]

 

     ARIPiprazol      2020- No             5mg  QD   Take 5 mg           

[facility]



     e (ABILIFY)                                by mouth           [place]

[facility]



     5 MG tablet      00:00: 00:00                          daily .           Me

dical



               00   :00                                          [place]

 

     ARIPiprazol      2020- No             5mg  QD   Take 5 mg           

[facility]



     e (ABILIFY)                                by mouth           [facility]

[facility]



     5 MG tablet      00:00: 00:00                          daily .           Me

dical



               00   :00                                          [place]

 

     ARIPiprazol      2020 No             5mg  QD   Take 5 mg           

[facility]



     e (ABILIFY)                                by mouth           [place]

[facility]



     5 MG tablet      00:00: 00:00                          daily .           Me

dical



               00   :00                                          [place]

 

     Tamsulosin Tamsulosin 2020- No             1{capsu QD   Tamsulosin  

         



     HCl 0.4 MG HCl 0.4 MG                 le}       HCl 0.4 MG       

    



               00:00: 00:00                                         



               00   :00                                          

 

     Tamsulosin Tamsulosin 2020- No             1{capsu QD   Tamsulosin  

         



     HCl 0.4 MG HCl 0.4 MG                 le}       HCl 0.4 MG       

    



               00:00: 00:00                                         



               00   :00                                          

 

     busPIRone      2022- No             7.5mg QD   Take 7.5           [facility]



     (BUSPAR)                                mg by           [facility]



     7.5 MG      00:00: 00:00                          mouth           [facility]



     tablet      00   :00                           daily .           [place]

 

     busPIRone      2022- No             7.5mg QD   Take 7.5           [facility]



     (BUSPAR)                                mg by           [facility]



     7.5 MG      00:00: 00:00                          mouth           [facility]



     tablet      00   :00                           daily .           [place]

 

     busPIRone      2022- No             7.5mg QD   Take 7.5           [facility]



     (BUSPAR)                                mg by           [facility]



     7.5 MG      00:00: 00:00                          mouth           [facility]



     tablet      00   :00                           daily .           [place]

 

     busPIRone      2022- No             7.5mg QD   Take 7.5           [facility]



     (BUSPAR)      -                          mg by           [facility]



     7.5 MG      00:00: 00:00                          mouth           [facility]



     tablet      00   :00                           daily .           [place]

 

     busPIRone      2022- No             7.5mg QD   Take 7.5           [facility]



     (BUSPAR)      -                          mg by           [facility]



     7.5 MG      00:00: 00:00                          mouth           [facility]



     tablet      00   :00                           daily .           [place]

 

     busPIRone      2022- No             7.5mg QD   Take 7.5           [facility]



     (BUSPAR)      -                          mg by           [facility]



     7.5 MG      00:00: 00:00                          mouth           [facility]



     tablet      00   :00                           daily .           [place]

 

     busPIRone       No             7.5mg QD   Take 7.5           [facility]



     (BUSPAR)      -                          mg by           [facility]



     7.5 MG      00:00: 00:00                          mouth           [facility]



     tablet      00   :00                           daily .           [place]

 

     busPIRone       No             7.5mg QD   Take 7.5           [facility]



     (BUSPAR)                                mg by           [facility]



     7.5 MG      00:00: 00:00                          mouth           [facility]



     tablet      00   :00                           daily .           [place]

 

     methocarbam            Yes            500mg      Take 500           [facility]

[facility]



     ol        9-18                               mg by           [facility]



     (ROBAXIN)      10:03:                               mouth 4           [place]



     500 mg      54                                 (four)           [facility]



     tablet                                         times           [facility]



                                                  daily.           

 

     simvastatin            Yes            20mg      Take 20 mg           

[facility]



     (ZOCOR) 20      9-18                               by mouth           [facility] o

f



     mg tablet      10:03:                               at             [place]



               54                                 bedtime.           [facility]



                                                                 [facility]

 

     amLODIPine            Yes            5mg       Take 5 mg           [facility]

[facility]



     (NORVASC) 5      9-18                               by mouth           [facility] 

of



     mg tablet      10:03:                               daily.           [place]



               54                                                [facility]



                                                                 [facility]

 

     pregabalin            Yes            100mg      Take 100           [facility]

[facility]



     (LYRICA)      9-18                               mg by           [facility]



     100 mg      10:03:                               mouth 2           [place]



     capsule      54                                 (two)           [facility]



                                                  times           [facility]



                                                  daily.           

 

     traMADOL            Yes       [id number] 50mg      Take 1 Tab         

  [facility]



     (ULTRAM) 50      9-26                               by mouth           [facility] 

of



     mg tablet      00:00:                               every 6           [place]



               00                                 (six)           [facility]



                                                  hours as           [facility]



                                                  needed for           



                                                  Pain           



                                                  (scale           



                                                  7-10).           

 

     cyclobenzap            Yes            10mg      Take 10 mg           

[facility]



     rine      8-26                               by mouth 3           [facility]



     (FLEXERIL)      09:54:                               (three)           [place]

[facility]



     10 mg      24                                 times           [facility]



     tablet                                         daily.           [facility]

 

     esomeprazol            Yes            20mg      Take 20 mg           

[facility]



     e (NEXIUM)      8-26                               by mouth 2           [facility]

 of



     20 mg      09:54:                               (two)           [place]



     capsule      24                                 times           [facility]



                                                  daily.           [facility]

 

     lisinopril            Yes            20mg      Take 20 mg           [facility]

[facility]



     (PRINIVIL,Z      8-26                               by mouth           [facility] 

of



     ESTRIL) 20      09:54:                               daily.           [place]



     mg tablet      24                                                [facility]



                                                                 [facility]

 

     HYDROcodone      -      Yes       [id number] 1{tbl}      Take 1 Tab    

       [facility]



     -acetaminop      8-26                               by mouth           [facility] 

of



     hen (NORCO)      00:00:                               every 4           [facility]

[facility]



      mg      00                                 (four)           [facility]



     tablet                                         hours as           [facility]



                                                  needed for           



                                                  Pain           



                                                  (scale           



                                                  4-6) or           



                                                  Pain           



                                                  (scale           



                                                  7-10).           

 

     DULoxetine            Yes            60mg      Take 1 Cap           [facility]

[facility]



     (CYMBALTA)      8-20                               by mouth           [facility] o

f



     60 mg      00:00:                               daily.           [place]



     capsule      00                                                Medical



                                                                 [facility]

 

     Eliquis 5 Eliquis 5           No                       Eliquis 5           



     MG   MG                                      MG             

 

     Tylenol # 3 Tylenol # 3           No                       Tylenol #       

    



     300/30mg 300/30mg                                    3 300/30mg           

 

     Losartan Losartan           No             1{table QD   Losartan           



     Potassium Potassium                          t}        Potassium           



     100  MG                                    100 MG           

 

     Tamsulosin Tamsulosin           No             1{capsu QD   Tamsulosin     

      



     HCl 0.4 MG HCl 0.4 MG                          le}       HCl 0.4 MG        

   

 

     Lorazepam 1 Lorazepam 1           No             1{table QD   Lorazepam    

       



     MG   MG                            t_at_be      1 MG           



                                        dtime_a                     



                                        s_neede                     



                                        d}                       

 

     Sulfamethox Sulfamethox           No                       Sulfametho      

     



     azole-TMP azole-TMP                                    xazole-TMP          

 



     DS   DS                                      DS             

 

     Eliquis 5 Eliquis 5           No                       Eliquis 5           



     MG   MG                                      MG             

 

     Tylenol # 3 Tylenol # 3           No                       Tylenol #       

    



     300/30mg 300/30mg                                    3 300/30mg           

 

     Losartan Losartan           No             1{table QD   Losartan           



     Potassium Potassium                          t}        Potassium           



     100  MG                                    100 MG           

 

     Tamsulosin Tamsulosin           No             1{capsu QD   Tamsulosin     

      



     HCl 0.4 MG HCl 0.4 MG                          le}       HCl 0.4 MG        

   

 

     Lorazepam 1 Lorazepam 1           No             1{table QD   Lorazepam    

       



     MG   MG                            t_at_be      1 MG           



                                        dtime_a                     



                                        s_neede                     



                                        d}                       

 

     Sulfamethox Sulfamethox           No                       Sulfametho      

     



     azole-TMP azole-TMP                                    xazole-TMP          

 



     DS   DS                                      DS             

 

     Eliquis 5 Eliquis 5           No                       Eliquis 5           



     MG   MG                                      MG             

 

     Tylenol # 3 Tylenol # 3           No                       Tylenol #       

    



     300/30mg 300/30mg                                    3 300/30mg           

 

     Losartan Losartan           No             1{table QD   Losartan           



     Potassium Potassium                          t}        Potassium           



     100  MG                                    100 MG           

 

     Tamsulosin Tamsulosin           No             1{capsu QD   Tamsulosin     

      



     HCl 0.4 MG HCl 0.4 MG                          le}       HCl 0.4 MG        

   

 

     Lorazepam 1 Lorazepam 1           No             1{table QD   Lorazepam    

       



     MG   MG                            t_at_be      1 MG           



                                        dtime_a                     



                                        s_neede                     



                                        d}                       

 

     Sulfamethox Sulfamethox           No                       Sulfametho      

     



     azole-TMP azole-TMP                                    xazole-TMP          

 



     DS   DS                                      DS             

 

     amiodarone amiodarone           No             1    Q1D  amiodarone        

   [facility]



     100 mg 100 mg                                    100 mg           [facility]



     tablet Take tablet Take                                    tablet          

 



     1 tablet 1 tablet                                    Take 1           



     every day every day                                    tablet           



     by oral by oral                                    every day           



     route for route for                                    by oral           



     30 days. 30 days.                                    route for           



                                                  30 days.           

 

     amiodarone amiodarone           No                       amiodarone        

   [facility]



     200 mg 200 mg                                    200 mg           [facility]



     tablet take tablet take                                    tablet          

 



     0.5 tablets 0.5 tablets                                    take 0.5        

   



     QD   QD                                      tablets QD           

 

     amoxicillin amoxicillin           No             1    Q12H amoxicilli      

     [facility]



     875  875                                     n 875           [facility]



     mg-potassiu mg-potassiu                                    mg-potassi      

     



     m    m                                       um             



     clavulanate clavulanate                                    clavulanat      

     



     125 mg 125 mg                                    e 125 mg           



     tablet Take tablet Take                                    tablet          

 



     1 tablet 1 tablet                                    Take 1           



     every 12 every 12                                    tablet           



     hours by hours by                                    every 12           



     oral route oral route                                    hours by          

 



     for 10 for 10                                    oral route           



     days. days.                                    for 10           



                                                  days.           

 

     aspirin 81 aspirin 81           No                       aspirin 81        

   [facility]



     mg   mg                                      mg             [facility]



     tablet,jackelyn tablet,jackelyn                                    tablet,del      

     



     yed release yed release                                    ayed           



     TAKE 1 TAKE 1                                    release           



     TABLET BY TABLET BY                                    TAKE 1           



     MOUTH EVERY MOUTH EVERY                                    TABLET BY       

    



     DAY FOR 30 DAY FOR 30                                    MOUTH           



     DAYS DAYS                                    EVERY DAY           



                                                  FOR 30           



                                                  DAYS           

 

     atorvastati atorvastati           No             1    Q1D  atorvastat      

     [facility]



     n 10 mg n 10 mg                                    in 10 mg           Med[facility]

al



     tablet Take tablet Take                                    tablet          

 



     1 tablet 1 tablet                                    Take 1           



     every day every day                                    tablet           



     by oral by oral                                    every day           



     route at route at                                    by oral           



     bedtime for bedtime for                                    route at        

   



     30 days. 30 days.                                    bedtime           



                                                  for 30           



                                                  days.           

 

     bumetanide bumetanide           No                       bumetanide        

   [facility]



     2 mg tablet 2 mg tablet                                    2 mg           M

edical



                                                  tablet           

 

     dexamethaso dexamethaso           No                       dexamethas      

     [facility]



     ne 4 mg ne 4 mg                                    one 4 mg           [facility]

al



     tablet tablet                                    tablet           

 

     duloxetine duloxetine           No             2capsul Q1D  duloxetine     

      [facility]



     20 mg 20 mg                          e(s)      20 mg           [facility]



     capsule,del capsule,del                                    capsule,de      

     



     ayed ayed                                    layed           



     release release                                    release           



     Take 2 Take 2                                    Take 2           



     capsules capsules                                    capsules           



     every day every day                                    every day           



     by oral by oral                                    by oral           



     route for route for                                    route for           



     30 days. 30 days.                                    30 days.           

 

     Eliquis 5 Eliquis 5           No             1    BID  Eliquis 5           

[facility]



     mg tablet mg tablet                                    mg tablet           

[facility]



     Take 1 Take 1                                    Take 1           



     tablet tablet                                    tablet           



     twice a day twice a day                                    twice a         

  



     by oral by oral                                    day by           



     route for route for                                    oral route          

 



     30 days. 30 days.                                    for 30           



                                                  days.           

 

     Flonase Flonase           No             1spray( Q1D  Flonase           [facility]

via



     Allergy Allergy                          s)        Allergy           [facility]

l



     Relief 50 Relief 50                                    Relief 50           



     mcg/actuati mcg/actuati                                    mcg/actuat      

     



     on nasal on nasal                                    ion nasal           



     spray,suspe spray,suspe                                    spray,susp      

     



     nsion Spray nsion Spray                                    ension          

 



     1 spray 1 spray                                    Spray 1           



     every day every day                                    spray           



     by   by                                      every day           



     intranasal intranasal                                    by             



     route. route.                                    intranasal           



                                                  route.           

 

     guaifenesin guaifenesin           No             1    Q12H guaifenesi      

     [facility]



      mg  mg                                    n            M

edical



     tablet, tablet,                                    mg tablet,           



     extended extended                                    extended           



     release 12 release 12                                    release 12        

   



     hr Take 1 hr Take 1                                    hr Take 1           



     tablet tablet                                    tablet           



     every 12 every 12                                    every 12           



     hours by hours by                                    hours by           



     oral route oral route                                    oral route        

   



     for 7 days. for 7 days.                                    for 7           



                                                  days.           

 

     tamsulosin tamsulosin           No             1capsul Q1D  tamsulosin     

      [facility]



     0.4 mg 0.4 mg                          e(s)      0.4 mg           Medical



     capsule capsule                                    capsule           



     Take 1 Take 1                                    Take 1           



     capsule capsule                                    capsule           



     every day every day                                    every day           



     by oral by oral                                    by oral           



     route for route for                                    route for           



     30 days. 30 days.                                    30 days.           







Immunizations







           Ordered Immunization Filled Immunization Date       Status     Commen

ts   Source



           Name       Name                                        

 

           FLUCELVAX QUAD             2022 Completed             [facility]

[facility]



                                 00:00:00                         [facility]

 

           FLUCELVAX QUAD             2022 Completed             [facility]

[facility]



                                 00:00:00                         [facility]

 

           FLUCELVAX QUAD             2022 Completed             [facility]

[facility]



                                 00:00:00                         [facility]

 

           FLUCELVAX QUAD             2022 Completed             [facility]

st



                                 00:00:00                         [facility]

 

           FLUCELVAX QUAD             2022 Completed             [facility]



                                 00:[facility]:00                         [facility]

 

           FLUCELVAX QUAD PF            2022 Completed             [facility]



                                 00:[facility]:00                         [facility]

 

           FLUCELVAX QUAD PF            2022 Completed             [facility]

[facility]



                                 00:00:00                         [facility]

 

           Influenza Four-QIV            2020 Completed             [facility]



           PF 3YR+ ([id number])            00:00:00                         [facility]

 

           Influenza Four-QIV            2020 Completed             [facility]



           PF 3YR+ ([id number])            00:00:00                         [facility]

 

           Influenza Four-QIV            2020 Completed             [facility]



           PF 3YR+ ([id number])            00:00:00                         [facility]

 

           Influenza Four-QIV            2020 Completed             [facility]



           PF 3YR+ ([id number])            00:00:00                         [facility]

 

           Influenza Four-QIV            2020 Completed             [facility]



           PF 3YR+ ([id number])            00:00:00                         [facility]

 

           Influenza Four-QIV            2020 Completed             [facility]



           PF 3YR+ ([id number])            00:00:00                         [facility]

 

           Influenza Four-QIV            2020 Completed             [facility]



           PF 3YR+ ([id number])            00:00:00                         [facility]

 

           Influenza Four-QIV            2020 Completed             [facility]



           PF 3YR+ ([id number])            00:00:00                         [facility]







Vital Signs







             Vital Name   Observation Time Observation Value Comments     Source

 

             HEIGHT       2020 02:00:00 167.6 cm                  

 

             WEIGHT       2020 02:00:00 112.855 kg                

 

             Heart rate   2023 05:00:00 89 /min                   [facility]

 

             Oxygen saturation in 2023 05:00:00 98 /min                   

[facility]



             Arterial blood by                                        [facility]

[facility]



             Pulse oximetry                                        [facility]

 

             Systolic blood 2023 04:53:00 123 mm[Hg]                [facility]

[facility]



             pressure                                            [facility]

 

             Diastolic blood 2023 04:53:00 78 mm[Hg]                 [facility]

[facility]

 

             Respiratory rate 2023 04:53:00 20 /min                   [facility]

[facility]

 

             Body temperature 2023 02:55:00 36.56 Magui                 [facility]

 

             Body height  2023 02:55:00 170.2 cm                  [facility]

 

             Body weight  2023 02:55:00 111.585 kg                [facility]

 

             BMI          2023 02:55:00 38.53 kg/m2               [facility]

 

             Height       2023 00:00:00 67 [in_i]                 [facility]

[facility]

 

             BMI (Body Mass 2023 00:00:00 40.7 kg/m2                [facility]

 Medical



             Index)                                              

 

             Body Weight  2023 00:00:00 4160 [oz_av]              [facility]

[facility]

 

             BP Diastolic 2023 00:00:00 79 mm[Hg]                 [facility]

[facility]

 

             Height       2023 00:00:00 67 [in_i]                 [facility]

[facility]

 

             BMI (Body Mass 2023 00:00:00 40.9 kg/m2                [facility]

 Medical



             Index)                                              

 

             BP Systolic  2023 00:00:00 126 mm[Hg]                [facility]

[facility]

 

             Body Weight  2023 00:00:00 4176 [oz_av]              [facility]

[facility]

 

             Systolic blood 2023 17:52:00 113 mm[Hg]                [facility]



             pressure                                            Medicine

 

             Diastolic blood 2023 17:52:00 74 mm[Hg]                 [facility]



             pressure                                            Medicine

 

             Heart rate   2023 17:52:00 71 /min                   [facility]

 

             Body temperature 2023 17:52:00 36 Magui                    [facility]

 

             Respiratory rate 2023 17:52:00 16 /min                   [facility]

 

             Body height  2023 17:52:00 170.2 cm                  [facility]



                                                                 [facility]

 

             Body weight  2023 17:52:00 120.657 kg                [facility]

 

             BMI          2023 17:52:00 41.66 kg/m2               [facility]

 

             Systolic blood 2022 14:02:00 138 mm[Hg]                [facility]



             pressure                                            Medicine

 

             Diastolic blood 2022 14:02:00 86 mm[Hg]                 [facility]

[employer]



             pressure                                            [facility]

 

             Heart rate   2022 14:02:00 64 /min                   [facility]

 

             Body temperature 2022 14:02:00 36.39 Magui                 [facility]

 

             Respiratory rate 2022 14:02:00 16 /min                   [facility]

 

             Body height  2022 14:02:00 170.2 cm                  [facility]

 

             Body weight  2022 14:02:00 127.461 kg                [facility]

 

             BMI          2022 14:02:00 44.01 kg/m2               [facility]

 

             WEIGHT       2022 06:26:00 131.543 kg                

 

             HEIGHT       2022 06:26:00 167.6 cm                  

 

             HEIGHT       2022 12:21:00 170.2 cm                  

 

             WEIGHT       2022 12:21:00 131.543 kg                

 

             WEIGHT       2022 06:26:00 131.543 kg                

 

             HEIGHT       2022 06:26:00 167.6 cm                  

 

             HEIGHT       2022 12:21:00 170.2 cm                  

 

             WEIGHT       2022 12:21:00 131.543 kg                

 

             WEIGHT       2022 06:26:00 131.543 kg                

 

             HEIGHT       2022 06:26:00 167.6 cm                  

 

             HEIGHT       2022 12:21:00 170.2 cm                  

 

             WEIGHT       2022 12:21:00 131.543 kg                

 

             HEIGHT       2022 15:45:00 167.6 cm                  

 

             WEIGHT       2022 15:45:00 131.543 kg                

 

             HEIGHT       2022 15:45:00 167.6 cm                  

 

             WEIGHT       2022 15:45:00 131.543 kg                

 

             HEIGHT       2022 15:45:00 167.6 cm                  

 

             WEIGHT       2022 15:45:00 131.543 kg                

 

             HEIGHT       2022 15:58:00 167.6 cm                  

 

             WEIGHT       2022 15:58:00 131.543 kg                

 

             HEIGHT       2022 15:58:00 167.6 cm                  

 

             WEIGHT       2022 15:58:00 131.543 kg                

 

             HEIGHT       2022 15:58:00 167.6 cm                  

 

             WEIGHT       2022 15:58:00 131.543 kg                

 

             HEIGHT       2022 12:10:00 167.6 cm                  

 

             WEIGHT       2022 12:10:00 127.007 kg                

 

             HEIGHT       2022 12:10:00 167.6 cm                  

 

             WEIGHT       2022 12:10:00 127.007 kg                

 

             HEIGHT       2022-01-10 16:37:00 172.7 cm                  

 

             WEIGHT       2022-01-10 16:37:00 127.007 kg                

 

             HEIGHT       2022-01-10 16:37:00 172.7 cm                  

 

             WEIGHT       2022-01-10 16:37:00 127.007 kg                

 

             HEIGHT       2021-10-11 14:52:00 167.6 cm                  

 

             WEIGHT       2021-10-11 14:52:00 130.636 kg                

 

             WEIGHT       2021 06:00:00 125.102 kg                

 

             WEIGHT       2021 09:00:00 126 kg                    

 

             WEIGHT       2021 05:58:00 128.822 kg                

 

             HEIGHT       2021 08:02:00 167.6 cm                  

 

             WEIGHT       2021 08:02:00 127.007 kg                

 

             WEIGHT       2021 06:00:00 125.102 kg                

 

             WEIGHT       2021 09:00:00 126 kg                    

 

             WEIGHT       2021 05:58:00 128.822 kg                

 

             HEIGHT       2021 08:02:00 167.6 cm                  

 

             WEIGHT       2021 08:02:00 127.007 kg                

 

             HEIGHT       2021 08:45:00 167.6 cm                  

 

             WEIGHT       2021 08:45:00 113.399 kg                

 

             HEIGHT       2021 16:42:00 167.6 cm                  

 

             WEIGHT       2021 16:42:00 111.131 kg                

 

             HEIGHT       2021 16:42:00 167.6 cm                  

 

             WEIGHT       2021 16:42:00 111.131 kg                

 

             HEIGHT       2020 20:44:00 169 cm                    

 

             WEIGHT       2020 20:44:00 110.678 kg                

 

             HEIGHT       2020 20:44:00 169 cm                    

 

             WEIGHT       2020 20:44:00 110.678 kg                

 

             HEIGHT       2020 02:00:00 167.6 cm                  

 

             WEIGHT       2020 02:00:00 112.855 kg                

 

             WEIGHT       2020 20:51:00 109.2 kg                  

 

             HEIGHT       2020 14:29:00 167.6 cm                  

 

             WEIGHT       2020 14:29:00 106.142 kg                

 

             WEIGHT       2020 20:51:00 109.2 kg                  

 

             HEIGHT       2020 14:29:00 167.6 cm                  

 

             WEIGHT       2020 14:29:00 106.142 kg                

 

             WEIGHT       2020 09:37:00 106.142 kg                

 

             height       2020 09:00:00 66 [in_i]                 [facility]

 

             weight       2020 09:00:00 241 [lb_av]               [facility]

 

             temperature  2020 09:00:00 95.0 [degF]               [facility]

 

             bmi          2020 09:00:00 38.89 kg/m2               [facility]

 

             oximetry     2020 09:00:00 95 %                      [facility]

 

             blood pressure 2020 09:00:00 142 mm[Hg]                [facility]



             systolic                                            [facility]

 

             blood pressure 2020 09:00:00 71 mm[Hg]                 [facility]



             diastolic                                           [facility]

 

             height       2020 13:30:00 66 [in_i]                 [facility]

 

             weight       2020 13:30:00 241 [lb_av]               [facility]

 

             temperature  2020 13:30:00 97.8 [degF]               [facility]

 

             bmi          2020 13:30:00 38.89 kg/m2               [facility]

 

             oximetry     2020 13:30:00 94 %                      [facility]

 

             blood pressure 2020 13:30:00 164 mm[Hg]                [facility]



             systolic                                            [facility]

 

             blood pressure 2020 13:30:00 83 mm[Hg]                 [facility]



             diastolic                                           [facility]

 

             Height       2020 13:06:00 167.64 CM                 

 

             Weight       2020 13:06:00 106.59 KG                 

 

             Height       2020 05:11:00 167.64 CM                 

 

             Weight       2020 05:11:00 104.32 KG                 

 

             Height       2020 10:47:00 167.64 CM                 

 

             Weight       2020 10:47:00 104.32 KG                 

 

             Height       2020 15:40:00 170.18 CM                 

 

             Weight       2020 15:40:00 6.8 KG                    

 

             Height       2020 13:20:00 167.64 CM                 

 

             Weight       2020 13:20:00 108.86 KG                 

 

             Height       2020 10:30:00 167.64 CM                 

 

             Weight       2020 10:30:00 108.86 KG                 

 

             Height       2020 22:38:00 165.1 CM                  

 

             Weight       2020 22:38:00 113.39 KG                 

 

             Height       2020 14:30:00 170.18 CM                 

 

             Weight       2020 14:30:00 117.93 KG                 

 

             Height       2020 20:59:00 167.64 CM                 

 

             Weight       2020 20:59:00 117.93 KG                 

 

             Height       2020 13:30:00 167.64 CM                 

 

             Height       2020 17:16:00 170.18 CM                 

 

             Weight       2020 17:16:00 117.93 KG                 

 

             Height       2020 02:23:00 165.1 CM                  

 

             Weight       2020 02:23:00 117.93 KG                 

 

             Height       2020 16:10:00 167.64 CM                 

 

             Weight       2020 16:10:00 117.93 KG                 

 

             Height       2020 13:54:00 170.18 CM                 

 

             Weight       2020 13:54:00 104.32 KG                 

 

             Weight       2020 23:28:00 108.4 KG                  

 

             Height       2020 18:24:00 167.64 CM                 

 

             Weight       2020 18:24:00 122.46 KG                 

 

             Height       2020 16:25:00 167.64 CM                 

 

             Weight       2020 16:25:00 122.46 KG                 

 

             Height       2020 20:09:00 167.64 CM                 

 

             Weight       2020 20:09:00 122.46 KG                 

 

             Height       2020-06-15 12:41:00 167.64 CM                 

 

             Weight       2020-06-15 12:41:00 99.79 KG                  

 

             Height       2020 11:29:00 167.64 CM                 

 

             Weight       2020 11:29:00 122.46 KG                 

 

             Height       2020-06-10 17:03:00 167.64 CM                 

 

             Weight       2020-06-10 17:03:00 117.93 KG                 

 

             Height       2020 11:46:00 167.64 CM                 

 

             Weight       2020 11:46:00 86.18 KG                  

 

             Height       2020 13:34:00 167.64 CM                 

 

             Weight       2020 13:34:00 117.93 KG                 

 

             Height       2020 11:56:00 167.64 CM                 

 

             Weight       2020 11:56:00 122.46 KG                 

 

             Height       2020 11:56:00 167.64 CM                 

 

             Weight       2020 11:56:00 122.46 KG                 

 

             Height       2020-04-15 13:42:00 167.64 CM                 

 

             Weight       2020-04-15 13:42:00 122.46 KG                 

 

             Height       2020 14:36:00 167.64 CM                 

 

             Weight       2020 14:36:00 122.46 KG                 

 

             Height       2020 06:29:00 167.64 CM                 

 

             Weight       2020 06:29:00 122.46 KG                 

 

             Height       2020 20:10:00 167.64 CM                 

 

             Weight       2020 20:10:00 118.6 KG                  

 

             Height       2020 11:41:00 170.18 CM                 

 

             Weight       2020 11:41:00 122.01 KG                 

 

             Height       2020 10:53:00 170.18 CM                 

 

             Weight       2020 10:53:00 117.93 KG                 

 

             Systolic blood 2023 18:30:00 104 mm[Hg]                [facility]

 

             Diastolic blood 2023 18:30:00 66 mm[Hg]                 [facility]

 

             Heart rate   2023 18:30:00 82 /min                   [facility]

 

             Respiratory rate 2023 18:30:00 18 /min                   [facility]

 

             Oxygen saturation in 2023 18:30:00 94 /min                   

[facility]



             Arterial blood by                                        Medical Ce

nter



             Pulse oximetry                                        

 

             Systolic blood 2023 01:33:00 132 mm[Hg]                [facility]



             pressure                                            

 

             Diastolic blood 2023 01:33:00 75 mm[facility]Hg]                 [facility]

[facility]



             pressure                                            

 

             Heart rate   2023 01:33:00 78 /min                   [facility]

 

             Respiratory rate 2023 01:33:00 18 /min                   [facility]

 

             Oxygen saturation in 2023 01:33:00 95 /min                   

[facility]



             Arterial blood by                                        



             Pulse oximetry                                        

 

             Body temperature 2023 21:15:03 36.5 Magui                  [facility]

 

             Body height  2023 21:11:00 170.2 cm                  [facility]

 

             Body weight  2023 21:11:00 120.657 kg                [facility]

 

             BMI          2023 21:11:00 41.66 kg/m2               [facility]

 

             Body temperature 2023 13:15:00 36.78 Magui                 [facility]

 

             Systolic blood 2022 16:47:52 128 mm[Hg]                [facility]

ist [facility]



             pressure                                            

 

             Diastolic blood 2022 16:47:52 73 mm[Hg]                 [facility]



             pressure                                            

 

             Heart rate   2022 16:47:52 67 /min                   [facility]

 

             Body temperature 2022 16:47:52 35.94 Magui                 [facility]

 

             Respiratory rate 2022 16:47:52 17 /min                   [facility]

 

             Oxygen saturation in 2022 16:47:52 93 /min                   

[facility]



             Arterial blood by                                        



             Pulse oximetry                                        

 

             Body weight  2022 09:24:38 127.007 kg                [facility]

 

             BMI          2022 09:24:38 45.19 kg/m2               [facility]

 

             Body height  2022 01:28:45 167.6 cm                  [facility]

 

             Systolic blood 2022 09:45:00 113 mm[Hg]                [facility]

 

             Diastolic blood 2022 09:45:00 62 mm[Hg]                 [facility]

[facility]

 

             Heart rate   2022 09:45:00 66 /min                   [facility]

 

             Body temperature 2022 09:45:00 36.56 Magui                 [facility]

 

             Respiratory rate 2022 09:45:00 15 /min                   [facility]

 

             Oxygen saturation in 2022 09:45:00 90 /min                   

[facility]



             Arterial blood by                                        Medical Ce

nter



             Pulse oximetry                                        

 

             Body height  2022 06:26:00 167.6 cm                  [facility]

 

             Body weight  2022 06:26:00 131.543 kg                [facility]

 

             BMI          2022 06:26:00 46.81 kg/m2               [facility]







Procedures







                Procedure       Date / Time     Performing Clinician Source



                                Performed                       

 

                MAGNESIUM       2023 03:50:00 Tate Wan [facility]

 

                COMP. METABOLIC PANEL 2023 03:50:00 Tate Wan [facility]



                ([id number]

 

                TROPONIN I      2023 03:10:00 Tate Wan [facility]

 

                CBC WITH DIFF   2023 03:10:00 [facility] ARJUN [facility]

 

                N-TERMINAL PRO-BNP 2023 03:10:00 Tate Wan [facility]

 

                ECG 12-LEAD     2023 13:08:28 [facility]

 

                COVID-19 QUALITATIVE 2023 05:44:00 Laase, Laura Ellen [facility]



                RT-PCR                                          

 

                CT LUMBAR SPINE WO 2023 03:19:48 Laase, Laura Ellen [facility]

[facility]



                CONTRAST                                        

 

                CBC WITH PLATELET AND 2023 01:40:00 Laase, Laura Ellen [facility]



                DIFFERENTIAL                                    

 

                BASIC METABOLIC PANEL 2023 01:40:00 Laase, Laura Ellen [facility]

 

                ESTIMATED GFR   2023 01:40:00 Laase, Laura Ellen [facility]

 

                ECG ED PRELIMINARY 2023 00:06:41 Laase, Laura Ellen [facility]



                INTERPRETATION                                  

 

                XR SPINE CERVICAL 2 OR 3 2023 [facility]:04[facility]00 [facility]



                VIEWS                           [facility]        Center

 

                XR SPINE LUMBAR COMPLETE 2023 18:04:00 Rory [facility]



                4 VIEWS MIN                     [facility]        Center

 

                XR SCOLIOSIS 2 VIEWS 2023 18:37:00 Edgardo Fry [facility]

 

                XR LUMBAR SPINE COMP WITH 2023 13:02:00 Edgardo Fry

[facility]



                FLEX & EXT                                      Center

 

                XR SPINE SCOLIOSIS STUDY 2023 12:37:00 Edgardo Fry [facility]

I [facility]

 

                XR SCOLIOSIS 2 VIEWS 2023 12:37:00                 [facility]

 

                TTE COMPLETE, WO 2022 01:28:00 Arnaud Rider H

ospital



                CONTRAST, W KAT Mckeon          



                ([id number])                                         

 

                ZZCOVID-19 ANTI-SPIKE IGG 2022 20:30:00 [facility]



                ANTIBODY TITER                                  

 

                ZZCOVID-19 SEROLOGY 2022 20:30:00 [facility]



                PATIENT SURVEILLANCE                                 

 

                SEDIM[facility]ATION RATE [facility]22 [facility]30[facility]00 [facility]

 

                C-REACTIVE PROTEIN 2022 20:30:[facility]

 

                TROPONIN T      2022 08:26:00 Arnaud Rider Ho

spital



                                                Mike          

 

                CBC WITH PLATELET AND 2022 08:26:00 Arnaud Rider [facility]



                DIFFERENTIAL                    Mike          

 

                COMPREHENSIVE METABOLIC 2022 08:26:00 Arnaud Rider [facility]



                PANEL                           Mike          

 

                ESTIMATED GFR   2022 08:26:00 Arnaud Rider Ho

spital



                                                Mike          

 

                TROPONIN T      2022 04:42:00 Arnaud Rider Ho

spital



                                                Mike          

 

                COVID-19 QUALITATIVE 2022 00:45:00 Julien Wise [facility]



                RT-PCR                                          

 

                CT ANGIOGRAM NECK W WO 2022 23:41:52 Julien Wise [facility]



                CONTRAST                                        

 

                CT ANGIOGRAM HEAD W WO 2022 23:39:44 Julien Wise [facility]



                CONTRAST                                        

 

                CT HEAD WO CONTRAST 2022 23:29:03 Julien Wise [facility]

 

                ECG ED PRELIMINARY 2022 22:46:23 Julien Wise [facility]



                INTERPRETATION                                  

 

                CBC WITH PLATELET AND 2022 22:24:00 [facility]



                DIFFERENTIAL                                    

 

                COMPREHENSIVE METABOLIC 2022 22:24:00 [facility]



                PANEL                                           

 

                ESTIMATED GFR   2022 22:24:00 [facility]

 

                TROPONIN T      2022 22:24:00 Arnaud Rider [facility]

segun Keeenan          

 

                B NATRIURETIC PEPTIDE 2022 22:24:00 Julien Wise [facility]

 

                ECG 12-LEAD     2022 21:54:19 [facility]

 

                REPORT OF PROCEDURE - 2022 08:56:43 Georgina Pacheco [facility]



                ENDOSCOPY URL                                   Center

 

                REPORT OF PROCEDURE - 2022 08:50:44 Georgina Pacheco [facility]



                ENDOSCOPY URL                                   Center

 

                TISSUE EXAM     2022 08:11:00 Georgina Pacheco [facility]

 

                COLONOSCOPY, WITH 2022 07:41:00 Georgina Pacheco [facility]



                POLYPECTOMY                                     Center

 

                ENDOSCOPY, UPPER GI 2022 07:41:00 Georgina Pacheco [facility]



                TRACT, WITH BIOPSY                                 Center

 

                EKG-SCANNED     2022 00:00:00 Provider, [facility]



                                                Scanning        Center

 

                XR ABDOMEN/KUB 1 VIEW 2022 17:57:00 Gomez Berman [facility]



                PORTABLE                        Christin          Center

 

                CT CHEST WITHOUT IV 2022 20:03:00 Bertha White     [facility]



                CONTRAST                        Radha       [place]

 

                CT ABDOMEN/PELVIS WITHOUT 2022 20:03:00 [facility]



                [facility] CONTR[facility]                     [facility]

 

                XR CHEST 1 VIEW PORTABLE 2022 18:16:00 [facility]



                / BEDSIDE                       Di          Center

 

                CBC W/PLT COUNT & AUTO 2022 18:07:00 [facility]



                DIFFERENTIAL                    [place]

 

                BASIC METABOLIC PANEL 2022 18:07:00 [facility]

 

                TROPONIN I      2022 18:07:00 [facility]

 

                HEPATIC FUNCTION PANEL 2022 18:07:00 [facility]



                                                [facility]

 

                LIPASE          2022 18:07:00 [facility]



                                                [facility]

 

                CBC W/PLT COUNT & AUTO 2022 18:07:00 [facility]



                DIFFERENTIAL                    [facility]

 

                ED ECG INTERPRETATION 2022 16:36:00 [facility]

 

                EKG-SCANNED     2022 00:00:00 Robert Hazel [facility]



                                                Scanning        [place]

 

                B-TYPE NATRIURETIC FACTOR 2022 13:14:00 [facility]



                (BNP)                           [facility]

 

                CBC W/PLT COUNT & AUTO 2022 13:14:00 PennieGilbert glasgow   [facility]



                DIFFERENTIAL                    [facility]

 

                COMPREHENSIVE METABOLIC 2022 13:14:00 [facility]   [facility]



                PANEL                           [facility]

 

                D-DIMER         2022 13:14:00 [facility]   [facility]

 

                LIPASE          2022 13:14:00 [facility]   [facility]

 

                TROPONIN I      2022 13:14:00 [facility]   [facility]

 

                CBC W/PLT COUNT & AUTO 2022 13:14:00 [facility]



                DIFFERENTIAL                    [facility]

 

                XR CHEST 1 VIEW PORTABLE 2022 12:44:00 [facility]



                / BEDSIDE                       [facility]

 

                EKG-SCANNED     2022 00:00:00 [facility]el [facility]

es Medical



                                                Scanning        Center

 

                CT LUMBAR SPINE WITHOUT 2022-01-10 22:37:00 Marsha Tillman [facility]



                IV CONTRAST                     [facility]

 

                CBC W/PLT COUNT & AUTO 2022-01-10 17:50:00 Marsha Tillman [facility]



                DIFFERENTIAL                    [facility]

 

                COMPREHENSIVE METABOLIC 2022-01-10 17:50:00 Marsha Tillman [facility] 

Medical



                PANEL                           [facility]

 

                CBC W/PLT COUNT & AUTO 2022-01-10 17:50:00 [facility]



                DIFFERENTIAL                    [facility]

 

                INSRT INFUS DEVC SUP VENA 2020-07-10 00:00:00                 [facility]

[facility]



                CAVA PERQ                                       Center

 

                FLUORO SUP VENA CAVA LW 2020-07-10 00:00:00                 [facility] CT GUID                                     Center







Plan of Care







             Planned Activity Planned Date Details      Comments     Source

 

             Future Scheduled 2032   Screening for              [facility]

es



             Test         00:00:00     malignant neoplasm of              Medica

l Center



                                       colon (procedure)              



                                       [code = 839396317]              

 

             Future Scheduled 2032   Screening for              [facility]

es



             Test         00:00:00     malignant neoplasm of              Medica

l Center



                                       colon (procedure)              



                                       [code = 548845572]              

 

             Future Scheduled 2032   Screening for              [facility]

es



             Test         00:00:00     malignant neoplasm of              Medica

l Center



                                       colon (procedure)              



                                       [code = 892661926]              

 

             Future Scheduled 2032   Screening for              [facility]

es



             Test         00:00:00     malignant neoplasm of              Medica

l Center



                                       colon (procedure)              



                                       [code = 734714531]              

 

             Future Scheduled 2032   Screening for              [facility]

es



             Test         00:00:00     malignant neoplasm of              Medica

l Center



                                       colon (procedure)              



                                       [code = 589373197]              

 

             Future Scheduled 2032   Screening for              [facility]

es



             Test         00:00:00     malignant neoplasm of              Medica

l Center



                                       colon (procedure)              



                                       [code = 302440877]              

 

             Future Scheduled 2032   Screening for              [facility]

es



             Test         00:00:00     malignant neoplasm of              Medica

l Center



                                       colon (procedure)              



                                       [code = 560726994]              

 

             Future Scheduled 2032   Screening for              [facility]

es



             Test         00:00:00     malignant neoplasm of              Medica

l Center



                                       colon (procedure)              



                                       [code = 473838864]              

 

             Future Scheduled 2032   Screening for              [facility]

es



             Test         00:00:00     malignant neoplasm of              Medica

l Center



                                       colon (procedure)              



                                       [code = 132548911]              

 

             Future Scheduled 2032   Screening for              [facility]

es



             Test         00:00:00     malignant neoplasm of              Medica

l Center



                                       colon (procedure)              



                                       [code = 637798061]              

 

             Future Scheduled 2032   Screening for              [facility]

es



             Test         00:00:00     malignant neoplasm of              Medica

l Center



                                       colon (procedure)              



                                       [code = 485772224]              

 

             Future Scheduled 2032   Screening for              [facility]

es



             Test         00:00:00     malignant neoplasm of              Medica

l Center



                                       colon (procedure)              



                                       [code = 767467966]              

 

             Future Scheduled 2032   Screening for              [facility]

es



             Test         00:00:00     malignant neoplasm of              Medica

l Center



                                       colon (procedure)              



                                       [code = 833026204]              

 

             Future Scheduled 2032   Screening for              [facility]

es



             Test         00:00:00     malignant neoplasm of              Medica

l Center



                                       colon (procedure)              



                                       [code = 557387661]              

 

             Future Scheduled 2032   Screening for              [facility]

es



             Test         00:00:00     malignant neoplasm of              Medica

l Center



                                       colon (procedure)              



                                       [code = 170051339]              

 

             Future Scheduled 2032   Screening for              [facility]

es



             Test         00:00:00     malignant neoplasm of              Medica

l Center



                                       colon (procedure)              



                                       [code = 594646685]              

 

             Future Scheduled 2024-07-15   Lipid panel               [facility]

s



             Test         00:00:00     (procedure) [code =              [facility]



                                       [id number]]                 

 

             Future Scheduled 2024-07-15   Lipid panel               [facility]

s



             Test         00:00:00     (procedure) [code =              [facility]



                                       [id number]]                 

 

             Future Scheduled 2024-07-15   Lipid panel               [facility]

s



             Test         00:00:00     (procedure) [code =              [facility]



                                       [id number]]                 

 

             Future Scheduled 2024-07-15   Lipid panel               [facility]

s



             Test         00:00:00     (procedure) [code =              [facility]



                                       [id number]]                 

 

             Future Scheduled 2024-07-15   Lipid panel               [facility]

s



             Test         00:00:00     (procedure) [code =              [facility]



                                       [id number]]                 

 

             Future Scheduled 2024-07-15   Lipid panel               [facility]

s



             Test         00:00:00     (procedure) [code =              [facility]



                                       [id number]]                 

 

             Future Scheduled 2024-07-15   Lipid panel               [facility]

s



             Test         00:00:00     (procedure) [code =              [facility]



                                       [id number]]                 

 

             Future Scheduled 2024-07-15   Lipid panel               [facility]

s



             Test         00:00:00     (procedure) [code =              [facility]



                                       [id number]]                 

 

             Future Scheduled 2024   Tobacco Cessation              [facility]



             Test         00:00:00     [facility]

r



                                       Screening (12+) [code              



                                       = Tobacco Cessation              



                                       Counseling and              



                                       Screening (12+)]              

 

             Future Scheduled 2024   Tobacco Cessation              [facility]



             Test         00:00:00     [facility]

r



                                       Screening (12+) [code              



                                       = Tobacco Cessation              



                                       Counseling and              



                                       Screening (12+)]              

 

             Future Scheduled 2023   Tobacco Cessation              [facility]



             Test         00:00:00     [facility]              [facility]

r



                                       Screening (12+) [code              



                                       = Tobacco Cessation              



                                       Counseling and              



                                       Screening (12+)]              

 

             Future Scheduled 2023   Tobacco Cessation              [facility]



             Test         00:00:00     [facility]              [facility]

r



                                       Screening (12+) [code              



                                       = Tobacco Cessation              



                                       Counseling and              



                                       Screening (12+)]              

 

             Future Scheduled 2023   Tobacco Cessation              [facility]



             Test         00:00:00     [facility]              [facility] Cent[facility]

r



                                       Screening (12+) [code              



                                       = Tobacco Cessation              



                                       Counseling and              



                                       Screening (12+)]              

 

             Future Scheduled 2023   Tobacco Cessation              [facility]



             Test         00:00:00     [facility]              [facility]

r



                                       Screening (12+) [code              



                                       = Tobacco Cessation              



                                       Counseling and              



                                       Screening (12+)]              

 

             Future Scheduled 2023   Pneumococcal Vaccine:              [facility]

[facility]



             Test         13:21:30     Pediatrics (0 to 5              Hospital



                                       Years) and At-Risk              



                                       Patients (6 to 64              



                                       Years) (1 - PCV) [code              



                                       = Pneumococcal              



                                       Vaccine: Pediatrics (0              



                                       to 5 Years) and              



                                       At-Risk Patients (6 to              



                                       64 Years) (1 - PCV)]              

 

             Future Scheduled 2023   Hepatitis C screening              [facility]

[facility]



             Test         13:21:30     (procedure) [code =              Hospital



                                       [id number]]                

 

             Future Scheduled 2023   COLONOSCOPY SCREENING              [facility]

[facility]



             Test         13:21:30     [code = COLONOSCOPY              Hospital



                                       SCREENING]                

 

             Future Scheduled 2023   SHINGLES VACCINES (1              [facility]

[facility]



             Test         13:21:30     of 2) [code = SHINGLES              [facility]ospi

[facility]



                                       VACCINES (1 of 2)]              

 

             Future Scheduled 2023   COVID-19 VACCINE (3 -              [facility]

[facility]



             Test         13:21:30     Booster for Moderna              Hospital



                                       series) [code =              



                                       COVID-19 VACCINE (3 -              



                                       Booster for Moderna              



                                       series)]                  

 

             Future Scheduled 2023   INFLUENZA VACCINE              [facility]

ist



             Test         13:21:30     [code = INFLUENZA              Hospital



                                       VACCINE]                  

 

             Future Scheduled 2023   Pneumococcal Vaccine:              [facility]

[facility]



             Test         11:41:57     Pediatrics (0 to 5              Hospital



                                       Years) and At-Risk              



                                       Patients (6 to 64              



                                       Years) (1 - PCV) [code              



                                       = Pneumococcal              



                                       Vaccine: Pediatrics (0              



                                       to 5 Years) and              



                                       At-Risk Patients (6 to              



                                       64 Years) (1 - PCV)]              

 

             Future Scheduled 2023   Hepatitis C screening              [facility]

[facility]



             Test         11:41:57     (procedure) [code =              Hospital



                                       [id number]]                

 

             Future Scheduled 2023   COLONOSCOPY SCREENING              [facility]

[facility]



             Test         11:41:57     [code = COLONOSCOPY              Hospital



                                       SCREENING]                

 

             Future Scheduled 2023   SHINGLES VACCINES (1              [facility]

[facility]



             Test         11:41:57     of 2) [code = SHINGLES              Hospi

shay



                                       VACCINES (1 of 2)]              

 

             Future Scheduled 2023   COVID-19 VACCINE (3 -              [facility]

[facility]



             Test         11:41:57     Booster for Moderna              Hospital



                                       series) [code =              



                                       COVID-19 VACCINE (3 -              



                                       Booster for Moderna              



                                       series)]                  

 

             Future Scheduled 2023   INFLUENZA VACCINE              Method

ist



             Test         11:41:57     [code = INFLUENZA              Hospital



                                       VACCINE]                  

 

             Diagnostic Test 2023   Vasopressin               [facility]

al



             Pending      00:00:00     [Mass/volume] in Serum              



                                       or Plasma [code =              



                                       3126-0]                   

 

             Diagnostic Test 2023   Grapefruit IgE Ab              [facility]



             Pending      00:00:00     [Units/volume] in              



                                       Serum [code = 6131-7]              

 

             Diagnostic Test 2023   magnesium, serum or              [facility]

[facility]



             Pending      00:00:00     plasma [code =              



                                       magnesium, serum or              



                                       plasma]                   

 

             Diagnostic Test 2023   drug screen, blood              [facility]



             Pending      00:00:00     [code = drug screen,              



                                       blood]                    

 

             Future Scheduled 2023   COVID-19 Vaccine (#1)              [facility]

[facility]



             Test         08:38:31     [code = COVID-19              of Medicine



                                       Vaccine (#1)]              

 

             Future Scheduled 2023   TETANUS SHOT (ADULT)              [place]

[facility]



             Test         08:38:31     [code = TETANUS SHOT              of Medi

cine



                                       (ADULT)]                  

 

             Future Scheduled 2023   BMI Follow Up Plan              [facility]



             Test         08:38:31     [code = BMI Follow Up              of Med

icine



                                       Plan]                     

 

             Future Scheduled 2023   Human immunodeficiency              [facility]

[facility]



             Test         08:38:31     virus screening              of Medicine



                                       (procedure) [code =              



                                       537572223]                

 

             Future Scheduled 2023   Hepatitis C screening              [facility]

[facility]



             Test         08:38:31     (procedure) [code =              of Medic

ine



                                       [id number]]                

 

             Future Scheduled 2023   ZOSTER VACCINE (1 of              [facility]



             Test         08:38:31     2) [code = ZOSTER              of Medicin

e



                                       VACCINE (1 of 2)]              

 

             Future Scheduled 2023   FLU VACCINE > 6 MONTHS              B

[facility]



             Test         08:38:31     [code = FLU VACCINE >              of Med

icine



                                       6 MONTHS]                 

 

             Future Scheduled 2023   Screening for              [facility]

lege



             Test         08:38:31     malignant neoplasm of              of Med

icine



                                       colon (procedure)              



                                       [code = 025553786]              

 

             Future Scheduled 2022   Pneumococcal Vaccine:              [facility]

[facility]



             Test         02:07:04     Pediatrics (0 to 5              Hospital



                                       Years) and At-Risk              



                                       Patients (6 to 64              



                                       Years) (1 - PCV) [code              



                                       = Pneumococcal              



                                       Vaccine: Pediatrics (0              



                                       to 5 Years) and              



                                       At-Risk Patients (6 to              



                                       64 Years) (1 - PCV)]              

 

             Future Scheduled 2022   Hepatitis C screening              [facility]

[facility]



             Test         02:07:04     (procedure) [code =              Hospital



                                       [id number]]                

 

             Future Scheduled 2022   COLONOSCOPY SCREENING              [facility]

[facility]



             Test         02:07:04     [code = COLONOSCOPY              Hospital



                                       SCREENING]                

 

             Future Scheduled 2022   SHINGLES VACCINES (1              [facility]

[facility]



             Test         02:07:04     of 2) [code = SHINGLES              Hospi

shay



                                       VACCINES (1 of 2)]              

 

             Future Scheduled 2022   COVID-19 VACCINE (3 -              [facility]

[facility]



             Test         02:07:04     Booster for Moderna              Hospital



                                       series) [code =              



                                       COVID-19 VACCINE (3 -              



                                       Booster for Moderna              



                                       series)]                  

 

             Future Scheduled 2022   Pneumococcal Vaccine:              [facility]

[facility]



             Test         02:07:04     Pediatrics (0 to 5              Hospital



                                       Years) and At-Risk              



                                       Patients (6 to 64              



                                       Years) (1 - PCV) [code              



                                       = Pneumococcal              



                                       Vaccine: Pediatrics (0              



                                       to 5 Years) and              



                                       At-Risk Patients (6 to              



                                       64 Years) (1 - PCV)]              

 

             Future Scheduled 2022   Hepatitis C screening              [facility]

[facility]



             Test         02:07:04     (procedure) [code =              Hospital



                                       [id number]]                

 

             Future Scheduled 2022   COLONOSCOPY SCREENING              [facility]

[facility]



             Test         02:07:04     [code = COLONOSCOPY              Hospital



                                       SCREENING]                

 

             Future Scheduled 2022   SHINGLES VACCINES (1              [facility]

[facility]



             Test         02:07:04     of 2) [code = SHINGLES              Hospi

shay



                                       VACCINES (1 of 2)]              

 

             Future Scheduled 2022   COVID-19 VACCINE (3 -              [facility]

[facility]



             Test         02:07:04     Booster for Moderna              Hospital



                                       series) [code =              



                                       COVID-19 VACCINE (3 -              



                                       Booster for Moderna              



                                       series)]                  

 

             Future Scheduled 2022   XR LUMBAR SPINE AP 1 Occurrences [facility]

[facility]



             Test         08:18:50     LATERAL OBLIQUES starting     of Medicine



                                       FLEXION AND EXTENSION 2022 until 



                                       [code = 60725-4] 2023   

 

             Future Scheduled 2022   COVID-19 Vaccine (#1)              [facility]

[employer]



             Test         08:06:42     [code = COVID-19              of Medicine



                                       Vaccine (#1)]              

 

             Future Scheduled 2022   TETANUS SHOT (ADULT)              [place]

[employer]



             Test         08:06:42     [code = TETANUS SHOT              of Medi

cine



                                       (ADULT)]                  

 

             Future Scheduled 2022   Hepatitis C screening              [facility]

[employer]



             Test         08:06:42     (procedure) [code =              of Medic

ine



                                       [id number]]                

 

             Future Scheduled 2022   [facility]              [facility]



             Test         08:06:42     virus screening              of Medicine



                                       (procedure) [code =              



                                       001420502]                

 

             Future Scheduled 2022   ZOSTER VACCINE (1 of              [facility]



             Test         08:06:42     2) [code = ZOSTER              of Medicin

e



                                       VACCINE (1 of 2)]              

 

             Future Scheduled 2022   FLU VACCINE > 6 MONTHS              B

[facility]



             Test         08:06:42     [code = FLU VACCINE >              of Med

icine



                                       6 MONTHS]                 

 

             Future Scheduled 2022   Screening for              [facility]

lege



             Test         08:06:42     malignant neoplasm of              of Med

icine



                                       colon (procedure)              



                                       [code = 356770102]              

 

             Future Scheduled 2022-10-13   HEPATITIS B VACCINES              [facility]

[facility]



             Test         14:37:38     (1 of 3 - 3-dose              Hospital



                                       series) [code =              



                                       HEPATITIS B VACCINES              



                                       (1 of 3 - 3-dose              



                                       series)]                  

 

             Future Scheduled 2022-10-13   Pneumococcal Vaccine:              [facility]

[facility]



             Test         14:37:38     Pediatrics (0 to 5              Hospital



                                       Years) and At-Risk              



                                       Patients (6 to 64              



                                       Years) (1 - PCV) [code              



                                       = Pneumococcal              



                                       Vaccine: Pediatrics (0              



                                       to 5 Years) and              



                                       At-Risk Patients (6 to              



                                       64 Years) (1 - PCV)]              

 

             Future Scheduled 2022-10-13   Hepatitis C screening              [facility]

[facility]



             Test         14:37:38     (procedure) [code =              Hospital



                                       [id number]]                

 

             Future Scheduled 2022-10-13   COLONOSCOPY SCREENING              [facility]

[facility]



             Test         14:37:38     [code = COLONOSCOPY              Hospital



                                       SCREENING]                

 

             Future Scheduled 2022-10-13   SHINGLES VACCINES (1              Met

[facility]ist



             Test         14:37:38     of 2) [code = SHINGLES              Hospi

shay



                                       VACCINES (1 of 2)]              

 

             Future Scheduled 2022-10-13   COVID-19 VACCINE (3 -              [facility]

[facility]



             Test         14:37:38     Booster for Moderna              Hospital



                                       series) [code =              



                                       COVID-19 VACCINE (3 -              



                                       Booster for Moderna              



                                       series)]                  

 

             Future Scheduled 2022-10-13   HEPATITIS B VACCINES              [facility]

[facility]



             Test         14:37:38     (1 of 3 - 3-dose              Hospital



                                       series) [code =              



                                       HEPATITIS B VACCINES              



                                       (1 of 3 - 3-dose              



                                       series)]                  

 

             Future Scheduled 2022-10-13   Pneumococcal Vaccine:              [facility]

[facility]



             Test         14:37:38     Pediatrics (0 to 5              Hospital



                                       Years) and At-Risk              



                                       Patients (6 to 64              



                                       Years) (1 - PCV) [code              



                                       = Pneumococcal              



                                       Vaccine: Pediatrics (0              



                                       to 5 Years) and              



                                       At-Risk Patients (6 to              



                                       64 Years) (1 - PCV)]              

 

             Future Scheduled 2022-10-13   Hepatitis C screening              [facility]

[facility]



             Test         14:37:38     (procedure) [code =              Hospital



                                       [id number]]                

 

             Future Scheduled 2022-10-13   COLONOSCOPY SCREENING              [facility]

[facility]



             Test         14:37:38     [code = COLONOSCOPY              Hospital



                                       SCREENING]                

 

             Future Scheduled 2022-10-13   SHINGLES VACCINES (1              [facility]

[facility]



             Test         14:37:38     of 2) [code = SHINGLES              Hospi

shay



                                       VACCINES (1 of 2)]              

 

             Future Scheduled 2022-10-13   COVID-19 VACCINE (3 -              [facility]

[facility]



             Test         14:37:38     Booster for Moderna              Hospital



                                       series) [code =              



                                       COVID-19 VACCINE (3 -              



                                       Booster for Moderna              



                                       series)]                  

 

             Future Scheduled 2022-10-06   HEPATITIS B VACCINES              [facility]

[facility]



             Test         15:28:41     (1 of 3 - 3-dose              Hospital



                                       series) [code =              



                                       HEPATITIS B VACCINES              



                                       (1 of 3 - 3-dose              



                                       series)]                  

 

             Future Scheduled 2022-10-06   Pneumococcal Vaccine:              [facility]

[facility]



             Test         15:28:41     Pediatrics (0 to 5              Hospital



                                       Years) and At-Risk              



                                       Patients (6 to 64              



                                       Years) (1 - PCV) [code              



                                       = Pneumococcal              



                                       Vaccine: Pediatrics (0              



                                       to 5 Years) and              



                                       At-Risk Patients (6 to              



                                       65 Years) (1 - PCV)]              

 

             Future Scheduled 2022-10-06   Hepatitis C screening              [facility]

[facility]



             Test         15:28:41     (procedure) [code =              Hospital



                                       [id number]]                

 

             Future Scheduled 2022-10-06   COLONOSCOPY SCREENING              [facility]

[facility]



             Test         15:28:41     [code = COLONOSCOPY              Hospital



                                       SCREENING]                

 

             Future Scheduled 2022-10-06   SHINGLES VACCINES (1              [facility]

[facility]



             Test         15:28:41     of 2) [code = SHINGLES              Hospi

shay



                                       VACCINES (1 of 2)]              

 

             Future Scheduled 2022-10-06   COVID-19 VACCINE (3 -              [facility]

[facility]



             Test         15:28:41     Booster for Moderna              Hospital



                                       series) [code =              



                                       COVID-19 VACCINE (3 -              



                                       Booster for Moderna              



                                       series)]                  

 

             Future Scheduled 2022   INFLUENZA VACCINE (#1)              C

[facility]



             Test         00:00:00     [code = INFLUENZA              Medical Ce

nter



                                       VACCINE (#1)]              

 

             Future Scheduled 2022   INFLUENZA VACCINE (#1)              C

[facility]



             Test         00:00:00     [code = INFLUENZA              Medical Ce

nter



                                       VACCINE (#1)]              

 

             Future Scheduled 2022   INFLUENZA VACCINE (#1)              C

[facility]



             Test         00:00:00     [code = INFLUENZA              Medical Ce

nter



                                       VACCINE (#1)]              

 

             Future Scheduled 2022   INFLUENZA VACCINE (#1)              C

[facility]



             Test         00:00:00     [code = INFLUENZA              Medical Ce

nter



                                       VACCINE (#1)]              

 

             Future Scheduled 2022   INFLUENZA VACCINE (#1)              C

[facility]



             Test         00:00:00     [code = INFLUENZA              Medical Ce

nter



                                       VACCINE (#1)]              

 

             Future Scheduled 2022   INFLUENZA VACCINE (#1)              C

[facility]



             Test         00:00:00     [code = INFLUENZA              Medical Ce

nter



                                       VACCINE (#1)]              

 

             Future Scheduled 2011   SHINGLES VACCINES (1              [facility]



             Test         00:00:00     of 2) [code = SHINGLES              [facility]

[facility] Center



                                       VACCINES (1 of 2)]              

 

             Future Scheduled 2011   SHINGLES VACCINES (1              [facility]



             Test         00:00:00     of 2) [code = SHINGLES              [facility]

al Center



                                       VACCINES (1 of 2)]              

 

             Future Scheduled 2011   SHINGLES VACCINES (1              [facility]



             Test         00:00:00     of 2) [code = SHINGLES              [facility]

al Center



                                       VACCINES (1 of 2)]              

 

             Future Scheduled 2011   SHINGLES VACCINES (1              [facility]



             Test         00:00:00     of 2) [code = SHINGLES              [facility]

al Center



                                       VACCINES (1 of 2)]              

 

             Future Scheduled 2011   SHINGLES VACCINES (1              [facility]



             Test         00:00:00     of 2) [code = SHINGLES              [facility]

al Center



                                       VACCINES (1 of 2)]              

 

             Future Scheduled 2011   SHINGLES VACCINES (1              [facility]



             Test         00:00:00     of 2) [code = SHINGLES              [facility]

al Center



                                       VACCINES (1 of 2)]              

 

             Future Scheduled 2011   SHINGLES VACCINES (1              [facility]



             Test         00:00:00     of 2) [code = SHINGLES              [facility]

al Center



                                       VACCINES (1 of 2)]              

 

             Future Scheduled 2011   SHINGLES VACCINES (1              [facility]



             Test         00:00:00     of 2) [code = SHINGLES              [facility]

al Center



                                       VACCINES (1 of 2)]              

 

             Future Scheduled 1980   DTAP/TDAP/TD VACCINES              [facility]



             Test         00:00:00     (1 - Tdap) [code =              Medical C

enter



                                       DTAP/TDAP/TD VACCINES              



                                       (1 - Tdap)]               

 

             Future Scheduled 1980   DTAP/TDAP/TD VACCINES              [facility]



             Test         00:00:00     (1 - Tdap) [code =              Medical C

enter



                                       DTAP/TDAP/TD VACCINES              



                                       (1 - Tdap)]               

 

             Future Scheduled 1980   DTAP/TDAP/TD VACCINES              [facility]



             Test         00:00:00     (1 - Tdap) [code =              Medical C

enter



                                       DTAP/TDAP/TD VACCINES              



                                       (1 - Tdap)]               

 

             Future Scheduled 1980   DTAP/TDAP/TD VACCINES              [facility]



             Test         00:00:00     (1 - Tdap) [code =              Medical C

enter



                                       DTAP/TDAP/TD VACCINES              



                                       (1 - Tdap)]               

 

             Future Scheduled 1980   DTAP/TDAP/TD VACCINES              [facility]



             Test         00:00:00     (1 - Tdap) [code =              Medical C

enter



                                       DTAP/TDAP/TD VACCINES              



                                       (1 - Tdap)]               

 

             Future Scheduled 1980   DTAP/TDAP/TD VACCINES              [facility]



             Test         00:00:00     (1 - Tdap) [code =              Medical C

enter



                                       DTAP/TDAP/TD VACCINES              



                                       (1 - Tdap)]               

 

             Future Scheduled 1980   DTAP/TDAP/TD VACCINES              [facility]



             Test         00:00:00     (1 - Tdap) [code =              Medical C

enter



                                       DTAP/TDAP/TD VACCINES              



                                       (1 - Tdap)]               

 

             Future Scheduled 1980   DTAP/TDAP/TD VACCINES              [facility]



             Test         00:00:00     (1 - Tdap) [code =              Medical C

enter



                                       DTAP/TDAP/TD VACCINES              



                                       (1 - Tdap)]               

 

             Future Scheduled 1979   HEPATITIS C SCREENING              [facility]



             Test         00:00:00     [code = HEPATITIS C              Medical 

Center



                                       SCREENING]                

 

             Future Scheduled 1979   HEPATITIS C SCREENING              [facility]



             Test         00:00:00     [code = HEPATITIS C              Medical 

Center



                                       SCREENING]                

 

             Future Scheduled 1979   HEPATITIS C SCREENING              [facility]



             Test         00:00:00     [code = HEPATITIS C              Medical 

Center



                                       SCREENING]                

 

             Future Scheduled 1979   HEPATITIS C SCREENING              [facility]



             Test         00:00:00     [code = HEPATITIS C              Medical 

Center



                                       SCREENING]                

 

             Future Scheduled 1979   HEPATITIS C SCREENING              [facility]



             Test         00:00:00     [code = HEPATITIS C              Medical 

Center



                                       SCREENING]                

 

             Future Scheduled 1979   HEPATITIS C SCREENING              [facility]



             Test         00:00:00     [code = HEPATITIS C              Medical 

Center



                                       SCREENING]                

 

             Future Scheduled 1979   HEPATITIS C SCREENING              [facility]



             Test         00:00:00     [code = HEPATITIS C              Medical 

Center



                                       SCREENING]                

 

             Future Scheduled 1979   HEPATITIS C SCREENING              [facility]



             Test         00:00:00     [code = HEPATITIS C              Medical 

Center



                                       SCREENING]                

 

             Future Scheduled 1967   PNEUMOCOCCAL VACCINE              [facility]



             Test         00:00:00     0-64 YRS (1 - PCV)              Medical C

enter



                                       [code = PNEUMOCOCCAL              



                                       VACCINE 0-64 YRS (1 -              



                                       PCV)]                     

 

             Future Scheduled 1967   PNEUMOCOCCAL VACCINE              [facility]



             Test         00:00:00     0-64 YRS (1 - PCV)              Medical C

enter



                                       [code = PNEUMOCOCCAL              



                                       VACCINE 0-64 YRS (1 -              



                                       PCV)]                     

 

             Future Scheduled 1967   PNEUMOCOCCAL VACCINE              [facility]



             Test         00:00:00     0-64 YRS (1 - PCV)              Medical C

enter



                                       [code = PNEUMOCOCCAL              



                                       VACCINE 0-64 YRS (1 -              



                                       PCV)]                     

 

             Future Scheduled 1967   PNEUMOCOCCAL VACCINE              [facility]



             Test         00:00:00     0-64 YRS (1 - PCV)              Medical C

enter



                                       [code = PNEUMOCOCCAL              



                                       VACCINE 0-64 YRS (1 -              



                                       PCV)]                     

 

             Future Scheduled 1967   PNEUMOCOCCAL VACCINE              [facility]



             Test         00:00:00     0-64 YRS (1 - PCV)              Medical C

enter



                                       [code = PNEUMOCOCCAL              



                                       VACCINE 0-64 YRS (1 -              



                                       PCV)]                     

 

             Future Scheduled 1967   PNEUMOCOCCAL VACCINE              [facility]



             Test         00:00:00     0-64 YRS (1 - PCV)              Medical C

enter



                                       [code = PNEUMOCOCCAL              



                                       VACCINE 0-64 YRS (1 -              



                                       PCV)]                     

 

             Future Scheduled 1967   PNEUMOCOCCAL VACCINE              [facility]



             Test         00:00:00     0-64 YRS (1 - PCV)              Medical C

enter



                                       [code = PNEUMOCOCCAL              



                                       VACCINE 0-64 YRS (1 -              



                                       PCV)]                     

 

             Future Scheduled 1967   PNEUMOCOCCAL VACCINE              [facility]



             Test         00:00:00     0-64 YRS (1 - PCV)              Medical C

enter



                                       [code = PNEUMOCOCCAL              



                                       VACCINE 0-64 YRS (1 -              



                                       PCV)]                     

 

             Future Scheduled 1961   COVID-19 VACCINE (#1)              [facility]



             Test         00:00:00     [code = COVID-19              Medical You

ter



                                       VACCINE (#1)]              

 

             Future Scheduled 1961   COVID-19 VACCINE (#1)              [facility]



             Test         00:00:00     [code = COVID-19              [facility] You

ter



                                       VACCINE (#1)]              

 

             Future Scheduled 1961   COVID-19 VACCINE (#1)              [facility]



             Test         00:00:00     [code = COVID-19              Medical You

ter



                                       VACCINE (#1)]              

 

             Future Scheduled 1961   COVID-19 VACCINE (#1)              [facility]



             Test         00:00:00     [code = COVID-19              [facility] You

ter



                                       VACCINE (#1)]              

 

             Future Scheduled 1961   COVID-19 VACCINE (#1)              [facility]



             Test         00:00:00     [code = COVID-19              [facility] You

ter



                                       VACCINE (#1)]              

 

             Future Scheduled 1961   COVID-19 VACCINE (#1)              [facility]



             Test         00:00:00     [code = COVID-19              [facility] You

ter



                                       VACCINE (#1)]              

 

             Future Scheduled 1961   COVID-19 VACCINE (#1)              [facility]



             Test         00:00:00     [code = COVID-19              [facility] You

ter



                                       VACCINE (#1)]              

 

             Future Scheduled 1961   COVID-19 VACCINE (#1)              [facility]



             Test         00:00:00     [code = COVID-19              [facility]

ter



                                       VACCINE (#1)]              

 

             Future Scheduled 1961   [facility]



             Test         00:00:00     (combo) [code = CT              Medical C

enter



                                       Colonography (combo)]              

 

             Future Scheduled 1961   Screening for              [facility]

es



             Test         00:00:00     malignant neoplasm of              [facility]

[facility]



                                       colon (procedure)              



                                       [code = 790262829]              

 

             Future Scheduled 1961   Screening for              [facility]

es



             Test         00:00:00     malignant neoplasm of              [facility]

[facility]



                                       colon (procedure)              



                                       [code = 262130924]              

 

             Future Scheduled 1961   Sigmoidoscopy [code =              [facility]



             Test         00:00:00     Sigmoidoscopy]              [facility]

r

 

             Future Scheduled 1961   CT [facility]



             Test         00:00:00     (combo) [code = CT              [facility] C

enter



                                       Colonography (combo)]              

 

             Future Scheduled 1961   Screening for              [facility]

es



             Test         00:00:00     malignant neoplasm of              [facility]

[facility]



                                       colon (procedure)              



                                       [code = 630383726]              

 

             Future Scheduled 1961   Screening for              [facility]

es



             Test         00:00:00     malignant neoplasm of              [facility]

[facility]



                                       colon (procedure)              



                                       [code = 672853093]              

 

             Future Scheduled 1961   Sigmoidoscopy [code =              [facility]



             Test         00:00:00     Sigmoidoscopy]              [facility]

r

 

             Future Scheduled 1961   CT [facility]



             Test         00:00:00     (combo) [code = CT              Medical C

enter



                                       Colonography (combo)]              

 

             Future Scheduled 1961   Screening for              [facility]

es



             Test         00:00:00     malignant neoplasm of              [facility]

[facility]



                                       colon (procedure)              



                                       [code = 636427921]              

 

             Future Scheduled 1961   Screening for              [facility]

es



             Test         00:00:00     malignant neoplasm of              [facility]

[facility]



                                       colon (procedure)              



                                       [code = 903445576]              

 

             Future Scheduled 1961   Sigmoidoscopy [code =              [facility]



             Test         00:00:00     Sigmoidoscopy]              [facility]

r

 

             Future Scheduled 1961   CT [facility]ography              [facility]



             Test         00:00:00     (combo) [code = CT              Medical C

enter



                                       Colonography (combo)]              

 

             Future Scheduled 1961   Screening for              [facility]

es



             Test         00:00:00     malignant neoplasm of              [facility]

[facility]



                                       colon (procedure)              



                                       [code = 708738493]              

 

             Future Scheduled 1961   Screening for              [facility]

es



             Test         00:00:00     malignant neoplasm of              [facility]

[facility]



                                       colon (procedure)              



                                       [code = 293366239]              

 

             Future Scheduled 1961   Sigmoidoscopy [code =              [facility]



             Test         00:00:00     Sigmoidoscopy]              [facility]

r

 

             Future Scheduled 1961   CT [facility]ography              [facility]



             Test         00:00:00     (combo) [code = CT              Medical C

enter



                                       Colonography (combo)]              

 

             Future Scheduled 1961   Screening for              [facility]

es



             Test         00:00:00     malignant neoplasm of              [facility]

[facility]



                                       colon (procedure)              



                                       [code = 402089901]              

 

             Future Scheduled 1961   Screening for              [facility]

es



             Test         00:00:00     malignant neoplasm of              [facility]

[facility]



                                       colon (procedure)              



                                       [code = 780278901]              

 

             Future Scheduled 1961   Sigmoidoscopy [code =              [facility]



             Test         00:00:00     Sigmoidoscopy]              [facility]

r

 

             Future Scheduled 1961   CT [facility]ography              [facility]



             Test         00:00:00     (combo) [code = CT              Medical C

enter



                                       Colonography (combo)]              

 

             Future Scheduled 1961   Screening for              [facility]

es



             Test         00:00:00     malignant neoplasm of              [facility]

[facility]



                                       colon (procedure)              



                                       [code = 309287242]              

 

             Future Scheduled 1961   Screening for              [facility]

es



             Test         00:00:00     malignant neoplasm of              [facility]

[facility]



                                       colon (procedure)              



                                       [code = 745203111]              

 

             Future Scheduled 1961   Sigmoidoscopy [code =              [facility]



             Test         00:00:00     Sigmoidoscopy]              [facility]

r

 

             Future Scheduled 1961   CT [facility]ography              [facility]



             Test         00:00:00     (combo) [code = CT              [facility] C

enter



                                       Colonography (combo)]              

 

             Future Scheduled 1961   Screening for              [facility]

es



             Test         00:00:00     malignant neoplasm of              [facility]

[facility]



                                       colon (procedure)              



                                       [code = 413340478]              

 

             Future Scheduled 1961   Screening for              [facility]

es



             Test         00:00:00     malignant neoplasm of              [facility]

[facility]



                                       colon (procedure)              



                                       [code = 422429067]              

 

             Future Scheduled 1961   Sigmoidoscopy [code =              [facility]



             Test         00:00:00     Sigmoidoscopy]              [facility]

r

 

             Future Scheduled 1961   CT [facility]



             Test         00:00:00     (combo) [code = CT              Medical C

enter



                                       Colonography (combo)]              

 

             Future Scheduled 1961   Screening for              [facility]

es



             Test         00:00:00     [facility]



                                       colon (procedure)              



                                       [code = 335762497]              

 

             Future Scheduled 1961   Screening for              [facility]

es



             Test         00:00:00     malignant neoplasm of              [facility]

[facility] Center



                                       colon (procedure)              



                                       [code = 409628157]              

 

             Future Scheduled 1961   Sigmoidoscopy [code =              [facility]



             Test         00:00:00     Sigmoidoscopy]              [facility] Cent[facility]

r







Encounters







        Start   End     Encounter Admission Attending Care    Care    Encounter 

Source



        Date/Time Date/Time Type    Type    Clinicians Facility Department ID   

   

 

        2023         Outpatient                 [facility]     [facility]     [id number]

 [facility]



        16:17:03                                                 [id number]  [facility]

 

        2023         Outpatient                 [facility]     [facility]     [id number]

 [facility]



        08:52:58                                                 [id number]  [facility]

 

        2022-10-12         Outpatient                 UTH     [facility]     [id number]

 [facility]



        03:19:25                                                 [id number]  [facility]

 

        2022-10-11         Outpatient                 UTH     UTH     [id number]

 [place]



        16:17:32                                                 [id number]  [facility]

 

        2022-10-07         Outpatient                 UTH     UTH     [id number]

 [place]



        13:04:57                                                 [id number]  [facility]

 

        2022         Inpatient C       NONE, NONE [facility] [id number]

[facility]



        11:52:00                                         [facility]

 

        2022         Outpatient                 UTH     UTH     [id number]

 [place]



        12:29:56                                                 [id number]  [facility]

 

        2022         Outpatient                 [facility]     [id number]

 [place]



        14:42:17                                                 [id number] [facility]

 

        2022         Outpatient                 UTH     UTH     [id number]

 [place]



        15:39:42                                                 [id number]  [facility]

 

        2022         Outpatient                 [facility]     [id number]

 [place]



        14:10:06                                                 [id number] [facility]

 

        2022         Outpatient                 UTH     UTH     [id number]

 [place]



        16:22:10                                                 [id number]  [facility]

 

        2022         [facility]         [facility]     [id number]

 [place]



        08:48:23                         AMARI                   [id number]  [facility]

 

        2022         Inpatient         Yulissa [facility]   [id number]

 [facility]



        17:45:00                         Amjad                   36      

 

        20[facility]  [id number]

 Common



        12:06:01                                                 [id number]   [facility]

 

        2021         Inpatient         Yulissa [facility]   [id number]

 [facility]



        12:05:00                         Amjad                   81      

 

        2021-10-08         Inpatient ER      SUZAN SLEGOLDIE    Internal [id number]

[id number] [facility]



        20:51:31                         LUAN            Med             

 

        2021         Inpatient         Yulissa [facility]   [id number]

 [facility]



        12:10:00                         Amjad                   82      

 

        2020         Inpatient C       ,  [facility]     RAD     [id number] 

[facility]



        14:26:00                         [facility]

 

        2020         Inpatient C       VIJAY MARTINES [facility]     PT      [id number]

447 [facility]



        13:00:00                                                         [facility]

 

        2019         Inpatient C       MADI,   [facility]     DIET    [id number] 

[facility]



        13:30:00                         LUC                         [facility]

[facility]

 

        2019         Inpatient C       MADI,   [facility]     PT      [id number] 

[facility]



        09:00:00                         LUC                         [facility]

[facility]

 

        2019         Inpatient C       MADI,   [facility]     RAD     [id number] 

[facility]



        14:15:00                         LUC                         [facility]

[facility]

 

        2018         Inpatient C       SUHAS, [facility]     RAD     [id number]

 [facility]



        06:00:00                         [facility]

 

        2018         Inpatient C       MADI   [facility]     PT      [id number] 

[facility]



        15:00:00                         [place]                         Medica

[facility]

 

        2017         Inpatient C       MADI,   [facility]     PT      [id number] 

[facility]



        13:00:00                         LUC                         [facility]

[facility]

 

        2017-10-19         Inpatient C       MADI,   [facility]     RAD     [id number] 

[facility]



        13:00:00                         LUC                         [facility]

[facility]

 

        2023 Emergency X       [facility],   [facility]    ERT     [id number]

80 [facility]



        21:52:00 01:48:00                 TATE leon of



                                                                        [facility]

 

        2023 Emergency         Soco,   [facility]    [id number]

[id number] Univers



        21:52:00 01:48:00                 Tate NICHOLS 350.1.13.10        

 [facility] 4.2.7.2.686         [facility]  [phone]         Medi

franklin



                                                        084             Branch

 

        2023 Outpatient         [facility] PRIV    PRIV    272

[id number] [facility]



        00:00:00 00:00:00                 _A                      [id number] Med[facility]

l

 

        2023 Ming                 PRIV    [facility] 202

[id number] [facility]



        00:00:00 00:00:00 r                               Health -         Medic

al



                        Weiner,                         [facility]         



                        DO:                          [facility]                                         



                        [address], [place], [place]                                         



                        [address]                                         



                        , Ph.                                           



                        [phone]                                         

 

        2023 Outpatient         [facility] PRIV    PRIV    272

[id number] [facility]



        00:00:00 00:00:00                 _A                      [id number] Medica

l

 

        2023 Outpatient         [facility] PRIV    PRIV    272

[id number] [facility]



        00:00:00 00:00:00                 _A                      [id number] Medica

l

 

        2023 Emergency         [facility]   [id number]

[id number] [facility]



        17:42:00 21:59:00                 [facility]

 

        2023 Emergency ER      TILLMAN, \[facility]\""    Emergency 2058

[id number] [facility]



        17:42:00 21:59:00                 [place]                          

 

        2023 Emergency         [facility]   [id number]

[id number] [facility]



        17:42:00 21:59:00                 [facility]

 

        2023 Emergency E       AKHIL,   FB    FB    7525    

FB



        18:40:00 01:05:00                 MURIEL                         

 

        2023 Outpatient E       KAHLIL, [facility]    MED     7524 

   [facility]



        04:08:00 17:00:00                 TICO                            

 

        2023 Emergency         Steve, 1.2.840.1 [id number] 2100

758601 Methodi



        17:06:00 21:56:00                 Ayesha 26367.1.1         [address]     st



                                        Amilcar 3.430.2.7                 Hospit

a



                                                .3.511696                 l



                                                .8                      

 

        2023 Emergency         Wilson, 1.2.840.1 [id number] 

[id number] [facility]



        17:06:00 21:56:00                 Ayesha 54788.1.1         [address]     st



                                        Amilcar 3.430.2.7                 Hospit

a



                                                .3.869506                 l



                                                .8                      

 

        2023 Outpatient                 PRIV    PRIV    [id number]

8-2 [facility]



        00:00:00 00:00:00                                         [id number] Medica

l

 

        2023 Outpatient                 PRIV    PRIV    [id number]

8-2 [facility]



        00:00:00 00:00:00                                         [id number] Med[facility]

l

 

        2023 Travel                  1.2.840.1 1.2.551.525 7635

714557 Methodi



        00:00:00 00:00:00                         57865.1.1 350.1.13.43 764     

st



                                                3.430.2.7 0.2.7.3.698         Ho

spita



                                                .3.327975 084.8           l



                                                .8                      

 

        2023 Travel                  1.2.840.1 1.2.645.379 0337

614679 [facility]



        00:00:00 00:00:00                         28146.1.1 350.1.13.43 764     

st



                                                3.430.2.7 0.2.7.3.698         Ho

spita



                                                .3.227181 084.8           l



                                                .8                      

 

        2023 Outpatient         [facility] PRIV    PRIV    272

[id number] [facility]



        00:00:00 00:00:00                 _A                      [id number] Medica

l

 

        2023 Ming                 PRIV    [facility]

[id number] [facility]



        00:00:00 00:00:00 r                               Health -         Medic

al



                        Weiner,                         [facility]         



                        DO: 14423                         [facility]                                         



                        [address], [place], [place]                                         



                        [address]                                         



                        , Ph.                                           



                        [phone]                                         

 

        2023 Emergency         Boris Mustapha Strong 1.2.840.1 

[id number] 

[id number]                              [facility]i



        18:15:00 13:35:00                 Gretchen Wilson 20923.1.1        

 [address]     st



                                        Doran, Benito 3.430.2.7                 

Hospita



                                                .3.687525                 l



                                                .8                      

 

        2023 Emergency         Mustapha Escalante Tae 1.2.840.1 

[id number] 

[id number]                              Methodi



        18:15:00 13:35:00                 Gretchen Wilson 05712.1.1        

 802     st



                                        Doran, Benito 3.430.2.7                 

Hospita



                                                .3.819357                 l



                                                .8                      

 

        2023 Telephone         Meliton, 1.2.840.1 [id number] 210

[id number] [facility]



        00:00:00 00:00:00                 Suze   70418.1.1         060     st



                                                3.430.2.7                 Hospit

a



                                                .3.460638                 l



                                                .8                      

 

        2023 Telephone         Meliton, 1.2.840.1 [id number] 210

[id number] Methodi



        00:00:00 00:00:00                 Suze   64758.1.1         060     st



                                                3.430.2.7                 Hospit

a



                                                .3.741133                 l



                                                .8                      

 

        2023 Emergency         Uzair Jerome [facility]   

[id number] 

[id number]                              [facility]



        16:35:00 16:14:00                 [facility]

 

        2023 Emergency ER      [facility], \[facility]\""    Emergency 7

[id number] \[facility]\""



        16:35:00 16:14:00                 [place]                           

 

        2023 Emergency         Uzair Jerome [facility]   

[id number] 

[id number]                              [facility]



        16:35:00 16:14:00                 [facility]

 

        2023 Travel                  1.2.840.1 1.2.439.685 5997

223054 [facility]



        00:00:00 00:00:00                         01151.1.1 350.1.13.43 383     

st



                                                3.430.2.7 0.2.7.3.698         Ho

spita



                                                .3.665790 084.8           l



                                                .8                      

 

        2023 Travel                  1.2.840.1 1.2.303.218 6273

293401 [facility]



        00:00:00 00:00:00                         17069.1.1 350.1.13.43 383     

st



                                                3.430.2.7 0.2.7.3.698         Ho

spita



                                                .3.682426 084.8           l



                                                .8                      

 

        2023 Travel                  [facility]   [id number]

 [facility]



        00:00:00 00:00:00                                                 [facility]

 

        2023 Travel                  [facility]   [id number]

 [facility]



        00:00:00 00:00:00                                                 [facility]

 

        2023 Outpatient         GC_NEMD_Edo PRIV    PRIV    270

[id number] [facility]



        00:00:00 00:00:00                 kpayi_N                 [id number] Med[facility]

l

 

        2023 Outpatient         GC_NEMD_Edo PRIV    PRIV    270

21297-1 [facility]



        00:00:00 00:00:00                 kpayi_N                 [id number] [facility]

l

 

        2023 [facility]   [id number]

[address] [facility]



        12:18:22 23:59:00 Encounter         Edgardo STORY

[facility]

 

        2023 [facility]   [id number]

[address] [facility]



        12:18:22 23:59:00 Encounter         Edgardo STORY

[facility]

 

        2023 Outpatient       [facility]    [id number]

912 [facility]



        12:18:22 23:59:00                 EDGARDO                         

 

        2023 Office          DELMY FRY     1.2.840.114 [id number]

[address] [facility]



        11:47:38 15:37:10 Visit           EDGARDO AMBULATOR 350.1.13.21         

College



                                                Y       0.2.7.2.686         [facility].[id number]         Medi

rio



                                                        300             e

 

        2023 Outpatient REJI VARGAS    [facility]    [id number]

534 [facility]



        12:18:08 12:17:00                 EDGARDO                         

 

        2023 [facility]   [id number]

[address] [facility]



        11:10:00 12:17:00 Encounter         Edgardo STORY

[facility]

 

        2023 [facility]   [id number]

[address] [facility]



        11:10:00 12:17:00 Encounter         Edgardo BJose STORY

[facility]

 

        2023 [facility]   [id number]

[address] [facility]



        00:00:00 00:00:00 Only            Edgardo GUZMAN                         [facility]

 

        2023 Orders          [facility]   [id number]

[address] [facility]



        00:00:00 00:00:00 Only            Edgardo THOMAS                         [facility]

 

        2023 Outpatient BONI COLLADO, [facility]    MED     7523   

 FB



        18:02:00 19:15:00                 AMIR                            

 

        2022 [facility]   [id number]

[address] [facility]



        12:00:00 12:00:00 Encounter         Edgardo DÍAZJose STORY

[facility]

 

        2022 [facility]   [id number]

[address] [facility]



        12:00:00 12:00:00 Encounter         Edgardo STORY

[facility]

 

        2022 Outpatient REJI VARGAS    SLE    [id number]

301 [facility]



        00:00:00 00:00:00                 EDGARDO                         

 

        2022 Office          DELMY RFY     1.2.840.114 [id number]

[address] [facility]



        07:54:02 13:51:14 Visit           EDGARDO AMBULATOR 350.1.13.21         

College



                                                Y       0.2.7.2.686         



                                                        [phone]         Medi

rio



                                                        300             e

 

        2022 [facility]          [facility]   [id number]

[address] [facility]



        00:00:00 00:00:00 Only            Edgardo GUZMAN                         Rachele

[facility]

 

        2022 [facility]          [facility]   [id number]

[address] [facility]



        00:00:00 00:00:00 Only            Edgardo THOMAS Jeffrey

[facility]

 

        2022 Outpatient PHONG SibleyNC   CTII    [id number]

608 [facility]



        16:33:00 16:33:00                 [address]

 

        2022-10-28 2022-10-28 Outpatient         [facility]    MED     752

2    [facility]



        08:27:00 14:30:00                 VICTORIANO                          

 

        2022-10-08 2022-10-09 Inpatient E       KENDRA MEYERS [facility]    MED     7521  

  [facility]



        09:19:00 16:53:00                                                 

 

        2022 Emergency         Kole Catalan 1.2.840.1 1041

85263 

[id number]                              Methodi



        16:50:00 15:45:00                 Arnaud Rider 83739.1.1      

   507     [facility] [place] 3.430.2.7                 H

ospita



                                                .3.401287                 l



                                                .8                      

 

        2022 Emergency         Kole Catalan 1.2.840.1 1041

25688 

[id number]                              Methodi



        16:50:00 15:45:00                 Arnaud Rider 11926.1.1      

   507     



                                        [place], [place] 3.430.2.7                 H

ospita



                                                .3.026913                 l



                                                .8                      

 

        2022 Outpatient         BOBBY CAUSEY [facility]     140

[id number] [facility]



        09:45:00 09:45:00                                                 Health

 

        2022 Outpatient         O'DELL, [facility]     [id number]

55 [facility]



        09:00:00 09:00:00                 [facility]

 

        2022 Orders          Dylan, 1.2.840.1 [id number]

3842 Methodi



        00:00:00 00:00:00 Only            Amelia    87335.1.1         [address]     st



                                                3.430.2.7                 Hospit

a



                                                .3.215373                 l



                                                .8                      

 

        2022 Orders          Dylan, 1.2.840.1 [id number]

3842 Methodi



        00:00:00 00:00:00 Only            Amelia    45250.1.1         737     st



                                                3.430.2.7                 Hospit

a



                                                .3.283341                 l



                                                .8                      

 

        2022 Travel                  1.2.840.1 1.2.732.891 5214

047894 Methodi



        00:00:00 00:00:00                         94940.1.1 350.1.13.43 053     

st



                                                3.430.2.7 0.2.7.3.698         Ho

spita



                                                .3.455472 084.8           l



                                                .8                      

 

        2022 Travel                  1.2.840.1 1.2.576.991 1398

053339 [facility]



        00:00:00 00:00:00                         97674.1.1 350.1.13.43 053     

st



                                                3.430.2.7 0.2.7.3.698         Ho

spita



                                                .3.012236 084.8           l



                                                .8                      

 

        2022 [facility]      [facility]   [id number]

[address] [facility]



        06:00:00 11:17:00 Encounter         [facility]

 

        2022 Outpatient RODRIGUE PACHECO \[facility]\""    Surgery [id number]

5011 \[facility]\""



        06:00:00 11:17:00                 GEORGINA                         

 

        2022 \[facility]\""   [id number]

[address] [facility]



        06:00:00 11:17:00 Encounter         [facility]

 

        2022 Anesthesia         HaynesGilbert levi [facility]   102

[id number] 

[id number]                              [facility]



        07:41:00 08:45:00 Event           Gatito Hensley [facility]

 

        2022 Anesthesia         Gilbert Haynes [facility]   102

[id number] 

[id number]                              [facility]



        07:41:00 08:45:00 Event           [facility]

 

        2022 Surgery         Tara, [facility]   [id number]

[address] [facility]



        07:30:00 08:15:00                 [facility]

 

        2022 Surgery         Tara, [facility]   [id number]

[address] [facility]



        07:30:00 08:15:00                 [facility]

 

        2022 Travel                  [facility]   [id number]

 [facility]



        00:00:00 00:00:00                                                 [facility]

 

        2022 Travel                  [facility]   [id number]

 [facility]



        00:00:00 00:00:00                                                 [facility]

 

        2022 Outpatient EL              [facility]    [id number]

146 [facility]



        00:00:00 00:00:00                                                 

 

        2022 Emergency         Antonella, [facility]   [id number]

[id number] [facility]



        16:40:00 18:38:00                 [facility]

 

        2022 Emergency ER      [facility]    Emergency 2049

[id number] [facility]



        16:40:00 18:38:00                 [facility]                          

 

        2022 Emergency         Range, [facility]   [id number] 204

[id number] [facility]



        16:40:00 18:38:00                 [facility]

 

        2022 Emergency ER      Emilia Magallon [facility]   10

[id number] 

[id number]                              [facility]



        16:03:00 01:02:00                 [facility]

 

        2022 Emergency ER      BERTHA WHITE \[facility] [facility]\""    Emergency 20

[id number] [facility]



        16:03:00 01:02:00                                                 

 

        2022 Emergency         Emilia Magallon [facility]   10

[id number] 

[id number]                              [facility]



        16:03:00 01:02:00                 [facility]

 

        2022 Travel                  [facility]   [id number]

 [facility]



        00:00:00 00:00:00                                                 [facility]

 

        2022 Travel                  [facility]   [id number]

 [facility]



        00:00:00 00:00:00                                                 [facility]

 

        2022 [facility]         [facility]     [id number]

16 [place]



        09:45:00 09:45:00                 RYLEY                         Heal

th

 

        2022 Emergency ER      Pennie, [facility]   [id number]

[id number] [facility]



        11:59:00 18:30:00                 [facility]

 

        2022 Emergency ER      PENNIE, \[facility]\""    Emergency [id number]

68555 \[facility]\""



        11:59:00 18:30:00                 GILBERT                            

 

        2022 Outpatient         KYRIRUBIADES, [facility]    [facility]    205

2    [facility]



        08:31:00 16:30:00                 VICTORIANO                          

 

        2022-01-10 2022 Emergency ER      Ortega,   [facility]   [id number]

[address] [facility]



        18:08:00 01:43:00                 Matthew Owens                         [facility]

 

        2022-01-10 2022 Emergency ER      [facility]    Emergency [id number]

2469 [facility]



        18:08:00 01:43:00                 MATTHEW                          

 

        2022-01-10 2022-01-10 Travel                  [facility]   [id number]

 [facility]



        00:00:00 00:00:00                                                 [facility]

 

        2022 Telephone         Honorio Kahn [facility] [id number] 

[id number] UT



        00:00:00 00:00:00                         ORTHO .[id number]         

Health



                                                SPINE   [id number]         



                                                MEDICAL [id number]         



                                                PLACHIDI   1               

 

        2021 Outpatient         RONI, [facility]    [facility]    [id number]   

 [facility]



        08:36:00 08:36:00                 HE                           

 

        2021-10-18 2021-10-18 Outpatient                 [facility]   [id number]

[address] [facility]



        10:36:25 10:48:25                                                 [facility]

 

        2021-10-11 2021-10-11 Outpatient         GERDA   [facility]   [id number]

[address] [facility]



        14:18:45 15:20:25                 QUOCDAI                         [facility]

 

        2021 Emergency ER              [facility]    Emergency [id number]

9745 [facility]



        07:54:00 07:54:00                                                 

 

        2021 Inpatient         ROBERTO, [facility]     089     [id number]

[address] [place]



        00:00:00 00:00:00                 WHITNEY                 248     [address]

i



                                                                        st

 

        2021 Emergency ER              [facility]    Emergency [id number]

7440 [facility]



        13:58:00 13:58:00                                                 

 

        2021 Outpatient         NORMAN,  [facility]     064     [id number]

[address] [place] [address]



        00:00:00 00:00:00                 CHIMI                   131     [address]

i



                                                                        st

 

        2021 Emergency ER              [facility]    Emergency [id number]

9421 [facility]



        08:36:00 08:36:00                                                 

 

        2021 Emergency ER              [facility]    Emergency [id number]

0370 [facility]



        16:26:00 16:26:00                                                 

 

        2021 (TEL)                   [facility]  [id number] Co

mmon



        00:00:00 00:00:00                                                 Spirit



                                                                        - [facility]



                                                                        [facility]

 

        2021 Outpatient         KENNY MARRERO [facility]   [id number]

[address] [facility]



        00:00:00 00:00:00                                                 [facility]

 

        2020 Emergency ER              [facility]    Emergency [id number]

1551 [facility]



        20:24:00 20:24:00                                                 

 

        2020 Outpatient         HE, KENNY [facility]   [id number]

[address] [facility]



        00:00:00 00:00:00                                                 [facility]

 

        2020-12-15 2020-12-15 Outpatient         REGINALDO [facility]   [id number]

[address] [facility]



        00:00:00 00:00:00                 [facility]

 

        2020 Emergency ER              [facility]    Emergency [id number]

5628 [facility]



        14:26:00 14:26:00                                                 

 

        2020 Outpatient         REGINALDO [facility]   [id number]

[address] [facility]



        00:00:00 00:00:00                 [facility]

 

        2020 Outpatient         HE, KENNY [facility]   [id number]

[address]12 [facility]



        00:00:00 00:00:00                                                 [facility]

 

        2020 Outpatient         HE, KENNY [facility]   [id number]

[address]40 [facility]



        00:00:00 00:00:00                                                 [facility]

 

        2020 Outpatient         HE, KENNY [facility]   [id number]

[address] [facility]



        00:00:00 00:00:00                                                 [facility]

 

        2020 OFFICE                  [facility]  [facility]  [id number] Co

mmon



        00:00:00 00:00:00 VISIT EST                                         Spir

it



                        PT LEVEL 3                                         - CHI



                                                                        [facility]

 

        2020 OFFICE                  [facility]  [facility]  [id number] Co

mmon



        00:00:00 00:00:00 VISIT NEW                                         Spir

it



                        PT LEVEL 3                                         - CHI



                                                                        [facility]

 

        2020 Emergency E       BUDDY MCCLOUD [facility]     ECC     1000

[id number] [address]



        13:03:00 15:26:00                                                 Medica

[facility]

 

        2020 Emergency E       TRIVEDI RK [facility]     ECC     1000

[id number] [address]



        05:06:00 05:55:00                                                 Medica

l



                                                                        [place]

 

        2020 Emergency E       TOMY GUILLEN [facility]     ECC     [id number]

50559 [address]



        10:41:00 12:33:00                                                 Medica

l



                                                                        [place]

 

        2020 Outpatient         Molina_J [facility]     [facility]     [id number]

4[facility]



        10:09:00 10:09:00                                         [id number]  Family



                                                                        Practic



                                                                        e

 

        2020 Outpatient         KENNY MARRERO [facility]   [id number]

90611 [facility]



        00:00:00 00:00:00                                                 [facility]

 

        2020 Emergency E       FIDENCIO [facility]     1000

[id number] [address]



        15:40:00 18:56:00                 OJN                           Medica

l



                                                                        [place]

 

        2020 Emergency E       TOMY GUILLEN [facility]     ECC     [id number]

21231 [address]



        13:17:00 15:45:00                                                 Medica

l



                                                                        [place]

 

        2020 Emergency E       TOMY GUILLEN [facility]     ECC     [id number]

35909 [address]



        10:29:00 11:09:00                                                 Medica

l



                                                                        [place]

 

        2020 Emergency E       MARIELOS [facility]     ECC     [id number]

1704 [address]



        22:14:00 22:52:00                 ELISABETH                          Medica

l



                                                                        [place]

 

        2020 Emergency E       GILLIAN, [facility]     ECC     [id number]

1609 [address]



        14:16:00 16:31:00                 BERTHA                            Medica

l



                                                                        [place]

 

        2020 Emergency E       ROGERS [facility]     ECC     [id number]

23 [address]



        20:59:00 22:48:00                 ALICIA                            Medica

l



                                                                        [place]

 

        2020 Emergency E       ZOEY  [facility]     ECC     [id number]

08 [address]



        13:13:00 13:45:00                 LARISSA                         Medica

l



                                        [facility]

 

        2020 Emergency E       ROGERS [facility]     ECC     [id number]

46 [address]



        17:14:00 17:50:00                 ALICIA                            Medica

l



                                                                        [place]

 

        2020 Emergency E       FIDENCIO [facility]     ECC     1000

[id number] [address]



        02:13:00 03:57:00                 JON                           Medica

l



                                                                        [place]

 

        2020 Emergency E       GILLIAN [facility]     ECC     [id number]

0415 [address]



        16:00:00 19:17:00                 BERTHA                            Medica

l



                                                                        [place]

 

        2020 Emergency E       ROGERS [facility]     ECC     [id number]

29 [address]



        13:46:00 15:01:00                 ALICIA                            Medica

l



                                                                        [place]

 

        2020 Emergency         LISHA DELEON [facility]     064     [id number]

[address] [place]



        00:00:00 00:00:00                                         423     Method

i



                                                                        st

 

        2020 Inpatient E       VIJAY MARTINES [facility]     TELE    1000

107815 [address]



        18:19:00 16:28:00                                                 Medica

l



                                                                        [place]

 

        2020 2020-07-15 Inpatient E       ADI,  [facility]     TELE    [id number]

90 [address]



        18:29:00 13:06:00                 KOLE                          Medica

l



                                                                        [place]

 

        2020 Emergency E       MCCLOUD, [facility]     ECC     [id number]

28 [address]



        20:02:00 21:45:00                 ALICIA                            Medica

l



                                                                        [place]

 

        2020-06-15 2020-06-15 Emergency E       FRENCH, [facility]     ECC     [id number]

3283 [address]



        12:41:00 15:00:00                 BERTHA                            Medica

l



                                                                        [place]

 

        2020 Emergency E       EMILE, [facility]     ECC     [id number]

3025 [address]



        11:29:00 16:10:00                 PANCHITO                             Medica

l



                                                                        [place]

 

        2020-06-10 2020 Emergency E       BUDDY MCCLOUD [facility]     ECC     1000

[id number] [address]



        16:37:00 03:53:00                                                 Medica

l



                                                                        [place]

 

        2020 Emergency E       FRENCH, [facility]     ECC     [id number]

7982 [address]



        11:37:00 15:00:00                 BERTHA                            Medica

l



                                                                        [place]

 

        2020 Outpatient E       JOSHI,  [facility]     TELE    [id number]

210 [address]



        13:25:00 18:31:00                 KOLE                          Medica

l



                                                                        [place]

 

        2020 Inpatient E       DAI,  [facility]     TELE    [id number]

65 [address]



        11:49:00 17:29:00                 KOLE                          Medica

l



                                                                        [place]

 

        2020 Emergency E       ZOEY  [facility]     ECC     [id number]

28 [address]



        11:49:00 12:15:00                 LARISSA                         Medica

l



                                        [facility]

 

        2020 Outpatient C       ,  [facility]     RAD     [id number]

599 [address]



        14:23:00 23:59:00                 RAJAN                          Medica

l



                                                                        [place]

 

        2020-04-15 2020-04-15 Emergency E       GILLIAN, [facility]     ECC     [id number]

0460 [address]



        13:39:00 14:22:00                 BERTHA                            Medica

l



                                                                        [place]

 

        2020 Emergency E       TRIVEDIRK ELKINS [facility]     ECC     1000

999924 [address]



        14:23:00 17:50:00                                                 Medica

l



                                                                        [place]

 

        2020 Emergency E       TRIVEDIRK ELKINS [facility]     ECC     1000

[id number] [address]



        06:29:00 09:16:00                                                 Medica

l



                                                                        [place]

 

        2020 Emergency E       ZOEY,  [facility]     ECC     [id number]

33 [address]



        20:00:00 21:05:00                 LARISSA                         Medica

l



                                        [facility]

 

        2020 Emergency E       GILLIAN, [facility]     ECC     [id number]

7334 [address]



        11:35:00 15:50:00                 [facility]                            Medica

l



                                                                        [place]

 

        2020 Emergency E       HI, [facility]     ECC     [id number]

79 [address]



        10:53:00 13:03:00                 KAREY                          Medica

l



                                                                        [place]

 

        2019 Emergency E       ZOEY,  [facility]     ECC     [id number]

90 [address]



        14:41:00 17:05:00                 LARISSA                         Medica

l



                                        [facility]

 

        2019-10-21 2019-10-21 Emergency E       OEI,    [facility]     ECC     [id number]

27 [address]



        11:00:00 13:29:00                 JESS                         Medic

al



                                                                        [place]

 

        2019-10-15 2019-10-15 Emergency E       OEI,    [facility]     ECC     [id number]

86 [address]



        12:04:00 17:12:00                 JESS                         Medic

al



                                                                        [place]

 

        2019-10-13 2019-10-13 Emergency E       TRIVEDIRK ELKINS [facility]     ECC     1000

[id number] [address]



        20:48:00 23:55:00                                                 Medica

l



                                                                        [place]

 

        2019-10-13 2019-10-13 Emergency E       MCCLOUD, [facility]     ECC     [id number]

49 [address]



        10:15:00 11:13:00                 ALICIA                            Medica

l



                                                                        [place]

 

        2019 Outpatient E       MADI,   [facility]     TELE    [id number]

592 [address]



        17:28:00 14:10:00                 LCU                         Medic

al



                                                                        [place]

 

        2019 Emergency E       GILLIAN, [facility]     ECC     [id number]

0230 [address]



        15:39:00 16:34:00                 BERTHA                            Medica

l



                                                                        [place]

 

        2019 Emergency E       GILLIAN, [facility]     ECC     [id number]

0133 [address]



        12:27:00 15:00:00                 BERTHA                            Medica

l



                                                                        [place]

 

        2019 Emergency E       MADI,   [facility]     TELE    [id number]

67 [address]



        18:19:00 19:00:00                 LUC                         Medic

al



                                                                        [place]

 

        2019 Emergency E       ROGERS, [facility]     ECC     [id number]

94 [address]



        23:38:00 01:49:00                 ALICIA                            Medica

l



                                                                        [place]

 

        2019-03-15 2019-03-15 Emergency E       COURT, [facility]   [id number]

5169 [address]



        09:53:00 10:15:00                 JENNY                            Medica

l



                                                                        [place]

 

        2019 Emergency E       ENNABI, [facility]     ECC     [id number]

51 [address]



        16:03:00 17:15:00                 CHUN                         [facility]

 

        2019 Emergency E       [facility]   [id number]

72 [address]



        14:37:00 15:30:00                 BRIDGETTE                         Medic

al



                                                                        [place]

 

        2018-12-10 2018-12-10 Emergency E       BA, GARY [facility]   [id number]

691 [address]



        20:17:00 21:05:00                                                 Medica

l



                                                                        [place]

 

        2018 Emergency E       [facility]   [id number]

2241 [address]



        15:43:00 17:47:00                 ELISABETH                          Medica

l



                                                                        [place]

 

        2018-10-30 2018-10-30 Emergency E       ZOEY,  [facility]     ECC     [id number]

42 [address]



        12:36:00 13:16:00                 LARISSA                         Medica

l



                                        LANCE                           [place]

 

        2018-10-29 2018-10-29 Emergency E       , [facility]     ECC     [id number]

26 [address]



        14:22:00 15:27:00                 WASIM                           Medica

l



                                                                        [place]

 

        2018 Emergency E       , [facility]     ECC     [id number]

39 [address]



        07:22:00 09:39:00                 WASIM                           Medica

l



                                                                        [place]

 

        2018-09-15 2018-09-15 Emergency E       ABRAHAM, [facility]     ECC     [id number]

3596 [address]



        23:14:00 23:57:00                 ELISABETH                          Medica

l



                                                                        [place]

 

        2018 Emergency E       BUDDY MCCLOUD [facility]     ECC     1000

[id number] [address]



        11:19:00 12:12:00                                                 Medica

l



                                                                        [place]

 

        2018 Emergency E       BUDDY MCCLOUD [facility]     ECC     1000

026524 [address]



        09:26:00 11:32:00                                                 Medica

l



                                                                        [place]

 

        2018 Emergency E       HOSSAIN, [facility]     WWC   [id number]

674 [address]



        21:27:00 23:35:00                 ZUÑIGA                         Medic

al



                                                                        [place]

 

        2018 Emergency E       SAVANAH, [facility]     WWC   [id number]

91 [address]



        01:38:00 02:18:00                 TATE                         Medica

[facility]

 

        2018 Emergency E       [place], [facility] [place]     [facility]   [id number]

[address]



        18:50:00 20:30:00                 EBELECHUKWU                         Me

dical



                                                                        [place]

 

        2018 Emergency E       OEI,    [facility] [place]     [facility]     [id number]

[address]



        15:28:00 17:31:00                 JESS                         Medic

al



                                                                        [place]

 

        2018 Outpatient RHONDA [place]OLINA, [facility] [place]     [facility]     [id number]

[address]



        08:06:00 12:45:00                 RAINER                         [facility]

 

        2018 Emergency E       COURT, [facility] [place]     [facility]     [id number]

[address]



        08:31:00 09:48:00                 JENNY                            Medica

[facility]

 

        2018 Emergency E       GILLIAN, [facility] [place]     [facility]     [id number]

[address]



        09:46:00 10:55:00                 BERTHA                            Medica

[facility]

 

        2018 Outpatient RHONDA MOLINA, [facility] [place]     [facility]     [id number]

[address]



        08:06:00 13:30:00                 RAINER                         [facility]

 

        2018 Emergency E       KIRBY, [facility] [place]     [facility]   [id number]

[address]



        00:40:00 02:43:00                 BRIDGETTE                         Medic

al



                                                                        [place]

 

        2018 Emergency E       GILLIAN, [facility] [place]     [facility]     [id number]

[address]



        12:38:00 15:30:00                 BERTHA                            Medica

l



                                                                        [place]

 

        2018 Emergency E       XIN, [facility] [place]     [facility]     [id number]

[address]



        11:13:00 13:12:00                 EBELECHUKWU                         Me

dical



                                                                        [place]

 

        2018 Outpatient C       MADI,   [facility] [place]     RAD     [id number]

[address]



        08:15:00 23:59:00                 LUC                         Medic

al



                                                                        [place]

 

        2018 Emergency E       GILLIAN, [facility] [place]     [facility]     [id number]

[address]



        10:46:00 11:56:00                 BERTHA                            Medica

l



                                                                        [place]

 

        2018 Emergency E       COURT, [facility] [place]     [facility]     [id number]

[address]



        17:20:00 20:00:00                 JENNY                            Medica

l



                                                                        [place]

 

        2018 Emergency E       COURT [facility] [place]     [facility]   [id number]

[address]



        07:43:00 08:04:00                 JENNY                            Medica

l



                                                                        [place]

 

        2018 Outpatient C       MADI,   [facility] [place]     [facility]     [id number]

[address]



        07:39:00 23:59:00                 LUC                         Medic

al



                                                                        [place]

 

        2018 Outpatient C       SUHAS, [facility] [place]     RAD     [id number]

[address]



        12:10:00 23:59:00                 ROHTIH                           Medica

l



                                                                        [place]

 

        2018 Emergency E        [facility] [place]     [facility]     [id number]

[address]



        13:15:00 15:40:00                 FLORY                          Medica

l



                                                                        [place]

 

        2017 Emergency E       SHEIKH [facility] [place]     [facility]     [id number]

[address]



        10:41:00 12:44:00                 WASIM                           Medica

l



                                                                        [place]

 

        2017 Emergency E       TRIVEDIRK ELKINS [facility] [place]     [facility]CC     [id number]

[id number]



        15:07:00 01:31:00                                                 Medica

l



                                                                        Center







Results







           Test Description Test Time  Test Comments Results    Result Comments 

Source









                    MAGNESIUM           2023 04:09:19 









                      Test Item  Value      Reference Range Interpretation Comme

nts









             MAGNESIUM (test code = 5889943494) 2.0 mg/dL    1.7-2.4            

       

 

             Lab Interpretation (test code = 74439-2) Normal                    

             



[id number] 
04:09:18





             Test Item    Value        Reference Range Interpretation Comments

 

             NA (test code = 133 mmol/L   135-145      L            



             [id number])                                         

 

             K (test code = 4.2 mmol/L   3.5-5.0                   



             7320660823)                                         

 

             CL (test code = 105 mmol/L                       



             3371805331)                                         

 

             CO2 TOTAL (test code = 22 mmol/L    23-31        L            



             5686350402)                                         

 

             AGAP (test code = 6            2-16                      



             5823732046)                                         

 

             BUN (test code = 10 mg/dL     7-23                      



             8357363743)                                         

 

             GLUCOSE (test code = 105 mg/dL                        



             6833619751)                                         

 

             CREATININE (test code = 0.66 mg/dL   0.60-1.25                 



             9754610352)                                         

 

             TOTAL BILI (test code = 0.5 mg/dL    0.1-1.1                   



             7769014139)                                         

 

             CALCIUM (test code = 8.5 mg/dL    8.6-10.6     L            



             9556970135)                                         

 

             T PROTEIN (test code = 6.5 g/dL     6.3-8.2                   



             3725341245)                                         

 

             ALBUMIN (test code = 3.6 g/dL     3.5-5.0                   



             2730496575)                                         

 

             ALK PHOS (test code = 117 U/L                          



             0070235735)                                         

 

             ALTv (test code = 134 U/L      5-50         H            



             1742-6)                                             

 

             AST(SGOT) (test code = 58 U/L       13-40        H            



             7711373827)                                         

 

             eGFR (test code = 122.7        mL/min/1.73m2              



             5587521273)                                         

 

             KIMMY (test code = KIMMY) Association of                           



                          Glomerular Filtration                           



                          Rate (GFR) and Staging                           



                          of Kidney Disease*                           



                          +---------------------                           



                          --+-------------------                           



                          --+-------------------                           



                          ------+| GFR                           



                          (mL/min/1.73 m2) ?|                           



                          With Kidney Damage ?|                           



                          ?Without Kidney                           



                          Damage+---------------                           



                          --------+-------------                           



                          --------+-------------                           



                          ------------+| ?>90 ?                           



                          ? ? ? ? ? ? ? ?|                           



                          ?Stage one ? ? ? ? ?|                           



                          ? Normal ? ? ? ? ? ? ?                           



                          ?+--------------------                           



                          ---+------------------                           



                          ---+------------------                           



                          -------+| ?60-89 ? ? ?                           



                          ? ? ? ? ?| ?Stage two                           



                          ? ? ? ? ?| ? Decreased                           



                          GFR ? ? ? ?                            



                          +---------------------                           



                          --+-------------------                           



                          --+-------------------                           



                          ------+| ?30-59 ? ? ?                           



                          ? ? ? ? ?| ?Stage                           



                          three ? ? ? ?| ? Stage                           



                          three ? ? ? ? ?                           



                          +---------------------                           



                          --+-------------------                           



                          --+-------------------                           



                          ------+| ?15-29 ? ? ?                           



                          ? ? ? ? ?| ?Stage four                           



                          ? ? ? ? | ? Stage four                           



                          ? ? ? ? ?                              



                          ?+--------------------                           



                          ---+------------------                           



                          ---+------------------                           



                          -------+| ?<15 (or                           



                          dialysis) ? ?| ?Stage                           



                          five ? ? ? ? | ? Stage                           



                          five ? ? ? ? ?                           



                          ?+--------------------                           



                          ---+------------------                           



                          ---+------------------                           



                          -------+ *Each stage                           



                          assumes the associated                           



                          GFR level has been in                           



                          effect for at least                           



                          three months. ?Stages                           



                          1 to 5, with or                           



                          without kidney                           



                          disease, indicate                           



                          chronic kidney                           



                          disease. Notes:                           



                          Determination of                           



                          stages one and two                           



                          (with eGFR                             



                          >59mL/min/1.73 m2)                           



                          requires estimation of                           



                          kidney damage for at                           



                          least three months as                           



                          defined by structural                           



                          or functional                           



                          abnormalities of the                           



                          kidney, manifested by                           



                          either:Pathological                           



                          abnormalities or                           



                          Markers of kidney                           



                          damage (including                           



                          abnormalities in the                           



                          composition of the                           



                          blood or urine or                           



                          abnormalities in                           



                          imaging tests).                           

 

             Lab Interpretation Abnormal                               



             (test code = 91684-1)                                        



[facility] panel - Blood by Automated [id number]
00:00:00





             Test Item    Value        Reference Range Interpretation Comments

 

             WBC (test code = WBC) 6.6 10       3.7-12.0                  

 

             RBC (test code = RBC) 5.14 10      3.60-5.50                 

 

             HGB (test code = HGB) 14.0 g/dL    13.5-17.5                 

 

             HCT (test code = HCT) 42.8 %       35.0-53.0                 

 

             MCV (test code = MCV) 83.3 um      80.0-102.0                

 

             MCH (test code = MCH) 27.2 pg      25.0-34.1                 

 

             MCHC (test code = MCHC) 32.7 g/dL    29.0-35.0                 

 

             RDW (test code = RDW) 15.4 %       10.9-16.9                 

 

             plt (test code = plt) 241 10       136-392                   

 

             MPV (test code = MPV) 9.1 um       7.4-11.1                  

 

             gran % (test code = gran %) 72.4 %       36.0-78.0                 

 

             lymph % (test code = lymph %) 15.7 %       12.0-48.0               

  

 

             mono % (test code = mono %) 8.2 %        0.0-13.0                  

 

             eos % (test code = eos %) 1 %          0-8                       

 

             baso % (test code = baso %) 3 %          0-2          H            

 

             gran # (test code = gran #) 4.8 10       1.2-6.8                   

 

             lymph # (test code = lymph #) 1.0 10       1.2-3.2      L          

  

 

             mono # (test code = mono #) 0.5 10       0.3-0.8                   

 

             eos # (test code = eos #) 0.0 10       0.0-0.2                   

 

             baso # (test code = baso #) 0.2 10       0.0-0.2                   



[facility] MedicalMagnesium [Mass/volume] in Serum or [id number] 00:00:00





             Test Item    Value        Reference Range Interpretation Comments

 

             magnesium (test code = magnesium) 2.0 mg/dL    1.6-2.6             

      



[facility] or [id number] 00:00:00





             Test Item    Value        Reference Range Interpretation Comments

 

             sodium (test code = 136 mmol/L   136-145                   



             sodium)                                             

 

             potassium (test code = 4.3 mmol/L   3.5-5.5                   



             potassium)                                          

 

             chloride (test code = 98 mmol/L                        



             chloride)                                           

 

             CO2 (test code = CO2) 23 mmol/ L   23-31                     

 

             glucose (test code = 109 mg/dL    70-99        H            



             glucose)                                            

 

             BUN (test code = BUN) 11 mg/dL     6-20                      

 

             creatinine (test code = 0.9 mg/dL    0.7-1.2                   



             creatinine)                                         

 

             BUN/creatinine ratio 12.2 calc    10.0-28.0                 



             (test code =                                        



             BUN/creatinine ratio)                                        

 

             eGFR non-african american 91.179 mL/min/1.73A? >60.000             

      



             (test code = eGFR                                        



             non-)                                        

 

             eGFR  109.415      >60.000                   



             (test code = eGFR african mL/min/1.73A?                           



             american)                                           



[facility] 12 [id number] 04:29:36





             Test Item    Value        Reference Range Interpretation Comments

 

             Ventricular rate (test 66                                     



             code = 253)                                         

 

             Atrial rate (test code 66                                     



             = 255)                                              

 

             CA interval (test code 180                                    



             = 266)                                              

 

             QRSD interval (test 86                                     



             code = 260)                                         

 

             QT interval (test code 432                                    



             = 264)                                              

 

             QTC interval (test code 452                                    



             = 265)                                              

 

             P axis 1 (test code = 108                                    



             267)                                                

 

             QRS axis 1 (test code = 52                                     



             268)                                                

 

             T wave axis (test code 60                                     



             = 270)                                              

 

             EKG impression (test Normal sinus                           



             code = 273)  rhythm-Early                           



                          Tranisition                            



                          -Nonspecific ST and T                           



                          wave                                   



                          abnormality-Abnormal                           



                          ECG-In automated                           



                          comparison with ECG of                           



                          20-SEP-2022 16:54,-No                           



                          significant change was                           



                          found-Electronically                           



                          Signed By Alexis Lantigua MD ([id number]) on                           



                          3/29/2023 11:29:33 PM                           



[facility] 12 [id number] 04:29:36





             Test Item    Value        Reference Range Interpretation Comments

 

             Ventricular rate (test 66                                     



             code = 253)                                         

 

             Atrial rate (test code 66                                     



             = 255)                                              

 

             CA interval (test code 180                                    



             = 266)                                              

 

             QRSD interval (test 86                                     



             code = 260)                                         

 

             QT interval (test code 432                                    



             = 264)                                              

 

             QTC interval (test code 452                                    



             = 265)                                              

 

             P axis 1 (test code = 108                                    



             267)                                                

 

             QRS axis 1 (test code = 52                                     



             268)                                                

 

             T wave axis (test code 60                                     



             = 270)                                              

 

             EKG impression (test Normal sinus                           



             code = 273)  rhythm-Early                           



                          Tranisition                            



                          -Nonspecific ST and T                           



                          wave                                   



                          abnormality-Abnormal                           



                          ECG-In automated                           



                          comparison with ECG of                           



                          20-SEP-2022 16:54,-No                           



                          significant change was                           



                          found-Electronically                           



                          Signed By Alexis Lantigua MD ([id number]) on                           



                          3/29/2023 11:29:33 PM                           



[facility]Presence] in Respiratory specimen 
by SHANDA with probe [id number] 02:02:08





             Test Item    Value        Reference Range Interpretation Comments

 

             SARS-CoV-2 (COVID-19) RNA Not detected                           



             [Presence] in Respiratory                                        



             specimen by SHANDA with probe                                        



             detection (test code = 88040-1)                                    

    

 

             Whether patient is employed in a Unknown                           

     



             healthcare setting (test code =                                    

    



             82404-6)                                            

 

             Whether the patient has symptoms Unknown                           

     



             related to condition of interest                                   

     



             (test code = 73195-0)                                        

 

             Whether the patient was Unknown                                



             hospitalized for condition of                                      

  



             interest (test code = 42233-1)                                     

   

 

             Whether the patient was admitted Unknown                           

     



             to intensive care unit (ICU) for                                   

     



             condition of interest (test code                                   

     



             = 52735-7)                                          

 

             Whether patient resides in a Unknown                               

 



             congregate care setting (test                                      

  



             code = 85028-8)                                        

 

             Pregnancy status (test code = Unknown                              

  



             33315-4)                                            

 

             Date and time of symptom onset Unknown                             

   



             (test code = 16332-1)                                        



[facility] 3 [id number]
 18:08:00Reason for exam:-&gt;BACK PAINPt. Was at [facility] and had 
not had pain medication for his pain for chronic back pain per EMS for 8 hrs. . 
Pt. Was sent to ER for pain.
************************************************************[facility] CENTERName: SAVAGE CONNORS : 1960 Sex: 
M************************************************************FINAL REPORT 
PATIENT ID: [id number] Clinical history: Pain TECHNIQUE: Three views are obtained 
of the cervical spine COMPARISON: 2020 IMPRESSION:There is straightening 
of the normal cervical lordosis, similar to the prior examination. There is no 
evidence for acute fracture or dislocation. Vertebral body heights are 
maintained. The dens as well as lateral masses are unremarkable. There are 
multileveldegenerative changes present, most prominent at C4-C5 where there is 
intervertebral disc space narrowing, similar to the prior examination. The 
prevertebral soft tissues and visualized lung apices are unremarkable. Signed: 
Raman Bell [facility]ort Verified Date/Time: 2023 18:08:40 Electronically si
gned by: RAMAN BELL MD on 2023 06:08 PMRAD, SPINE, LUMBAR, COMPLETE 
(MIN 4 VIEWS)2023 18:08:00Reason for exam:-&gt;BACK PAINPt. Was at [facility] and had not had pain medication for his pain for chronic back pain
 per EMS for 8 hrs. . Pt. Was sent to ER for pain.
[facility] 1960 Sex: 
M************************************************************FINAL REPORT 
PATIENT ID: [id number] TECHNIQUE: RAD, SPINE, LUMBAR, COMPLETE (MIN 4 VIEWS) 
INDICATION: BACKPAIN. COMPARISON: 3/7/2023 and CT lumbar spine 1/10/2022 
FINDINGS:Chronic anterior wedging of the U2ksdhdwbzc body. Osteopenia. 
Multilevel endplate degenerative changes. Posterior osseous fusion with bone 
graft, as before. Mild widening of the anterior disc spaces L4/L5, as before. 
Sacrum partially obscured by overlying stool and bowel gas. IMPRESSION:Chronic 
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 Osteopenia in 
osseous fusion with bone graft material posteriorly, as before. No acute 
fracture or subluxation. Signed: Mak Londono [facility] Verified 
Date/Time: 2023 18:08:54 Electronically signed by: MAK LONDONO MD on 2023 06:08 PMRAD, SPINE, LUMBAR, COMPLETE, WITH [id number] 
16:45:00Release to patient-&gt;ImmediateWhich [facility] radiology 
location is preferred?-&gt;[employer] ID = [id number]; Insurance 
Company Name = [employer]; Insurance Company Phone Number = ; Policy 
Number = [id number]
************************************************************[facility] : 1960 Sex: 
M************************************************************FINAL REPORT 
PATIENT ID: [id number] RAD, SPINE, SCOLIOSIS STUDY, 2 OR 3 VIEWS, RAD, SPINE, 
LUMBAR, COMPLETE, WITH FLEX \T\ EXTENSION - MIN. 6 VIEWS INDICATION: 
M96.1\S\Postlaminectomy syndrome, not elsewhereclassified COMPARISON: 2020. 2020 TECHNIQUE: Radiographs of the spine for scoliosis. 
10 images of the spine. Radiographs of the lumbar spine five views with 
bilateral obliquesFINDINGS:The examination is of limited by body habitus and 
degraded capability to stand upright. IMPRESSION: Exaggeration of thoracic 
kyphosis due to severe height loss/vertebra plana at the T12 vertebral body. 
Thoracolumbar fusion with bone graft material. Degenerative endplate changes are
 present throughout the thoracic and lumbar spine. Flowing syndesmophytes. There
 is slight dextroconvex scoliotic curvature in the upper thoracic spine, with 
Glover angle of approximately 5 degrees. This is essentially unchanged. Signed: 
Jae Ford MDR[facility]ort Verified Date/Time: 2023 16:45:14 Electronically si
gned by: JAE FORD on 2023 04:45 PMRAD, SPINE, SCOLIOSIS STUDY, 2 OR 3
 [id number] 16:45:00Release to patient-&gt;ImmediateWhich [facility] radiology location is preferred?-&gt;[employer] ID = [id number]; 
Insurance Company Name = [employer]; Insurance Company Phone Number = ; 
Policy Number = [id number]
************************************************************[facility] CENTERName: SAVAGE CONNORS : 1960 Sex: 
M************************************************************FINAL REPORT 
PATIENT ID: [id number] RAD, SPINE, SCOLIOSIS STUDY, 2 OR 3 VIEWS, RAD, SPINE, 
LUMBAR, COMPLETE, WITH FLEX \T\ EXTENSION - MIN. 6 VIEWS INDICATION: 
M96.1\S\Postlaminectomy syndrome, not elsewhereclassified COMPARISON: 2020. 2020 TECHNIQUE: Radiographs of the spine for scoliosis. 
10 images of the spine. Radiographs of the lumbar spine five views with 
bilateral obliquesFINDINGS:The examination is of limited by body habitus and 
degraded capability to stand upright. IMPRESSION: Exaggeration of thoracic 
kyphosis due to severe height loss/vertebra plana at the T12 vertebral body. 
Thoracolumbar fusion with bone graft material. Degenerative endplate changes are
 present throughout the thoracic and lumbar spine. Flowing syndesmophytes. There
 is slight dextroconvex scoliotic curvature in the upper thoracic spine, with 
Glover angle of approximately 5 degrees. This is essentially unchanged. Signed: 
Jae Ford [facility] Verified Date/Time: 2023 16:45:14 Electronically si
gned by: JAE FORD on 2023 04:45 PMTransthoracic Echocardiogram 
Complete, (w Contrast, Strain and 3D if needed)2022 02:08:58





             Test Item    Value        Reference Range Interpretation Comments

 

             Ao Root Diameter 2.70 cm                                



             (test code =                                        



             4737261802)                                         

 

             AoV Area, Vmax 2.65 cm2     >=1.5                     



             (test code =                                        



             5701200812)                                         

 

             AoV Area, VTI 3.16 cm2                               



             (test code =                                        



             3200388862)                                         

 

             AoV Mean PG (test 4.84         mmHg                      



             code = 1433892942)                                        

 

             AoV Peak PG (test 7.02         mmHg                      



             code = 0676265551)                                        

 

             AoV Vmax (test 1.32 m/s                               



             code = 4660687192)                                        

 

             AoV VTI (test code 0.29 m                                 



             = 7927259044)                                        

 

             IVS,d (test code = 1.0 cm       0.6-1                     



             8960171197)                                         

 

             IVS/LVPW,2D (test 1.48                                   



             code = 0108680033)                                        

 

             LV,d (test code = 4.09 cm                                



             6952821979)                                         

 

             LV EF,2D (test 74.10 %                                



             code = 6187496549)                                        

 

             LV,s (test code = 2.61 cm                                



             2249749860)                                         

 

             LVOT area (test 3.20 cm2                               



             code = 1792295194)                                        

 

             LVOT Diam,S (test 2.02 cm                                



             code = 5483021993)                                        

 

             LVOT Vmax (test 1.09 m/s                               



             code = 9196565392)                                        

 

             LVOT VTI (test 0.29 m                                 



             code = 0608121905)                                        

 

             LVPWD,d (test code 1.00 cm      0.60-1.19                 



             = 2641524224)                                        

 

             PV Pk Grad (test 4.85         mmHg                      



             code = 3026508146)                                        

 

             PV VMAX (test code 1.10 m/s                               



             = 8958625539)                                        

 

             RVOT Vmax (test 0.92 m/s                               



             code = 1881720238)                                        

 

             MV E A ratio (test 1.51                                   



             code = 4491553353)                                        

 

             E wave decelartion 196.02       See_Comment                [Automat

ed



             time (test code =                                        message] T

he



             8737073718)                                         system which



                                                                 generated this



                                                                 result



                                                                 transmitted



                                                                 reference range

:



                                                                 200 msec. The



                                                                 reference range



                                                                 was not used to



                                                                 interpret this



                                                                 result as



                                                                 normal/abnormal

.

 

             MV Peak A Pablo 0.55 m/s                               



             (test code =                                        



             5057941625)                                         

 

             MV valve area p 3.87 cm2                               



             1/2 method (test                                        



             code = 7337183159)                                        

 

             MV Peak E Pablo 0.83 m/s                               



             (test code =                                        



             4321085151)                                         

 

             MV stenosis  56.85 ms                               



             pressure 1/2 time                                        



             (test code =                                        



             5126149253)                                         

 

             LVOT stroke volume 0.93 cm3                               



             (test code =                                        



             1155742597)                                         

 

             AV LVOT peak 4.79         mmHg                      



             gradient (test                                        



             code = 8262448640)                                        

 

             Ascending aorta 3.79 cm                                



             (test code =                                        



             3721987799)                                         

 

             Ao Root Diameter 2.70 cm                                



             (test code =                                        



             6846706095)                                         

 

             LV SYS VOL (test 24.74 ml     21-61                     



             code = 2833564679)                                        

 

             LV FELDMAN VOL (test 73.69 ml                         



             code = 1622842610)                                        

 

             LA area s A4C 18.01 cm2                              



             (test code =                                        



             5829664796)                                         

 

             LV SV Teich 2D 48.95 ml                               



             (test code =                                        



             8856234098)                                         

 

             LV Vol s Teich 24.74 ml                               



             PSAX (test code =                                        



             8781547097)                                         

 

             RVOT pk grad (test 3.38         mmHg                      



             code = 1145082033)                                        

 

             AoV Vmn (test code 1.06 m/s                               



             = 7908479921)                                        

 

             LV FS Teich 2D 36.25                                  



             (test code =                                        



             2380480141)                                         

 

             MV AE ratio (test 0.66                                   



             code = 5901078305)                                        

 

             LV FS Cube 2D 36.25                                  



             (test code =                                        



             9154077889)                                         

 

             LVOT Vmn (test 0.75                                   



             code = 4389247790)                                        

 

             Aov area Vmn (test 2.25 cm2                               



             code = 0022690442)                                        

 

             LVOT mean grad 2.56         mmHg                      



             (test code =                                        



             0794127031)                                         

 

             MAX Pred HR (test 158.77                                 



             code = 8170970920)                                        

 

             85 of MPHR (test 134.96                                 



             code = 0610724674)                                        

 

             Calc MPHR (test 158.77       bpm                       



             code = 1750959469)                                        

 

             LV SV Cube 2D 50.60 ml                               



             (test code =                                        



             5574095462)                                         

 

             LV vol d cube 2D 68.29 ml                               



             (test code =                                        



             7298774783)                                         

 

             LV vol s cube 2D 17.69 ml                               



             (test code =                                        



             6057223733)                                         

 

             MV Decel slope 4.25 m/s2                              



             (test code =                                        



             7667552186)                                         

 

             Pred Exer Dur R1 8.52                                   



             (test code =                                        



             5670314419)                                         

 

             Pred METS R1 (test 8.82                                   



             code = 8727492780)                                        

 

             LA Vol MOD A4C 53.34 ml                               



             (test code =                                        



             9358345970)                                         

 

             E prine sept (test 0.09                                   



             code = 9878898654)                                        

 

             E prime lat (test 0.08                                   



             code = 8540394967)                                        

 

             Velocity Ratio 0.83 m/s                               



             (V1/V2) (test code                                        



             = 4689)                                             

 

             EF (test code = 66 %                                   



             2987318062)                                         

 

             E/A ratio (test 1.51                                   



             code = 9332409321)                                        

 

             LVOT VTI (CM) 29.00 cm                               



             (test code =                                        



             5265575764)                                         

 

                          KIMMY (test code =          

                                                           



             KIMMY)          Left Ventricle:                           



                          Normal wall motion.                           



                          Normal systolic                           



                          function with a                           



                          visually estimated                           



                          EF of 60 - 65%.                           



                          Normal left                            



                          ventricular size                           



                          with preserved                           



                          systolic and                           



                          diastolic function                           



                          and no regionality.                           



                          Trace mitral and                           



                          tricuspid                              



                          regurgitation.                           



                          Normal right sided                           



                          chambers. Left                           



                          VentricleLeft                           



                          ventricle size is                           



                          normal. Normal wall                           



                          thickness. Normal                           



                          wall motion. Normal                           



                          systolic function                           



                          with a visually                           



                          estimated EF of 60 -                           



                          65%. Normal                            



                          diastolic function.                           



                          Normal left                            



                          ventricular filling                           



                          pressure.Right                           



                          VentricleRight                           



                          ventricle size is                           



                          normal. Normal                           



                          systolic                               



                          function.Left                           



                          AtriumLeft atrium                           



                          size is normal.Right                           



                          AtriumRight atrium                           



                          size is                                



                          normal.Mitral                           



                          ValveValve structure                           



                          is normal. Trace                           



                          valvular                               



                          regurgitation. No                           



                          stenosis.Tricuspid                           



                          ValveValve structure                           



                          is normal. Trace                           



                          valvular                               



                          regurgitation. No                           



                          stenosis.Aortic                           



                          ValveValve structure                           



                          is normal. No                           



                          significant valvular                           



                          regurgitation. No                           



                          stenosis.Pulmonic                           



                          ValveValve structure                           



                          is normal. No                           



                          significant valvular                           



                          regurgitation. No                           



                          stenosis. Main                           



                          pulmonary artery                           



                          size is                                



                          normal.PericardiumTh                           



                          ere is no                              



                          pericardial effusion                           



                          present.AortaNormal                           



                          sized aortic                           



                          root.Study                             



                          DetailsStudy quality                           



                          was good. A complete                           



                          2D, color flow                           



                          Doppler and spectral                           



                          Doppler                                



                          echocardiogram was                           



                          performed.The                           



                          apical, parasternal                           



                          and subcostal views                           



                          were obtained. The                           



                          suprasternal view                           



                          was limited. Patient                           



                          exhibited sinus                           



                          rhythm.Wall Scoring                           



                          BaselineScore Index:                           



                          1.00The left                           



                          ventricular wall                           



                          motion is normal.                           



[facility], Strain and
3D if needed)2022 02:08:58





             Test Item    Value        Reference Range Interpretation Comments

 

             Ao Root Diameter 2.70 cm                                



             (test code =                                        



             4135096000)                                         

 

             AoV Area, Vmax 2.65 cm2     >=1.5                      [Automated



             (test code =                                        message] The



             1216616959)                                         system which



                                                                 generated this



                                                                 result



                                                                 transmitted



                                                                 reference range

:



                                                                 >=1.5. The



                                                                 reference range



                                                                 was not used to



                                                                 interpret this



                                                                 result as



                                                                 normal/abnormal

.

 

             AoV Area, VTI 3.16 cm2                               



             (test code =                                        



             3562567364)                                         

 

             AoV Mean PG (test 4.84         mmHg                      



             code = 1093548526)                                        

 

             AoV Peak PG (test 7.02         mmHg                      



             code = 8627506319)                                        

 

             AoV Vmax (test 1.32 m/s                               



             code = 9517875476)                                        

 

             AoV VTI (test code 0.29 m                                 



             = 5742857004)                                        

 

             IVS,d (test code = 1.0 cm       0.6-1                     



             3828741608)                                         

 

             IVS/LVPW,2D (test 1.48                                   



             code = 2592165371)                                        

 

             LV,d (test code = 4.09 cm                                



             8666164183)                                         

 

             LV EF,2D (test 74.10 %                                



             code = 2958008680)                                        

 

             LV,s (test code = 2.61 cm                                



             4636894429)                                         

 

             LVOT area (test 3.20 cm2                               



             code = 4793326857)                                        

 

             LVOT Diam,S (test 2.02 cm                                



             code = 1988603994)                                        

 

             LVOT Vmax (test 1.09 m/s                               



             code = 6410068441)                                        

 

             LVOT VTI (test 0.29 m                                 



             code = 0242238520)                                        

 

             LVPWD,d (test code 1.00 cm      0.60-1.19                 



             = 3132181110)                                        

 

             PV Pk Grad (test 4.85         mmHg                      



             code = 8192726641)                                        

 

             PV VMAX (test code 1.10 m/s                               



             = 4951966238)                                        

 

             RVOT Vmax (test 0.92 m/s                               



             code = 9747209701)                                        

 

             MV E A ratio (test 1.51                                   



             code = 5863205617)                                        

 

             E wave decelartion 196.02       See_Comment                [Automat

ed



             time (test code =                                        message] T

he



             9681221407)                                         system which



                                                                 generated this



                                                                 result



                                                                 transmitted



                                                                 reference range

:



                                                                 200 msec. The



                                                                 reference range



                                                                 was not used to



                                                                 interpret this



                                                                 result as



                                                                 normal/abnormal

.

 

             MV Peak A Pablo 0.55 m/s                               



             (test code =                                        



             3395100902)                                         

 

             MV valve area p 3.87 cm2                               



             1/2 method (test                                        



             code = 7971190754)                                        

 

             MV Peak E Pablo 0.83 m/s                               



             (test code =                                        



             7242218893)                                         

 

             MV stenosis  56.85 ms                               



             pressure 1/2 time                                        



             (test code =                                        



             7671570226)                                         

 

             LVOT stroke volume 0.93 cm3                               



             (test code =                                        



             9790354067)                                         

 

             AV LVOT peak 4.79         mmHg                      



             gradient (test                                        



             code = 3941087462)                                        

 

             Ascending aorta 3.79 cm                                



             (test code =                                        



             1901462473)                                         

 

             Ao Root Diameter 2.70 cm                                



             (test code =                                        



             3072874878)                                         

 

             LV SYS VOL (test 24.74 ml     21-61                     



             code = 4895518469)                                        

 

             LV FELDMAN VOL (test 73.69 ml                         



             code = 2308258138)                                        

 

             LA area s A4C 18.01 cm2                              



             (test code =                                        



             5702248541)                                         

 

             LV SV Teich 2D 48.95 ml                               



             (test code =                                        



             9674337090)                                         

 

             LV Vol s Teich 24.74 ml                               



             PSAX (test code =                                        



             3482835311)                                         

 

             RVOT pk grad (test 3.38         mmHg                      



             code = 2696532884)                                        

 

             AoV Vmn (test code 1.06 m/s                               



             = 7764424434)                                        

 

             LV FS Teich 2D 36.25                                  



             (test code =                                        



             8487916945)                                         

 

             MV AE ratio (test 0.66                                   



             code = 4618968154)                                        

 

             LV FS Cube 2D 36.25                                  



             (test code =                                        



             8904757545)                                         

 

             LVOT Vmn (test 0.75                                   



             code = 9195863068)                                        

 

             Aov area Vmn (test 2.25 cm2                               



             code = 1522238827)                                        

 

             LVOT mean grad 2.56         mmHg                      



             (test code =                                        



             1429395864)                                         

 

             MAX Pred HR (test 158.77                                 



             code = 9242613509)                                        

 

             85 of MPHR (test 134.96                                 



             code = 8131324165)                                        

 

             Calc MPHR (test 158.77       bpm                       



             code = 1033482350)                                        

 

             LV SV Cube 2D 50.60 ml                               



             (test code =                                        



             2952992014)                                         

 

             LV vol d cube 2D 68.29 ml                               



             (test code =                                        



             5644521443)                                         

 

             LV vol s cube 2D 17.69 ml                               



             (test code =                                        



             4296270280)                                         

 

             MV Decel slope 4.25 m/s2                              



             (test code =                                        



             4603785724)                                         

 

             Pred Exer Dur R1 8.52                                   



             (test code =                                        



             1917751262)                                         

 

             Pred METS R1 (test 8.82                                   



             code = 9388187594)                                        

 

             LA Vol MOD A4C 53.34 ml                               



             (test code =                                        



             1795870910)                                         

 

             E prine sept (test 0.09                                   



             code = 9855681073)                                        

 

             E prime lat (test 0.08                                   



             code = 3115993775)                                        

 

             Velocity Ratio 0.83 m/s                               



             (V1/V2) (test code                                        



             = 4689)                                             

 

             EF (test code = 66 %                                   



             7239302164)                                         

 

             E/A ratio (test 1.51                                   



             code = 7703589795)                                        

 

             LVOT VTI (CM) 29.00 cm                               



             (test code =                                        



             8733020850)                                         

 

                          KIMMY (test code =          

                                                           



             KIMMY)          Left Ventricle:                           



                          Normal wall motion.                           



                          Normal systolic                           



                          function with a                           



                          visually estimated                           



                          EF of 60 - 65%.                           



                          Normal left                            



                          ventricular size                           



                          with preserved                           



                          systolic and                           



                          diastolic function                           



                          and no regionality.                           



                          Trace mitral and                           



                          tricuspid                              



                          regurgitation.                           



                          Normal right sided                           



                          chambers. Left                           



                          VentricleLeft                           



                          ventricle size is                           



                          normal. Normal wall                           



                          thickness. Normal                           



                          wall motion. Normal                           



                          systolic function                           



                          with a visually                           



                          estimated EF of 60 -                           



                          65%. Normal                            



                          diastolic function.                           



                          Normal left                            



                          ventricular filling                           



                          pressure.Right                           



                          VentricleRight                           



                          ventricle size is                           



                          normal. Normal                           



                          systolic                               



                          function.Left                           



                          AtriumLeft atrium                           



                          size is normal.Right                           



                          AtriumRight atrium                           



                          size is                                



                          normal.Mitral                           



                          ValveValve structure                           



                          is normal. Trace                           



                          valvular                               



                          regurgitation. No                           



                          stenosis.Tricuspid                           



                          ValveValve structure                           



                          is normal. Trace                           



                          valvular                               



                          regurgitation. No                           



                          stenosis.Aortic                           



                          ValveValve structure                           



                          is normal. No                           



                          significant valvular                           



                          regurgitation. No                           



                          stenosis.Pulmonic                           



                          ValveValve structure                           



                          is normal. No                           



                          significant valvular                           



                          regurgitation. No                           



                          stenosis. Main                           



                          pulmonary artery                           



                          size is                                



                          normal.PericardiumTh                           



                          ere is no                              



                          pericardial effusion                           



                          present.AortaNormal                           



                          sized aortic                           



                          root.Study                             



                          DetailsStudy quality                           



                          was good. A complete                           



                          2D, color flow                           



                          Doppler and spectral                           



                          Doppler                                



                          echocardiogram was                           



                          performed.The                           



                          apical, parasternal                           



                          and subcostal views                           



                          were obtained. The                           



                          suprasternal view                           



                          was limited. Patient                           



                          exhibited sinus                           



                          rhythm.Wall Scoring                           



                          BaselineScore Index:                           



                          1.00The left                           



                          ventricular wall                           



                          motion is normal.                           



[address]2022-09-21 03:47:21





             Test Item    Value        Reference Range Interpretation Comments

 

             Ventricular rate (test                                        



             code = 253)                                         

 

             Atrial rate (test code                                        



             = 255)                                              

 

             CA interval (test code                                        



             = 266)                                              

 

             QRSD interval (test                                        



             code = 260)                                         

 

             QT interval (test code                                        



             = 264)                                              

 

             QTC interval (test code                                        



             = 265)                                              

 

             P axis 1 (test code =                                        



             267)                                                

 

             QRS axis 1 (test code =                                        



             268)                                                

 

             T wave axis (test code                                        



             = 270)                                              

 

             EKG impression (test Normal sinus                           



             code = 273)  rhythm-Low voltage                           



                          QRS-Borderline ECG-In                           



                          automated comparison                           



                          with ECG of                            



                          2021 05:15,-No                           



                          significant change was                           



                          found-Electronically                           



                          Signed By Manjit Interiano MD () on                           



                          2022 10:47:19 PM                           



[address]2022-09-21 03:47:21





             Test Item    Value        Reference Range Interpretation Comments

 

             Ventricular rate (test                                        



             code = 253)                                         

 

             Atrial rate (test code                                        



             = 255)                                              

 

             CA interval (test code                                        



             = 266)                                              

 

             QRSD interval (test                                        



             code = 260)                                         

 

             QT interval (test code                                        



             = 264)                                              

 

             QTC interval (test code                                        



             = 265)                                              

 

             P axis 1 (test code =                                        



             267)                                                

 

             QRS axis 1 (test code =                                        



             268)                                                

 

             T wave axis (test code                                        



             = 270)                                              

 

             EKG impression (test Normal sinus                           



             code = 273)  rhythm-Low voltage                           



                          QRS-Borderline ECG-In                           



                          automated comparison                           



                          with ECG of                            



                          2021 05:15,-No                           



                          significant change was                           



                          found-Electronically                           



                          Signed By Manjit Interiano MD () on                           



                          2022 10:47:19 PM                           



[address]2022-09-21 03:47:21





             Test Item    Value        Reference Range Interpretation Comments

 

             Ventricular rate (test                                        



             code = 253)                                         

 

             Atrial rate (test code                                        



             = 255)                                              

 

             CA interval (test code                                        



             = 266)                                              

 

             QRSD interval (test                                        



             code = 260)                                         

 

             QT interval (test code                                        



             = 264)                                              

 

             QTC interval (test code                                        



             = 265)                                              

 

             P axis 1 (test code =                                        



             267)                                                

 

             QRS axis 1 (test code =                                        



             268)                                                

 

             T wave axis (test code                                        



             = 270)                                              

 

             EKG impression (test Normal sinus                           



             code = 273)  rhythm-Low voltage                           



                          QRS-Borderline ECG-In                           



                          automated comparison                           



                          with ECG of                            



                          2021 05:15,-No                           



                          significant change was                           



                          found-Electronically                           



                          Signed By Manjit Interiano MD () on                           



                          2022 10:47:19 PM                           



[address]2022-09-21 03:47:21





             Test Item    Value        Reference Range Interpretation Comments

 

             Ventricular rate (test                                        



             code = 253)                                         

 

             Atrial rate (test code                                        



             = 255)                                              

 

             CA interval (test code                                        



             = 266)                                              

 

             QRSD interval (test                                        



             code = 260)                                         

 

             QT interval (test code                                        



             = 264)                                              

 

             QTC interval (test code                                        



             = 265)                                              

 

             P axis 1 (test code =                                        



             267)                                                

 

             QRS axis 1 (test code =                                        



             268)                                                

 

             T wave axis (test code                                        



             = 270)                                              

 

             EKG impression (test Normal sinus                           



             code = 273)  rhythm-Low voltage                           



                          QRS-Borderline ECG-In                           



                          automated comparison                           



                          with ECG of                            



                          2021 05:15,-No                           



                          significant change was                           



                          found-Electronically                           



                          Signed By Manjit Interiano MD () on                           



                          2022 10:47:19 PM                           



[address]2022-09-21 03:47:21





             Test Item    Value        Reference Range Interpretation Comments

 

             Ventricular rate (test 77                                     



             code = 253)                                         

 

             Atrial rate (test code 77                                     



             = 255)                                              

 

             CA interval (test code 168                                    



             = 266)                                              

 

             QRSD interval (test 72                                     



             code = 260)                                         

 

             QT interval (test code 380                                    



             = 264)                                              

 

             QTC interval (test code 430                                    



             = 265)                                              

 

             P axis 1 (test code = 19                                     



             267)                                                

 

             QRS axis 1 (test code = 44                                     



             268)                                                

 

             T wave axis (test code 72                                     



             = 270)                                              

 

             EKG impression (test Normal sinus                           



             code = 273)  rhythm-Low voltage                           



                          QRS-Borderline ECG-In                           



                          automated comparison                           



                          with ECG of                            



                          2021 05:15,-No                           



                          significant change was                           



                          found-Electronically                           



                          Signed By Manjit Interiano MD (2013) on                           



                          2022 10:47:19 PM                           



[facility] Respiratory specimen 
by SHANDA with probe [id number] 02:27:27





             Test Item    Value        Reference Range Interpretation Comments

 

             SARS-CoV-2 (COVID-19) RNA Not detected                           



             [Presence] in Respiratory                                        



             specimen by SHANDA with probe                                        



             detection (test code = 29282-6)                                    

    

 

             Whether patient is employed in a Unknown                           

     



             healthcare setting (test code =                                    

    



             64927-2)                                            

 

             Whether the patient has symptoms Unknown                           

     



             related to condition of interest                                   

     



             (test code = 12434-9)                                        

 

             Whether the patient was Unknown                                



             hospitalized for condition of                                      

  



             interest (test code = 94715-5)                                     

   

 

             Whether the patient was admitted Unknown                           

     



             to intensive care unit (ICU) for                                   

     



             condition of interest (test code                                   

     



             = 58284-7)                                          

 

             Whether patient resides in a Unknown                               

 



             congregate care setting (test                                      

  



             code = 94373-6)                                        

 

             Pregnancy status (test code = Unknown                              

  



             77873-7)                                            

 

             Date and time of symptom onset Unknown                             

   



             (test code = 42090-9)                                        



[id number]





             Test Item    Value        Reference Range Interpretation Comments

 

             Case Report (test code Surgical Pathology                          

 



             = 104)       Report Case:                           



                          [id number] Authorizing                           



                          Provider: Georgina Pacheco MD                           



                          Collected: 2022                           



                          08:11 AM Ordering                           



                          Location: \[facility]\""                           



                          ENDOSCOPY SERVICES                           



                          Received: 2022                           



                          09:25 AM Pathologist:                           



                          Cynthia Hussein MD                           



                          Specimens: A) -                           



                          Biopsy, Gastric  B) -                           



                          Polyp, Colon -                           



                          Transverse, x3                           

 

             DIAGNOSIS (test code = [id number]                      

     



             3220)        FuZzEwMzNcZnRuYmpcdWMx                           



                          IHtccnRmMVxlcGljOTYwMl                           



                          sevoAlLTRevSEeU6Qxwyzo                           



                          WXlfSN9yUM2bmZjahYDxoB                           



                          IiSWVmNgNob8rzf417cALr                           



                          a4cmJNFDumtgzFe4kUzwI6                           



                          7dw2W4LwnlK97czQZpCXZ5                           



                          YCPvZQWznGUyBLHjIYO4PX                           



                          RdiYEsB7duFTLxWE8ivkiu                           



                          BDbrQGgcSOQvkHX6GJPtkQ                           



                          AwI4YiDKWjXZbxDSJgvjs5                           



                          LcYpOk3dnRInkDbwSThcRH                           



                          BmUEUdYQuvSPKpQdCeQQ3s                           



                          Z5KXLSMWSHlpLiTOTG6DAE                           



                          ZYGtHXC8XLYThHFDCYI3MH                           



                          WTpccGFyICAgLSBBTlRSQU                           



                          iuBFuJVLAWBWTRMifOUF0A                           



                          T07PWLGGIBDSYDPKQMUYOR                           



                          WAVOqZI4RNP8TOZIyGYTHh                           



                          ntIsHQ1oC2kOCaBRBsIMJJ                           



                          UBG1KpE6xDLXFQYqNKNRCN                           



                          K2qTX0sJDXVVRXPLFQDQY8                           



                          5ccGFyICAgLSBOTyBIRUxJ                           



                          Q03SNSBTORHbDMkND4JVIH                           



                          wJJ7TiT0MUPD0AC53TNZSI                           



                          GF4uH35zT2NMTUsWXb2VJN                           



                          RHXpbwR8XAHO8hrKZgYDFy                           



                          VTCYMwVJObCAD5FERjTPTA                           



                          2SZXHWCNXMASDxHEPUX9LD                           



                          VOJAEDTUHyKRSXjJL68VIa                           



                          ERUL3ZFBCBZPVqxeqqZAKa                           



                          Do2dP98BV20xGELFTP5JBa                           



                          SVI7XaSROWGL9DK62UITMw                           



                          IP6NHICEI2RRBWi4LWUqhe                           



                          QmVZDZYLUWOERGPLFBRC3Y                           



                          QXCdTWBjCIaqYKNmDQ3pFh                           



                          8gSElHSCBHUkFERSBEWVNQ                           



                          SSRGQMSkS2NsZL7JYANTBt                           



                          PvI0GVE8xKF80VRHBHKK3v                           



                          cGFyXHBhclxwYXJccGFyXH                           



                          BhciAgXHBhciAgIFxwYXJ9                           



                          e3sxgQOgSUAyoAGtTDVmTQ                           



                          xhbnNpXGRlZmxhbmcxMDMz                           



                          DUH3lxNoXSHjFOdhGJDkHB                           



                          awDr1pfNGidMfqFyNoANSe                           



                          t9poojUFfkhuuLf9x3gyRP                           



                          YuXhY4qFCbHCzpJ8knfmXh                           



                          vDZeJKAtTKc4zS67HWSvsX                           



                          6beTRoCPsqaaLdAmW1ACsd                           



                          EXSoOaC9PASorXExEUSdC7                           



                          xyZWQwXGdyZWVuMFxibHVl                           



                          JQD7dRwlw3V6aWHxiDXvzP                           



                          qnZpNuWsBrNrLXq7VfUGn3                           



                          pJboK3IbELLdRiP3fVGpDV                           



                          EvTHvdWOUsNTPngaM5wD83                           



                          KBieqbJ6cVLij1Oue95jx5                           



                          50rM8onEOyOSF2JMEuKXMo                           



                          uWQmINZfSCC2DVXcqPKvD7                           



                          xsOXAaYU6uchprBKmhJDdo                           



                          IWCksNM5DREadXQpU7ZkRS                           



                          VoLPqzDYTjgvz3WjLdPr7j                           



                          tPFooTvpGNpfi3rmh2fkbQ                           



                          KgEqm5OZXyQxGeHtudAVtv                           



                          k1Ewn1haORDsfb3gHFE2sR                           



                          JdmKomj4W9dGVwMVMavBCz                           



                          LEZcRE2nrZMyRHTdaO1itr                           



                          xjXHBnYnJkcmhlYWRccGdi                           



                          fdBfDn0irUyuDPX9DUiiZ1                           



                          hkaX5eInS1MTceV7yssZ8g                           



                          MOq1XXnsXMHufZO1wpY6DV                           



                          CwdZLsB6OunR0bGFHeJN2a                           



                          pjf9t9yoGFB9TKluAGClBj                           



                          X1gkG2GZIbzHCbNJSeyGxw                           



                          VLydo613IGR5LcVtVLXng3                           



                          RqS4VodAfqL67yjEugR02d                           



                          TQMyeXkwrX1icXrvfM5zHy                           



                          TsPsHsBHtuaQyjTC7wYIKm                           



                          M8zjcMJbQCKeMFEbO5yxGo                           



                          JecB1wbFrbSWupqgWrDOCw                           



                          Rdc4AGVinXYqWKXfZii9HH                           



                          GcCFGqG90isvrgDSZ5bY6d                           



                          w7rzv3DzWIkbFZG0METub8                           



                          5jKUirxzJ9DRM4OJ37Pljb                           



                          KsP3W7meEZU6jM==                           

 

             CPT Code(s) (test code b1rqdCXzGPYquWT5BbUmPV                      

     



             = 3357)      Hlh5rpv9BlnYKbgJAtFDyu                           



                          eLXnksKcfy86eUJ4oV46FI                           



                          4tRDHhBkO2UWTzlrI6Ltz1                           



                          XMVjWIUnaCQyG803v3oxb6                           



                          tdtjYwdYQ2tHruKOIckynj                           



                          VcD2SNamKACcgobcLLg9CO                           



                          qcMNWdnGV0OSMhfLEwV7Nj                           



                          KYWjPJ7bvfr3TNJ4QVefAA                           



                          FjRvH8VJEfrEWuYNVnjDwy                           



                          FVgpp278DEC1ZhMoGIHgzd                           



                          UzlRgciG8pMpIhLTP2GDZx                           



                          NVxwYXJ9                               

 

             CLINICAL HISTORY (test g8cieHGmVIBhtRA3FdTuPP                      

     



             code = 3356) Qrp8qhn4CjbQVdcHDfGHob                           



                          kSExdbXcbl79tES7rJ09LT                           



                          8dRVPhPqX4GQNqwjE5Wlp6                           



                          AUDcOLSdvBHqW242i1bky7                           



                          nsfeHgmIW8rUsgZHVadawm                           



                          CuN1RCrjWCElcpswCDf1FX                           



                          mnXHTjxHN6RKHakPJeQ2Op                           



                          INUlNV0azcx1HPN4WXgeLN                           



                          YvYkF3WNRwrRLzMEOetMvk                           



                          GAghg155TTA6KgYnVTHvqb                           



                          YxjTuxxS0vIcYxSUGFdmVj                           



                          pF17JFDmBu2hLFKskiAsgf                           



                          ofHfXzj6BynQRiqTsqVY73                           



                          YU0rq0HePYUrPW6cYOLpyW                           



                          8iCNNbkBoiUXD3nuiyRVNs                           



                          gZ5daAWbxT==                           

 

             GROSS DESCRIPTION (test h2rsvGQgEBVacCJ8LgLyUV                     

      



             code = 6026950968) Kny2erw4FelTVdwQKsIFjj                          

 



                          lPPnteRgnd54mHR8iR18UF                           



                          3cFAPcRsZ2LESgchE1Gem4                           



                          RBLmWLItdKVxO112w1krw6                           



                          exbfFkyGQ9DTDcJNNmX6Do                           



                          DW0wXZMujGWuB81yuNDbMK                           



                          W6POOrYCGmtWAeKJZcJGM8                           



                          KSQogIGgA6wnMKVbBT7cye                           



                          shTSxvTYnmSACrzBZ0BZRl                           



                          yAPgM8ZtEQQdGYblNIDbkz                           



                          l9VrLsCf7awFHalFmbXAyr                           



                          XFHuk8pvJTPoxBNsTOW0SQ                           



                          hoyZNkZABiUDFrIWw2LLGn                           



                          GVygkGBsNR0miClnCnxrvB                           



                          mtb0ImhSUfTSyoKUItOWWs                           



                          XFihBDVnH3DKJEXbXptfDA                           



                          MuDeVzPSg2NLszC4RKKGEc                           



                          YVDvAOWbHLw4FnA2SVs6ML                           



                          GQCp0dOiC5UWlaVFVfIVT7                           



                          MTcxIFxcdCAyIFxcZmwgXF                           



                          leIUNyyHYpRAtbpkI1QIXi                           



                          ZNonXHDlVfDgMH5vSyzdaJ                           



                          H4JBBRJCL5wttvTcuuYAMm                           



                          RyMjIFogPuQtOkAgGWq6EA                           



                          XqxX4dBv6gxSKffX4gwTEf                           



                          DMqmYKV0vUEbPIMiCKHbWM                           



                          XmEP31D0GjpI4qf6RdKSNp                           



                          w62gFT9vDYylEwGeNVHbFh                           



                          Ydf2CahOjcC0VvaXKeEbYq                           



                          kHUiNZUcfW0rfVTspEJpVZ                           



                          Vwm7blOYRoPOSvBD5hKFCu                           



                          l4T9ZVpbAS11KKFeYPxjPL                           



                          huFbHuiPM2xJ31mVNikY7p                           



                          xsRmi23uG3IwZWvuMBFgBN                           



                          DwxFPzkMI9BGInW7EdETIq                           



                          ahVuSBEhRFU4CRNLRD6cYG                           



                          hlYSCmTbZiSnikKyE2SFAl                           



                          bVNcNAM0GX9mCXUucnbkXD                           



                          LwFRUxATT1CKbluF78bBOk                           



                          XGZzMTZccGFyfXtcKlxlcG                           



                          qls9JpwZTmQEzmUBGyLHYb                           



                          HGhxSNKeL9LIYSDkCkxuDX                           



                          XfIbPoMSo4QZwaY8QWHQZl                           



                          FHDuZVEiEbW1VhM1PPb0AW                           



                          LHPn7jTuR3JZcvLLq5UZW9                           



                          MTcxIFxcdCAyIFxcZmwgXF                           



                          xnYKQksQNnCAsoehY3QNSj                           



                          UeQpHc8rGV5uaRJmNYCncC                           



                          7lRD2xNKFlkkY7VPOtBC0u                           



                          mDBaKYLqClOnG0OfGJOkI0                           



                          WtlsMeXGvwFJKijf3rdInb                           



                          FMbeAkDrZQGzx9x7gOP0nK                           



                          KhyTR8kROvrMlgPLqrKa1s                           



                          hIL7cB7mFKOjKSIsTGWajP                           



                          TtUKVzs3n1xAkwO45yn96u                           



                          GEWeH4VlUOFhMD1veiXts7                           



                          DvAZGwi2MydDE9jL4nChH5                           



                          MyIgYXJlIHRocmVlIGZyYW                           



                          nyUM48qzAtSbSmzRRkh4Fn                           



                          KKFes5P0RN2yMQzgOZIkHA                           



                          ynKPI5MFGqZUwcWH10SS5s                           



                          EIH4lzZmZNSaMzRboDIhob                           



                          MvIKhmeOUqCGSiqNJtv6wl                           



                          biBhbmQgdGhlIGxhcmdlc3                           



                          QkAqRzH48sulYbtGBkc9Ml                           



                          JEImJA46PYHzVBtqDQ0tjD                           



                          kupY0nHJiyAV6ykK3tSILy                           



                          rALzrVD1JXEkRL21gONvxF                           



                          znnF3cK5Hng3Z8lIAfkIHv                           



                          KBelGMWXAH5bLCFDD2UpEO                           



                          rgUTSxL7YsI3UowiD2e6bc                           



                          qCodn8DylCShEO2mgJJpzG                           



                          ==                                     

 

             MICROSCOPIC DESCRIPTION n0lceIRkZQQzyGT6AtVoZP                     

      



             (test code = 3371) Ggp1uhu3OgqWRpgYCxYQtb                          

 



                          xVUmmwMhxh24sOK2xB09PW                           



                          1tEGHjTeO1MPIyqzF9Vnf4                           



                          VIVjGMJtnAEsX799n2blo9                           



                          irudMycTG5fFesAWEmfrfp                           



                          KiR4PJshCRCzfluiTFr3HU                           



                          geVJGoaGG3INEhgDUaC8Md                           



                          OODkOM7ojjm4RTM4VLouGZ                           



                          DzMwV3WZKttSGtSYLgwIka                           



                          OArwd441OCG4ZcLdRCZxdv                           



                          SmjDyuwG3bFqDzBGMCSLVV                           



                          M2EZSXDwdBWcoN==                           



[facility] Center[facility] [id number] 16:40:03





             Test Item    Value        Reference Range Interpretation Comments

 

             Case Report (test code Surgical Pathology                          

 



             = 104)       Report Case:                           



                          [id number] Authorizing                           



                          Provider: Georgina Pacheco MD                           



                          Collected: 2022                           



                          08:11 AM  Ordering                           



                          Location: \[facility]\""                           



                          ENDOSCOPY SERVICES                           



                          Received: 2022                           



                          09:25 AM Pathologist:                           



                          Cynthia Hussein MD                           



                          Specimens: A) -                           



                          Biopsy, Gastric  B) -                           



                          Polyp, Colon -                           



                          Transverse, x3                           

 

             DIAGNOSIS (test code = [id number]                      

     



             3220)        FuZzEwMzNcZnRuYmpcdWMx                           



                          IHtccnRmMVxlcGljOTYwMl                           



                          qwejDhUPEzyFAaD8Uwrpcb                           



                          OJnyZC3yYF7gtQxcuBLavA                           



                          KwHNTkUsEtl2vnr266iYGi                           



                          x4evPGHFpplnoSv4aFipR3                           



                          7rx0G9MgcoT39cyGGjVML5                           



                          AFLbWJGsaLBeLVAhQVQ6DR                           



                          EkcSGaK2coBDTiJT1pqrho                           



                          IFvvECzyZDLdwBZ0LXRxwW                           



                          JaP2AeEFSfPPbkYGLjcgf8                           



                          GcCzCw7uuUSvrCtlORipXS                           



                          XoSVJrSCnbJTEzWrPkXD8s                           



                          V8ITNJFGVVhuUgELIQ2EDZ                           



                          HTJgUIT0YUQKiMBEFIE4ZQ                           



                          WTpccGFyICAgLSBBTlRSQU                           



                          tlRSzIZPIMPDCXDtfXNX0E                           



                          D75MWYTOBSUTKNUQGPKCYK                           



                          LSZGuDW4QGX0YKFLeISCFe                           



                          tqDlRR8dJ6jIWnJGXtWMKD                           



                          PTC6UxL6tEGDZSAsXHHZLP                           



                          D0jHR6mIMKUXHXLQHRHVL1                           



                          5ccGFyICAgLSBOTyBIRUxJ                           



                          U00GPHLKSIXuQQiHS2DJCY                           



                          mSV6GcC6DZFW2US60RCDFP                           



                          ZG0eN02vC5BNVFgCAg8CGQ                           



                          BOPpqtT5RTTZ0nxOOhPEGg                           



                          WRDGInUOOfAQX6EDEvCAII                           



                          9FACHDLQERQFHiTSUNU2RX                           



                          EBJHLCFLShPKZWzTU00HZz                           



                          KDBG9GSHOXKRMgzzgbJIFj                           



                          Xh3uG89KV47yLZFALX2AKs                           



                          XUA6KyLUQWMJ0ZC35VGDKj                           



                          VP7QKYANY5IAQKs6SPYwxv                           



                          IsNDWIFGOPCYYWASSBGG0L                           



                          KPRyPCVrUKxcPZWnLN2kCc                           



                          8gSElHSCBHUkFERSBEWVNQ                           



                          VTIFEGAvL1NfMV7MJHPEGi                           



                          HsT0QFZ2kRY71WDTAKVD5p                           



                          cGFyXHBhclxwYXJccGFyXH                           



                          BhciAgXHBhciAgIFxwYXJ9                           



                          i3ikpMAsXJGshOQyVFLwYF                           



                          xhbnNpXGRlZmxhbmcxMDMz                           



                          NUY0mqMqBFIuHKowBTVnZP                           



                          flEq2kdFBczYqgOoEiBKUn                           



                          d9vsfqHFcntyrTv3p3whGF                           



                          ZeMwP1oIZcSUrxM2avkwOs                           



                          jLBzNTFmWDf3tN18XNQhwV                           



                          4vkJUwALjquxEoYrL6SJbv                           



                          LSUbUtI8RIBlyRSmRVZgD7                           



                          xyZWQwXGdyZWVuMFxibHVl                           



                          OBN7lDgcv5B1sUDthMAjwK                           



                          zmXtFmWcCiBnYQc9WeKFr5                           



                          rBifO0AzLOHaFaQ1lGLjFB                           



                          DrZKddILQvXXVyltR8jH80                           



                          FKrviyB0eTLtk9Vfa97fh6                           



                          34xO7kbKOfWOL4PBZhLFJj                           



                          gWEuOGOhCPA9QIJcnUVhI6                           



                          xwKIJfHI6amqjuYOynYNco                           



                          LAWcqBG2KAAwnCZmZ4XqBM                           



                          RfOZcxAUAvqks9NqPsMb8u                           



                          cOBkjPsdCErzp2pqi5sqgM                           



                          WfWed7CGRjKuJnOjqqUUba                           



                          h1Vde8fgJPZqgo7qFUH4oC                           



                          EdwJvre7U6uDZfCFMmpWIt                           



                          NXVwXJ9npCNsGLNaqO2sgq                           



                          xjXHBnYnJkcmhlYWRccGdi                           



                          hcKwGe3wvBfhOMR9TVzdT6                           



                          rgdT1sWjQ2XIaoK2xslA7y                           



                          RXp5HJbxNKAbjOU7cgT2SN                           



                          YpfYItV1OtnQ5vJPIuZB3x                           



                          ptf2c6bkQYT6MPpwZYGiYl                           



                          L6jwJ2FRQreUQgJUEmcRbm                           



                          HQeyw961IFF3IaDtKTSje2                           



                          TqR8BbnIaqX95gvMyyP34q                           



                          DNDfaVgrnN6pxQykpK9kJz                           



                          JcStSjDWqtrDmrGT2eWNRx                           



                          P0mcaHQzGGFrLGNuM5tdWg                           



                          RkxY4qzTwaRJcxpqEfSQKb                           



                          Oze6TLYzkUBdUMQiCsp5VD                           



                          YvCQTbG20ilxkxXXK0kG6o                           



                          k4xgx4PoWOxzOSA4GHUmd4                           



                          7tCNubtxJ2KVP7IE12Zwqs                           



                          JfA5F2onMBF7jF==                           

 

             CPT Code(s) (test code t2iyxAHnUEXguIZ0BwYpXW                      

     



             = 3357)      Xxp8jkv5YyiBWvlGArHVfi                           



                          hUMkbcHspn24fRY6dH81YP                           



                          1uWWKvMoB3XROmflW4Unb5                           



                          JNJkMAZjoWBjC463x7usf4                           



                          zbogLndDE1aPjxKAUlmhaq                           



                          XnU4LBlrXOPhciyiIVb1RL                           



                          nlJWSbcIP6TCJrkVRiL7Ep                           



                          MFLvKX4tmth3PVE1NYufPQ                           



                          ZyPoE2THRlvOOzLCNxiVkf                           



                          POchw470AAZ1QlAiRMNedm                           



                          NqlOpnoO0zTmEsWCD4CNWf                           



                          NVxwYXJ9                               

 

             CLINICAL HISTORY (test i0ysqEQoKYAvhWV7UzKmJU                      

     



             code = 3356) Zka8imx4IlnIHxtFQzHVgm                           



                          uJBywgDsfj01qLK0bF93HS                           



                          5tNGSbIfB2UNNiioB1Yaz2                           



                          WUCzUMAaoXUaH540v7gwt7                           



                          wjokRqmBT7xXirIGZglfca                           



                          AuX5NGusJSQikjaoLDq4YC                           



                          zwUEWlvFQ0ZWBpwHWbH5Cx                           



                          DSAtYO2qtri5YNP8CZuzXP                           



                          FgWfX2KAYhvJYiWFIutWzm                           



                          RXepq877SRS7UcOoSCHgzw                           



                          YpkXuclY3iXmKmMOUFyvVs                           



                          rZ23JJLlMh2eBSQedpNsde                           



                          pgNhTgn4KkpMZwyKheIJ70                           



                          UF9ht8ZsVNWaWD2cPWWwwB                           



                          8qSRWgbJhkAIC8uensFMTi                           



                          zU8gcWGseY==                           

 

             GROSS DESCRIPTION (test c5pbjBUfSQGuwTJ1UyHgHO                     

      



             code = [id number]) Adm5eut7DqjMHyuQZdCDel                          

 



                          lXRxadUgyw39wJJ0xA04KM                           



                          8vFTDpLnC6XPRwsmB9Jef1                           



                          EFKqEPAguIYcB135b7nrq4                           



                          acrqMjrRK1YUGqPMXbQ6Rt                           



                          UV1sFMNnjLGkK93sbDOvLA                           



                          D9SMOvYTGhqBFnVQIwOQO6                           



                          BPCeoENhK5mhOUYhII5pjd                           



                          szAOvdUEmbXKNpiMO7KTJg                           



                          tDJzY5TkUDYoPCieHDOkol                           



                          o0LqXxEm5epNAlgHmrFJnl                           



                          HPMsw9rhQAXiiKSdHZW8YB                           



                          nrxZZoZFDsATDxZEs0GWNl                           



                          LFoglEZmDS7dpTelTsvytL                           



                          ekx5RrwLKjBVkeDJHoGHPs                           



                          NPwlDRPbV0SUUXNaIlpySO                           



                          AuRrKiSKc7OIdfY7FNBQZx                           



                          GXXyGDBcYBb8HeE0VGn7BH                           



                          XZMi4uVpA8FUlcGOZkCWM7                           



                          MTcxIFxcdCAyIFxcZmwgXF                           



                          gxIZXijDOsYUvqdyQ0AELl                           



                          QBntKGUeMfByAC4eMeqnnU                           



                          N4UXGALUC3qajuZygiDROi                           



                          ItOtLXkcYiLgSiRwRZt6PD                           



                          HdiC7eSx0icHEnhR7lxFFg                           



                          JHrlAZB1lLHgJFAiBPGiUA                           



                          XuCN51W7MhkL3op4CfYFKz                           



                          r86aII1cRHumGdQyUVHiEp                           



                          Ftl5DdeTgyS5ClwMOzGbZb                           



                          wGGcKXPxyX0ogGVmqXNzMK                           



                          Kry0efAOEsBJWaND3rTSZa                           



                          q6U2RFnnXZ77VNBzPWvtDC                           



                          irKbVfqKY3mP72jANsyS7z                           



                          rpKcf58aI1QsRHkzUSDdVM                           



                          CnrIZwoHZ5XDYwZ6YjVRSj                           



                          aaHzVEPmGUX6NTASPF7yPJ                           



                          cwCJAdPqZiFjyvOaW9PAAs                           



                          pJNiYEH5AL2cVINvcktzMJ                           



                          FyZYKoHWQ2XCxlzL10sSOu                           



                          XGZzMTZccGFyfXtcKlxlcG                           



                          jun3EzpUYyWJngYPNfPLCx                           



                          RKgwBCRvC4XBIGAtKjybEI                           



                          JwVcXiYVa4TIonG6POVDQu                           



                          EZTqGXTxEeV6VgD7MKv1XB                           



                          OGVp0kYzG6OEeuPKr8FYP4                           



                          MTcxIFxcdCAyIFxcZmwgXF                           



                          bhNVPbzHYwWZiljkR2UQGr                           



                          OgIqBi3cNJ3zyHEvJTNiyZ                           



                          2hSZ4tNXJvuwE5SVRcSX4z                           



                          wJLfQGRkEkJeT6CwLOAwV2                           



                          RvfeTwPZrjHBQsko9yvLfu                           



                          TBzmTyPaHGXde2y5aDG9oK                           



                          KnaRH7xPNrsXnoXTfsDl9d                           



                          gTA2bH6hZEOsVSFfGXRjmT                           



                          UxMXOxm0l6gZozD80np44l                           



                          EOCzM7WhTZYlRR7azcJwg2                           



                          IhPBFxz9SxjUX8oO0xNfJ8                           



                          MyIgYXJlIHRocmVlIGZyYW                           



                          dqJW98ojHrVzDdoYXae5Nx                           



                          NVPnh0H0AB8gZNqoRKHjSL                           



                          qqGAM0MMQoNKvvAI27PR8n                           



                          LHK4svPcCXVmVaFssJKcjs                           



                          TxMCahyWOhZFEzjBSgi6jl                           



                          biBhbmQgdGhlIGxhcmdlc3                           



                          HuWnGxO59fjuHxiMWjd8Nd                           



                          AFMpNK01OCWhQBokSJ6wzH                           



                          cfxR9dYEvoOM7laK7uFZMy                           



                          nXEtvFR2HJEmUC23bQYhhK                           



                          dxcE4mQ0Fxx0V8xHYcnMZz                           



                          KHlgVLXRAG9nFXQVF9DkGA                           



                          pdQZYdS1OaQ5JeisE9g6uw                           



                          wAmjd5ShcWTvAX2woGZoaI                           



                          ==                                     

 

             MICROSCOPIC DESCRIPTION r0iixMWeRIVzjMP6GrByCM                     

      



             (test code = 3371) Kmw1unm9MjlISfxUYnWUbo                          

 



                          yAItxjNhit16jOP0dK21QJ                           



                          8mHPWgIzO8WHWpfrP0Zjy1                           



                          IUDsIWHhbCOsY783g9hsw5                           



                          gmyzRxyCL4iGraTFIxqokl                           



                          RqB7ODutUSGgloatYZl3ET                           



                          rdRJCpbVM2IAYfoJGiV9Az                           



                          UAAtDO9ggrw5OWE6MHjiAJ                           



                          SiQlY3KFVqtDReQCTliPkf                           



                          URilg446IXK5VgGqCEGutq                           



                          GhyUmheP1bYqQjWCBGGTTR                           



                          M6DKCHGidKOtqT==                           



[facility] CenterTise [id number] 16:40:03





             Test Item    Value        Reference Range Interpretation Comments

 

             Case Report (test code Surgical Pathology                          

 



             = 104)       Report Case:                           



                          [id number] Authorizing                           



                          Provider: Georgina Pacheco MD                           



                          Collected: 2022                           



                          08:11 AM  Ordering                           



                          Location: \[facility]\""                           



                          ENDOSCOPY SERVICES                           



                          Received: 2022                           



                          09:25 AM Pathologist:                           



                          Cynthia Hussein MD                           



                          Specimens: A) -                           



                          Biopsy, Gastric  B) -                           



                          Polyp, Colon -                           



                          Transverse, x3                           

 

             DIAGNOSIS (test code = r9brpYQbFLWff9drUQYxyH                      

     



             3220)        FuZzEwMzNcZnRuYmpcdWMx                           



                          IHtccnRmMVxlcGljOTYwMl                           



                          nhbbXyXTVulAZtP9Lwoeqv                           



                          NNecOD7lOZ7nlXlmoIIcwC                           



                          FrYJIaBaYsc5rbz528hOZs                           



                          n2hbZZBKuhlmsTt2wKnuN5                           



                          5ux4Q3KidrK82msXWrXVV9                           



                          CJAkTPKsuFHqXQQcLRC5WC                           



                          QtvSPwG2tqMUGyTK0uxvfv                           



                          VCevPJljULZjbVU1JFFeeQ                           



                          NpO0RsGFYpZPzwUXErzjl0                           



                          TxArOm7afMYeqKhlTLylUL                           



                          OkIVJmRSgfVIFzKjBrRK3t                           



                          H8AKIQVFHFzjCaSFKT9VOW                           



                          OBXuEHJ1NRAQbSERPWM4CK                           



                          WTpccGFyICAgLSBBTlRSQU                           



                          smVZcGTHHWZDHKFqbPND7B                           



                          Y23YRQCTZJASGNYUKTNQMJ                           



                          MHPInLC7LIG7ZXRGjNRNYi                           



                          grMvWA1hJ0oOLnLHTmFPJG                           



                          IEQ3EcZ1zXGNVHUuWWMSWG                           



                          V9sDD7fRGXRSQRRTEGDMW1                           



                          5ccGFyICAgLSBOTyBIRUxJ                           



                          N80KQQBTMRAdLLsTD6FADL                           



                          gBL1EsX9WTZP0FK60PNJJE                           



                          LA3sO48lM8BFMVfTZn5FMH                           



                          ZGKepsK4XKYB9mwSXzQRNt                           



                          TIIZFjRRSpULM3OGPhUIDU                           



                          3WWEANUOFYRJWfXADLS4YY                           



                          LHAJTHGAXqXLOPoJG69JUa                           



                          GDQM9WEWBHLPMukqwmCPPe                           



                          Zu2pA70PY63fIYDTJB4NIl                           



                          OSA8UcTXAYGN5DE77DGTRa                           



                          AL2CVGKNB3ZJWBm0ZLXfwm                           



                          SePILEZIYJCQQBUDUGCH2J                           



                          QGYkCPNiJPvrRRGqVY5rGn                           



                          8gSElHSCBHUkFERSBEWVNQ                           



                          PDQIHDCtK1ShZU6KYGODMs                           



                          WsP2CGS4aGS62KLQVWXH3h                           



                          cGFyXHBhclxwYXJccGFyXH                           



                          BhciAgXHBhciAgIFxwYXJ9                           



                          g3fhyKSoGEVkrJGuMHXnZN                           



                          xhbnNpXGRlZmxhbmcxMDMz                           



                          MTQ9hxQbKRUkTVmiYSXbHB                           



                          wvCs4cbDBzgHabRhBbEOEm                           



                          a4qkhlEQiwenrAq4q8fgVQ                           



                          PjNeB6dPUtEGzaK7epjxSe                           



                          hEXpIDSeACq7tJ64FBHtgF                           



                          5ewEElWLgifuUjMrE7FZlv                           



                          KZSkJdZ8QSQpaGWnRPDnC9                           



                          xyZWQwXGdyZWVuMFxibHVl                           



                          NNE0gMago9V8qMArzYJelO                           



                          lbToQcRhHrPjYLw7VsUKp2                           



                          iBneG9LpCSPtYmQ4eILtXQ                           



                          DbJNojALVhXPOfkkO9lX68                           



                          IYcbbbQ2sXAqt3Ipo05xl1                           



                          26vY7iwGImUHU8JOAdQBXs                           



                          hJLjOWFqJOS3VEYkwQSuN2                           



                          usUPGbPC2pfujgCOzbLNwc                           



                          ONFalDG5PTLbzAEuM5NtZB                           



                          XuUTxrBUUtgrg2SlIkWe3w                           



                          cTEmyVmpXJuaz9ifc2mqtU                           



                          XtGnj2LRMcTbBwYmorFLwc                           



                          q5Hfc5ryUDQcph5zUHR1hD                           



                          AprMtrg0M8iPQyEJFahHEh                           



                          BQRsPL8ijMEeJNGgnL1bwq                           



                          xjXHBnYnJkcmhlYWRccGdi                           



                          zgIzFg6diHvqHJL9RCjjS6                           



                          uutT5mMaA8LCwrR6byqW3z                           



                          AZp0CNzrRCKfpLM4hoA6TG                           



                          BnrLQjV1UchS1bDBPgDW4v                           



                          tuq9e1qaEOT3VZhbPLRsUn                           



                          K8seI1VKUoaLKuXJQzpVop                           



                          OOpxg002VSF3ZuFeULGek1                           



                          LbI0HbhPtsY36dqVdiW52j                           



                          TODhsDqzsT1ktDyxfC2eWo                           



                          OfBnMsFTplbHfsLS6bUOTw                           



                          O0uenBEnGXPzXIVxC5diKu                           



                          RvlA0gdDxyAFvjvnCfDNUf                           



                          Mmc2YLPojLOqGLChSrd5AR                           



                          LrVPJyA41cazilVCO9wM8s                           



                          f2ymf7PqUUgaISE2OPGrg1                           



                          7qTQhkmoQ3KVJ9EL56Htwc                           



                          HwY8Q3pkSEW7aI==                           

 

             CPT Code(s) (test code h3raqRElDZHkbMX8GpJrOF                      

     



             = 3357)      Bjv4zza8IcxCHcdCJqMRkk                           



                          eWXkptSzbi87rBH5sO26EB                           



                          4kNWOaPlF8ZWDuhpS1Dpe0                           



                          UOItOIMdzVSbT686x2oyg3                           



                          oncjIqpVR4pRifLPPtgytl                           



                          JlU1ZZleOCGvnwwiGRx6VT                           



                          rcFXFzpFK7FGWwoPWyF2Vy                           



                          MMEdML7jbgw3BCW8JGvvLC                           



                          ShIbH6TDYogMJqXTSqbVhs                           



                          AJsjh825QPD8IxMiUXClsl                           



                          FlxWsslJ2tJiNkCST8KFLy                           



                          NVxwYXJ9                               

 

             CLINICAL HISTORY (test e5huyJGdALMldCU5ZiIjTD                      

     



             code = 3356) Utj4wub4IwkOZwyPSzCGxd                           



                          zZHuazFemv47rFH5oU28PZ                           



                          4hYXOrUzV9FZQdakZ2Lhp8                           



                          GYWyKMModVZtP458p3nzy3                           



                          eojvKnxJS4lTkqEWBgefdo                           



                          SeN1MXhiAYQbaratTRw2NI                           



                          scNBKoxBK7IJLkiRWeD1Xi                           



                          YDLmIB0brhf1UKB9GHyrXT                           



                          RkQyZ3HGOdeOIgHMJwyRqp                           



                          AKtiv207GTX8TkWkMTSewc                           



                          YwgBiclY9fYiDrEDUPdbSb                           



                          gA94QXYtGy0lNGGfbuJhro                           



                          waPcXqd2WmcWYroOdmLX63                           



                          BV3vd6JlKEKjYR7uFOVifH                           



                          9aWOJehKofZMO1xdbhBDWj                           



                          pW8bpKBabM==                           

 

             GROSS DESCRIPTION (test w2zycVJfGQDpwPL7DfMlJQ                     

      



             code = [id number]) Eme3wgb8TsyFOmkBLdGKoi                          

 



                          bBGvbsWpyh76oPC5uT30UU                           



                          7gNBUlWdV2EAIkbdH4Bgx7                           



                          YMHmSSSddNRwP685y8axu5                           



                          grjmAokIJ3ANOuGMTxR1Fx                           



                          VR8yPCNhqNHpD55siUPsHL                           



                          E9CMOqFWQafSAzPUTwDED7                           



                          RKRgyDBzA2edTQXhWX4ljm                           



                          vrDIbtRIzaFSGpjZW8DMJw                           



                          bYXuT5ArAFSrFNvwIFLcpg                           



                          z4QjWcXj7rvUGydEzqLMup                           



                          RNAxq1tuYXAkrTBxXHR5KS                           



                          ehtSMsOVBtLHCaOFc4YGJx                           



                          DIvivGLoTI4dtZquMssjaT                           



                          ufs9EnlJOrGRtdFRKuLHDz                           



                          TCgdJABmL1FGWHVnEpcrQI                           



                          VfGbSwYZh0CNxvI9BCQCXg                           



                          ZKVnVVOrIJk9HrL9KXh8NC                           



                          WUVb8cYoJ1AEflLTOdBQM5                           



                          MTcxIFxcdCAyIFxcZmwgXF                           



                          fnKTZonCBmMEcnhpD6SDOf                           



                          VFtaMLCiXpAoSR5zWrbmnM                           



                          F0KBSLKZK0mhiwYsqmEQLj                           



                          VqUkTAwaHqTfDsWdPVv0JS                           



                          AfwY8aJa5cqKSaqE4bnIPd                           



                          SLtzOQD6xPLpTHBzBGRsBR                           



                          SeJI02E1GfiA0jp7XtNMJd                           



                          j60hPA2nDCzuTmTePMXjBf                           



                          Iop7HawCyxQ6BnrBIuYoJt                           



                          cXZrPMPewP7eqIVpvXZmCD                           



                          Ixz3rjTITgAFNaAY5eWBFl                           



                          v1N3LTjeVP49NWBjXWzzEZ                           



                          trByYnhZE7pI60aXHcyI0q                           



                          jkZah13xI0TqKPhsKFQkSP                           



                          LxrGBwhKA9FBOsQ7BgPKOx                           



                          piOiYGYrCQB8EDYEDN7fND                           



                          cgHURlAcNqStxhAtD9QFZu                           



                          lNYjFIR1YM6sVSIkskboYF                           



                          KzFJGxJXB3PTmrdC44tDAc                           



                          XGZzMTZccGFyfXtcKlxlcG                           



                          mbb8BlcGQlPKxhQYPsRYPq                           



                          ADwlTFMxD8CIGSWkEchpXP                           



                          ZgYeZvROl3OHktW9YBQNJs                           



                          TSNuCUKeMdF7RcO9ZKe9CE                           



                          JOQh5zPlR7CLqdVMl7ALQ0                           



                          MTcxIFxcdCAyIFxcZmwgXF                           



                          bkFFDgvPBbMIerokS9IGSk                           



                          NrEvOi7uXL5jcMBoQLMqbI                           



                          5zZG4uPQAjkfY8WVBoYB6g                           



                          yJAuEVXsAuDrR3EiRAKmQ0                           



                          PohzAiTDfoJRCzqe5hhOtk                           



                          LAyvYyZhSTVnd5g6bNG4tH                           



                          AmpEC8uOEnaXopWIvdWj6f                           



                          kQG1xA2zGZGsSQAfGRAauO                           



                          ZhSRHnt2p8aGraC63pi08k                           



                          HVBjN9EpPLEtAR3itsCqi7                           



                          AjOWWck9BhnHW2wZ2lFbB4                           



                          MyIgYXJlIHRocmVlIGZyYW                           



                          pfQG19yrUtZoPyuRCee6Tn                           



                          ATSsw1D5MW8kAXhtRTDbUB                           



                          noUBK8UBFwSRtxQU24CU1r                           



                          CCO4whYhITJaIhTyiUDvmr                           



                          RxXQiaaKBfFDZkbKKrl1aw                           



                          biBhbmQgdGhlIGxhcmdlc3                           



                          WhGaEnG08cdzStgHBws9Mb                           



                          FDClTW15THUyLAvwFE9ckE                           



                          avqT1bSPkaIF7lhQ8fNZGl                           



                          pRTliVE6NMSjCI22jQRtjZ                           



                          ydfI9iE9Vze7W8eDRapEIe                           



                          DEmpJPOESH3sBBKKC0FwOH                           



                          foTSGyM1GzK0WzxmK1a9nh                           



                          gHutk6GouTZiVJ9tuRQqcQ                           



                          ==                                     

 

             MICROSCOPIC DESCRIPTION i2uabSTaIOHakGO4FyJzBS                     

      



             (test code = 3371) Rcg4maq7HtaGJqnFCdQYkq                          

 



                          rZVydwMwil63eHV4pG83RW                           



                          0dOHFtSmZ8SJPuwcA3Ybs9                           



                          EABaHBDfyLVpD183k2ecr3                           



                          hrwtEooMI5gKcdDTFvytaf                           



                          AsK2ZOxwAIYgpvljUFn1MJ                           



                          enQTMcyMU7ROOquEHbT6Vx                           



                          PTXqJP7etnb0FLU3VSbnWR                           



                          BgFcU6DPFlqSCqHGFfbFva                           



                          IZkeo847ERG5CnPdWENimo                           



                          CvlJwjpM5dOmSkUTBSNDRH                           



                          K8SMCQWfyHZuyL==                           



[facility] Center[facility] [id number] 16:40:03





             Test Item    Value        Reference Range Interpretation Comments

 

             Case Report (test code Surgical Pathology                          

 



             = 104)       Report Case:                           



                          [id number] Authorizing                           



                          Provider: Georgina Pacheco MD                           



                          Collected: 2022                           



                          08:11 AM Ordering                           



                          Location: \[facility]\""                           



                          ENDOSCOPY SERVICES                           



                          Received: 2022                           



                          09:25 AM Pathologist:                           



                          Cynthia Hussein MD                           



                          Specimens: A) -                           



                          Biopsy, Gastric  B) -                           



                          Polyp, Colon -                           



                          Transverse, x3                           

 

             DIAGNOSIS (test code = [id number]                      

     



             3220)        FuZzEwMzNcZnRuYmpcdWMx                           



                          IHtccnRmMVxlcGljOTYwMl                           



                          enwwOaCWCuwLVkG0Jwloit                           



                          PXccQP0vQX3jeTakxFFneT                           



                          EkPPMgTaFkc8mfh966jQNa                           



                          n7bnVOLEzdaclGh1hIzrH2                           



                          7qz4D5XzquV65kpAHxGIZ2                           



                          XUSdTNRpcTOkDOLeEOM8VN                           



                          VlmJNxC8tlDUSlZW7sbxeb                           



                          SBwkFWahZRZuwVA3WGDziP                           



                          WeA0CaJOSqZQypNCPllpb3                           



                          FsDeOi8aiKNlpPawIAkmUU                           



                          WjHWXxBRzyCXWaNxCrLN7j                           



                          B2LYSBZNSBquYhEXOG7FBZ                           



                          JYLrNJB0HMZQqERDAJV2HX                           



                          WTpccGFyICAgLSBBTlRSQU                           



                          yhCVxZKHESJSOGAknWKG6C                           



                          H69SYJYXQZHWEUDEUQNAPW                           



                          IZRRcTL2JBA2LGYOePFSGf                           



                          fdTpOH8xF6yGPxWWYiNLRJ                           



                          TSN7DlE6hLDBWZYmSUXYDP                           



                          C1aIU3zREKSEDEDHPYHAU5                           



                          5ccGFyICAgLSBOTyBIRUxJ                           



                          G34MGJQOBPSaLNsUB7ZPOI                           



                          sDB6NyC2BLRB4MO14CXSIP                           



                          VI3mA33xC7BYUWcDWn3CCO                           



                          RSWshpG2DEOR2kfVMrMBDj                           



                          DMMJRyCWQbGWS8HPAzQLRZ                           



                          5EHOLOKFTUZQFsILEJA0PB                           



                          CLYGZAVDEfSOGBxGI80EFr                           



                          CGSK2JSFHTGZJxqfpdVDLt                           



                          Os5fN03RJ70bXJWTTJ1QFb                           



                          IGK5OjQZEPHA2OJ75ZSMKs                           



                          AE5WUTNOD5TRNRm2IYQgmm                           



                          GkQSSKJCQRAAHXGNTXRB0D                           



                          HGJcOAEwRRhjMPEkWK3pRb                           



                          8gSElHSCBHUkFERSBEWVNQ                           



                          UXITVSNhV8EhDV7EYVWZHp                           



                          DhS1QCZ1eXF17IVJSFWX7y                           



                          cGFyXHBhclxwYXJccGFyXH                           



                          BhciAgXHBhciAgIFxwYXJ9                           



                          s1ilfACmZRZzpRDmWLYpUT                           



                          xhbnNpXGRlZmxhbmcxMDMz                           



                          XRU5jxJdHOEsLPknZVEoMV                           



                          qsWt4osUHmkKorUdYuACYw                           



                          c6nildGCcjjncMo1b5reFM                           



                          MaBzJ3cJOeXMexB0sridLk                           



                          fBLfFKFoVPo7kV81WAElxB                           



                          3qsRMaWCqxekXpIkW0HRpg                           



                          NBPkRwN5WRYikKIgWOTcS0                           



                          xyZWQwXGdyZWVuMFxibHVl                           



                          OTZ1hJzgq0N7hBLprKDvuA                           



                          yaCnFgNuJpKcQLl0DoYLi7                           



                          iCpeR5EmJMYwUkO6bSWqTF                           



                          HdKOcjZSQfBOXmyvQ2yO76                           



                          WMgegnG7cVIxy5Ofw52pe6                           



                          75oM7jaWKlDGV8TCDkMAVo                           



                          mLKwAEJqEIE1ACUodAZpM7                           



                          gdPAQjTM3nmopkBIetIZry                           



                          LIQxzFB4CVQrcVBcF8AaAV                           



                          RuVNhmJYWwfck5UeSxQv0s                           



                          aOZcxJpjTFuke1tav0phgC                           



                          VlHvi8XZLqZeXkMnoySKay                           



                          i6Mtj4weJVLcpi0dBGB7rS                           



                          LmdTpov7A3aEVxAJLgxUVi                           



                          FTNnWM2koDZuVQUiwW1hsu                           



                          xjXHBnYnJkcmhlYWRccGdi                           



                          gzBpLo5hzUswYHJ0VIyjZ6                           



                          ftzT1gXyM0LEnqU9fegG9g                           



                          BKh9SJfcLFMrvAZ1xrW8JZ                           



                          ElcMCgI0AtkC3tBYYcBI3z                           



                          ypc4c3ekTDP6GGbmJWXwGe                           



                          R1kvR3REDpxDDtSFIxoRvt                           



                          BXpob527QXK7ZfYwRYFpy3                           



                          TeK4YsaJqoZ68grFgyU13n                           



                          QMOyuOtnsB1mrNrvwW5wCq                           



                          ZsXxVrZQhajPqyRI0lJTLd                           



                          I5sgrFDqTJVsBDIqX2oeTi                           



                          EonZ0saPciJRrgroQnNFCj                           



                          Ytc5ONGjyBQpUVYbPop7CO                           



                          YtGDZhI88dvscfBFC0fS7h                           



                          t8zmq6IpERlpXVF1WLNkr2                           



                          8wNFsgsnT4ZQS0PN97Iily                           



                          XyO2U2kyCWG9fA==                           

 

             CPT Code(s) (test code w8epnOIaRCBolER8YvOrNH                      

     



             = 3357)      Grk9uzw9SfdPXtxZJwHRqm                           



                          cKFdsjYgas21cUE7vS60WW                           



                          1dDBRjWqS0ACNqizB0Rok2                           



                          KUAaHMChnMYuR885e2puo6                           



                          pydxGixBE7eBsiWLXbprdk                           



                          LtT3GMotGIDexcxgEVe7JC                           



                          swNRWutNH6MVVjlSVhQ9Ti                           



                          RQPtQH0puwo0CTU0BRusAA                           



                          SyGgW5WEInzWYmRVAxjKpi                           



                          ERsnn683NCZ8OlVlYNKohs                           



                          UpwRvkcM5yJwBbDFK6NJFt                           



                          NVxwYXJ9                               

 

             CLINICAL HISTORY (test q3wxvTGuEVKveUX4TyByVC                      

     



             code = 3356) Ocp0xwd7NklAAvtTYxDIpu                           



                          gMTfrlLsqs55cFH3fB75YQ                           



                          0iZIWnJnQ8OXCmntP1Tld1                           



                          GQOzLPPptPEwG790d3yhr1                           



                          vuzlHnqLF3hJgfGDYkravu                           



                          PfF4WZmnKBZqvadyQUr6WN                           



                          xcOLLqvQR8SAHwvBYcU3Xc                           



                          CATyCY6iesa9BGK5MWjmQU                           



                          AjFoH7NSTjtSIvUJIhkZkk                           



                          NQusd569BMA6HzQhFPBbfp                           



                          AiaKdhiC6rFtDvYWOCeqAc                           



                          tU01DDNfNt8qFTSzmvAdhk                           



                          dgRfSwc8ZdrBOaiVjuPP81                           



                          WL1yk9EfJZSfMB3pLHOcrE                           



                          5eFUPipRtiQSW5egkmHJBb                           



                          dX7bqCFlvT==                           

 

             GROSS DESCRIPTION (test d1fqkFWzZTFhqWG9DgCyTM                     

      



             code = [id number]) Sdi3jbt8IfkLRboNBaAMov                          

 



                          sLPvxtIhqs65nQK2xZ54TU                           



                          3yHRVlAdP7WAIfjfH2Heb7                           



                          HRFjJAZrgWJiI695u8msd6                           



                          fwwrHjqTI8TSWhKNReG8Ol                           



                          DS3pJBAuhMOlU14wxIWuCG                           



                          V6FYMtYOUgcRLsUXCxOZL9                           



                          IEKjyMApD8jfVMWsSM8jot                           



                          evJQucGIfgGHImpOV2NUKs                           



                          fCSbS1GxFXNeBSfxPQGkwr                           



                          h8UbWwBd2sgSSuhBqiQCza                           



                          DKQaw7ilZPCcdQYnTAY8YZ                           



                          hxtCEdLEWgNXMkTKz7YHEf                           



                          GPoybSSrJK6cqTiwDmkeaN                           



                          wld8OvnQLbYHwxJDDnVWNn                           



                          SEepHIHaH9DETHSdXkotSM                           



                          FqBjPpPAn8LSeiJ0HKMESt                           



                          XDWmNUYkFEb6MsS2ITg2GH                           



                          HZCo3iYpJ2PWagLHSfZVL7                           



                          MTcxIFxcdCAyIFxcZmwgXF                           



                          jdHMUscXVuKUfpmhR2BNAb                           



                          DFhyXQYyKiPaMV2mKkuomI                           



                          X0EFXIMNL1toolMgvwJQUy                           



                          OoJoUTjpVlXgMfPjUAr9WC                           



                          KadG3bUb1lwENmcY9raBSr                           



                          VUrpPCI9aWAnQHNlBJYzBD                           



                          VyAT40V6ExfR7pn8MyDGGn                           



                          c02cVA5zMDehRhFwARSpCi                           



                          Wqm3JmgPiyY3TvdVYjXmJu                           



                          xVQhSITfiK5fkWKcqATeJK                           



                          Ldd4rxXBUrWIHbZM8zXUKb                           



                          m9W2LSphMC97TTFhJBtsRA                           



                          cjTmKdmSU0dW28qLBseH1y                           



                          hkMnt73eI4CgMYavUGUnVY                           



                          EysFAweWP3GGEwU6ZcFCMm                           



                          iiHsVIUmDPQ0TBCPXW1yWB                           



                          igWYMsGbUdUiibVoC5GFHo                           



                          eEGoCOB1WT3xHGBipbzqWN                           



                          IwBTUaSWA0FNfcaA87xNPk                           



                          XGZzMTZccGFyfXtcKlxlcG                           



                          elj7ZfnFNhSPjiXYWmUHDz                           



                          UVnzOVQkQ9QBRRQiBsibLG                           



                          EvXyNgELr9FSueH1LWZLOp                           



                          FICfMFLmJdZ6OeQ5YWc4SH                           



                          CCJn2oEqO6IWdlMSc6GOQ7                           



                          MTcxIFxcdCAyIFxcZmwgXF                           



                          bjLSGazRFrYAbfrtB8WAWt                           



                          ZrMnYe5lLV8npUKzYOLvwY                           



                          0cNR5gYIGdmmU3EIGgLV0u                           



                          jVXiBBSsHjMkM5XmZRQaM5                           



                          LamcJqKOadUPIbsc6xaExh                           



                          APvpMnPqPMWrn8i8mGN3rV                           



                          HnfUP4rJKfxXdrWVryGh1a                           



                          eCL5mS6uOLHtTLGpTQZfyS                           



                          JqSWVfa5z3sEsuH37xy03k                           



                          TKIvT8OsQWUnOQ2vilUqr9                           



                          AzCPDbu0AenYA0gC9mXeT1                           



                          MyIgYXJlIHRocmVlIGZyYW                           



                          gwRC53hbXzKuZbvRNqi0Ij                           



                          HIQzc1D3HM4wYWcsQUAgYB                           



                          viNLV9KUQuOTbkII16UY7r                           



                          UIW4nvXaWOMlQoAvsRYhgt                           



                          PlXBuucWTrOHKrcEFdu2fx                           



                          biBhbmQgdGhlIGxhcmdlc3                           



                          NkVoVgD88xusKwxPPab8Rf                           



                          OCHmNH60YMXbJYodWR3fdY                           



                          useB3uADgtUU2xfW9oGXWk                           



                          yPVlvUN8EYOpDO19eLXtuW                           



                          dpzW6yJ9Qpx6C9sYZqiVId                           



                          CHnpBQHIZI1ePTGHB9BfSZ                           



                          shMHIdN2HdK4TrhmM7a1km                           



                          yJxba2YeaERdCN5ujILlqR                           



                          ==                                     

 

             MICROSCOPIC DESCRIPTION v8jmzRXuAZLhcZC4ZnDcWU                     

      



             (test code = 3371) Tdu7fpz8RzwHLpgTLiNEnp                          

 



                          xQTgofOkmy36tLT4xG70PV                           



                          1zKENsRqW5IYFlbkA8Hcs7                           



                          PTJzXBInrFVhU424u4srz4                           



                          lzulHmpPB5xLheSYMikjaj                           



                          JeM8KSsiLMBsjcsaAOb3VF                           



                          umVEKipZR1BFFhvRKjY9Qc                           



                          IXSvWJ1bqhs2ELQ9ZSnqAE                           



                          KoClC4PXFnuOKpATLxxUfp                           



                          DTrpb189NZK4OoQhCZRxvv                           



                          FxtJycpP1qOeBcOGULJXLU                           



                          L2LCHCAnjJNdpA==                           



[facility] [id number] 16:40:03





             Test Item    Value        Reference Range Interpretation Comments

 

             Case Report (test code Surgical Pathology                          

 



             = 104)       Report Case:                           



                          [id number] Authorizing                           



                          Provider: Georgina Pacheco MD                           



                          Collected: 2022                           



                          08:11 AM Ordering                           



                          Location: \[facility]\""                           



                          ENDOSCOPY SERVICES                           



                          Received: 2022                           



                          09:25 AM Pathologist:                           



                          Cynthia Hussein MD                           



                          Specimens: A) -                           



                          Biopsy, Gastric  B) -                           



                          Polyp, Colon -                           



                          Transverse, x3                           

 

             DIAGNOSIS (test code = e4dwpUCqVICer9bxCUEjyA                      

     



             3220)        FuZzEwMzNcZnRuYmpcdWMx                           



                          IHtccnRmMVxlcGljOTYwMl                           



                          mcddUvZXGntMLbB9Zesdtx                           



                          VIgyPM4uLU7veLiipHHvkI                           



                          UkUOSwMsPjg0zby511uYRe                           



                          g7rjZPKXjslaeRk7xNkhC8                           



                          8sc5G0VztlL93slROmQJR7                           



                          MITxSDKffVVhZKVmEPF4YG                           



                          WcsKYjB6qyEZTzJZ3zwhkz                           



                          SHsnXGrtPCEftOI8IULosZ                           



                          TmJ3CkSEFeTFvtWWUksrj8                           



                          BuJcOr6doGNraXauCEvnBN                           



                          ZyJYNjIViyJOWjFyVnYV7p                           



                          A6HFAVDBJDhrKyPDET9LTB                           



                          UQWxQFO3XFXPpAZHXXH8TF                           



                          WTpccGFyICAgLSBBTlRSQU                           



                          raHHaGYSHDWTYDBxbOAR3E                           



                          B93ZNUKDXZYJBESARLVTVR                           



                          NBPEvPX8TBX3DQKTxJGUAx                           



                          snMiUG8aQ0zBMdXUKiNKCP                           



                          HJT8WxL8eCVMCGTxAAIJLW                           



                          V1kTP3cWOJAHDZHMKDOYM2                           



                          5ccGFyICAgLSBOTyBIRUxJ                           



                          G91EPPAJHGRjYDpOZ0HXDF                           



                          nWD6ByN2WCOV4MK43RUIXI                           



                          IG3wU58iX9KSZKrBDr4WUA                           



                          ZXIysvU1XPLW5hiHShVVVk                           



                          ROKADpPLKzKAD6HOHpXGWB                           



                          1KBHGPAYFLEPVbQENQM7GI                           



                          AQIJUGXSYpHGHDlNJ05UPv                           



                          FRQT3POLTYSOPxozopMAQg                           



                          Xb1wA34WI29rPUPAIO6OJf                           



                          OKZ6YvSOLMJY2UJ78SYRVx                           



                          SA8LPJNWZ8YKFJb8WWNtcx                           



                          FaYCUFVQHHQVBUNOGWQQ8Z                           



                          SENnECNkMYzeXIUkGM7nVa                           



                          8gSElHSCBHUkFERSBEWVNQ                           



                          GOXCICGlT0ZnMT1DHRKXFz                           



                          NlS3JBS6eEM91YPXXVUU7a                           



                          cGFyXHBhclxwYXJccGFyXH                           



                          BhciAgXHBhciAgIFxwYXJ9                           



                          b8stuVWeLIBekFMjIUEdBL                           



                          xhbnNpXGRlZmxhbmcxMDMz                           



                          ITE0ihChENWoMMmgNJFfBO                           



                          vmIt9yuLJejAeqFyKrIYEe                           



                          i5hpyqNQnfxvlBp7z2wzME                           



                          OkJuQ1eZFcRPumU6gipwNb                           



                          hUPdLGTaRJj5eI81LZKauD                           



                          6iuLLcODzchcYpJmN3HGql                           



                          QMGfLuE0BNVfiMImIZLtF7                           



                          xyZWQwXGdyZWVuMFxibHVl                           



                          LEK4eOukn9F0fAJupCZumY                           



                          brZvPvVjQeRoSQt3DiCKj0                           



                          jIvhM6VgSQTgXlA6lGBcND                           



                          ZmKQffLNLdYQEtaiP6aE45                           



                          PYzwifE7uIPmp2Zet21xv5                           



                          37mO3gbQVgNFB6HBHwHFMw                           



                          mDCbOWQqOLQ2HUUcgTHkM0                           



                          vsCXUmDR4sdmgtURwgNEqn                           



                          SVNxkZS7EHAzbYGjY1TlKC                           



                          LiZFkqDEOlsmb4FvWxMa0v                           



                          jUWyqOwlBLdgm5tju6aqxL                           



                          KuKlt2WGMrAaJhEbijATpx                           



                          r0Dak5kmLYHzbg6zGGL3gG                           



                          VccBmib1B3kHTgRZJilNMr                           



                          TRYsWN7xtIBfCXUllL0yka                           



                          xjXHBnYnJkcmhlYWRccGdi                           



                          wqSwFx1ddMtgGUX0XPcqP7                           



                          iepN3nHtK4MLcoM9lofL8p                           



                          REv0MAyzMOMncHA9yxD4FF                           



                          KokXOqE7IcmS6mHDMpFX7h                           



                          gjl9z6ixIQS8HUcaAIIrEu                           



                          V3veP5AYNceWKoLMKltYuu                           



                          PSeuz636EHO5IbCbEOQqy2                           



                          CxG5TkeXwxW23skHakJ15a                           



                          FRDxaKqmsT0sxVsshU3sXu                           



                          RsLmWtNJbwaGylYS7pKGJf                           



                          X6vxkBOyVYJiITLhM1jbOd                           



                          JlkD6hxYlvGFmgniRhMXBp                           



                          Hlp9EMQamIFbLRSnFbo4QZ                           



                          FaPMAgG93remmpTYG1eH1l                           



                          w0jaw0ZgFIfiUSQ0DHCmx4                           



                          7kSKocknH1APQ3KC64Yhqd                           



                          MyE7D2qsPSK7rJ==                           

 

             CPT Code(s) (test code r4gffJEfZBSbgGA9KiYzAK                      

     



             = 3357)      Uwr1umi9GvjMZczKWoSVip                           



                          eHOebnTsci66zLI7hC93UN                           



                          3aJLMpQyN6PKVxbfV2Iqm5                           



                          PDHyQFZupYPnG021l1qfk6                           



                          uzgtDuhAA1nCzqIAVlzuyh                           



                          BnX2QTluYEZedzmgTIw8DX                           



                          naPSFfpNK3UHBunGUzM9Qw                           



                          LGQfDO3lexc6NFS5DQmfCE                           



                          OoJeC6LXRlmQRvVGFrgXtl                           



                          GWrnl227LQN6HhNuZLXrtk                           



                          ZchGmhxL6sFbFaNWZ6LMUw                           



                          NVxwYXJ9                               

 

             CLINICAL HISTORY (test f1uqeDRvOKXdqIJ0FaCyMW                      

     



             code = 3356) Fyb4thp3ZmnVFxgNLmSCgx                           



                          bVIpsoYzkz61pIE2uX86PP                           



                          2eKBFdPdF6HROtmvK3Bmb7                           



                          HPAnKEMgiXTdT653w3pnq1                           



                          iwfjBhuMP3gSfiLIRzgeus                           



                          IxM0WNnrWFGcorppWBu2TE                           



                          lrNTUngVM8NUIkmPYaV6Bx                           



                          NGJcKH2bkrc2RTV0GYyiQG                           



                          EsRuW4HTLgtDTvXNLecSlo                           



                          YHamw512VKY1McYvIAVlfp                           



                          BwtDyczY0uLwAcLKMDufWp                           



                          eS55RDKqJo7yYGQksuUevg                           



                          jiXmAfz8EzdVSldWsgQU15                           



                          OH4pj3BbXXPoYR8sXIOgwN                           



                          9eJPPkcZhrXJJ3xhwgQQBt                           



                          cU9aaVFitM==                           

 

             GROSS DESCRIPTION (test x9asvJBhVEAnmQF6LeSnAK                     

      



             code = [id number]) Ctq0wtm9TdcIAywAZxXKtc                          

 



                          zIUzjqHdin56zUF0yH01VR                           



                          1wBVLfIkL6PPOzchT1Vfs4                           



                          WOYqYCWczWKiF042i4mka3                           



                          yeymQtpBU0YVTcHXSzA6Px                           



                          NM6tFITqvHFyW98okFLyYH                           



                          F9JNZiUZYmcEMwQRYnSFH7                           



                          VXJujVHsB7xpDMXhLX5vov                           



                          jrUIrnLPxjKTIyhYP9LNVv                           



                          aVLpL0BlRKCjVJtrIOFkmz                           



                          x8UcTmLk8vxVTliUofLRqp                           



                          BTWlz6uuTVYwhXVpRMA0BU                           



                          bbnTYyOEScSDRkKPu2TTIr                           



                          SIfhtCJmAA3mpQihGdwlqX                           



                          hfo3VgpHNtYHteADOoAXGm                           



                          DTjzTBKgU7PJVWUzZfmfDP                           



                          FwOlVqAMw9ZPqlL1WYGKNl                           



                          UTXoGKIkBAr0DvG2FEp0YB                           



                          DTBp2wDhY3CSepTKVkRYU8                           



                          MTcxIFxcdCAyIFxcZmwgXF                           



                          kpHUMmgLJxCJfblcN6ODRi                           



                          ZEraUYQeLiOmRE9lWaudoM                           



                          W4YUFCCZR6xjivWewlFNEn                           



                          KpTyVLlvOdFyJjEiFGy3EW                           



                          SekH9bIy3poPOjwW0hqMVy                           



                          NHiwEXG3tLRoGYItNYOfOL                           



                          IpXU53M0AatB0bk8VjBINn                           



                          m27nWB8iRBeeDcPcTYRsFn                           



                          Kzh5PgnYsaQ6ShqOByHuEp                           



                          hTGfWOHrfS0wnPRszQZuXM                           



                          Xmy5crNMLqWUUcUT0kQGTr                           



                          w0L6VMozCF34PQOoGKeyAO                           



                          beXnQgqWB4bY23zIYlfS7g                           



                          baIte41kP8LrBZedHRBqJY                           



                          UejCXxgMF2VIMgG0OaOEBq                           



                          lnDqCGQcCMM5PAIVKO7uYI                           



                          zoMKPeKfOhBeaaYbE6JCAb                           



                          hHMmEQI4KR9mJBWdolnvPN                           



                          CfFKUdMEA0RMztqA23jCTx                           



                          XGZzMTZccGFyfXtcKlxlcG                           



                          avh4JdkGLjTSxoDGZtOZQc                           



                          PDtsVLEaY1MIXCIeMdvaAD                           



                          KrZpZbCFd4CBzdB4MXGQRz                           



                          INGeFKAaHjO7FbI1QKo1BM                           



                          SFLw3qUtS7HMfmZOv9HPI1                           



                          MTcxIFxcdCAyIFxcZmwgXF                           



                          rcCFPkiNCtXIhmgwI8VLKb                           



                          NiNhHz2rLE3ioCLgMISxlA                           



                          3rOR6hGXMhygV2CKLoAW7w                           



                          oUEcBAPoWnOtW7VpAKEoH6                           



                          EaqdNiNZsjWLHwkp8qzDdk                           



                          RSdeVzOrSJAbk7q9dUW5lS                           



                          QpjAC7xOWuzYqsCDneNc4t                           



                          dET6bA1qOBZnIPYePFVzxF                           



                          MhCESlh6e4rMdbU78oe01f                           



                          NVUnD9KwGKIcQM7zbxCzu9                           



                          CnGQStr4IpjCE2dJ3tRxM8                           



                          MyIgYXJlIHRocmVlIGZyYW                           



                          zjEA44woTrVzMzbWNjt6Gb                           



                          KDUkh1M8RO5sYXgpZPQfQY                           



                          uuTUQ8VHHkIMnxGF80BO7x                           



                          LMA9ttNaENQbUbVyoEKknk                           



                          NjPLubiGBqUQHefWXmu4uj                           



                          biBhbmQgdGhlIGxhcmdlc3                           



                          IlNiUoO61xddLlsCQxc4Dx                           



                          KSBgDK41FGJoDMqkNJ8dvE                           



                          wdaR5gJUqdTT2mlZ2zXBNf                           



                          pLCsmAZ4XKGgWS03tQZisV                           



                          ybzJ5aN4Xpn5G0qLHalFDk                           



                          RMcwPMWXHX5lPSSIW0ImSY                           



                          tmEXEbS4ElK5ImucP5r0wz                           



                          aAqmo3WzgUWoKP9etCSpcT                           



                          ==                                     

 

             MICROSCOPIC DESCRIPTION d8ebdGDnRDSlwJE6XlBaHN                     

      



             (test code = 3371) Dgq6qmw9AioYBrfSOpKWdp                          

 



                          tVHihmBrye67mPR7eE32XE                           



                          0bOEKsDoM6JYUtijQ1Ecr0                           



                          AYJvXRWszHUvE898i8qgf3                           



                          ueqtIibHD5qTuaWLOgvmqs                           



                          SsW6DLpzMVHmeacvFQp7FB                           



                          kqXZZeyIV0SVVtrOGfV4Xf                           



                          MINqHU0otoo7IZK6SDrzHZ                           



                          SnLpL8UIRksTKzVURlfHjt                           



                          NJtni416JHZ5UsQvERTzxt                           



                          SaqMkgrU9bTkZaKNJGIVPG                           



                          D5ZPKGNagKAjeV==                           



[facility] [id number] 16:40:03





             Test Item    Value        Reference Range Interpretation Comments

 

             Case Report (test code Surgical Pathology                          

 



             = 104)       Report Case:                           



                          [id number] Authorizing                           



                          Provider: Georgina Pacheco MD                           



                          Collected: 2022                           



                          08:11 AM Ordering                           



                          Location: \[facility]\""                           



                          ENDOSCOPY SERVICES                           



                          Received: 2022                           



                          09:25 AM Pathologist:                           



                          Cynthia Hussein MD                           



                          Specimens: A) -                           



                          Biopsy, Gastric  B) -                           



                          Polyp, Colon -                           



                          Transverse, x3                           

 

             DIAGNOSIS (test code = q6qqlYWzBBRgm6dwCLCdvA                      

     



             3220)        FuZzEwMzNcZnRuYmpcdWMx                           



                          IHtccnRmMVxlcGljOTYwMl                           



                          ykgzFyHBEztPJuH2Zlcgbd                           



                          HRdhNM0bPZ0icXigwVZqcU                           



                          MuAZErMxLew2phe137tZMq                           



                          w8qgOSSUbotwcOc3yLqgY0                           



                          2qq8A4RtmlU49pdXWyAZR9                           



                          RVKaFJFskBCvAXJqRYR4QK                           



                          FpmYIbB3zfSHQpDA8yfegi                           



                          RHlnZJnxMANhnGX7PPFhhB                           



                          XeX6XbJHOoYNhgAETzzlh4                           



                          YzRnXz0rkQOcsTtnJLaxVQ                           



                          YrQUGvALmlCLIbFlMcKB9s                           



                          V5ZAVOIJRCyqZzLNTN1DUN                           



                          ZNOiETV5JMZQvMSDSJH8VJ                           



                          WTpccGFyICAgLSBBTlRSQU                           



                          bnXHgLSCPDSHWPDtsCIC9R                           



                          W93RDELCIUGGRTHFWICVUK                           



                          NZMVeYJ6QZH0FFEYkSEKGn                           



                          teQqGZ2oB6wDWaSIJuEHUG                           



                          SFM5KwW0pCDPUUOrXCOBBV                           



                          J7uHL9fSMCGZUQXSYRXXM9                           



                          5ccGFyICAgLSBOTyBIRUxJ                           



                          O16SMAIESLJlAZaAK0XTBU                           



                          aQJ6KhZ1IBBR8YV15SXDXO                           



                          RI3kP55eA3PALEgIAm9JXY                           



                          PCFsgwV0FNHN5aeIKrJKVe                           



                          HJQVKuBUVrMSW2XEHzYOKX                           



                          6XKJZZMIRDXAJcAYMJD4VR                           



                          FDZFVADQZiEALRiRM73PKj                           



                          URWU8WTCUIGWAnczrwVOWh                           



                          Wn7zL00HG31xBWHRRX8BFu                           



                          BIB0YqVKCXSC2QB28PNCSg                           



                          SV2ZULWZR9AONBp8HPYcqj                           



                          KxGTNFDSECZCPBGEINXO9B                           



                          FNHfUIUdXRexEEAtBH3dWq                           



                          8gSElHSCBHUkFERSBEWVNQ                           



                          QDXMJIPpR7HyXM0XYLZUYz                           



                          BcZ3CGA7aZE22DCNZSHA1g                           



                          cGFyXHBhclxwYXJccGFyXH                           



                          BhciAgXHBhciAgIFxwYXJ9                           



                          h2haiLLeQSUtcSDnGMRyVR                           



                          xhbnNpXGRlZmxhbmcxMDMz                           



                          AKV0rxRtFOKyXBbpDJJtXV                           



                          kkCo0ysZQmzGojShUdLGUh                           



                          y7fowmFGxuiemYs8s2paBS                           



                          GmBnV6pHEeQIhfS0tahmJj                           



                          pLPyYAAnRYy1zM64PPAzcP                           



                          8gsWTgDRmrxiMdDuY2TQhs                           



                          VJYsRvI4CCPzvJGqTIPoG3                           



                          xyZWQwXGdyZWVuMFxibHVl                           



                          MCE4cAjfj6H1kJMebANyiM                           



                          iePhXwHyUwCuVTc1SfRTg5                           



                          wIltG7OyLBYlMeK6dANzOH                           



                          PzSVbdJHEqFIYpnlI5fN66                           



                          MScquoP8zKOci9Wna46nw5                           



                          22kB0oeXWmWRO0JBCsEZHu                           



                          xEZmNJVgARD2XBFmzPLuA8                           



                          agBXOlZV2ilwilUTbwZPre                           



                          GJQdgKQ0UEBgqLMkF9DmVB                           



                          GsDJisFOTnjcd8HuIcPn7f                           



                          jILcfIkxBMned9ftd1nmiQ                           



                          XgFqo7SHFnKiVjDlhoDAje                           



                          x4Qlq8fxGXZllx9mBZM9qX                           



                          MizGpoh3S7mPMbNBPwoZOk                           



                          LQNzQR4vfGGiPBFhuG4jeg                           



                          xjXHBnYnJkcmhlYWRccGdi                           



                          mrDgSj0hmYewRAV8LTchU1                           



                          ugxE7qNdK9OCzvI3uydH7v                           



                          MLt4JBeaKPUbuMW8zlJ7HW                           



                          VzuIRxN7LcgY0oHXLfOK7k                           



                          mwk2p2kuLIV4CToqDBLiTu                           



                          H7obW9HZLvmWRiJEDqsEsr                           



                          SCubz517CSW6ZzDjWUOih2                           



                          IfW3LdnTuiK79mcMqvS28y                           



                          TUIppXmbxR2zqSxibR1pZt                           



                          WoGwOgVVmwxNsjSY6tQTOb                           



                          V5kueBYkJBPxAQFrQ0qtKe                           



                          OliA6qqGvbWUgxrrHjQDIb                           



                          Sbi9IOVlgHYpDKIxYzl4TO                           



                          GaBSPsF56souvoLZZ7nT2m                           



                          t4pqs7VaXAzlQRL1BRDgy4                           



                          8eNOvhfsO5VTT0JZ47Bfpq                           



                          UnR7C7hlAES9mC==                           

 

             CPT Code(s) (test code h8vxeOZtJCPtxZQ0MfAqDW                      

     



             = 3357)      Kyn8zmc9PyfTZnqJRlMDed                           



                          cTGyqlHjjc57xSA7bU89EN                           



                          4kLDBhXeX9ZRWuhjA1Huq0                           



                          HSZsWMPxqMBvQ018t4ebq8                           



                          ehimSowIH1qLzaGUBqdfmi                           



                          SbZ1RRklDNAyxturPBv2RL                           



                          xjLCStyRZ7QLAzdBYtR7Nz                           



                          CXWiET4grpq7ZLA4DAlzJS                           



                          PeFqO1QPDooEWrCYMkqGpd                           



                          JCszh971JDT8JwBsWDHxet                           



                          OsxImyyK0lGiMlUCF5CIYb                           



                          NVxwYXJ9                               

 

             CLINICAL HISTORY (test u5xbwBQaTODmxCX0GbZgDO                      

     



             code = 3356) Rqj8ukq5ZclVEarCOnWBth                           



                          sIWruoNibv86dIP1vZ13CU                           



                          5cXJWfWdP5PWTugpN2Dxf7                           



                          ZTZeSRKsjTHdT539o2irh6                           



                          wivlDvuSH9vPnfWFOswyrc                           



                          KtH4XLpwOICdeareDMc3BV                           



                          czGUWhhUM0TZCewAPuQ7Af                           



                          PUMkYQ8lxeu8BAH0IYivGI                           



                          FtHxK4XAAloQFaGRJeeLgm                           



                          RSony386ZAF3GgWhAAFnrf                           



                          KjoVcdwJ9sZxXpKKPTeaNu                           



                          iD62QBIgHu0bQGJpwySeeu                           



                          bxZnZsi7JbaACrwVymCL51                           



                          SG2ns8TjSCOdCV9vKBDwjJ                           



                          6fDWMzrObvXJG9ntjkDYHr                           



                          mW5pnMQqvZ==                           

 

             GROSS DESCRIPTION (test l1eufPFcIQHnxRI2AgFnOO                     

      



             code = [id number]) Rzm7xzu1EeiFVxaNRdKYjk                          

 



                          uAUsbyLvtm26tMR9hZ76ON                           



                          3xDTSkOxR2JQUiueN4Dfs2                           



                          RBTnSBBnzOXmD233y2zvt5                           



                          zysuZyjJL0BUFnLKMuM3Kx                           



                          PB8dPWHelLGuN90qvMMzTL                           



                          Z5LDTfBZRzgFEwKXOhWXX5                           



                          IMKhmGVxZ8jyFTJwNN4oey                           



                          byOArtRNnbIGAtnRX5BKBq                           



                          gVJsK4RlXEHxXUqeMQEkuu                           



                          v2DlKtLa4boDNcpKlnMVrz                           



                          CXWxu3jtKMEwiOLzTID6GY                           



                          mneEImSJUyGERhEVt9YYNu                           



                          JFuzbKNgTS0nuDjjPtxqxY                           



                          xgw8WvxYFkVQqnLRWbBPCw                           



                          ATquXRQsW6JMVQSuNkptTZ                           



                          SoEaNnTVz4TQnmN4GZUGLj                           



                          XCOkRILoZGy6KoR7YPa3ZM                           



                          FITt1uKyC8ZTdhWADtNSG0                           



                          MTcxIFxcdCAyIFxcZmwgXF                           



                          ucSFCqiKWvWYcjguN5BNNj                           



                          WMrqBVHxLuLdNB0gJssvjZ                           



                          I7OGWKRVX6zmdyXvocTVLg                           



                          WqOhJBvrOhDeIoEqQCq5VD                           



                          RozB6bIt0irSNbxQ0kkTQf                           



                          HNrfWPH1nDKeRQLzPBYnOC                           



                          MtFX97M2XueY8ri6LxCHOo                           



                          t40zSX8kBSefApUgSMQpIl                           



                          Upl3NixQgzM1AidHPpSsUu                           



                          xDAhGVEfpE7tdINupKIlPC                           



                          Ugo6keULGxXDFyTQ9mNYMc                           



                          e1R3QDixXX75YECtIBbjEW                           



                          sjTdQkcBH0xE92dHEbrP9k                           



                          ylXuq96sT1TdAWauTAZsLJ                           



                          WdhEBanLN1SDTwV3GeZQHe                           



                          ijXcZSJhOFE9WMCAQI2fSR                           



                          haAHSdBeUmVfpmQpG2WVZl                           



                          cAAmEWC4MT2xPVWxmdolEO                           



                          UgWEXiMQS2KMckrW17uTGx                           



                          XGZzMTZccGFyfXtcKlxlcG                           



                          rgt1SngMMrTNbxINZbAUWc                           



                          KOydILPhL4CBLUAxQmdeNQ                           



                          ZiLqFsFMs5UDueX2CWCWTo                           



                          ROGcBLOmHoQ5HrL5NHx2PH                           



                          FSAm9dYcJ7HPmaOVs5DZR1                           



                          MTcxIFxcdCAyIFxcZmwgXF                           



                          rhKZKauZVkMLwcpnP4YMIb                           



                          VhZvKt9qXN8dqKIvSRDugQ                           



                          1jLB0cXMZnzaD6UYZlOP0k                           



                          jYBoDNBcRuMnQ9GdXXXdI7                           



                          OulpMcLErwKVZspv2slYje                           



                          KIzeTxQlLBCme7j4wGU8zC                           



                          WklQJ5cIGwtZgpXWcoYd3i                           



                          yRI7xP4vJMZzWLLvGGGqfM                           



                          QaGNZgf2d5aRkeQ84pn42k                           



                          FMNwE3ReTCYuIL3tkaXjl5                           



                          VzGBUwt6BejSG8rQ6gZaL7                           



                          MyIgYXJlIHRocmVlIGZyYW                           



                          ndYB72ztWsSwDekMFci5Ba                           



                          QOAts9N6RI3jJYovLQZtXV                           



                          xvISY4ERBeHXzaXP74QB5s                           



                          HMY1muGdVSCyIiNloGAhjs                           



                          LaLMvwtFUnHJMdpGFhb2lt                           



                          biBhbmQgdGhlIGxhcmdlc3                           



                          SdYzWzU60mgeWqvTDqw3Au                           



                          DJSjQF34OIBeRScePM4yaL                           



                          wqeX8oSIqtBK0ybT4pTSFf                           



                          jUVbgJA0IOAaON35nJNbwG                           



                          wbmO0eE4Hzt0F2uXEwkEFu                           



                          MExvWBIKAT1bYGOGX6SfWM                           



                          rjKOUpR3GdJ6FageV7u5kd                           



                          rPllq4MefOCeFJ3ohDKifA                           



                          ==                                     

 

             MICROSCOPIC DESCRIPTION i0firEHuMXLdcOF9SlHmRD                     

      



             (test code = 3371) Iyf9ozx3WcmFFkwDUwQOdd                          

 



                          uHDlfzJaoh23uKJ1zF46SS                           



                          7mLXAnXrH8AHHbuzZ5Wkx5                           



                          JBGgQJTnlFEeR191a2rys4                           



                          ytlsFxgIG9dGrfAICfmwvu                           



                          LzX6GIuaORYxbpvlZLd1GO                           



                          utHOMleHP7CGSkoTIpO7Ru                           



                          JKRbBD6xvao7MUL8KDzmPL                           



                          PmImI6ARBywVNjRKMmqWph                           



                          JPmbr877XSB6OpNfHMRzqc                           



                          OtbGdfaZ9kAoFqYYKSSIXS                           



                          H6HFJRXriQJmsD==                           



[facility] CenterTise [id number] 16:40:03





             Test Item    Value        Reference Range Interpretation Comments

 

             Case Report (test code Surgical Pathology                          

 



             = 104)       Report Case:                           



                          [id number] Authorizing                           



                          Provider: Georgina Pacheco MD                           



                          Collected: 2022                           



                          08:11 AM Ordering                           



                          Location: \[facility]\""                           



                          ENDOSCOPY SERVICES                           



                          Received: 2022                           



                          09:25 AM Pathologist:                           



                          Cynthia Hussein MD                           



                          Specimens: A) -                           



                          Biopsy, Gastric  B) -                           



                          Polyp, Colon -                           



                          Transverse, x3                           

 

             DIAGNOSIS (test code = [id number]                      

     



             3220)        FuZzEwMzNcZnRuYmpcdWMx                           



                          IHtccnRmMVxlcGljOTYwMl                           



                          ngjjXkTUGsoRCkO8Bujhbp                           



                          EKmiPD1iQF0ruFsrkUSznJ                           



                          VxWAEaAeAiw9nha255yIMk                           



                          u0tbLTTGqbneeHg6bRxiV3                           



                          0mn3N6ZtdlH47ocIQwEWJ5                           



                          VTQnFUVjzPVhZBDbADV6QU                           



                          RigNYpH9tbTUJaOT2bkxgg                           



                          HFzdSZxmSKVagHZ3BBFvyE                           



                          HsB3AeRGRoAFqaGKXozzq3                           



                          EyZxOk9iuXNqpPrhWObdWJ                           



                          VzTBBzFCglREYwMeJiWS6l                           



                          S5YGWEMWDVkcCnKBLQ7PVO                           



                          FNZbPWJ3AAIEnTZWTTL6TB                           



                          WTpccGFyICAgLSBBTlRSQU                           



                          hyJRjIWJJPKITUJyiHBP3O                           



                          P13HRHGLWBDVXBMNACFGRP                           



                          MBVUySX1QZB3EUZBdMWPTd                           



                          mcSxTS8bA3zYVaUHMsICAO                           



                          BPV9ZcZ0sRGGXVAwYCQOMU                           



                          L2gWA4aRIAYXXXFAWTOZA0                           



                          5ccGFyICAgLSBOTyBIRUxJ                           



                          F62QQJWWKYXvYNpTU7GESC                           



                          lKX4VxE8IBNI9WZ38LFDBG                           



                          SK6oD79zW1SJQTqKUu5YVP                           



                          DOVbqcI7STGE0bcYEwDKFk                           



                          ZNSJXpHEYoJZH6BQRqNOAL                           



                          1FEXETFDFGBELjWRXZG6LA                           



                          EEVXKDRSMiYNERkFG52MSr                           



                          VJEG4OAMODSFJnhtblJEZi                           



                          Iq3bH87LE81qEDNQKA1RCm                           



                          VNU6ZkJQBMNH5KZ81FNMKp                           



                          CU7VCSGJZ7VSPHw0OIQvxr                           



                          WyPCLAIQPVFIGKDZVLDG0I                           



                          BFIgAYOvIOivVJBlSD1wGa                           



                          8gSElHSCBHUkFERSBEWVNQ                           



                          VNJAXXFkZ9JsFY1LGEVPSc                           



                          WkD2OSY9kQH75MDHTJXF5v                           



                          cGFyXHBhclxwYXJccGFyXH                           



                          BhciAgXHBhciAgIFxwYXJ9                           



                          r3qmbXIjVDEuuDUiOZHgLN                           



                          xhbnNpXGRlZmxhbmcxMDMz                           



                          ESE4kdAuCSTcGPhpNYHrFC                           



                          fvJc9meEAnrWnwZlOkLBNx                           



                          s5bqzrGDdflbjDd2a4odQG                           



                          ZkNxY9fJGjRXwqZ6yzntEc                           



                          fYWyNPXaCBk0zF20ZXDzlP                           



                          8prAMhRMtbutSmJxL0AFer                           



                          CFAtIwX2ZWXypUGbNYKmZ2                           



                          xyZWQwXGdyZWVuMFxibHVl                           



                          MOU6hXlru1S2fJRceYZpbF                           



                          ykMmGcLoHrVnROy0OqGPn2                           



                          dIbeU8QxWJWmBfU9wSFzOU                           



                          YeWFxlUBPxFWYaftR3tZ88                           



                          NNttukF5jZDod6Ems47jm2                           



                          71gU1jbIJjEVI4VWHrWHWh                           



                          fSOfUFOmHXN4PCDqyLEfI9                           



                          bcZHIpMW9aktedXBtrHErt                           



                          AOGceKZ3LECmvXQgJ0TgKY                           



                          UuRUloIJEzrvz5CsHzPw3s                           



                          pUAimLllKTcji3whv3xffS                           



                          InIwc5MGZvThKeMshhAPbc                           



                          r0Huv0feAZTpqj6dIES7bR                           



                          GxoSeen0C1gFMeXSUulVIf                           



                          GICtOM5vbYDjOUUmrT0svt                           



                          xjXHBnYnJkcmhlYWRccGdi                           



                          peNsVg4vuKfkEEC2BAmcV5                           



                          rwqF5pRrT9UKneS5flpK4j                           



                          MWb6QCnoMXUuvAU8inQ1RC                           



                          RftNBzJ1FfcB3jRHGuHI7f                           



                          twg2a1ziCOW3XDhcGGTmLq                           



                          E3auQ0FFXrlDOzDSJobNqz                           



                          JJnqk213GFP1YcJaDUMqe2                           



                          KzS0VomRkeS71ybWilV45y                           



                          GFCdpQpzxW0aoWgysO3fIh                           



                          CoGcRaWNtwiVezCI7fWXXq                           



                          Y6zhhPNtCDUuCDJpU3zuJu                           



                          HkzY2alIwiWYnwrjAlLZBd                           



                          Mgv1HGPjyRUaNRRpIlu6KO                           



                          KwKGDjY58owngxJXZ3sF2g                           



                          w0zmt7ZkRCflYBI1AWTvk1                           



                          9qQRcarjU8VLP3HI23Xqil                           



                          RdZ8T9zqSNB7dY==                           

 

             CPT Code(s) (test code l7dnaUGeWIOjiVD8GmHgCF                      

     



             = 3357)      Hjj5sug5SquNKxfIHkDSoe                           



                          nFBkdxJcfn29vKK9oJ83IC                           



                          9fFINhKxP2OHMpspH3Zcf6                           



                          SNGzHKStkXEjS397y7uwp7                           



                          yjsmSehSM1eIwyCYObsoqr                           



                          ZqH3REljLCQsenxkDMi4FE                           



                          enFHAcoCZ0ACYnqNKuH9Ty                           



                          DODyZO2sxzj6LHJ1EFvmVL                           



                          AgMeE4AJLdnKOgMXKufLww                           



                          OOnqd345JZQ0QoEuQQNtig                           



                          VrqKpzqX7nRcUmPEJ9WMRc                           



                          NVxwYXJ9                               

 

             CLINICAL HISTORY (test o4tgwMFsLBCjdOD5FwKiCU                      

     



             code = 3356) Mmg6lqo7GldSVblCRqDZds                           



                          vCKstkOyfa60zSU2cQ83MG                           



                          2iXUAdRtL8LFObqvT8Lyq1                           



                          MZXuTIRynTLwG559v2gjl8                           



                          yxxsTguKP6eZydDQBwfbqm                           



                          AyR9DBcsDSDrchbqAHx1YT                           



                          rxZHHwrQW1TGVnpWKeL0Ok                           



                          DIEfSF2oaxl4WAL9VTaoPT                           



                          BhEoD1ZHLluFKzMNNgcTsv                           



                          COxda209KPH0CnEkPBYogf                           



                          MllEkndB9nXiFdXGLKwqHz                           



                          bO65UGHjFq0xUCMwvqOqrb                           



                          orWoShy0MnbUSacFquKG77                           



                          LQ0ek6CfZRScGZ0xXHAjlE                           



                          0aBPEdaRnsAAH2utqwDOMp                           



                          mE9bpZWifS==                           

 

             GROSS DESCRIPTION (test x0fcdJTkSLPxpPL6KhVxTU                     

      



             code = [id number]) Tau9xkz9PniSUmjATiELoa                          

 



                          kHAevbXszi42zTC7cI16JX                           



                          4aUQRlQjE9VRJcziY0Hiv1                           



                          PALnMCYyoIGfA626h6ofj3                           



                          xbvhDjtRQ5MATnWOVmD2Cx                           



                          UH9gPIBslGWdX06vgMRaGR                           



                          G7CAZiREInxSOsBDLaEFI1                           



                          BLRcsEArI6ehXVByDU0qye                           



                          srDXuiROikVLJgjQH1BVQi                           



                          gDNaB0ChJXUaHGrtYAVway                           



                          d0IsLnYe2jtVTeoFcwOSkm                           



                          ATZhl5mkROPukITkSSR0WK                           



                          hdcWMnYRUxCMYsLVo9HDYh                           



                          ADvcfCUsVE1nrScgMgqmaV                           



                          opk8JbhTLjJBxzWPTiXNKx                           



                          UJplBHFkK2IYVHZvBbnlIE                           



                          TfUlBhNOd7AGiiI3QLAEHt                           



                          EDXuQJBgIYo0ZnQ1SFy8RE                           



                          JXOu8uZsS8DTozDLQpFEH0                           



                          MTcxIFxcdCAyIFxcZmwgXF                           



                          vqDKLndQFrKGlcigN5BIEb                           



                          EAwxHTSoLwHfGA8uQgpswJ                           



                          Z8JQQPSNA9tgikKgqeDFJh                           



                          OjTsEOdkLpCnDtIwFTv8WX                           



                          RotF5rHn6msBEfgY8asESt                           



                          SLfzPAB5pTSqGEIaGYVqLL                           



                          IvUA68C2DycY7gq0MdHGEh                           



                          t64aAV2gQGloKnFdNBWjMq                           



                          Ris3VdtNgbI9YdyYDeJcFv                           



                          sEBmTVIbpE6mcWVohSDkNS                           



                          Acv5juSXHiPQVkIS5xVPVf                           



                          a2R7ZMecOB19YGLgDEbzTU                           



                          pgGpDnmNQ9kU25pWSusL5y                           



                          hyHef51iT6SaSBsfJCVuCV                           



                          LxiUEaeXD6OCXmG8LhFWTi                           



                          wtMyHTIoHJZ3APTEIJ7zTT                           



                          lnKTYcLiEpUiyoIvZ3EUJg                           



                          rJKvSSU9NO4cRCXpydgyTO                           



                          UgKMXiZSP0GTbfwX44kRIz                           



                          XGZzMTZccGFyfXtcKlxlcG                           



                          xva1ZkqKFjNNnqBMGmOTSp                           



                          XHhsFUJbL8RYMIZhPxyyTX                           



                          LsOuYiIBr8QXjoB4UVOOZb                           



                          CTZpYFDhSgU0QqA4OWf3SF                           



                          RXOl4bCeY7WUzdACv6RID7                           



                          MTcxIFxcdCAyIFxcZmwgXF                           



                          kfEYGdrLRrLDibetD9XADm                           



                          XbSfUd3cDD9ghUQrROFzuR                           



                          8nDQ5oMSHdgrW4HIDwKW8m                           



                          hXHjCROkKcYiL8BkVGClJ4                           



                          DzxwYbDWlcYKCanh9gpEua                           



                          HGesNxQyLRLfx3k1wPX7bU                           



                          GahAA6hIYcdJbeBOhiCe2m                           



                          uNT4tW1fSVYfKYCtYQMolS                           



                          QcCWPtb7y1iJflE24yh71u                           



                          HJEvF5YrPIObTM3oygMol4                           



                          OwRVZkw2ZnyUW3dK9jFaU2                           



                          MyIgYXJlIHRocmVlIGZyYW                           



                          ltMP60zmPiDsEyzQIfs6Mo                           



                          LULhg1U6IS3aHGszGAIvNH                           



                          reXRX6AAJaZGmyLO61EQ2m                           



                          PUD7elZwUUVcTySdwSAjks                           



                          RrKAysgQErRUQyvPGiu8em                           



                          biBhbmQgdGhlIGxhcmdlc3                           



                          IyRlZaZ80pwePdySPko5Oz                           



                          FNCaRU05JQHqFKqpTT0nqF                           



                          iquW4rYDmsRJ5ktZ4zMPKe                           



                          zIUjqXK5BGZjXE30yGLwmS                           



                          sdqU0gF7Dfv1X7iHCtfSSc                           



                          PLzjCOSWKL2nQRIGQ6OsIR                           



                          rxDAGoH0SyM2RrfhO0o1vi                           



                          rKqzu6IsxVCjYN1cqDOipN                           



                          ==                                     

 

             MICROSCOPIC DESCRIPTION k5ikvFMnCJLhrBC1EzXbIA                     

      



             (test code = 3371) Eml4vhm5GclSZmjDDdKQrk                          

 



                          cZOhymPhsb25dAZ9qF46TD                           



                          2iBHYoAbR7XRAnnaP1Ece3                           



                          CATdVUEwtIJmH716c3eyu6                           



                          vhyiVdgGV3vYtuTPZhzyfi                           



                          PjG2ZKfvDFYbaqbcJVx7NB                           



                          mbQKMouUZ7PCUllGDuZ3Zf                           



                          QJPoMX5lrph6NFD7PMhlKE                           



                          XdHlO7KPNyrBHgVHZhgNnp                           



                          QRipy784ANI1PaAzXULvsh                           



                          GxwYuwbF6tHxEqVEUFLBYP                           



                          V8KGODPlqDPswU==                           



[facility] CenterTissue [id number] 16:40:03





             Test Item    Value        Reference Range Interpretation Comments

 

             Case Report (test code Surgical Pathology                          

 



             = 104)       Report Case:                           



                          [id number] Authorizing                           



                          Provider: Georgina Pacheco MD                           



                          Collected: 2022                           



                          08:11 AM Ordering                           



                          Location: \[facility]\""                           



                          ENDOSCOPY SERVICES                           



                          Received: 2022                           



                          09:25 AM Pathologist:                           



                          Cynthia Hussein MD                           



                          Specimens: A) -                           



                          Biopsy, Gastric  B) -                           



                          Polyp, Colon -                           



                          Transverse, x3                           

 

             DIAGNOSIS (test code = [id number]                      

     



             3220)        FuZzEwMzNcZnRuYmpcdWMx                           



                          IHtccnRmMVxlcGljOTYwMl                           



                          wfyqBqHIIgpYSbV6Dkbajb                           



                          MXrvOM1pOH1baMwwnVRseR                           



                          IzUWPcQzUiu8hwx438bSVs                           



                          p4ccIKMLohombRf1lXvqZ6                           



                          2tz0Q5DywwV33lfVZvDTW4                           



                          EQTgFQOvfBVlLFOvOKW8UM                           



                          UshDNfJ6wkXZHoPC7awmyl                           



                          VZoxXAtgYNSlsUD3MGCsyF                           



                          OxV1IvBQNhGNvgIRTaqty6                           



                          FpSoBs7etUEogJabDTavYK                           



                          OcVCEwLApaARRrAbIqEF4w                           



                          S1OKGKPUOAqsAiPGJJ3LSE                           



                          ISAsKMX4MNQLwOPXGXW2HL                           



                          WTpccGFyICAgLSBBTlRSQU                           



                          fnHHeBTJQUSLABVwoKGA8C                           



                          T38FFEOOLBQYLMKGHOWXLW                           



                          SZGBvWK9KKY4OJTEfLUHZr                           



                          soGlWW3jU7kPWyVYBaISLR                           



                          YGU0NlU6pSVQJELlASJZUR                           



                          D6fZV0fCPAERPDISKXNOR6                           



                          5ccGFyICAgLSBOTyBIRUxJ                           



                          C18RKKUKSGYkBEeLI2LQWK                           



                          aZW8UqQ5GZLX7AR86OPDVK                           



                          NH4zW46nB0OFTSeMIo0PTG                           



                          JFPdafW6WJAZ1jgIPeIPDi                           



                          MZLGIoRRJtMSD8YBWmTTXN                           



                          9NUGEKZLRZGOJwLVVJE2HI                           



                          ENQYHXWJQsDBSCaXE17WXm                           



                          HKOH7RPFWFBFDgruwtFGCy                           



                          Ff5eH45US99jAJAVSM2RMm                           



                          NCQ7UvCKRWPP0NZ86MKACc                           



                          EF8NOSUBF2KIYYw8IRXzfr                           



                          DwMJKWWOJQLXZSNPGIKG1H                           



                          KKHhNBOsYButLZTsWU4uGj                           



                          8gSElHSCBHUkFERSBEWVNQ                           



                          YBJTITEnP1ClTP4GZEPYGj                           



                          YkC6FAN6yCS14WOSRMYI0z                           



                          cGFyXHBhclxwYXJccGFyXH                           



                          BhciAgXHBhciAgIFxwYXJ9                           



                          s4fzlZSeLTWelYPvQOUaJT                           



                          xhbnNpXGRlZmxhbmcxMDMz                           



                          SNK8byAqHKMlDMsoNOIfXD                           



                          llSl3viGTkpUfkYiYrGGUf                           



                          z2glbsTPbcjhnQv9v4qvEJ                           



                          QhSqE3jSMlHHctN6ffxdNr                           



                          zXXaUYThSCq4hM59YMYshE                           



                          1wpOZgAOpipdXfZhW7IPak                           



                          DTTdPwI0UYVnwHVqJYImW5                           



                          xyZWQwXGdyZWVuMFxibHVl                           



                          MDU6zUvve5X7yLCyoMFzdE                           



                          nsAaFjItWwFdYXr5EzTCp4                           



                          hKlxH4FqGYRoDhO1pBAwKU                           



                          CwJLwoFJFlOAKehbE8hU83                           



                          TUtbafR0cWFnl7Oua70gl0                           



                          18rX8hqSKsIHI1DQEjATMy                           



                          eHFmACYhUGU4GJDiiAJgN6                           



                          dsSWMbXH0lqhaxNLxvUCyf                           



                          QTWljDV6HZAoqVPsH1BoTQ                           



                          TnKYjwHVJnbkl2EoQwYi4h                           



                          tMOifYsfWUkgm4ist0jdsT                           



                          IxTnb3PDTuKoIbEzouRBtm                           



                          t5Ozy8yhANPmfr1tEVE7qU                           



                          SteCblp8V4jXRpUYFtgLQa                           



                          SZNbUV2zfDPtQRApeS9uck                           



                          xjXHBnYnJkcmhlYWRccGdi                           



                          fgOwSp3vuYviKSI0SOuiZ4                           



                          cceJ0pCeB0DEmtT4lkqD5a                           



                          YDx0PFhkKQLinTZ5cdX6LC                           



                          LttPAkL5PjmZ6kFNGjRI1a                           



                          tlb7r6arFCA8XHxlKCDuFf                           



                          J2vwP8XQUarKZyIMJuwRuk                           



                          ZAdlx207SFG9MaAzPPPxk1                           



                          PiE9HhoSoqM01thFypP27u                           



                          XGQphYsvfQ4cdFqubF6mOb                           



                          SrYgIsXTghhNqgGX5rMONr                           



                          Q7hqoVLlHEEuFBHgH1okWu                           



                          FogD9ytUdcEEhvoqSqUJWh                           



                          Yff9IONgmGYwOPBtVpv1BM                           



                          YeNSSwY97mjsizSLU8kX9l                           



                          b5ooc3DqBYbsYRO0DGSti9                           



                          6bRKorsnS7IVJ5RH48Ewxc                           



                          TnR6E5glBNB5tF==                           

 

             CPT Code(s) (test code m8ahnMXiIORcyOI6KwFgIO                      

     



             = 3357)      Ggy7udp7KwnPQlzCXvVHan                           



                          lWGnwiUewy65vAN4sP14JO                           



                          2yIQVjJfU8QNKbwkK5Bzs2                           



                          XGWsGGZgmQHlK959v3zhw3                           



                          wmegJzzMR4vIxxJTWxjpgc                           



                          WzY9QRglLSTwuegqELe9YR                           



                          xbQBAajTR5XRHweQVvO6Qu                           



                          SONxSV0cxwl5MZN5KBinQG                           



                          DvAuH7KECvoUVsURHcdFbl                           



                          DLkgz293GDJ8RsFnGFOujt                           



                          SqbScedK8oJyErLDB3SQLi                           



                          NVxwYXJ9                               

 

             CLINICAL HISTORY (test r5shvOFuTNNbuBK8YhPrXG                      

     



             code = 3356) Lri8fkz2NzlCTpwITsBPug                           



                          bQJfxfAuto63bGG3mM84JR                           



                          2xGPIwTbL2PVUiizU0Mrr8                           



                          ZJQpVZUmmILxF842d8jbt7                           



                          vdqsUstSR1lZnySAZkwzoz                           



                          AwO5HWhdMDRmpdvqGTb6TA                           



                          mbHIGahIL3YSTirDVtY7Eo                           



                          TPOeGZ5ocyz0SIB9ESdxXC                           



                          PdFzI2KFDsrMBbDSUpdOej                           



                          KVvbr701EIZ9EiInGEGcte                           



                          JrsCxuvL6cJtKvGYXWzxRn                           



                          hG20KWInHs1iXBUdrrCenp                           



                          reDpKbg4QslLPxwZudCO81                           



                          ZG1em6TwKXJzXZ1hKOSajK                           



                          3rETNexMcsOAM2iosrQAZs                           



                          vU0lxYBhbB==                           

 

             GROSS DESCRIPTION (test t3afnKWoHRSpoQG4ScOtPM                     

      



             code = [id number]) Qth7oeo9QuiMImaYUbTHlt                          

 



                          pPJblxGake22dXL2eE63DT                           



                          6iONFoVmE0PFUcvzL1Buq1                           



                          EBReWBWxeRDaP902k4mga2                           



                          clipUgyGX0SXZsMIOpC5Pd                           



                          BG4jJAQtrYMnB17uxRBfDB                           



                          F0AIXfIAUnxYDuXXTjMDB7                           



                          LQEgsNBoH6ldXLIfTX2sjm                           



                          ehOQnmKPjqVSNxxSG9BYZm                           



                          rOVrF9TxEJMjBFwlPPEmna                           



                          x0LmXnGu5bsYBouGupCOfo                           



                          JWYoc8ysSKEwmCEdZEM9RZ                           



                          jbbCWhVDXzLQUlJNw0LKZl                           



                          VFlrdDZwUN6yhNouFvllsC                           



                          szh5JdmEImRVtcQFZoFVTx                           



                          YPxuCGEiO8DPFKKgFrwdDR                           



                          RnZkNsSGr2CWrcZ3CGLWNi                           



                          WRKnONVbAPd8PwR7PPm2AA                           



                          UEBn6uQnH5IRijDJIsGZR9                           



                          MTcxIFxcdCAyIFxcZmwgXF                           



                          anBNMefXNgZUmtkfV3CTFn                           



                          EXvxQGMnHxTfOU9yZdgudA                           



                          Q4XOJDNZN1axasWdkxPUZi                           



                          GnUcJSpcKnTuIlNdLHt6AO                           



                          YudI8hJq0xvEQivI7ygZYr                           



                          ASqlDXS0fLKnEINqRGEfWD                           



                          TeCC68T5ObwK3nc6CqWXUq                           



                          e61iWL3kYPfuPgVxJMQvGn                           



                          Avw0ZhhJthF6XplRYrAsXi                           



                          tBPpPJLzjC1myCWfiWGqVJ                           



                          Jew2asTVLeMZHqNM8mPBSq                           



                          z9J0SVvbAR23ZDPoBTzrTM                           



                          naVuRdtZW0aF65sXLzkD0j                           



                          zcNet47yR5UaRQjvXGXaWN                           



                          GikXLkuTS4GEMoF1TjZTYm                           



                          fxKpSBCfAGS4VBSDVF2bIA                           



                          jbCJJfQfUkGjquSkL6HKSg                           



                          wDAwGKD5HU3xQSTyaznhEW                           



                          WiNHOyPDI1QWozzY32xGSs                           



                          XGZzMTZccGFyfXtcKlxlcG                           



                          iyj6UesYIrEIcxMSFsOSLb                           



                          SOrrBQOwI9JNVGOxOxsnUE                           



                          ToRxPvLJs2GSogB0YQPSVt                           



                          KHYnKBPoEmA7RpZ4AZn2FN                           



                          YZNk0aDbB2EVykJUz0BZX9                           



                          MTcxIFxcdCAyIFxcZmwgXF                           



                          eoUKHxmXQtRMgmgxX1EURd                           



                          RoHkEf2rZL1otHXfHUPqlU                           



                          7rAG6rVUTudnZ8UZUaGF1j                           



                          jOZgNCAaIlWmG4WuZPLvN4                           



                          AisjAvFLuhWPNkbq9piNuu                           



                          NFovOlEcIYMnx7k4mLS7iN                           



                          VwkZE3aPQciRlrLSuaMl6y                           



                          wLM3uH1vVQZgNCInTILrfB                           



                          DjBPTiz9v9tYixO81bt99m                           



                          SAFdX8SjTZGyIF6zudXlp4                           



                          VuYJStr1FpsKR5pE1gHuO1                           



                          MyIgYXJlIHRocmVlIGZyYW                           



                          sjJY04pzSwMoIwhCIfc8Ib                           



                          MHYpn4Q7OF0mPPbzYWBlBO                           



                          utKNS4OHZcBHsyMP08WH8l                           



                          UQC3dgToIRDxJbUhoDFluk                           



                          UzVWskwUVkZONynKMjq7va                           



                          biBhbmQgdGhlIGxhcmdlc3                           



                          SkEoAfY39hyeQxlFUdw8Ia                           



                          BSOuVX24NCXkJEtyVX5hnR                           



                          zltY5eGCjuNZ0qxO6cXXGr                           



                          tWVduZA4HPJoMW25jQDagX                           



                          cezU3wY1Njr6P9lJLgkKCh                           



                          LAwxNCWPRP6mCESDK4MfET                           



                          bmJLJwE2WdN8UdxrN7v6fd                           



                          aTtaj7FscAUhIF2jnWVofC                           



                          ==                                     

 

             MICROSCOPIC DESCRIPTION y6lowOWcRMZqhPY9IcRqQU                     

      



             (test code = 3371) Hie1upj9HxyQPlyEUbVHyz                          

 



                          rJYbcgIwkc49lXK9vV37NA                           



                          9iRSIcEyO1CKJtguE3Bbl5                           



                          DTKqOIArsLXzH132d2qpy3                           



                          sjvzIlnGA3wMqqQQBniqil                           



                          CxX4YXxhYYFanendLCo2YT                           



                          vtDQJacIP0PHTyaRQrD7Ca                           



                          RKXdKV8ceen6AHX3QIleVF                           



                          SeGeA9WRDudSOeTWHdxRhb                           



                          VMqxh654ZSR1ViZsPCRzix                           



                          NegIccjG1uNzUxVUZTWOPH                           



                          B9ARJKAbiXWxzV==                           



[facility] CenterTISSUE [id number] 16:40:03Surgical Pathology 
Report Case: [id number] Authorizing Provider: Georgina Pacheco MD Collected:
 2022 08:11 AM  Ordering Location: \[facility]\"" ENDOSCOPY SERVICES Received: 
2022 09:25 AM Pathologist: Cynthia Hussein MD Specimens: A) - 
Biopsy, Gastric  B) - Polyp, Colon - Transverse, x3 A. STOMACH, RANDOM, 
ENDOSCOPIC BIOPSY: - ANTRAL TYPE GASTRIC MUCOSA WITH REACTIVE GASTROPATHY  - OX
YNTIC MUCOSA WITH NO PATHOLOGIC ALTERATION - NO HELICOBACTER PYLORI-LIKE 
ORGANISMS SEEN ON WARTHIN-STARRY STAIN - NO INTESTINAL METAPLASIA, DYSPLASIA OR 
MALIGNANCY NOTEDB. COLON, TRANSVERSE, ENDOSCOPICPOLYPECTOMY: - TUBULAR ADENOMA X
 3 - NO HIGH GRADE DYSPLASIA OR INVASIVE CARCINOMA SEEN Signing Pathologist 
Direct Phone Line: [phone] signed by Cynthia Hussein MD on 2022 at 4:40 [id number] for screening for malignant 
neoplasm of colon, epigastric painA. Biopsy,Gastric.Received in formalin labeled
 with the patient's information and labeled "biopsy, gastric" louie single tan-
brown piece of tissue, 0.5 cm in maximum dimension, submitted in cassette A1. B.
 Polyp,Colon - Transverse.Received in formalin labeled with the patient's 
information and labeled "polyp, colon - transverse, description: x3" are three 
fragments of mucosal tissue. The smallest fragment measures 0.2 cm in maximum 
dimension and the largest fragment measures 0.7 cm in maximum dimension, submit
sonia entirely in cassette labeled B1. BH/pl PERFORMEDRAD, ABDOMEN/KUB 1 VIEW AP
2022 18:05:00Reason for exam:-&gt;ABDOMINAL PAINReason for exam:-&gt;
************************************************************[facility]ENTERName: [facility]ORS : 1960 Sex: 
M************************************************************FINAL REPORT 
PATIENT ID: [id number] RAD, ABDOMEN/KUB 1 VIEW AP CLINICAL INDICATION: ABDOMINAL 
PAIN COMPARISON: None TECHNIQUE: Single, frontal radiograph of the abdomen. 
FINDINGS: Nondiagnostic due to patient body habitus and technique. Signed: 
Marialuisa Cintron Verified Date/Time: 2022 18:05:52 Electronically 
signed by: MARIALUISA CINTRON MD on 2022 06:05 [id number] 
21:00:48





             Test Item    Value        Reference Range Interpretation Comments

 

             LIPASE (BEAKER) (test code = 749) 5 U/L        6-51         L      

      



 ID - DSENSONOperator ID - DSENSONOperator ID - DSENSONOperator ID - 
DSENSONCT, CHEST, WITHOUT [id number] 20:52:00Unlisted Reason for Exam - 
Click Yes and Enter Reason Below-&gt;No
************************************************************[facility] CENTERName: SAVAGE CONNORS : 1960 Sex: 
M************************************************************FINAL REPORT 
PATIENT ID: [id number] CT CHEST, ABDOMEN AND PELVIS WITHOUT CONTRAST HISTORY: 
Persistent cough and severe abdominal pain. COMPARISON: CT chest 2021 and 
CT abdomen and pelvis 2021 Technique: Helically acquired images were 
obtained through the chest, abdomen and pelvis without contrast. Coronal and 
sagittal reformats are provided. Post marginal radiation reduction technique was
 used.FINDINGS: CHEST: There is mild cardiomegaly. No pathologic-appearing 
mediastinal or hilar lymph nodes. There is no consolidation, effusion or 
pneumothorax. No acute osseous abnormalities. ABDOMEN/PELVIS: Diffuse hepatic 
steatosis. No acute findings in the gallbladder, liver, adrenals, spleen, 
kidneys,pancreas, small bowel, appendix and colon. No free intraperitoneal fluid
 or free air. Moderate degenerative change of the lumbar spine. No evidence of 
acute osseous abnormality. IMPRESSION: No evidenceof acute abnormality. Signed: 
Marielena Bates MDReport Verified Date/Time: 2022 20:52:23 Electronically 
signed by: MARIELENA BATES MD on 2022 08:52 PMCT, [id number] 
20:52:00Unlisted Reason for Exam - Click Yes and Enter Reason Below-&gt;NoIs 
this for enterography?-&gt;NoWill this procedure require oral contrast?-&gt;No
********************************************************[facility] 1960 Sex: 
M[facility]****************************FINAL REPORT 
PATIENT ID: [id number] CT CHEST, ABDOMEN AND PELVIS WITHOUT CONTRAST HISTORY: 
Persistent cough and severe abdominal pain. COMPARISON: CT chest 2021 and 
CT abdomen and pelvis 2021 Technique: Helically acquired images were 
obtained through the chest, abdomen and pelvis without contrast. Coronal and 
sagittal reformats are provided. Post marginal radiation reduction technique was
 used.FINDINGS: CHEST: There is mild cardiomegaly. No pathologic-appearing 
mediastinal or hilar lymph nodes. There is no consolidation, effusion or 
pneumothorax. No acute osseous abnormalities. ABDOMEN/PELVIS: Diffuse hepatic 
steatosis. No acute findings in the gallbladder, liver, adrenals, spleen, 
kidneys,pancreas, small bowel, appendix and colon. No free intraperitoneal fluid
 or free air. Moderate degenerative change of the lumbar spine. No evidence of 
acute osseous abnormality. IMPRESSION: No evidenceof acute abnormality. Signed: 
Marielena Bates MDReport Verified Date/Time: 2022 20:52:23 Electronically 
signed by: MARIELENA BATES MD on 2022 08:52 PMHEPATIC FUNCTION PANEL
2022 20:42:46





             Test Item    Value        Reference Range Interpretation Comments

 

             TOTAL PROTEIN (BEAKER) (test code = 7.6 gm/dL    6.0-8.5           

        



             770)                                                

 

             ALBUMIN (BEAKER) (test code = 1145) 4.5 g/dL     3.5-5.0           

        

 

             BILIRUBIN TOTAL (BEAKER) (test code 0.7 mg/dL    0.1-1.2           

        



             = 377)                                              

 

             BILIRUBIN DIRECT (BEAKER) (test 0.3 mg/dL    0.0-0.4               

    



             code = 706)                                         

 

             ALKALINE PHOSPHATASE (BEAKER) (test 130 U/L             H    

        



             code = 346)                                         

 

             AST (SGOT) (BEAKER) (test code = 21 U/L       5-40                 

     



             353)                                                

 

             ALT (SGPT) (BEAKER) (test code = 21 U/L       5-50                 

     



             347)                                                



 ID - DSENSONOperator ID - DSENSONOperator ID - DSENSONOperator ID - 
DSENSONOperator ID - DSENSONOperator ID - DSENSONOperator ID - DSENSONTROPONIN I
2022 18:36:07





             Test Item    Value        Reference Range Interpretation Comments

 

             TROPONIN I (BEAKER) (test code = 397) < ng/mL      0.00-0.15       

          








             Test Item    Value        Reference Range Interpretation Comments

 

             SODIUM (BEAKER) 135 meq/L    135-148                   



             (test code = 381)                                        

 

             POTASSIUM    4.0 meq/L    3.6-5.5                   



             (BEAKER) (test                                        



             code = 379)                                         

 

             CHLORIDE (BEAKER) 96 meq/L            L            



             (test code = 382)                                        

 

             CO2 (BEAKER) 25 meq/L     20-29                     



             (test code = 355)                                        

 

             BLOOD UREA   17 mg/dL     10-26                     



             NITROGEN (BEAKER)                                        



             (test code = 354)                                        

 

             CREATININE   1.33 mg/dL   0.50-1.20    H            



             (BEAKER) (test                                        



             code = 358)                                         

 

             GLUCOSE RANDOM 122 mg/dL           H            



             (BEAKER) (test                                        



             code = 652)                                         

 

             CALCIUM (BEAKER) 9.9 mg/dL    8.5-10.5                  



             (test code = 697)                                        

 

             EGFR (BEAKER) 62                                      Interpretatio

n of eGFR



             (test code = mL/min/1.73                            values Stage De

scription



             1092)        sq m                                   Result G1 Gianna

l or high



                                                                 >=90 G2 Mildly 

decreased



                                                                 60-89 G3a Mildl

y to



                                                                 moderately 45-5

9 G3b



                                                                 Moderately to s

everely



                                                                 30-44 G4 Severl

y decreased



                                                                 15-29 G5 Kidney

 failure



                                                                 <15Reported eGF

R is based



                                                                 on the CKD-EPI 





                                                                 equation that d

oes not use



                                                                 a race



                                                                 coefficientEsti

mated GFR



                                                                 is not as accur

ate as



                                                                 Creatinine Amanda patino in



                                                                 predicting glom

erular



                                                                 filtration rate

. Estimated



                                                                 GFR is not appl

icable for



                                                                 dialysis patien

ts



 ID - DSENSONOperator ID - DSENSONOperator ID - DSENSONOperator ID - 
DSENSONOperator ID - DSENSONOperator ID - DSENSONOperator ID - DSENSONOperator 
ID - DSENSONOperator ID - DSENSONOperator ID - DSENSONOperator ID - 
DSENSONOperator ID - DSENSONRAD, CHEST, 1 VIEW, NON [id number] 18:16:00
Reason for exam:-&gt;chest painShould this be performed at the bedside?-&gt;Yes
************************************************************[facility] 1960 Sex: 
M************************************************************FINAL REPORT 
PATIENT ID: [id number] INDICATION: chest pain COMPARISON: 2020 TECHNIQUE: 
Single frontalview of the chest. FINDINGS: Lungs and pleura: Small left 
effusion. Lungs are hypoinflated.Heart andmediastinum: Normal heart size. 
Unremarkable mediastinal contours.Osseous structures: No acute abnorm
ality.Other: None. IMPRESSION: Small left effusion. Lungs are hypoinflated. 
Signed: Marialuisa Cintron MDReport Verified Date/Time: 2022 18:16:34 
Electronically signed by: MARIALUISA CINTRON MD on 2022 06:16 PMCBC
 W/PLT COUNT &amp; AUTO [id number] 18:12:00





             Test Item    Value        Reference Range Interpretation Comments

 

             WHITE BLOOD CELL COUNT (BEAKER) 7.4 K/ L     4.0-10.0              

    



             (test code = 775)                                        

 

             RED BLOOD CELL COUNT (BEAKER) 5.15 M/ L    4.20-5.80               

  



             (test code = 761)                                        

 

             HEMOGLOBIN (BEAKER) (test code = 14.4 GM/DL   13.0-16.8            

     



             410)                                                

 

             HEMATOCRIT (BEAKER) (test code = 41.2 %       36.0-50.0            

     



             411)                                                

 

             MEAN CORPUSCULAR VOLUME (BEAKER) 80.0 fL      82.0-99.0    L       

     



             (test code = 753)                                        

 

             MEAN CORPUSCULAR HEMOGLOBIN 28.0 pg      27.0-33.0                 



             (BEAKER) (test code = 751)                                        

 

             MEAN CORPUSCULAR HEMOGLOBIN CONC 35.0 GM/DL   32.0-36.0            

     



             (BEAKER) (test code = 752)                                        

 

             RED CELL DISTRIBUTION WIDTH 14.7 %       12.0-15.0                 



             (BEAKER) (test code = 412)                                        

 

             PLATELET COUNT (BEAKER) (test 279 K/CU MM  150-430                 

  



             code = 756)                                         

 

             MEAN PLATELET VOLUME (BEAKER) 10.2 fL      6.0-11.5                

  



             (test code = 754)                                        

 

             NUCLEATED RED BLOOD CELLS 0 /100 WBC   0-0                       



             (BEAKER) (test code = 413)                                        

 

             NEUTROPHILS RELATIVE PERCENT 69 %                                  

 



             (BEAKER) (test code = 429)                                        

 

             LYMPHOCYTES RELATIVE PERCENT 20 %                                  

 



             (BEAKER) (test code = 430)                                        

 

             MONOCYTES RELATIVE PERCENT 10 %                                   



             (BEAKER) (test code = 431)                                        

 

             EOSINOPHILS RELATIVE PERCENT 1 %                                   

 



             (BEAKER) (test code = 432)                                        

 

             BASOPHILS RELATIVE PERCENT 0 %                                    



             (BEAKER) (test code = 437)                                        

 

             NEUTROPHILS ABSOLUTE COUNT 5.11 K/ L    1.80-8.00                 



             (BEAKER) (test code = 670)                                        

 

             LYMPHOCYTES ABSOLUTE COUNT 1.47 K/ L    1.48-4.50    L            



             (BEAKER) (test code = 414)                                        

 

             MONOCYTES ABSOLUTE COUNT (BEAKER) 0.73 K/ L    0.00-1.30           

      



             (test code = 415)                                        

 

             EOSINOPHILS ABSOLUTE COUNT 0.06 K/ L    0.00-0.50                 



             (BEAKER) (test code = 416)                                        

 

             BASOPHILS ABSOLUTE COUNT (BEAKER) 0.03 K/ L    0.00-0.20           

      



             (test code = 417)                                        

 

             IMMATURE GRANULOCYTES-RELATIVE 1 %          0-0          H         

   



             PERCENT (BEAKER) (test code =                                      

  



             2801)                                               



B-type Natriuretic Factor (BNP)2022 13:49:41





             Test Item    Value        Reference Range Interpretation Comments

 

             BNP (test code = 46654-1) 52 pg/mL     0-100                     

 

             KIMMY (test code = KIMMY)  ID -                           



                          DSENSON                                

 

             Lab Interpretation (test Normal                                 



             code = 54978-9)                                        



[facility]41





             Test Item    Value        Reference Range Interpretation Comments

 

             BNP (test code = 87943-8) 52 pg/mL     0-100                     

 

             KIMMY (test code = KIMMY)  ID -                           



                          DSENSON                                

 

             Lab Interpretation (test Normal                                 



             code = 93797-5)                                        



[facility] 13:49:41





             Test Item    Value        Reference Range Interpretation Comments

 

             BNP (test code = 23479-0) 52 pg/mL     0-100                     

 

             KIMMY (test code = KIMMY)  ID -                           



                          DSADDY                                

 

             Lab Interpretation (test Normal                                 



             code = 33401-9)                                        



[facility] 13:49:41





             Test Item    Value        Reference Range Interpretation Comments

 

             BNP (test code = 00925-4) 52 pg/mL     0-100                     

 

             KIMMY (test code = KIMMY)  ID -                           



                          DSADDY                                

 

             Lab Interpretation (test Normal                                 



             code = 13134-0)                                        



[facility] 13:49:41





             Test Item    Value        Reference Range Interpretation Comments

 

             B-TYPE NATRIURETIC PEPTIDE (BEAKER) 52 pg/mL     0-100             

        



             (test code = 700)                                        



 ID - JIETROPONIN -60-16 13:49:23





             Test Item    Value        Reference Range Interpretation Comments

 

             TROPONIN I (BEAKER) (test code = 397) < ng/mL      0.00-0.15       

          








             Test Item    Value        Reference Range Interpretation Comments

 

             LIPASE (BEAKER) (test code = 749) 8 U/L        6-51                

      



 ID - DSENSONOperator ID - DSENSONOperator ID - DSENSONOperator ID - 
DSENSONComprehensive metabolic [id number] 13:46:21





             Test Item    Value        Reference    Interpretation Comments



                                       Range                     

 

             Protein, Total (test 6.9          See_Comment                [Autom

ated



             code = 2885-2)                                        message] The



                                                                 system which



                                                                 generated this



                                                                 result



                                                                 transmitted



                                                                 reference range

:



                                                                 6.0 - 8.5 gm/dL

.



                                                                 The reference



                                                                 range was not



                                                                 used to



                                                                 interpret this



                                                                 result as



                                                                 normal/abnormal

.

 

             Albumin (test code = 3.9 g/dL     3.5-5.0                   



             15861-2)                                            

 

             Alkaline Phosphatase 135 U/L             H            



             (test code = 6768-6)                                        

 

             Total Bilirubin 0.4 mg/dL    0.1-1.2                   



             (test code = 1975-2)                                        

 

             Sodium (test code = 138 meq/L    135-148                   



             2951-2)                                             

 

             Potassium (test code 4.1 meq/L    3.6-5.5                   



             = 2823-3)                                           

 

             Chloride (test code 102 meq/L                        



             = 2075-0)                                           

 

             CO2 (test code = 24 meq/L     20-29                     



             2028-9)                                             

 

             BUN (test code = 15 mg/dL     10-                     



             3094-0)                                             

 

             Creatinine (test 0.88 mg/dL   0.50-1.20                 



             code = 2160-0)                                        

 

             Glucose (test code = 102 mg/dL                        



             2345-7)                                             

 

             Calcium (test code = 8.9 mg/dL    8.5-10.5                  



             52927-5)                                            

 

             AST (test code = 18 U/L       5-40                      



             1920-8)                                             

 

             ALT (test code = 23 U/L       -50                      



             1742-6)                                             

 

             EGFR (test code = 88           mL/min/1.73 sq              ESTIMATE

D GFR IS



             33914-3)                  m                         NOT AS ACCURATE



                                                                 AS CREATININE



                                                                 CLEARANCE IN



                                                                 PREDICTING



                                                                 GLOMERULAR



                                                                 FILTRATION RATE

.



                                                                 ESTIMATED GFR I

S



                                                                 NOT APPLICABLE



                                                                 FOR DIALYSIS



                                                                 PATIENTS.

 

             KIMMY (test code =  ID -                           



             KIMMY)         DSENSONOperator ID -                           



                          DSENSONOperator ID -                           



                          DSENSONOperator ID -                           



                          DSENSONOperator ID -                           



                          DSENSONOperator ID -                           



                          DSENSONOperator ID -                           



                          DSENSONOperator ID -                           



                          DSENSONOperator ID -                           



                          DSENSONOperator ID -                           



                          DSENSONOperator ID -                           



                          DSENSONOperator ID -                           



                          DSENSONOperator ID -                           



                          DSENSONOperator ID -                           



                          DSENSONOperator ID -                           



                          DSENSONOperator ID -                           



                          DSENSON                                

 

             Lab Interpretation Abnormal                               



             (test code =                                        



             46689-9)                                            



[facility] [id number] 13:46:21





             Test Item    Value        Reference    Interpretation Comments



                                       Range                     

 

             Protein, Total (test 6.9          See_Comment                [Autom

ated



             code = 2885-2)                                        message] The



                                                                 system which



                                                                 generated this



                                                                 result



                                                                 transmitted



                                                                 reference range

:



                                                                 6.0 - 8.5 gm/dL

.



                                                                 The reference



                                                                 range was not



                                                                 used to



                                                                 interpret this



                                                                 result as



                                                                 normal/abnormal

.

 

             Albumin (test code = 3.9 g/dL     3.5-5.0                   



             03496-5)                                            

 

             Alkaline Phosphatase 135 U/L             H            



             (test code = 6768-6)                                        

 

             Total Bilirubin 0.4 mg/dL    0.1-1.2                   



             (test code = 1975-2)                                        

 

             Sodium (test code = 138 meq/L    135-148                   



             2951-2)                                             

 

             Potassium (test code 4.1 meq/L    3.6-5.5                   



             = 2823-3)                                           

 

             Chloride (test code 102 meq/L                        



             = 2075-0)                                           

 

             CO2 (test code = 24 meq/L     20-29                     



             8-9)                                             

 

             BUN (test code = 15 mg/dL     10-                     



             3094-0)                                             

 

             Creatinine (test 0.88 mg/dL   0.50-1.20                 



             code = 2160-0)                                        

 

             Glucose (test code = 102 mg/dL                        



             2345-7)                                             

 

             Calcium (test code = 8.9 mg/dL    8.5-10.5                  



             88835-0)                                            

 

             AST (test code = 18 U/L       5-40                      



             1920-8)                                             

 

             ALT (test code = 23 U/L       5-50                      



             1742-6)                                             

 

             EGFR (test code = 88           mL/min/1.73 sq              ESTIMATE

D GFR IS



             33914-3)                  m                         NOT AS ACCURATE



                                                                 AS CREATININE



                                                                 CLEARANCE IN



                                                                 PREDICTING



                                                                 GLOMERULAR



                                                                 FILTRATION RATE

.



                                                                 ESTIMATED GFR I

S



                                                                 NOT APPLICABLE



                                                                 FOR DIALYSIS



                                                                 PATIENTS.

 

             KIMMY (test code =  ID -                           



             KIMMY)         DSENSONOperator ID -                           



                          DSENSONOperator ID -                           



                          DSENSONOperator ID -                           



                          DSENSONOperator ID -                           



                          DSENSONOperator ID -                           



                          DSENSONOperator ID -                           



                          DSENSONOperator ID -                           



                          DSENSONOperator ID -                           



                          DSENSONOperator ID -                           



                          DSENSONOperator ID -                           



                          DSENSONOperator ID -                           



                          DSENSONOperator ID -                           



                          DSENSONOperator ID -                           



                          DSENSONOperator ID -                           



                          DSENSONOperator ID -                           



                          DSENSON                                

 

             Lab Interpretation Abnormal                               



             (test code =                                        



             05808-3)                                            



[id number]21





             Test Item    Value        Reference    Interpretation Comments



                                       Range                     

 

             Protein, Total (test 6.9          See_Comment                [Autom

ated



             code = 2885-2)                                        message] The



                                                                 system which



                                                                 generated this



                                                                 result



                                                                 transmitted



                                                                 reference range

:



                                                                 6.0 - 8.5 gm/dL

.



                                                                 The reference



                                                                 range was not



                                                                 used to



                                                                 interpret this



                                                                 result as



                                                                 normal/abnormal

.

 

             Albumin (test code = 3.9 g/dL     3.5-5.0                   



             86373-0)                                            

 

             Alkaline Phosphatase 135 U/L             H            



             (test code = 6768-6)                                        

 

             Total Bilirubin 0.4 mg/dL    0.1-1.2                   



             (test code = 1975-2)                                        

 

             Sodium (test code = 138 meq/L    135-148                   



             2951-2)                                             

 

             Potassium (test code 4.1 meq/L    3.6-5.5                   



             = 2823-3)                                           

 

             Chloride (test code 102 meq/L                        



             = 2075-0)                                           

 

             CO2 (test code = 24 meq/L     20-29                     



             8-9)                                             

 

             BUN (test code = 15 mg/dL     10-                     



             3094-0)                                             

 

             Creatinine (test 0.88 mg/dL   0.50-1.20                 



             code = 2160-0)                                        

 

             Glucose (test code = 102 mg/dL                        



             2345-7)                                             

 

             Calcium (test code = 8.9 mg/dL    8.5-10.5                  



             10365-7)                                            

 

             AST (test code = 18 U/L       5-40                      



             1920-8)                                             

 

             ALT (test code = 23 U/L       -50                      



             1742-6)                                             

 

             EGFR (test code = 88           mL/min/1.73 sq              ESTIMATE

D GFR IS



             33914-3)                  m                         NOT AS ACCURATE



                                                                 AS CREATININE



                                                                 CLEARANCE IN



                                                                 PREDICTING



                                                                 GLOMERULAR



                                                                 FILTRATION RATE

.



                                                                 ESTIMATED GFR I

S



                                                                 NOT APPLICABLE



                                                                 FOR DIALYSIS



                                                                 PATIENTS.

 

             KIMMY (test code =  ID -                           



             KIMMY)         DSENSONOperator ID -                           



                          DSENSONOperator ID -                           



                          DSENSONOperator ID -                           



                          DSENSONOperator ID -                           



                          DSENSONOperator ID -                           



                          DSENSONOperator ID -                           



                          DSENSONOperator ID -                           



                          DSENSONOperator ID -                           



                          DSENSONOperator ID -                           



                          DSENSONOperator ID -                           



                          DSENSONOperator ID -                           



                          DSENSONOperator ID -                           



                          DSENSONOperator ID -                           



                          DSENSONOperator ID -                           



                          DSENSONOperator ID -                           



                          DSENSON                                

 

             Lab Interpretation Abnormal                               



             (test code =                                        



             42240-3)                                            



[facility] [id number] 13:46:21





             Test Item    Value        Reference    Interpretation Comments



                                       Range                     

 

             Protein, Total (test 6.9          See_Comment                [Autom

ated



             code = 2885-2)                                        message] The



                                                                 system which



                                                                 generated this



                                                                 result



                                                                 transmitted



                                                                 reference range

:



                                                                 6.0 - 8.5 gm/dL

.



                                                                 The reference



                                                                 range was not



                                                                 used to



                                                                 interpret this



                                                                 result as



                                                                 normal/abnormal

.

 

             Albumin (test code = 3.9 g/dL     3.5-5.0                   



             74912-3)                                            

 

             Alkaline Phosphatase 135 U/L             H            



             (test code = 6768-6)                                        

 

             Total Bilirubin 0.4 mg/dL    0.1-1.2                   



             (test code = -2)                                        

 

             Sodium (test code = 138 meq/L    135-148                   



             2951-2)                                             

 

             Potassium (test code 4.1 meq/L    3.6-5.5                   



             = 2823-3)                                           

 

             Chloride (test code 102 meq/L                        



             = 2075-0)                                           

 

             CO2 (test code = 24 meq/L     20-29                     



             8-9)                                             

 

             BUN (test code = 15 mg/dL     10-                     



             3094-0)                                             

 

             Creatinine (test 0.88 mg/dL   0.50-1.20                 



             code = 2160-0)                                        

 

             Glucose (test code = 102 mg/dL                        



             2345-7)                                             

 

             Calcium (test code = 8.9 mg/dL    8.5-10.5                  



             84970-3)                                            

 

             AST (test code = 18 U/L       5-40                      



             1920-8)                                             

 

             ALT (test code = 23 U/L       5-50                      



             1742-6)                                             

 

             EGFR (test code = 88           mL/min/1.73 sq              ESTIMATE

D GFR IS



             33914-3)                  m                         NOT AS ACCURATE



                                                                 AS CREATININE



                                                                 CLEARANCE IN



                                                                 PREDICTING



                                                                 GLOMERULAR



                                                                 FILTRATION RATE

.



                                                                 ESTIMATED GFR I

S



                                                                 NOT APPLICABLE



                                                                 FOR DIALYSIS



                                                                 PATIENTS.

 

             KIMMY (test code =  ID -                           



             KIMMY)         DSENSONOperator ID -                           



                          DSENSONOperator ID -                           



                          DSENSONOperator ID -                           



                          DSENSONOperator ID -                           



                          DSENSONOperator ID -                           



                          DSENSONOperator ID -                           



                          DSENSONOperator ID -                           



                          DSENSONOperator ID -                           



                          DSENSONOperator ID -                           



                          DSENSONOperator ID -                           



                          DSENSONOperator ID -                           



                          DSENSONOperator ID -                           



                          DSENSONOperator ID -                           



                          DSENSONOperator ID -                           



                          DSENSONOperator ID -                           



                          DSENSON                                

 

             Lab Interpretation Abnormal                               



             (test code =                                        



             21916-6)                                            



[facility] [id number] 13:46:21





             Test Item    Value        Reference Range Interpretation Comments

 

             TOTAL PROTEIN 6.9 gm/dL    6.0-8.5                   



             (BEAKER) (test code =                                        



             770)                                                

 

             ALBUMIN (BEAKER) 3.9 g/dL     3.5-5.0                   



             (test code = 1145)                                        

 

             ALKALINE PHOSPHATASE 135 U/L             H            



             (BEAKER) (test code =                                        



             346)                                                

 

             BILIRUBIN TOTAL 0.4 mg/dL    0.1-1.2                   



             (BEAKER) (test code =                                        



             377)                                                

 

             SODIUM (BEAKER) (test 138 meq/L    135-148                   



             code = 381)                                         

 

             POTASSIUM (BEAKER) 4.1 meq/L    3.6-5.5                   



             (test code = 379)                                        

 

             CHLORIDE (BEAKER) 102 meq/L                        



             (test code = 382)                                        

 

             CO2 (BEAKER) (test 24 meq/L     20-29                     



             code = 355)                                         

 

             BLOOD UREA NITROGEN 15 mg/dL     10-26                     



             (BEAKER) (test code =                                        



             354)                                                

 

             CREATININE (BEAKER) 0.88 mg/dL   0.50-1.20                 



             (test code = 358)                                        

 

             GLUCOSE RANDOM 102 mg/dL                        



             (BEAKER) (test code =                                        



             652)                                                

 

             CALCIUM (BEAKER) 8.9 mg/dL    8.5-10.5                  



             (test code = 697)                                        

 

             AST (SGOT) (BEAKER) 18 U/L       5-40                      



             (test code = 353)                                        

 

             ALT (SGPT) (BEAKER) 23 U/L       5-50                      



             (test code = 347)                                        

 

             EGFR (BEAKER) (test 88 mL/min/1.73                           ESTIMA

SONIA GFR IS



             code = 1092) sq m                                   NOT AS ACCURATE

 AS



                                                                 CREATININE



                                                                 CLEARANCE IN



                                                                 PREDICTING



                                                                 GLOMERULAR



                                                                 FILTRATION RATE

.



                                                                 ESTIMATED GFR I

S



                                                                 NOT APPLICABLE 

FOR



                                                                 DIALYSIS PATIEN

TS.



 ID - DSENSONOperator ID - DSENSONOperator ID - DSENSONOperator ID - 
DSENSONOperator ID - DSENSONOperator ID - DSENSONOperator ID - DSENSONOperator 
ID - DSENSONOperator ID - DSENSONOperator ID - DSENSONOperator ID - 
DSENSONOperator ID - DSENSONOperator ID - DSENSONOperator ID - DSENSONOperatorID
- DSENSONOperator ID - [id number] 13:39:38





             Test Item    Value        Reference Range Interpretation Comments

 

             D-Dimer, Quant (test 0.42         See_Comment               Final I

nformation



             code = 03023-4)                                        (Auto Output

)



                                                                 [Automated



                                                                 message] The



                                                                 system which



                                                                 generated this



                                                                 result



                                                                 transmitted



                                                                 reference range

:



                                                                 <0.50 MG/L FEU.



                                                                 The reference



                                                                 range was not



                                                                 used to interpr

et



                                                                 this result as



                                                                 normal/abnormal

.

 

             KIMMY (test code = REGARDING D-DIMER                           



             KIMMY)         RESULTS: The 98%                           



                          NPV (Negative                           



                          Predictive Value)                           



                          for DVT/PE                             



                          exclusion is 0.50                           



                          mg/L FEU as                            



                          suggested by the                           



                           and                           



                          as approved by the                           



                          FDA.                                   

 

             Lab Interpretation Normal                                 



             (test code =                                        



             46256-1)                                            



[id number]





             Test Item    Value        Reference Range Interpretation Comments

 

             D-Dimer, Quant (test 0.42         See_Comment               Final I

nformation



             code = 12487-4)                                        (Auto Output

)



                                                                 [Automated



                                                                 message] The



                                                                 system which



                                                                 generated this



                                                                 result



                                                                 transmitted



                                                                 reference range

:



                                                                 <0.50 MG/L FEU.



                                                                 The reference



                                                                 range was not



                                                                 used to interpr

et



                                                                 this result as



                                                                 normal/abnormal

.

 

             KIMMY (test code = REGARDING D-DIMER                           



             KIMMY)         RESULTS: The 98%                           



                          NPV (Negative                           



                          Predictive Value)                           



                          for DVT/PE                             



                          exclusion is 0.50                           



                          mg/L FEU as                            



                          suggested by the                           



                           and                           



                          as approved by the                           



                          FDA.                                   

 

             Lab Interpretation Normal                                 



             (test code =                                        



             73360-2)                                            



[id number] 13[facility]39[facility]38





             Test Item    Value        Reference Range Interpretation Comments

 

             D-Dimer, Quant (test 0.42         See_Comment               Final I

nformation



             code = 33839-3)                                        (Auto Output

)



                                                                 [Automated



                                                                 message] The



                                                                 system which



                                                                 generated this



                                                                 result



                                                                 transmitted



                                                                 reference range

:



                                                                 <0.50 MG/L FEU.



                                                                 The reference



                                                                 range was not



                                                                 used to interpr

et



                                                                 this result as



                                                                 normal/abnormal

.

 

             KIMMY (test code = REGARDING D-DIMER                           



             KIMMY)         RESULTS: The 98%                           



                          NPV (Negative                           



                          Predictive Value)                           



                          for DVT/PE                             



                          exclusion is 0.50                           



                          mg/L FEU as                            



                          suggested by the                           



                           and                           



                          as approved by the                           



                          FDA.                                   

 

             Lab Interpretation Normal                                 



             (test code =                                        



             93150-0)                                            



[id number]





             Test Item    Value        Reference Range Interpretation Comments

 

             D-Dimer, Quant (test 0.42         See_Comment               Final I

nformation



             code = 75137-4)                                        (Auto Output

)



                                                                 [Automated



                                                                 message] The



                                                                 system which



                                                                 generated this



                                                                 result



                                                                 transmitted



                                                                 reference range

:



                                                                 <0.50 MG/L FEU.



                                                                 The reference



                                                                 range was not



                                                                 used to interpr

et



                                                                 this result as



                                                                 normal/abnormal

.

 

             KIMMY (test code = REGARDING D-DIMER                           



             KIMMY)         RESULTS: The 98%                           



                          NPV (Negative                           



                          Predictive Value)                           



                          for DVT/PE                             



                          exclusion is 0.50                           



                          mg/L FEU as                            



                          suggested by the                           



                           and                           



                          as approved by the                           



                          FDA.                                   

 

             Lab Interpretation Normal                                 



             (test code =                                        



             81846-9)                                            



[id number] 13[facility]39[facility]38





             Test Item    Value        Reference Range Interpretation Comments

 

             D-DIMER QUANTITATIVE 0.42 MG/L FEU <0.50                     Final 

Information



             (BEAKER) (test code =                                        (Auto 

Output)



             671)                                                



REGARDING D-DIMER RESULTS: The 98% NPV (Negative Predictive Value) for DVT/PE 
exclusion is 0.50 mg/LFEU as suggested by the  and as approved by 
the FDA.CBC W/PLT COUNT &amp; AUTO [id number] 13:28:00





             Test Item    Value        Reference Range Interpretation Comments

 

             WHITE BLOOD CELL COUNT (BEAKER) 5.7 K/ L     4.0-10.0              

    



             (test code = 775)                                        

 

             RED BLOOD CELL COUNT (BEAKER) 4.94 M/ L    4.20-5.80               

  



             (test code = 761)                                        

 

             HEMOGLOBIN (BEAKER) (test code = 13.7 GM/DL   13.0-16.8            

     



             410)                                                

 

             HEMATOCRIT (BEAKER) (test code = 42.1 %       36.0-50.0            

     



             411)                                                

 

             MEAN CORPUSCULAR VOLUME (BEAKER) 85.2 fL      82.0-99.0            

     



             (test code = 753)                                        

 

             MEAN CORPUSCULAR HEMOGLOBIN 27.7 pg      27.0-33.0                 



             (BEAKER) (test code = 751)                                        

 

             MEAN CORPUSCULAR HEMOGLOBIN CONC 32.5 GM/DL   32.0-36.0            

     



             (BEAKER) (test code = 752)                                        

 

             RED CELL DISTRIBUTION WIDTH 14.2 %       12.0-15.0                 



             (BEAKER) (test code = 412)                                        

 

             PLATELET COUNT (BEAKER) (test 216 K/CU MM  150-430                 

  



             code = 756)                                         

 

             MEAN PLATELET VOLUME (BEAKER) 10.5 fL      6.0-11.5                

  



             (test code = 754)                                        

 

             NUCLEATED RED BLOOD CELLS 0 /100 WBC   0-0                       



             (BEAKER) (test code = 413)                                        

 

             NEUTROPHILS RELATIVE PERCENT 61 %                                  

 



             (BEAKER) (test code = 429)                                        

 

             LYMPHOCYTES RELATIVE PERCENT 24 %                                  

 



             (BEAKER) (test code = 430)                                        

 

             MONOCYTES RELATIVE PERCENT 13 %                                   



             (BEAKER) (test code = 431)                                        

 

             EOSINOPHILS RELATIVE PERCENT 2 %                                   

 



             (BEAKER) (test code = 432)                                        

 

             BASOPHILS RELATIVE PERCENT 1 %                                    



             (BEAKER) (test code = 437)                                        

 

             NEUTROPHILS ABSOLUTE COUNT 3.43 K/ L    1.80-8.00                 



             (BEAKER) (test code = 670)                                        

 

             LYMPHOCYTES ABSOLUTE COUNT 1.33 K/ L    1.48-4.50    L            



             (BEAKER) (test code = 414)                                        

 

             MONOCYTES ABSOLUTE COUNT (BEAKER) 0.73 K/ L    0.00-1.30           

      



             (test code = 415)                                        

 

             EOSINOPHILS ABSOLUTE COUNT 0.11 K/ L    0.00-0.50                 



             (BEAKER) (test code = 416)                                        

 

             BASOPHILS ABSOLUTE COUNT (BEAKER) 0.03 K/ L    0.00-0.20           

      



             (test code = 417)                                        

 

             IMMATURE GRANULOCYTES-RELATIVE 1 %          0-0          H         

   



             PERCENT (BEAKER) (test code =                                      

  



             2801)                                               



RAD, CHEST, 1 VIEW, NON [id number] 13:00:00Reason for exam:-&gt;chest 
painShould this be performed at the bedside?-&gt;Yes
********************************************************[facility] 1960 Sex: 
M************************************************************FINAL REPORT 
PATIENT ID: [id number] Chest, one view History: chest pain Comparison: 2021 
Findings:Lungs are hypoexpanded, but grossly clear. The cardiac silhouette is 
exaggerated by low lung volumes andportable AP technique. No pleural effusion or
pneumothorax. Impression:No definite acute findings inthe chest. Signed: 
Moose Camacho MDReport Verified Date/Time: 2022 13:00:39 Reading 
Location: [address] Ortho Consult Reading Room Electronically signed by: 
MOOSE CAMACHO MD on 2022 01:00 PMCT, SPINE, LUMBAR, WO CONTRAST
2022-01-10 23:22:00Unlisted Reason for Exam - Click Yes and Enter Reason 
Below-&gt;No************************************************************[facility] : 1960 Sex: 
M************************************************************FINAL REPORT 
PATIENT ID: [id number] CT Lumbar spine CLINICAL HISTORY: Lower back pain with 
radiation to right leg TECHNIQUE: Contiguous noncontrast axial images of the 
lumbar spine with coronal and sagittal reformations to assess the alignment. 
This exam was performed according to the departmental dose optimization program 
which includes automated exposure control, adjustment of the mA and/or kV 
according to the patient size, and/or use of an iterative reconstruction 
technique. COMPARISON: 2020 FINDINGS: There is a stable appearance of a 
severe T12 vertebral body compression deformity as well as mild superior 
endplate can cavities of T10 and T11. Affect there is straightening of the 
normal lumbar lordosis related to confluent posterior bone graft fusion from the
superior most margin of the visualized thoracic spine through the L5 posterior 
elements. An exaggerated thoracic kyphosis centered on T12 is similar to 
previous and relates to the chronic T12 deformity. Please note that the lack of 
intrathecal contrast somewhat limits evaluation of the soft tissue contents of 
the central canal. At L5-S1, mild loss of disc space height. Bilateral facet 
hypertrophy. No significant canal or neural foraminal narrowing At L4-L5, . Mild
loss of disc space height with vacuum disc phenomenon. Facet hypertrophy. 
Moderate canal and severe bilateral neural foraminal narrowing. 5 mm of 
retrolisthesis of L4 on L5. At L3-L4, mild loss of disc space height, vacuum 
disc phenomenon and mild facet hypertrophy without significant canal or neural 
foraminal narrowing. At L2-L3, moderate to severe loss of disc space height with
vacuum disc phenomenon and disc osteophyte formation. Mild canal stenosis. No 
significant neural foraminal stenosis. At L1-L2, moderate to severe loss of disc
space height. No canal or neural foraminal narrowing. At T12-L1, moderate loss 
of disc space height with vacuum disc phenomenon. No canal or neural foraminal 
narrowing. The visualized soft tissues are without acute abnormality. IMPRESSI
ON: No acute lumbar fracture or dislocation. There is a stable appearance of 
extensive thoracolumbarfusion with bone graft material and of the previously 
described T12 vertebral body compression deformity. Mild superior endplate 
deformities at T10 and T11 are also stable. Similar appearing degenerative 
changes in the lumbar spine, most prominent at L4-L5, as described above. 
Signed: Herrera Jacome MDReport Verified Date/Time: 01/10/2022 23:22:10 
Electronically signed by: HERRERA JACOME M.D. on 01/10/2022 11:22 PM
COMPREHENSIVE METABOLIC PANEL2022-01-10 18:11:46





             Test Item    Value        Reference Range Interpretation Comments

 

             TOTAL PROTEIN 8.1 gm/dL    6.0-8.5                   Specimen sligh

tly



             (BEAKER) (test code =                                        hemoly

zed



             770)                                                

 

             ALBUMIN (BEAKER) 4.3 g/dL     3.5-5.0                   Specimen sl

ightly



             (test code = 1145)                                        hemolyzed

 

             ALKALINE PHOSPHATASE 119 U/L             H            



             (BEAKER) (test code =                                        



             346)                                                

 

             BILIRUBIN TOTAL 0.3 mg/dL    0.1-1.2                   Specimen sli

ghtly



             (BEAKER) (test code =                                        hemoly

zed



             377)                                                

 

             SODIUM (BEAKER) (test 135 meq/L    135-148                   



             code = 381)                                         

 

             POTASSIUM (BEAKER) 5.4 meq/L    3.6-5.5                   Specimen 

slightly



             (test code = 379)                                        hemolyzed

 

             CHLORIDE (BEAKER) 98 meq/L                         



             (test code = 382)                                        

 

             CO2 (BEAKER) (test 23 meq/L     20-29                     



             code = 355)                                         

 

             BLOOD UREA NITROGEN 32 mg/dL     10-26        H            



             (BEAKER) (test code =                                        



             354)                                                

 

             CREATININE (BEAKER) 1.53 mg/dL   0.50-1.20    H            Specimen

 slightly



             (test code = 358)                                        hemolyzed

 

             GLUCOSE RANDOM 97 mg/dL                         



             (BEAKER) (test code =                                        



             652)                                                

 

             CALCIUM (BEAKER) 9.4 mg/dL    8.5-10.5                  



             (test code = 697)                                        

 

             AST (SGOT) (BEAKER) 21 U/L       5-40                      Specimen

 slightly



             (test code = 353)                                        hemolyzed

 

             ALT (SGPT) (BEAKER) 17 U/L       5-50                      Specimen

 slightly



             (test code = 347)                                        hemolyzed

 

             EGFR (BEAKER) (test 47 mL/min/1.73                           ESTIMA

SONIA GFR IS



             code = 1092) sq m                                   NOT AS ACCURATE

 AS



                                                                 CREATININE



                                                                 CLEARANCE IN



                                                                 PREDICTING



                                                                 GLOMERULAR



                                                                 FILTRATION RATE

.



                                                                 ESTIMATED GFR I

S



                                                                 NOT APPLICABLE 

FOR



                                                                 DIALYSIS PATIEN

TS.



 ID - [id number] ID - [id number] ID - [id number] ID - 
[id number] ID - [id number] ID - [id number] ID - 
[id number] ID - [id number] ID - [id number] ID - 
[id number] ID - [id number] ID - [id number] ID - 
[id number] ID - [id number] ID - [id number] ID - 
[id number] ID - [id number] ID - [id number] ID - [id number] 
W/PLT COUNT &amp; AUTO DIFFERENTIAL2022-01-10 17:55:02





             Test Item    Value        Reference Range Interpretation Comments

 

             WHITE BLOOD CELL COUNT (BEAKER) 7.8 K/ L     4.0-10.0              

    



             (test code = 775)                                        

 

             RED BLOOD CELL COUNT (BEAKER) 4.97 M/ L    4.20-5.80               

  



             (test code = 761)                                        

 

             HEMOGLOBIN (BEAKER) (test code = 13.5 GM/DL   13.0-16.8            

     



             410)                                                

 

             HEMATOCRIT (BEAKER) (test code = 42.4 %       36.0-50.0            

     



             411)                                                

 

             MEAN CORPUSCULAR VOLUME (BEAKER) 85.3 fL      82.0-99.0            

     



             (test code = 753)                                        

 

             MEAN CORPUSCULAR HEMOGLOBIN 27.2 pg      27.0-33.0                 



             (BEAKER) (test code = 751)                                        

 

             MEAN CORPUSCULAR HEMOGLOBIN CONC 31.8 GM/DL   32.0-36.0    L       

     



             (BEAKER) (test code = 752)                                        

 

             RED CELL DISTRIBUTION WIDTH 14.6 %       12.0-15.0                 



             (BEAKER) (test code = 412)                                        

 

             PLATELET COUNT (BEAKER) (test 253 K/CU MM  150-430                 

  



             code = 756)                                         

 

             MEAN PLATELET VOLUME (BEAKER) 10.1 fL      6.0-11.5                

  



             (test code = 754)                                        

 

             NUCLEATED RED BLOOD CELLS 0 /100 WBC   0-0                       



             (BEAKER) (test code = 413)                                        

 

             NEUTROPHILS RELATIVE PERCENT 68 %                                  

 



             (BEAKER) (test code = 429)                                        

 

             LYMPHOCYTES RELATIVE PERCENT 19 %                                  

 



             (BEAKER) (test code = 430)                                        

 

             MONOCYTES RELATIVE PERCENT 10 %                                   



             (BEAKER) (test code = 431)                                        

 

             EOSINOPHILS RELATIVE PERCENT 2 %                                   

 



             (BEAKER) (test code = 432)                                        

 

             BASOPHILS RELATIVE PERCENT 1 %                                    



             (BEAKER) (test code = 437)                                        

 

             NEUTROPHILS ABSOLUTE COUNT 5.26 K/ L    1.80-8.00                 



             (BEAKER) (test code = 670)                                        

 

             LYMPHOCYTES ABSOLUTE COUNT 1.49 K/ L    1.48-4.50                 



             (BEAKER) (test code = 414)                                        

 

             MONOCYTES ABSOLUTE COUNT (BEAKER) 0.78 K/ L    0.00-1.30           

      



             (test code = 415)                                        

 

             EOSINOPHILS ABSOLUTE COUNT 0.18 K/ L    0.00-0.50                 



             (BEAKER) (test code = 416)                                        

 

             BASOPHILS ABSOLUTE COUNT (BEAKER) 0.04 K/ L    0.00-0.20           

      



             (test code = 417)                                        

 

             IMMATURE GRANULOCYTES-RELATIVE 1 %          0-0          H         

   



             PERCENT (BEAKER) (test code =                                      

  



             2801)                                               



BASIC METABOLIC [id number] 05:39:00





             Test Item    Value        Reference Range Interpretation Comments

 

             SODIUM (BEAKER) 134 meq/L    135-148      L            



             (test code = 381)                                        

 

             POTASSIUM (BEAKER) 4.2 meq/L    3.6-5.5                   



             (test code = 379)                                        

 

             CHLORIDE (BEAKER) 88 meq/L            L            



             (test code = 382)                                        

 

             CO2 (BEAKER) (test 36 meq/L     20-29        H            



             code = 355)                                         

 

             BLOOD UREA NITROGEN 30 mg/dL     10-26        H            



             (BEAKER) (test code                                        



             = 354)                                              

 

             CREATININE (BEAKER) 1.38 mg/dL   0.50-1.20    H            



             (test code = 358)                                        

 

             GLUCOSE RANDOM 98 mg/dL                         



             (BEAKER) (test code                                        



             = 652)                                              

 

             CALCIUM (BEAKER) 9.9 mg/dL    8.5-10.5                  



             (test code = 697)                                        

 

             EGFR (BEAKER) (test 53 mL/min/1.73                           ESTIMA

SONIA GFR IS



             code = 1092) sq m                                   NOT AS ACCURATE

 AS



                                                                 CREATININE



                                                                 CLEARANCE IN



                                                                 PREDICTING



                                                                 GLOMERULAR



                                                                 FILTRATION RATE

.



                                                                 ESTIMATED GFR I

S



                                                                 NOT APPLICABLE 

FOR



                                                                 DIALYSIS PATIEN

TS.



 ID - [id number] ID - [id number] ID - [id number] ID - 
[id number] ID - [id number] ID - [id number] ID - [id number] ID - 
[id number] ID - [id number] ID - [id number] 05:36:00





             Test Item    Value        Reference Range Interpretation Comments

 

             MAGNESIUM (BEAKER) (test code = 2.4 mg/dL    1.5-3.0               

    



             627)                                                



 ID - [id number] ID - [id number] ID - [id number] ID - [id number]
[id number] 06:24:00





             Test Item    Value        Reference Range Interpretation Comments

 

             MAGNESIUM (BEAKER) 2.3 mg/dL    1.5-3.0                   Specimen 

moderately



             (test code = 627)                                        hemolyzed



 ID - [id number] ID - [id number] ID - [id number] ID - [id number]
BASIC METABOLIC [id number] 06:23:00





             Test Item    Value        Reference Range Interpretation Comments

 

             SODIUM (BEAKER) 134 meq/L    135-148      L            



             (test code = 381)                                        

 

             POTASSIUM (BEAKER) 4.3 meq/L    3.6-5.5                   Specimen 

moderately



             (test code = 379)                                        hemolyzed

 

             CHLORIDE (BEAKER) 94 meq/L            L            



             (test code = 382)                                        

 

             CO2 (BEAKER) (test 30 meq/L     20-29        H            



             code = 355)                                         

 

             BLOOD UREA NITROGEN 25 mg/dL     10-26                     



             (BEAKER) (test code                                        



             = 354)                                              

 

             CREATININE (BEAKER) 1.21 mg/dL   0.50-1.20    H            Specimen

 moderately



             (test code = 358)                                        hemolyzed

 

             GLUCOSE RANDOM 105 mg/dL                        



             (BEAKER) (test code                                        



             = 652)                                              

 

             CALCIUM (BEAKER) 9.6 mg/dL    8.5-10.5                  



             (test code = 697)                                        

 

             EGFR (BEAKER) (test 61 mL/min/1.73                           ESTIMA

SONIA GFR IS



             code = 1092) sq m                                   NOT AS ACCURATE

 AS



                                                                 CREATININE



                                                                 CLEARANCE IN



                                                                 PREDICTING



                                                                 GLOMERULAR



                                                                 FILTRATION RATE

.



                                                                 ESTIMATED GFR I

S



                                                                 NOT APPLICABLE 

FOR



                                                                 DIALYSIS PATIEN

TS.



 ID - [id number] ID - [id number] ID - [id number] ID - 
[id number] ID - [id number] ID - [id number] ID - [id number] ID - 
[id number] ID - [id number] 06:52:00





             Test Item    Value        Reference Range Interpretation Comments

 

             MAGNESIUM (BEAKER) (test code = 2.4 mg/dL    1.5-3.0               

    



             627)                                                



 ID - LITOOperator ID - LITOOperator ID - LITOOperator ID - LITOBASIC 
METABOLIC [id number] 06:50:00





             Test Item    Value        Reference Range Interpretation Comments

 

             SODIUM (BEAKER) 139 meq/L    135-148                   



             (test code = 381)                                        

 

             POTASSIUM (BEAKER) 4.0 meq/L    3.6-5.5                   



             (test code = 379)                                        

 

             CHLORIDE (BEAKER) 95 meq/L            L            



             (test code = 382)                                        

 

             CO2 (BEAKER) (test 33 meq/L     20-29        H            



             code = 355)                                         

 

             BLOOD UREA NITROGEN 27 mg/dL     10-26        H            



             (BEAKER) (test code                                        



             = 354)                                              

 

             CREATININE (BEAKER) 1.23 mg/dL   0.50-1.20    H            



             (test code = 358)                                        

 

             GLUCOSE RANDOM 94 mg/dL                         



             (BEAKER) (test code                                        



             = 652)                                              

 

             CALCIUM (BEAKER) 9.8 mg/dL    8.5-10.5                  



             (test code = 697)                                        

 

             EGFR (BEAKER) (test 60 mL/min/1.73                           ESTIMA

SONIA GFR IS



             code = 1092) sq m                                   NOT AS ACCURATE

 AS



                                                                 CREATININE



                                                                 CLEARANCE IN



                                                                 PREDICTING



                                                                 GLOMERULAR



                                                                 FILTRATION RATE

.



                                                                 ESTIMATED GFR I

S



                                                                 NOT APPLICABLE 

FOR



                                                                 DIALYSIS PATIEN

TS.



 ID - LITOOperator ID - LITOOperator ID - LITOOperator ID - LITOOperator
ID - LITOOperator ID - LITOOperator ID - LITOOperator ID - LITOOperator ID - 
[id number] 01:52:00





             Test Item    Value        Reference Range Interpretation Comments

 

             MAGNESIUM (BEAKER) 2.3 mg/dL    1.5-3.0                   Specimen 

slightly



             (test code = 627)                                        hemolyzed



 ID - [id number] ID - [id number] ID - [id number] ID - 
[id number] METABOLIC [id number] 01:51:00





             Test Item    Value        Reference Range Interpretation Comments

 

             SODIUM (BEAKER) 141 meq/L    135-148                   



             (test code = 381)                                        

 

             POTASSIUM (BEAKER) 4.0 meq/L    3.6-5.5                   Specimen 

slightly



             (test code = 379)                                        hemolyzed

 

             CHLORIDE (BEAKER) 97 meq/L            L            



             (test code = 382)                                        

 

             CO2 (BEAKER) (test 28 meq/L     20-29                     



             code = 355)                                         

 

             BLOOD UREA NITROGEN 28 mg/dL     10-26        H            



             (BEAKER) (test code                                        



             = 354)                                              

 

             CREATININE (BEAKER) 1.30 mg/dL   0.50-1.20    H            Specimen

 slightly



             (test code = 358)                                        hemolyzed

 

             GLUCOSE RANDOM 98 mg/dL                         



             (BEAKER) (test code                                        



             = 652)                                              

 

             CALCIUM (BEAKER) 9.5 mg/dL    8.5-10.5                  



             (test code = 697)                                        

 

             EGFR (BEAKER) (test 56 mL/min/1.73                           ESTIMA

SONIA GFR IS



             code = 1092) sq m                                   NOT AS ACCURATE

 AS



                                                                 CREATININE



                                                                 CLEARANCE IN



                                                                 PREDICTING



                                                                 GLOMERULAR



                                                                 FILTRATION RATE

.



                                                                 ESTIMATED GFR I

S



                                                                 NOT APPLICABLE 

FOR



                                                                 DIALYSIS PATIEN

TS.



 ID - [id number] ID - [id number] ID - [id number] ID - 
[id number] ID - [id number] ID - [id number] ID - 
[id number] ID - [id number] ID - [id number] W/PLT COUNT &amp; AUTO 
[id number] 01:29:00





             Test Item    Value        Reference Range Interpretation Comments

 

             WHITE BLOOD CELL COUNT (BEAKER) 4.9 K/ L     4.0-10.0              

    



             (test code = 775)                                        

 

             RED BLOOD CELL COUNT (BEAKER) 4.56 M/ L    4.20-5.80               

  



             (test code = 761)                                        

 

             HEMOGLOBIN (BEAKER) (test code = 12.0 GM/DL   13.0-16.8    L       

     



             410)                                                

 

             HEMATOCRIT (BEAKER) (test code = 38.8 %       36.0-50.0            

     



             411)                                                

 

             MEAN CORPUSCULAR VOLUME (BEAKER) 85.1 fL      82.0-99.0            

     



             (test code = 753)                                        

 

             MEAN CORPUSCULAR HEMOGLOBIN 26.3 pg      27.0-33.0    L            



             (BEAKER) (test code = 751)                                        

 

             MEAN CORPUSCULAR HEMOGLOBIN CONC 30.9 GM/DL   32.0-36.0    L       

     



             (BEAKER) (test code = 752)                                        

 

             RED CELL DISTRIBUTION WIDTH 15.3 %       12.0-15.0    H            



             (BEAKER) (test code = 412)                                        

 

             PLATELET COUNT (BEAKER) (test 239 K/CU MM  150-430                 

  



             code = 756)                                         

 

             MEAN PLATELET VOLUME (BEAKER) 10.3 fL      6.0-11.5                

  



             (test code = 754)                                        

 

             NUCLEATED RED BLOOD CELLS 0 /100 WBC   0-0                       



             (BEAKER) (test code = 413)                                        

 

             NEUTROPHILS RELATIVE PERCENT 53 %                                  

 



             (BEAKER) (test code = 429)                                        

 

             LYMPHOCYTES RELATIVE PERCENT 23 %                                  

 



             (BEAKER) (test code = 430)                                        

 

             MONOCYTES RELATIVE PERCENT 13 %                                   



             (BEAKER) (test code = 431)                                        

 

             EOSINOPHILS RELATIVE PERCENT 9 %                                   

 



             (BEAKER) (test code = 432)                                        

 

             BASOPHILS RELATIVE PERCENT 1 %                                    



             (BEAKER) (test code = 437)                                        

 

             NEUTROPHILS ABSOLUTE COUNT 2.59 K/ L    1.80-8.00                 



             (BEAKER) (test code = 670)                                        

 

             LYMPHOCYTES ABSOLUTE COUNT 1.10 K/ L    1.48-4.50    L            



             (BEAKER) (test code = 414)                                        

 

             MONOCYTES ABSOLUTE COUNT (BEAKER) 0.65 K/ L    0.00-1.30           

      



             (test code = 415)                                        

 

             EOSINOPHILS ABSOLUTE COUNT 0.46 K/ L    0.00-0.50                 



             (BEAKER) (test code = 416)                                        

 

             BASOPHILS ABSOLUTE COUNT (BEAKER) 0.05 K/ L    0.00-0.20           

      



             (test code = 417)                                        

 

             IMMATURE GRANULOCYTES-RELATIVE 0 %          0-0                    

   



             PERCENT (BEAKER) (test code =                                      

  



             2801)                                               



TSH/FREE T4 IF INDICATED2021-07-15 04:09:00





             Test Item    Value        Reference Range Interpretation Comments

 

             THYROID STIMULATING HORMONE 4.240 uIU/mL 0.350-5.500               



             (BEAKER) (test code = 772)                                        



 ID - [id number] PANEL2021-07-15 03:51:00





             Test Item    Value        Reference Range Interpretation Comments

 

             TRIGLYCERIDES (BEAKER) (test code = 66 mg/dL                       

        



             540)                                                

 

             CHOLESTEROL (BEAKER) (test code = 131 mg/dL                        

      



             631)                                                

 

             HDL CHOLESTEROL (BEAKER) (test code 46 mg/dL                       

        



             = 976)                                              

 

             LDL CHOLESTEROL CALCULATED (BEAKER) 72 mg/dL                       

        



             (test code = 633)                                        



Triglyceride Reference Range: Low Risk &lt;150 Borderline 150-199 High Risk 200-
499  Very High Risk &gt;=500Cholesterol Reference Range: Low Risk &lt;200 
Borderline 200-239 High Risk &gt;240HDL Cholesterol Reference Range: Low Risk 
&gt;=60 High Risk &lt;40LDL Cholesterol Reference Range: Optimal &lt;100 Near 
Optimal 100-129 Borderline 130-159 High 160-189 Very High &gt;=190  ID -
[id number] ID - [id number] ID - [id number] 03:51:00





             Test Item    Value        Reference Range Interpretation Comments

 

             MAGNESIUM (BEAKER) (test code = 2.0 mg/dL    1.5-3.0               

    



             627)                                                



 ID - [id number] ID - [id number] ID - [id number] ID - [id number]
BASIC METABOLIC PANEL2021-07-15 03:50:00





             Test Item    Value        Reference Range Interpretation Comments

 

             SODIUM (BEAKER) 140 meq/L    135-148                   



             (test code = 381)                                        

 

             POTASSIUM (BEAKER) 3.7 meq/L    3.6-5.5                   



             (test code = 379)                                        

 

             CHLORIDE (BEAKER) 101 meq/L                        



             (test code = 382)                                        

 

             CO2 (BEAKER) (test 30 meq/L     20-29        H            



             code = 355)                                         

 

             BLOOD UREA NITROGEN 26 mg/dL     10-26                     



             (BEAKER) (test code                                        



             = 354)                                              

 

             CREATININE (BEAKER) 0.97 mg/dL   0.50-1.20                 



             (test code = 358)                                        

 

             GLUCOSE RANDOM 97 mg/dL                         



             (BEAKER) (test code                                        



             = 652)                                              

 

             CALCIUM (BEAKER) 9.1 mg/dL    8.5-10.5                  



             (test code = 697)                                        

 

             EGFR (BEAKER) (test 79 mL/min/1.73                           ESTIMA

SONIA GFR IS



             code = 1092) sq m                                   NOT AS ACCURATE

 AS



                                                                 CREATININE



                                                                 CLEARANCE IN



                                                                 PREDICTING



                                                                 GLOMERULAR



                                                                 FILTRATION RATE

.



                                                                 ESTIMATED GFR I

S



                                                                 NOT APPLICABLE 

FOR



                                                                 DIALYSIS PATIEN

TS.



 ID - [id number] ID - [id number] ID - [id number] ID - 
[id number] ID - [id number] ID - [id number] ID - [id number] ID - 
[id number] ID - [id number] W/PLT COUNT &amp; AUTO DIFFERENTIAL2021-0715 
03:31:00





             Test Item    Value        Reference Range Interpretation Comments

 

             WHITE BLOOD CELL COUNT (BEAKER) 5.1 K/ L     4.0-10.0              

    



             (test code = 775)                                        

 

             RED BLOOD CELL COUNT (BEAKER) 4.21 M/ L    4.20-5.80               

  



             (test code = 761)                                        

 

             HEMOGLOBIN (BEAKER) (test code = 11.2 GM/DL   13.0-16.8    L       

     



             410)                                                

 

             HEMATOCRIT (BEAKER) (test code = 35.9 %       36.0-50.0    L       

     



             411)                                                

 

             MEAN CORPUSCULAR VOLUME (BEAKER) 85.3 fL      82.0-99.0            

     



             (test code = 753)                                        

 

             MEAN CORPUSCULAR HEMOGLOBIN 26.6 pg      27.0-33.0    L            



             (BEAKER) (test code = 751)                                        

 

             MEAN CORPUSCULAR HEMOGLOBIN CONC 31.2 GM/DL   32.0-36.0    L       

     



             (BEAKER) (test code = 752)                                        

 

             RED CELL DISTRIBUTION WIDTH 15.1 %       12.0-15.0    H            



             (BEAKER) (test code = 412)                                        

 

             PLATELET COUNT (BEAKER) (test 203 K/CU MM  150-430                 

  



             code = 756)                                         

 

             MEAN PLATELET VOLUME (BEAKER) 10.8 fL      6.0-11.5                

  



             (test code = 754)                                        

 

             NUCLEATED RED BLOOD CELLS 0 /100 WBC   0-0                       



             (BEAKER) (test code = 413)                                        

 

             NEUTROPHILS RELATIVE PERCENT 65 %                                  

 



             (BEAKER) (test code = 429)                                        

 

             LYMPHOCYTES RELATIVE PERCENT 17 %                                  

 



             (BEAKER) (test code = 430)                                        

 

             MONOCYTES RELATIVE PERCENT 13 %                                   



             (BEAKER) (test code = 431)                                        

 

             EOSINOPHILS RELATIVE PERCENT 5 %                                   

 



             (BEAKER) (test code = 432)                                        

 

             BASOPHILS RELATIVE PERCENT 1 %                                    



             (BEAKER) (test code = 437)                                        

 

             NEUTROPHILS ABSOLUTE COUNT 3.32 K/ L    1.80-8.00                 



             (BEAKER) (test code = 670)                                        

 

             LYMPHOCYTES ABSOLUTE COUNT 0.85 K/ L    1.48-4.50    L            



             (BEAKER) (test code = 414)                                        

 

             MONOCYTES ABSOLUTE COUNT (BEAKER) 0.65 K/ L    0.00-1.30           

      



             (test code = 415)                                        

 

             EOSINOPHILS ABSOLUTE COUNT 0.25 K/ L    0.00-0.50                 



             (BEAKER) (test code = 416)                                        

 

             BASOPHILS ABSOLUTE COUNT (BEAKER) 0.03 K/ L    0.00-0.20           

      



             (test code = 417)                                        

 

             IMMATURE GRANULOCYTES-RELATIVE 0 %          0-0                    

   



             PERCENT (BEAKER) (test code =                                      

  



             2801)                                               



TROPONIN -25-45 17:59:00





             Test Item    Value        Reference Range Interpretation Comments

 

             TROPONIN I (BEAKER) (test code = 397) < ng/mL      0.00-0.15       

          








             Test Item    Value        Reference Range Interpretation Comments

 

             TROPONIN I (BEAKER) (test code = 397) < ng/mL      0.00-0.15       

          








             Test Item    Value        Reference Range Interpretation Comments

 

             COLOR (BEAKER) (test code = Yellow                                 



             470)                                                

 

             CLARITY (BEAKER) (test code = Clear                                

  



             469)                                                

 

             SPECIFIC GRAVITY UA (BEAKER) 1.020        1.001-1.035              

 



             (test code = 468)                                        

 

             PH UA (BEAKER) (test code = 6.0          5.0-8.0                   



             467)                                                

 

             PROTEIN UA (BEAKER) (test code Negative     Negative               

   



             = 464)                                              

 

             GLUCOSE UA (BEAKER) (test code Negative     Negative               

   



             = 365)                                              

 

             KETONES UA (BEAKER) (test code Negative     Negative               

   



             = 371)                                              

 

             BILIRUBIN UA (BEAKER) (test Negative     Negative                  



             code = 462)                                         

 

             BLOOD UA (BEAKER) (test code = Negative     Negative               

   



             461)                                                

 

             NITRITE UA (BEAKER) (test code Negative     Negative               

   



             = 465)                                              

 

             LEUKOCYTE ESTERASE UA (BEAKER) Negative     Negative               

   



             (test code = 466)                                        

 

             UROBILINOGEN UA (BEAKER) (test 0.2 mg/dL    0.2-1.0                

   



             code = 463)                                         

 

             BACTERIA (BEAKER) (test code = None Seen                           

   



             517)                                                

 

             RBC UA-MANUAL (BEAKER) (test None Seen /HPF                        

   



             code = 1659)                                        

 

             WBC UA-MANUAL (BEAKER) (test None Seen /HPF                        

   



             code = 1661)                                        

 

             SQUAMOUS EPITHELIAL MANUAL None Seen /HPF                          

 



             (BEAKER) (test code = 1663)                                        

 

             SOURCE(BEAKER) (test code =                                        



             2795)                                               



SARS-COV2/RT-PCR ([facility])2021 10:01:00





             Test Item    Value        Reference Range Interpretation Comments

 

             SARS-COV2/RT-PCR Negative     Negative                  The SARS-Co

V-2 target



             (test code = 9963271)                                        nuclei

c acids are not



                                                                 detected in thi

s



                                                                 specimen.



The presence of SARS-CoV-2/FLU/RSV viral nucleic acids cannot rule out co-
infections or disease caused by other viral or bacterial pathogens. As with any 
molecular test, mutations within the target regions of the [facility] 
SARS-CoV-2/Flu/RSV test could affect primer and/or probe binding resulting in 
failure to detect the presence of virus or the virus being detected less 
predictably. False negative results may occur if the virus is present at levels 
below the analytical limit of detection in thisspecimen.This [facility]pert Xpress 
SARS-CoV-2/Flu/RSV test is a rapid, real-time RT-PCR test intended for the 
qualitative detection of nucleic acid from [facility] SARS-CoV-2/Flu/RSV in a 
nasopharyngeal swabspecimen collected from individuals suspected of [facility]
SARS-CoV-2/Flu/RSV by their healthcareprovider. Results from [facility] Xpress 
SARS-CoV-2/Flu/RSV test should be correlated with the clinical history, 
epidemiological data, and other data available to the clinician evaluating the 
patient. Viral nucleic acid may persist in vivo, independent of virus viability.
Detection of analyte target(s)does not imply that the corresponding virus(es) 
are infectious or are the causative agents for clinical symptoms.This test has 
not been Food and Drug Administration (FDA) cleared or approved and has been 
authorized by FDA under an Emergency Use Authorization (EUA). This EUA will be 
effective until thedeclaration that circumstances exist justifying the 
authorization of the emergency use of in vitro diagnostic tests for detection 
and/or diagnosis of COVID-19 is terminated under Section 564(b)(2) of the Act or
the EUA is revoked under Section 564(g) of the Act.Fact Sheet for Healthcare 
Providers:https
://www.[employer]/Documents/Xpert%20Xpress%20SARS%20CoV-2/Fact%20Sheets/302-390

2%36OBMR-BZD-0%20HEALTHCARE%20PROVIDERS%20FACT%20SHEET.pdfFact Sheet for 
Healthcare Patients:https://www.[employer]/Docum
ents/Xpert%20Xpress%20SARS%20Cov-2/Fact%20Sheets/302-3801%13GPCB-GJR-7%20PATIENT

%20FACT%20SHEET.pdfTROPONIN -40-78 09:15:00





             Test Item    Value        Reference Range Interpretation Comments

 

             TROPONIN I (BEAKER) (test code = 397) < ng/mL      0.00-0.15       

          








             Test Item    Value        Reference Range Interpretation Comments

 

             B-TYPE NATRIURETIC PEPTIDE (BEAKER) 242 pg/mL    0-100        H    

        



             (test code = 700)                                        



 ID - [id number] METABOLIC [id number] 08:43:00





             Test Item    Value        Reference Range Interpretation Comments

 

             TOTAL PROTEIN 6.7 gm/dL    6.0-8.5                   Specimen sligh

tly



             (BEAKER) (test code =                                        hemoly

zed



             770)                                                

 

             ALBUMIN (BEAKER) 3.6 g/dL     3.5-5.0                   Specimen sl

ightly



             (test code = 1145)                                        hemolyzed

 

             ALKALINE PHOSPHATASE 110 U/L                          



             (BEAKER) (test code =                                        



             346)                                                

 

             BILIRUBIN TOTAL 0.6 mg/dL    0.1-1.2                   Specimen sli

ghtly



             (BEAKER) (test code =                                        hemoly

zed



             377)                                                

 

             SODIUM (BEAKER) (test 135 meq/L    135-148                   



             code = 381)                                         

 

             POTASSIUM (BEAKER) 4.2 meq/L    3.6-5.5                   Specimen 

slightly



             (test code = 379)                                        hemolyzed

 

             CHLORIDE (BEAKER) 102 meq/L                        



             (test code = 382)                                        

 

             CO2 (BEAKER) (test 26 meq/L     20-29                     



             code = 355)                                         

 

             BLOOD UREA NITROGEN 28 mg/dL     10-26        H            



             (BEAKER) (test code =                                        



             354)                                                

 

             CREATININE (BEAKER) 1.03 mg/dL   0.50-1.20                 Specimen

 slightly



             (test code = 358)                                        hemolyzed

 

             GLUCOSE RANDOM 109 mg/dL                        



             (BEAKER) (test code =                                        



             652)                                                

 

             CALCIUM (BEAKER) 9.2 mg/dL    8.5-10.5                  



             (test code = 697)                                        

 

             AST (SGOT) (BEAKER) 23 U/L       5-40                      Specimen

 slightly



             (test code = 353)                                        hemolyzed

 

             ALT (SGPT) (BEAKER) 21 U/L       5-50                      Specimen

 slightly



             (test code = 347)                                        hemolyzed

 

             EGFR (BEAKER) (test 74 mL/min/1.73                           ESTIMA

SONIA GFR IS



             code = 1092) sq m                                   NOT AS ACCURATE

 AS



                                                                 CREATININE



                                                                 CLEARANCE IN



                                                                 PREDICTING



                                                                 GLOMERULAR



                                                                 FILTRATION RATE

.



                                                                 ESTIMATED GFR I

S



                                                                 NOT APPLICABLE 

FOR



                                                                 DIALYSIS PATIEN

TS.



 ID - [id number] ID - [id number] ID - [id number] ID - 
SSAD29Guyogqln ID - [id number] ID - [id number] ID - AMPS52Kjhlossc ID - 
WISA25Qexojvgb ID - NHWP44Ltudjzjy ID - CEQX25Ikxfrloa ID - BQAH84Iqwlyusl ID - 
VVJE83Qcorappv ID - MRLT67Pnrhrovr ID - YBGG28Mdxkxwfq ID - PMCS83Reotpezt ID - 
CBLR93Mecsqurs ID - ZCBU92Myhsmqwr ID - GAFU92Oyiygrdu ID - [id number] W/PLT 
COUNT &amp; AUTO [id number] 08:29:00





             Test Item    Value        Reference Range Interpretation Comments

 

             WHITE BLOOD CELL COUNT (BEAKER) 5.1 K/ L     4.0-10.0              

    



             (test code = 775)                                        

 

             RED BLOOD CELL COUNT (BEAKER) 4.16 M/ L    4.20-5.80    L          

  



             (test code = 761)                                        

 

             HEMOGLOBIN (BEAKER) (test code = 11.0 GM/DL   13.0-16.8    L       

     



             410)                                                

 

             HEMATOCRIT (BEAKER) (test code = 35.3 %       36.0-50.0    L       

     



             411)                                                

 

             MEAN CORPUSCULAR VOLUME (BEAKER) 84.9 fL      82.0-99.0            

     



             (test code = 753)                                        

 

             MEAN CORPUSCULAR HEMOGLOBIN 26.4 pg      27.0-33.0    L            



             (BEAKER) (test code = 751)                                        

 

             MEAN CORPUSCULAR HEMOGLOBIN CONC 31.2 GM/DL   32.0-36.0    L       

     



             (BEAKER) (test code = 752)                                        

 

             RED CELL DISTRIBUTION WIDTH 15.0 %       12.0-15.0                 



             (BEAKER) (test code = 412)                                        

 

             PLATELET COUNT (BEAKER) (test 230 K/CU MM  150-430                 

  



             code = 756)                                         

 

             MEAN PLATELET VOLUME (BEAKER) 11.6 fL      6.0-11.5     H          

  



             (test code = 754)                                        

 

             NUCLEATED RED BLOOD CELLS 0 /100 WBC   0-0                       



             (BEAKER) (test code = 413)                                        

 

             NEUTROPHILS RELATIVE PERCENT 66 %                                  

 



             (BEAKER) (test code = 429)                                        

 

             LYMPHOCYTES RELATIVE PERCENT 15 %                                  

 



             (BEAKER) (test code = 430)                                        

 

             MONOCYTES RELATIVE PERCENT 12 %                                   



             (BEAKER) (test code = 431)                                        

 

             EOSINOPHILS RELATIVE PERCENT 6 %                                   

 



             (BEAKER) (test code = 432)                                        

 

             BASOPHILS RELATIVE PERCENT 1 %                                    



             (BEAKER) (test code = 437)                                        

 

             NEUTROPHILS ABSOLUTE COUNT 3.39 K/ L    1.80-8.00                 



             (BEAKER) (test code = 670)                                        

 

             LYMPHOCYTES ABSOLUTE COUNT 0.78 K/ L    1.48-4.50    L            



             (BEAKER) (test code = 414)                                        

 

             MONOCYTES ABSOLUTE COUNT (BEAKER) 0.62 K/ L    0.00-1.30           

      



             (test code = 415)                                        

 

             EOSINOPHILS ABSOLUTE COUNT 0.29 K/ L    0.00-0.50                 



             (BEAKER) (test code = 416)                                        

 

             BASOPHILS ABSOLUTE COUNT (BEAKER) 0.04 K/ L    0.00-0.20           

      



             (test code = 417)                                        

 

             IMMATURE GRANULOCYTES-RELATIVE 0 %          0-0                    

   



             PERCENT (BEAKER) (test code =                                      

  



             2801)                                               



RAD, CHEST, 1 VIEW, NON [id number] 08:24:00Reason for exam:-&gt;CHEST 
PAINShould this be performed at the bedside?-&gt;Yes
********************************[facility] 1960 Sex: 
M************************************************************FINAL REPORT 
PATIENT ID: [id number] INDICATION:Chest pain COMPARISON:2021 
TECHNIQUE:Frontal view of the chest. FINDINGS:Low lung volumes.Accentuated 
cardiomediastinal silhouette.No consolidation. No pneumothorax or pleural 
effusion. IMPRESSION:No acute pulmonary process. Signed: Lacho Swifteport 
Verified Date/Time: 2021 08:24:18 Reading Location: [facility] Radiology 
Reading Room Electronically signed by: LACHO SWIFT MD on 2021 08:24 AM
SARS-CoV-2 (COVID-19) RNA [Presence] in Respiratory specimen by SHANDA with probe 
[id number] 07:55:48





             Test Item    Value        Reference Range Interpretation Comments

 

             SARS-CoV-2 (COVID-19) RNA Not detected Not-Detected              



             [Presence] in Respiratory                                        



             specimen by SHANDA with probe                                        



             detection (test code = 06468-2)                                    

    

 

             Whether patient is employed in a                                   

     



             healthcare setting (test code =                                    

    



             90979-1)                                            

 

             Whether the patient has symptoms                                   

     



             related to condition of interest                                   

     



             (test code = 48230-9)                                        

 

             Patient was hospitalized because                                   

     



             of this condition (test code =                                     

   



             72375-2)                                            

 

             Whether the patient was admitted                                   

     



             to intensive care unit (ICU) for                                   

     



             condition of interest (test code                                   

     



             = 73969-3)                                          

 

             Whether patient resides in a                                       

 



             congregate care setting (test                                      

  



             code = 14225-4)                                        



SAPNA SANDERSON WESTCT, CTA [id number] 16:49:00Please extend down 
through mid-thighUnlisted Reason for Exam - Click Yes and Enter Reason 
Below-&gt;YesUnlisted Reason for Exam-&gt;"leaking from cath site", operation at
 [facility] a few days ago, eliquis, bruising and abdominal pain
********************************[facility] 1960 Sex: 
M************************************************************FINAL REPORT 
PATIENT ID: [id number] TECHNIQUE: CT ANGIOGRAM of the abdomen and pelvis WITHOUT 
and WITH intravenous contrast and WITHOUT oral contrast. Dose modulation, 
iterative reconstruction, and/or weight-based adjustment of the mA/kV was 
utilized to reduce the radiation dose to as low as reasonably achievable. 
INDICATION: Leaking from catheter site, bruised. COMPARISON: None. FINDINGS: 
LOWER THORAX: Bibasilar atelectasis.. HEPATOBILIARY: No focal hepatic lesions. 
Gallbladder is unremarkable. No biliary ductal dilatation.SPLEEN: No 
splenomegaly.PANCREAS: No focal masses or ductal dilatation. ADRENALS:No adrenal
 nodules.KIDNEYS/URETERS: No hydronephrosis, stones, or solid mass lesions. Mild
 multifocal cortical scarring of the right kidney.PELVIC ORGANS/BLADDER: 
Unremarkable. PERITONEUM/RETROPERITONEUM: Extraperitoneal hematoma hemorrhage 
within the pelvis along the right pelvic sidewall and along the anterior pelvic 
wall Measuring approximately 15 x 4 x 6 cm. There is no extravascular leak of 
contrast to suggest active arterial bleeding.LYMPH NODES: No 
lymphadenopathy.VESSELS: Mild atherosclerotic changes in the abdominal aorta and
 branch vessels. No evidence of aneurysmal dilation.. GI TRACT: No distention or
 wall thickening. Appendix is normal. BONES AND SOFT TISSUES: Small hematoma 
anterior to the right common femoral vessels measuring 2.1 x 1 cm.. Degenerative
 changes in the spine. No suspicious osseous lesion. IMPRESSION:There is a large
 extraperitoneal hematoma along the right pelvic sidewall and anterior abdominal
 wall. There is also small hematoma anterior to the right common femoralvessels.
 No extravasation of contrast to suggest active arterial bleeding. No evidence 
of arterial pseudoaneurysm. Signed: Karoline Givens MDReport Verified 
Date/Time: 2021 16:49:39 Reading Location: [facility] C013Y CT Body Reading 
Room Electronically signed by: KAROLINE GIVENS MD on 2021 04:49 PM
URINALYSIS W/ REFLEX URINE [id number] 16:36:00





             Test Item    Value        Reference Range Interpretation Comments

 

             COLOR (BEAKER) (test code = Yellow                                 



             470)                                                

 

             CLARITY (BEAKER) (test code = Clear                                

  



             469)                                                

 

             SPECIFIC GRAVITY UA (BEAKER) 1.020        1.001-1.035              

 



             (test code = 468)                                        

 

             PH UA (BEAKER) (test code = 8.0          5.0-8.0                   



             467)                                                

 

             PROTEIN UA (BEAKER) (test code Negative     Negative               

   



             = 464)                                              

 

             GLUCOSE UA (BEAKER) (test code Negative     Negative               

   



             = 365)                                              

 

             KETONES UA (BEAKER) (test code Negative     Negative               

   



             = 371)                                              

 

             BILIRUBIN UA (BEAKER) (test Negative     Negative                  



             code = 462)                                         

 

             BLOOD UA (BEAKER) (test code = Negative     Negative               

   



             461)                                                

 

             NITRITE UA (BEAKER) (test code Negative     Negative               

   



             = 465)                                              

 

             LEUKOCYTE ESTERASE UA (BEAKER) Negative     Negative               

   



             (test code = 466)                                        

 

             UROBILINOGEN UA (BEAKER) (test 0.2 mg/dL    0.2-1.0                

   



             code = 463)                                         

 

             BACTERIA (BEAKER) (test code = None Seen                           

   



             517)                                                

 

             RBC UA-MANUAL (BEAKER) (test None Seen /HPF                        

   



             code = 1659)                                        

 

             WBC UA-MANUAL (BEAKER) (test None Seen /HPF                        

   



             code = 1661)                                        

 

             SQUAMOUS EPITHELIAL MANUAL None Seen /HPF                          

 



             (BEAKER) (test code = 1663)                                        

 

             SOURCE(BEAKER) (test code =                                        



             2795)                                               



COMPREHENSIVE METABOLIC [id number] 15:02:00





             Test Item    Value        Reference Range Interpretation Comments

 

             TOTAL PROTEIN 6.4 gm/dL    6.0-8.5                   



             (BEAKER) (test code =                                        



             770)                                                

 

             ALBUMIN (BEAKER) 3.4 g/dL     3.5-5.0      L            



             (test code = 1145)                                        

 

             ALKALINE PHOSPHATASE 117 U/L             H            



             (BEAKER) (test code =                                        



             346)                                                

 

             BILIRUBIN TOTAL 0.8 mg/dL    0.1-1.2                   



             (BEAKER) (test code =                                        



             377)                                                

 

             SODIUM (BEAKER) (test 137 meq/L    135-148                   



             code = 381)                                         

 

             POTASSIUM (BEAKER) 4.4 meq/L    3.6-5.5                   



             (test code = 379)                                        

 

             CHLORIDE (BEAKER) 104 meq/L                        



             (test code = 382)                                        

 

             CO2 (BEAKER) (test 23 meq/L     20-29                     



             code = 355)                                         

 

             BLOOD UREA NITROGEN 12 mg/dL     10-26                     



             (BEAKER) (test code =                                        



             354)                                                

 

             CREATININE (BEAKER) 0.96 mg/dL   0.50-1.20                 



             (test code = 358)                                        

 

             GLUCOSE RANDOM 135 mg/dL           H            



             (BEAKER) (test code =                                        



             652)                                                

 

             CALCIUM (BEAKER) 8.7 mg/dL    8.5-10.5                  



             (test code = 697)                                        

 

             AST (SGOT) (BEAKER) 20 U/L       5-40                      



             (test code = 353)                                        

 

             ALT (SGPT) (BEAKER) 23 U/L       5-50                      



             (test code = 347)                                        

 

             EGFR (BEAKER) (test 80 mL/min/1.73                           ESTIMA

SONIA GFR IS



             code = 1092) sq m                                   NOT AS ACCURATE

 AS



                                                                 CREATININE



                                                                 CLEARANCE IN



                                                                 PREDICTING



                                                                 GLOMERULAR



                                                                 FILTRATION RATE

.



                                                                 ESTIMATED GFR I

S



                                                                 NOT APPLICABLE 

FOR



                                                                 DIALYSIS PATIEN

TS.



 ID - [id number] ID - [id number] ID - wyhe67Dielspcs ID - 
vkxb57Wzefrbwc ID - kaoy59Jsczgveo ID - tpwq87Pswywdtw ID - fxnb76Rhngefau ID - 
xcor41Sewbkshz ID - kqys77Lfepsdpw ID - jkup21Uidtkvfp ID - xdhy83Qdnwxgic ID - 
pdda74Bksjvflj ID - ojws23Yswhbvmn ID - uvmw86Ikxqzuhe ID - ymdk16Htttzfre ID - 
qawb61Bmlfznsr ID - xywv84Gmsgtbfy ID - hlrg60Xjanuaoc ID - [id number] W/PLT 
COUNT &amp; AUTO [id number] 14:58:00





             Test Item    Value        Reference Range Interpretation Comments

 

             WHITE BLOOD CELL COUNT (BEAKER) 7.6 K/ L     4.0-10.0              

    



             (test code = 775)                                        

 

             RED BLOOD CELL COUNT (BEAKER) 3.51 M/ L    4.20-5.80    L          

  



             (test code = 761)                                        

 

             HEMOGLOBIN (BEAKER) (test code = 9.6 GM/DL    13.0-16.8    L       

     



             410)                                                

 

             HEMATOCRIT (BEAKER) (test code = 29.9 %       36.0-50.0    L       

     



             411)                                                

 

             MEAN CORPUSCULAR VOLUME (BEAKER) 85.2 fL      82.0-99.0            

     



             (test code = 753)                                        

 

             MEAN CORPUSCULAR HEMOGLOBIN 27.4 pg      27.0-33.0                 



             (BEAKER) (test code = 751)                                        

 

             MEAN CORPUSCULAR HEMOGLOBIN CONC 32.1 GM/DL   32.0-36.0            

     



             (BEAKER) (test code = 752)                                        

 

             RED CELL DISTRIBUTION WIDTH 15.5 %       12.0-15.0    H            



             (BEAKER) (test code = 412)                                        

 

             PLATELET COUNT (BEAKER) (test 275 K/CU MM  150-430                 

  



             code = 756)                                         

 

             MEAN PLATELET VOLUME (BEAKER) 9.6 fL       6.0-11.5                

  



             (test code = 754)                                        

 

             NUCLEATED RED BLOOD CELLS 0 /100 WBC   0-0                       



             (BEAKER) (test code = 413)                                        

 

             NEUTROPHILS RELATIVE PERCENT 77 %                                  

 



             (BEAKER) (test code = 429)                                        

 

             LYMPHOCYTES RELATIVE PERCENT 11 %                                  

 



             (BEAKER) (test code = 430)                                        

 

             MONOCYTES RELATIVE PERCENT 8 %                                    



             (BEAKER) (test code = 431)                                        

 

             EOSINOPHILS RELATIVE PERCENT 2 %                                   

 



             (BEAKER) (test code = 432)                                        

 

             BASOPHILS RELATIVE PERCENT 0 %                                    



             (BEAKER) (test code = 437)                                        

 

             NEUTROPHILS ABSOLUTE COUNT 5.84 K/ L    1.80-8.00                 



             (BEAKER) (test code = 670)                                        

 

             LYMPHOCYTES ABSOLUTE COUNT 0.85 K/ L    1.48-4.50    L            



             (BEAKER) (test code = 414)                                        

 

             MONOCYTES ABSOLUTE COUNT (BEAKER) 0.61 K/ L    0.00-1.30           

      



             (test code = 415)                                        

 

             EOSINOPHILS ABSOLUTE COUNT 0.12 K/ L    0.00-0.50                 



             (BEAKER) (test code = 416)                                        

 

             BASOPHILS ABSOLUTE COUNT (BEAKER) 0.02 K/ L    0.00-0.20           

      



             (test code = 417)                                        

 

             IMMATURE GRANULOCYTES-RELATIVE 2 %          0-0          H         

   



             PERCENT (BEAKER) (test code =                                      

  



             2801)                                               



[id number] 14:57:00





             Test Item    Value        Reference Range Interpretation Comments

 

             PROTIME (BEAKER) (test 10.8 seconds 9.3-12.0                  Final

 Information



             code = 759)                                         (Auto Output)

 

             INR (BEAKER) (test 0.97         See_Comment               Final Inf

ormation



             code = 370)                                         (Auto Output)



                                                                 [Automated mess

age]



                                                                 The system [employer]



                                                                 generated this



                                                                 result transmit

sonia



                                                                 reference range

:



                                                                 <=5.90. The



                                                                 reference range

 was



                                                                 not used to



                                                                 interpret this



                                                                 result as



                                                                 normal/abnormal

.

 

             PARTIAL THROMBOPLASTIN 25.7 seconds 23.0-35.0                 Final

 Information



             TIME (BEAKER) (test                                        (Auto Ou

tput)



             code = 760)                                         



RECOMMENDED COUMADIN/WARFARIN INR THERAPY RANGESSTANDARD DOSE: 2.0 - 3.0 
Includes: PROPHYLAXIS for venous thrombosis, systemic embolization; TREATMENT 
for venous thrombosis and/or pulmonary embolus.HIGH RISK: Target INR is 2.5-3.5 
for patients with mechanical heart valves.SARS-CoV-2 (COVID-19) RNA [Presence] 
in Respiratory specimen by SHANDA with probe [id number] 04:23:09





             Test Item    Value        Reference Range Interpretation Comments

 

             SARS-CoV-2 (COVID-19) RNA Not detected Not-Detected              



             [Presence] in Respiratory                                        



             specimen by SHANDA with probe                                        



             detection (test code = 32425-0)                                    

    

 

             Whether patient is employed in a                                   

     



             healthcare setting (test code =                                    

    



             77245-8)                                            

 

             Whether the patient has symptoms                                   

     



             related to condition of interest                                   

     



             (test code = 68851-1)                                        

 

             Patient was hospitalized because                                   

     



             of this condition (test code =                                     

   



             62093-8)                                            

 

             Whether the patient was admitted                                   

     



             to intensive care unit (ICU) for                                   

     



             condition of interest (test code                                   

     



             = 44921-8)                                          

 

             Whether patient resides in a                                       

 



             congregate care setting (test                                      

  



             code = 54648-4)                                        



[facility], EXTREMITY, LOWER, RIGHT (NON-VASCULAR) 
[id number] 11:10:00Reason for exam:-&gt;R groin pain after recent heart 
cath.************************************************************[facility] : 1960 Sex: 
M************************************************************FINAL REPORT 
PATIENT ID: [id number] Clinical history: Groin pain after heart cath TECHNIQUE: 
Limited sonographic images are obtained of the right groin. FINDINGS/IMPRESSION:
 Limited images of the right groindemonstrate patent common femoral and 
superficial femoral arteries. No pseudoaneurysm, or organized fluid collection, 
or other sonographically evident abnormality is identified within the right 
groin. Signed: Raman Bell Verified Date/Time: 2021 11:10:12 
Reading Location: [address] Body Reading Room Electronically signed by: 
RAMAN BELL MD on 2021 11:10 AMBASIC METABOLIC [id number] 
03:14:00





             Test Item    Value        Reference Range Interpretation Comments

 

             SODIUM (BEAKER) 137 meq/L    135-148                   



             (test code = 381)                                        

 

             POTASSIUM (BEAKER) 4.9 meq/L    3.6-5.5                   



             (test code = 379)                                        

 

             CHLORIDE (BEAKER) 100 meq/L                        



             (test code = 382)                                        

 

             CO2 (BEAKER) (test 26 meq/L     20-29                     



             code = 355)                                         

 

             BLOOD UREA NITROGEN 36 mg/dL     10-26        H            



             (BEAKER) (test code                                        



             = 354)                                              

 

             CREATININE (BEAKER) 1.20 mg/dL   0.50-1.20                 



             (test code = 358)                                        

 

             GLUCOSE RANDOM 140 mg/dL           H            



             (BEAKER) (test code                                        



             = 652)                                              

 

             CALCIUM (BEAKER) 9.1 mg/dL    8.5-10.5                  



             (test code = 697)                                        

 

             EGFR (BEAKER) (test 62 mL/min/1.73                           ESTIMA

SONIA GFR IS



             code = 1092) sq m                                   NOT AS ACCURATE

 AS



                                                                 CREATININE



                                                                 CLEARANCE IN



                                                                 PREDICTING



                                                                 GLOMERULAR



                                                                 FILTRATION RATE

.



                                                                 ESTIMATED GFR I

S



                                                                 NOT APPLICABLE 

FOR



                                                                 DIALYSIS PATIEN

TS.



 ID - JUSTINOperator ID - JUSTINOperator ID - JUSTINOperator ID - 
JUSTINOperator ID - JUSTINOperator ID - JUSTINOperator ID - JUSTINOperator ID - 
JUSTINOperator ID - JUSTINOperator ID - JUSTINOperator ID - JUSTINOperator ID - 
JUSTINBASIC METABOLIC [id number] 03:31:00





             Test Item    Value        Reference Range Interpretation Comments

 

             SODIUM (BEAKER) 138 meq/L    135-148                   



             (test code = 381)                                        

 

             POTASSIUM (BEAKER) 5.1 meq/L    3.6-5.5                   



             (test code = 379)                                        

 

             CHLORIDE (BEAKER) 101 meq/L                        



             (test code = 382)                                        

 

             CO2 (BEAKER) (test 28 meq/L     20-29                     



             code = 355)                                         

 

             BLOOD UREA NITROGEN 32 mg/dL     10-26        H            



             (BEAKER) (test code                                        



             = 354)                                              

 

             CREATININE (BEAKER) 0.98 mg/dL   0.50-1.20                 



             (test code = 358)                                        

 

             GLUCOSE RANDOM 146 mg/dL           H            



             (BEAKER) (test code                                        



             = 652)                                              

 

             CALCIUM (BEAKER) 9.1 mg/dL    8.5-10.5                  



             (test code = 697)                                        

 

             EGFR (BEAKER) (test 78 mL/min/1.73                           ESTIMA

SONIA GFR IS



             code = 1092) sq m                                   NOT AS ACCURATE

 AS



                                                                 CREATININE



                                                                 CLEARANCE IN



                                                                 PREDICTING



                                                                 GLOMERULAR



                                                                 FILTRATION RATE

.



                                                                 ESTIMATED GFR I

S



                                                                 NOT APPLICABLE 

FOR



                                                                 DIALYSIS PATIEN

TS.



 ID - [id number] ID - [id number] ID - [id number] ID - 
[id number] ID - [id number] ID - [id number] ID - 
[id number] ID - [id number] ID - [id number] ID - 
[id number] ID - [id number] ID - [id number] 04:48:00





             Test Item    Value        Reference Range Interpretation Comments

 

             MAGNESIUM (BEAKER) (test code = 2.2 mg/dL    1.5-3.0               

    



             627)                                                



 ID - LITOOperator ID - LITOOperator ID - LITOOperator ID - LITOBASIC 
METABOLIC [id number] 04:47:00





             Test Item    Value        Reference Range Interpretation Comments

 

             SODIUM (BEAKER) 138 meq/L    135-148                   



             (test code = 381)                                        

 

             POTASSIUM (BEAKER) 4.5 meq/L    3.6-5.5                   



             (test code = 379)                                        

 

             CHLORIDE (BEAKER) 103 meq/L                        



             (test code = 382)                                        

 

             CO2 (BEAKER) (test 25 meq/L     20-29                     



             code = 355)                                         

 

             BLOOD UREA NITROGEN 19 mg/dL     10-26                     



             (BEAKER) (test code                                        



             = 354)                                              

 

             CREATININE (BEAKER) 1.01 mg/dL   0.50-1.20                 



             (test code = 358)                                        

 

             GLUCOSE RANDOM 104 mg/dL                        



             (BEAKER) (test code                                        



             = 652)                                              

 

             CALCIUM (BEAKER) 8.7 mg/dL    8.5-10.5                  



             (test code = 697)                                        

 

             EGFR (BEAKER) (test 76 mL/min/1.73                           ESTIMA

SONIA GFR IS



             code = 1092) sq m                                   NOT AS ACCURATE

 AS



                                                                 CREATININE



                                                                 CLEARANCE IN



                                                                 PREDICTING



                                                                 GLOMERULAR



                                                                 FILTRATION RATE

.



                                                                 ESTIMATED GFR I

S



                                                                 NOT APPLICABLE 

FOR



                                                                 DIALYSIS PATIEN

TS.



 ID - LITOOperator ID - LITOOperator ID - LITOOperator ID - LITOOperator
 ID - LITOOperator ID - LITOOperator ID - LITOOperator ID - LITOOperator ID - 
LITOCBC W/PLT COUNT &amp; AUTO [id number] 04:27:00





             Test Item    Value        Reference Range Interpretation Comments

 

             WHITE BLOOD CELL COUNT (BEAKER) 5.2 K/ L     4.0-10.0              

    



             (test code = 775)                                        

 

             RED BLOOD CELL COUNT (BEAKER) 4.99 M/ L    4.20-5.80               

  



             (test code = 761)                                        

 

             HEMOGLOBIN (BEAKER) (test code = 13.2 GM/DL   13.0-16.8            

     



             410)                                                

 

             HEMATOCRIT (BEAKER) (test code = 43.5 %       36.0-50.0            

     



             411)                                                

 

             MEAN CORPUSCULAR VOLUME (BEAKER) 87.2 fL      82.0-99.0            

     



             (test code = 753)                                        

 

             MEAN CORPUSCULAR HEMOGLOBIN 26.5 pg      27.0-33.0    L            



             (BEAKER) (test code = 751)                                        

 

             MEAN CORPUSCULAR HEMOGLOBIN CONC 30.3 GM/DL   32.0-36.0    L       

     



             (BEAKER) (test code = 752)                                        

 

             RED CELL DISTRIBUTION WIDTH 15.6 %       12.0-15.0    H            



             (BEAKER) (test code = 412)                                        

 

             PLATELET COUNT (BEAKER) (test 153 K/CU MM  150-430                 

  



             code = 756)                                         

 

             MEAN PLATELET VOLUME (BEAKER) 11.3 fL      6.0-11.5                

  



             (test code = 754)                                        

 

             NUCLEATED RED BLOOD CELLS 0 /100 WBC   0-0                       



             (BEAKER) (test code = 413)                                        

 

             NEUTROPHILS RELATIVE PERCENT 60 %                                  

 



             (BEAKER) (test code = 429)                                        

 

             LYMPHOCYTES RELATIVE PERCENT 24 %                                  

 



             (BEAKER) (test code = 430)                                        

 

             MONOCYTES RELATIVE PERCENT 12 %                                   



             (BEAKER) (test code = 431)                                        

 

             EOSINOPHILS RELATIVE PERCENT 3 %                                   

 



             (BEAKER) (test code = 432)                                        

 

             BASOPHILS RELATIVE PERCENT 1 %                                    



             (BEAKER) (test code = 437)                                        

 

             NEUTROPHILS ABSOLUTE COUNT 3.14 K/ L    1.80-8.00                 



             (BEAKER) (test code = 670)                                        

 

             LYMPHOCYTES ABSOLUTE COUNT 1.24 K/ L    1.48-4.50    L            



             (BEAKER) (test code = 414)                                        

 

             MONOCYTES ABSOLUTE COUNT (BEAKER) 0.62 K/ L    0.00-1.30           

      



             (test code = 415)                                        

 

             EOSINOPHILS ABSOLUTE COUNT 0.16 K/ L    0.00-0.50                 



             (BEAKER) (test code = 416)                                        

 

             BASOPHILS ABSOLUTE COUNT (BEAKER) 0.04 K/ L    0.00-0.20           

      



             (test code = 417)                                        

 

             IMMATURE GRANULOCYTES-RELATIVE 1 %          0-0          H         

   



             PERCENT (BEAKER) (test code =                                      

  



             2801)                                               



TROPONIN -94-72 07:02:00





             Test Item    Value        Reference Range Interpretation Comments

 

             TROPONIN I (BEAKER) (test code = 397) < ng/mL      0.00-0.15       

          








             Test Item    Value        Reference Range Interpretation Comments

 

             B-TYPE NATRIURETIC PEPTIDE (BEAKER) 198 pg/mL    0-100        H    

        



             (test code = 700)                                        



 ID - [id number] 06:54:00





             Test Item    Value        Reference Range Interpretation Comments

 

             TRIGLYCERIDES (BEAKER) 170 mg/dL                              Speci

men moderately



             (test code = 540)                                        hemolyzed

 

             CHOLESTEROL (BEAKER) 184 mg/dL                              Specime

n moderately



             (test code = 631)                                        hemolyzed

 

             HDL CHOLESTEROL (BEAKER) 59 mg/dL                               



             (test code = 976)                                        

 

             LDL CHOLESTEROL 91 mg/dL                               



             CALCULATED (BEAKER)                                        



             (test code = 633)                                        



Triglyceride Reference Range: Low Risk &lt;150 Borderline 150-199 High Risk 200-
499 Very High Risk &gt;=500Cholesterol Reference Range: Low Risk &lt;200 
Borderline 200-239 High Risk &gt;240HDL Cholesterol Reference Range: Low Risk 
&gt;=60 High Risk &lt;40LDL Cholesterol Reference Range: Optimal &lt;100 Near 
Optimal 100-129 Borderline 130-159 High 160-189 Very High &gt;=190  ID -
 [id number] ID - [id number] ID - [id number] 06:54:00





             Test Item    Value        Reference Range Interpretation Comments

 

             MAGNESIUM (BEAKER) 2.1 mg/dL    1.5-3.0                   Specimen 

moderately



             (test code = 627)                                        hemolyzed



 ID - [id number] ID - [id number] ID - [id number] ID - [id number]
BASIC METABOLIC [id number] 06:53:00





             Test Item    Value        Reference Range Interpretation Comments

 

             SODIUM (BEAKER) 137 meq/L    135-148                   



             (test code = 381)                                        

 

             POTASSIUM (BEAKER) 4.7 meq/L    3.6-5.5                   Specimen 

moderately



             (test code = 379)                                        hemolyzed

 

             CHLORIDE (BEAKER) 103 meq/L                        



             (test code = 382)                                        

 

             CO2 (BEAKER) (test 25 meq/L     20-29                     



             code = 355)                                         

 

             BLOOD UREA NITROGEN 14 mg/dL     10-26                     



             (BEAKER) (test code                                        



             = 354)                                              

 

             CREATININE (BEAKER) 0.81 mg/dL   0.50-1.20                 Specimen

 moderately



             (test code = 358)                                        hemolyzed

 

             GLUCOSE RANDOM 96 mg/dL                         



             (BEAKER) (test code                                        



             = 652)                                              

 

             CALCIUM (BEAKER) 8.9 mg/dL    8.5-10.5                  



             (test code = 697)                                        

 

             EGFR (BEAKER) (test 98 mL/min/1.73                           ESTIMA

SON[place] GFR IS



             code = 1092) sq m                                   NOT AS ACCURATE

 AS



                                                                 CREATININE



                                                                 CLEARANCE IN



                                                                 PREDICTING



                                                                 GLOMERULAR



                                                                 FILTRATION RATE

.



                                                                 ESTIMATED GFR I

S



                                                                 NOT APPLICABLE 

FOR



                                                                 DIALYSIS PATIEN

TS.



 ID - [id number] ID - [id number] ID - [id number] ID - 
[id number] ID - [id number] ID - [id number] ID - [id number] ID - 
[id number] ID - [id number] 06:52:00





             Test Item    Value        Reference Range Interpretation Comments

 

             PHOSPHORUS (BEAKER) 4.1 mg/dL    2.5-4.5                   Specimen

 moderately



             (test code = 604)                                        hemolyzed



 ID - [id number] 06:48:00





             Test Item    Value        Reference Range Interpretation Comments

 

             HEMOGLOBIN A1C (BEAKER) (test code = 5.5 %        4.3-6.1          

         



             368)                                                



 ID - [id number] 06:46:00





             Test Item    Value        Reference Range Interpretation Comments

 

             PROTIME (BEAKER) 10.6 seconds 9.3-12.0                  Final Infor

mation



             (test code = 759)                                        (Auto Outp

ut)

 

             INR (BEAKER) (test 0.95         See_Comment               Final Inf

ormation



             code = 370)                                         (Auto Output)



                                                                 [Automated mess

age]



                                                                 The system [employer]



                                                                 generated this 

result



                                                                 transmitted ref

erence



                                                                 range: <=5.90. 

The



                                                                 reference range

 was



                                                                 not used to int

erpret



                                                                 this result as



                                                                 normal/abnormal

.



RECOMMENDED COUMADIN/WARFARIN INR THERAPY RANGESSTANDARD DOSE: 2.0 - 3.0 
Includes: PROPHYLAXIS for venous thrombosis, systemic embolization; TREATMENT 
for venous thrombosis and/or pulmonary embolus.HIGH RISK: Target INR is 2.5-3.5 
for patients with mechanical heart valves.CBC W/PLT COUNT &amp; AUTO 
[id number] 06:34:00





             Test Item    Value        Reference Range Interpretation Comments

 

             WHITE BLOOD CELL COUNT (BEAKER) 4.8 K/ L     4.0-10.0              

    



             (test code = 775)                                        

 

             RED BLOOD CELL COUNT (BEAKER) 4.78 M/ L    4.20-5.80               

  



             (test code = 761)                                        

 

             HEMOGLOBIN (BEAKER) (test code = 13.0 GM/DL   13.0-16.8            

     



             410)                                                

 

             HEMATOCRIT (BEAKER) (test code = 40.5 %       36.0-50.0            

     



             411)                                                

 

             MEAN CORPUSCULAR VOLUME (BEAKER) 84.7 fL      82.0-99.0            

     



             (test code = 753)                                        

 

             MEAN CORPUSCULAR HEMOGLOBIN 27.2 pg      27.0-33.0                 



             (BEAKER) (test code = 751)                                        

 

             MEAN CORPUSCULAR HEMOGLOBIN CONC 32.1 GM/DL   32.0-36.0            

     



             (BEAKER) (test code = 752)                                        

 

             RED CELL DISTRIBUTION WIDTH 15.6 %       12.0-15.0    H            



             (BEAKER) (test code = 412)                                        

 

             PLATELET COUNT (BEAKER) (test 175 K/CU MM  150-430                 

  



             code = 756)                                         

 

             MEAN PLATELET VOLUME (BEAKER) 10.7 fL      6.0-11.5                

  



             (test code = 754)                                        

 

             NUCLEATED RED BLOOD CELLS 0 /100 WBC   0-0                       



             (BEAKER) (test code = 413)                                        

 

             NEUTROPHILS RELATIVE PERCENT 60 %                                  

 



             (BEAKER) (test code = 429)                                        

 

             LYMPHOCYTES RELATIVE PERCENT 26 %                                  

 



             (BEAKER) (test code = 430)                                        

 

             MONOCYTES RELATIVE PERCENT 11 %                                   



             (BEAKER) (test code = 431)                                        

 

             EOSINOPHILS RELATIVE PERCENT 3 %                                   

 



             (BEAKER) (test code = 432)                                        

 

             BASOPHILS RELATIVE PERCENT 1 %                                    



             (BEAKER) (test code = 437)                                        

 

             NEUTROPHILS ABSOLUTE COUNT 2.82 K/ L    1.80-8.00                 



             (BEAKER) (test code = 670)                                        

 

             LYMPHOCYTES ABSOLUTE COUNT 1.24 K/ L    1.48-4.50    L            



             (BEAKER) (test code = 414)                                        

 

             MONOCYTES ABSOLUTE COUNT (BEAKER) 0.50 K/ L    0.00-1.30           

      



             (test code = 415)                                        

 

             EOSINOPHILS ABSOLUTE COUNT 0.12 K/ L    0.00-0.50                 



             (BEAKER) (test code = 416)                                        

 

             BASOPHILS ABSOLUTE COUNT (BEAKER) 0.04 K/ L    0.00-0.20           

      



             (test code = 417)                                        

 

             IMMATURE GRANULOCYTES-RELATIVE 1 %          0-0          H         

   



             PERCENT (BEAKER) (test code =                                      

  



             2801)                                               



TROPONIN -07-85 00:45:00





             Test Item    Value        Reference Range Interpretation Comments

 

             TROPONIN I (ANGIE) (test code = 397) < ng/mL      0.00-0.15       

          



Troponin I (TnI) levels must be interpreted in the context of the presenting 
symptoms and the clinical findings. Elevated TnI levels indicate myocardial 
damage, but are not specific for ischemic heart disease. Elevated TnI levels are
seen in patients with other cardiac conditions (including myocarditis and 
congestive heart failure), and slight TnI elevations occur in patients with 
other conditions, including sepsis, renal failure, acidosis, acute neurological 
disease, and persistent tachyarrhythmia. ID - [id number] 
([facility])2021 21:02:00





             Test Item    Value        Reference Range Interpretation Comments

 

             SARS-COV2/RT-PCR Negative     Not Detected,              Performanc

e of the Xpert



             (test code =              Negative, See              Xpress



             [id number])                  external report              SARS-CoV-2/F

omaira/RSV test



                                       for linked test              has only bee

n



                                                                 established in



                                                                 nasopharyngeal 

swab



                                                                 specimens. Use 

of the



                                                                 Xpert Xpress



                                                                 SARS-CoV-2/Flu/

RSV test



                                                                 with other spec

imen



                                                                 types has not b

een



                                                                 assessed and pe

rformance



                                                                 characteristics

 are



                                                                 unknown. As wit

h any



                                                                 molecular test,



                                                                 mutations withi

n the



                                                                 targeted geneti

c regions



                                                                 identified by liza marrero Xpert



                                                                 Xpress



                                                                 SARS-CoV-2/Flu/

RSV test



                                                                 could affect pr

albaro



                                                                 and/or probe bi

nding



                                                                 resulting in fa

ilure to



                                                                 detect the pres

ence of



                                                                 virus or the vi

eber being



                                                                 detected less



                                                                 predictably.Neg

ative



                                                                 results do not 

preclude



                                                                 SARS-CoV-2, Inf

luenza



                                                                 A/B, or RSV inf

ection



                                                                 and should not 

be used



                                                                 as the sole bas

is for



                                                                 treatment or ot

her



                                                                 patient managem

ent



                                                                 decisions. Resu

lts from



                                                                 the Xpert Xpres

s



                                                                 SARS-CoV-2/Flu/

RSV test



                                                                 should be corre

lated



                                                                 with the clinic

al



                                                                 history, epidem

iological



                                                                 data, and other

 data



                                                                 available to th

e



                                                                 clinician evalu

ating the



                                                                 patient. Invali

d test



                                                                 results may occ

ur from



                                                                 improper specim

en



                                                                 collection; andrew

lure to



                                                                 follow the herberth

mmended



                                                                 sample collecti

on,



                                                                 handling, and s

torage



                                                                 procedures; estelle

hnical



                                                                 error. False ne

gative



                                                                 results may occ

ur if



                                                                 virus is presen

t at



                                                                 levels below th

e



                                                                 analytical limi

t of



                                                                 detection (LOD:

 131



                                                                 copies/mL). Vir

al



                                                                 nucleic acid ma

y persist



                                                                 in vivo, indepe

ndent of



                                                                 virus viability

.



                                                                 Detection of an

alyte



                                                                 target(s) does 

not imply



                                                                 that the corres

ponding



                                                                 virus(es) are i

nfectious



                                                                 or are the caus

ative



                                                                 agents for clin

ical



                                                                 symptoms. Recen

t patient



                                                                 exposure to Flu

Mist or



                                                                 other live atte

nuated



                                                                 influenza vacci

phoebe may



                                                                 cause inaccurat

e



                                                                 positive result

s.This



                                                                 test has been a

uthorized



                                                                 by FDA under an

 EUA for



                                                                 use by authoriz

ed



                                                                 laboratories. T

his test



                                                                 is only authori

zed for



                                                                 the duration of

 the



                                                                 declaration lorenza

t



                                                                 circumstances e

xist



                                                                 justifying the



                                                                 authorization o

f



                                                                 emergency use o

f in



                                                                 vitro diagnosti

c tests



                                                                 for detection a

nd/or



                                                                 diagnosis of CO

VID-19



                                                                 under Section 5

64(b)(1)



                                                                 of the Federal 

Food,



                                                                 Drug and Cosmet

ic Act,



                                                                 21 U.S.C.



                                                                 360bbb-3(b)(1),

 unless



                                                                 the authorizati

on is



                                                                 terminated or r

evoked



                                                                 sooner.Fact She

et for



                                                                 Healthcare Prov

iders:



                                                                 https://www.[employer]/



                                                                 Documents/Xpert

%20Xpress



                                                                 %11NJFL-NyE-8-F

omaira-RSV/30



                                                                 2-4508%20Rev.%2

0B%20HCP%



                                                                 20Fact%20Sheet.

pdfFact



                                                                 Sheet for Healt

hcare



                                                                 Patients:



                                                                 https://www.[employer]/



                                                                 Documents/Xpert

%20Xpress



                                                                 %00CSHK-JgO-7-F

omaira-RSV/30



                                                                 2-4507%20Rev.%2

0B%20Pati



                                                                 ent%20Fact%20Sh

eet.pdf

 

             SARS-COV-2   SLSL                                   Performed at:[facility]



             PERFORMING LAB                                        [place] 

[id number]



             (test code =                                        [place]

[address]



             [id number])                                            [place], [place] [address]

h:



                                                                 [phone]



CT, CHEST WITH IV CONTRAST- PE TEST [id number] 20:55:00Unlisted Reason for
Exam - Click Yes and Enter Reason Below-&gt;No
************************************************************[facility] : 1960 Sex: 
M************************************************************FINAL REPORT 
PATIENT ID: [id number] CLINICAL HISTORY: "PE suspected, intermediate probability, 
positive d-dimer" FINDINGS: Multiple axial images of the chest were performed 
after the uncomplicated administration of IV contrast, utilizing a pulmonary 
embolism protocol. Post-processing coronal reformats were created and 
interpreted. This exam was performed according to our departmental dose-
optimization program, which includes automated exposure control, adjustment of 
the mA and/or kV according to patient size and/or use of the iterative 
reconstruction technique. Study quality:Limited by slightly suboptimal bolus 
timing, intermittent respiratory motion artifact, patient body habitus and 
streak artifact from the patient's right arm position. Comparison:None. 
Correlation is made with an MRI of the thoracicspine dated 2020 Pulmonary 
arteries: No large or central pulmonary embolism is identified. Lung parenchyma:
Low lung volumes. No consolidation or mass. No suspicious nodule or worrisome 
groundglass opacification. Pleural effusion: None. Pneumothorax: None. 
Tracheobronchial tree: No significant findings. Pulmonary vasculature: No 
significant findings. Cardiac contours and great vessels: No significant 
findings. Mediastinum: No significant findings. Lymph Nodes: No adenopathy in 
the mediastinum or lian. Skeleton: No acute abnormality. Redemonstrated 
compression deformity of T12 vertebral body. Limited images of upper abdomen: No
significant findings. IMPRESSION: Limited examination, as described. No large or
central pulmonary embolism is identified. If there is continued clinical concern
for a small or peripheral pulmonary embolism further evaluation with V/Q scan 
can be considered. Signed: Herrera Jacome Verified Date/Time: 2021
20:55:53 Electronically signed by: HERRERA JACOME M.D. on 2021 08:55 PM
VENOUS DOPPLER LEGS, [id number] 19:49:00Reason for exam:-&gt;CHEST 
PAINReason for exam:-&gt;bilateral leg painReason for exam:-&gt;left
************************************************************[facility] : 1960 Sex: 
M************************************************************FINAL REPORT 
PATIENT ID: [id number]  CLINICAL HISTORY: Lower extremity pain. Bilateral lower 
extremity venous Doppler dated 2021 COMMENT: Real-time grayscale, color and
spectral Doppler ultrasound examination of both lower extremities was performed.
The bilateral common femoral, superficial femoral, greater saphenous, profunda 
femoral, popliteal, posterior tibial, and peroneal veins are patent without
filling defect present. There is normal compressibility and augmentation in the 
veins of both lower extremities. IMPRESSION: Unremarkable Doppler ultrasound of 
the veins of both lower extremities without deep venous thrombosis. Signed: 
Herrera Jacome MDReport Verified Date/Time: 2021 19:49:01 Electronically 
signed by: HERRERA JACOME M.D. on 2021 07:49 [id number] 18:12:00





             Test Item    Value        Reference Range Interpretation Comments

 

             D-DIMER QUANTITATIVE 0.52 MG/L FEU <0.50        H            Final 

Information



             (BEGRADY) (test code =                                        (Pjjh 

Output) 578)                                                



REGARDING D-DIMER RESULTS: The 98% NPV (Negative Predictive Value) for DVT/PE 
exclusion is 0.50 mg/LFEU as suggested by the  and as approved by 
the FDA.TROPONIN -66-60 18:12:00





             Test Item    Value        Reference Range Interpretation Comments

 

             TROPONIN I (ANGIE) (test code = 397) < ng/mL      0.00-0.15       

          








             Test Item    Value        Reference Range Interpretation Comments

 

             SODIUM (BEAKER) 137 meq/L    135-148                   



             (test code = 381)                                        

 

             POTASSIUM (BEAKER) 4.7 meq/L    3.6-5.5                   



             (test code = 379)                                        

 

             CHLORIDE (BEAKER) 104 meq/L                        



             (test code = 382)                                        

 

             CO2 (BEAKER) (test 24 meq/L     20-29                     



             code = 355)                                         

 

             BLOOD UREA NITROGEN 15 mg/dL     10-26                     



             (BEAKER) (test code                                        



             = 354)                                              

 

             CREATININE (BEAKER) 0.79 mg/dL   0.50-1.20                 



             (test code = 358)                                        

 

             GLUCOSE RANDOM 81 mg/dL                         



             (BEAKER) (test code                                        



             = 652)                                              

 

             CALCIUM (BEAKER) 9.0 mg/dL    8.5-10.5                  



             (test code = 697)                                        

 

             EGFR (BEAKER) (test 100 mL/min/1.73                           ESTIM

ATED GFR IS



             code = 1092) sq m                                   NOT AS ACCURATE

 AS



                                                                 CREATININE



                                                                 CLEARANCE IN



                                                                 PREDICTING



                                                                 GLOMERULAR



                                                                 FILTRATION RATE

.



                                                                 ESTIMATED GFR I

S



                                                                 NOT APPLICABLE 

FOR



                                                                 DIALYSIS PATIEN

TS.



 ID - [id number] ID - [id number] ID - [id number] ID - 
[id number] ID - [id number] ID - [id number] ID - [id number] ID - 
[id number] ID - [id number] ID - [id number] ID - [id number] ID - 
[id number] W/PLT COUNT &amp; AUTO [id number] 17:55:00





             Test Item    Value        Reference Range Interpretation Comments

 

             WHITE BLOOD CELL COUNT (BEAKER) 6.1 K/ L     4.0-10.0              

    



             (test code = 775)                                        

 

             RED BLOOD CELL COUNT (BEAKER) 5.39 M/ L    4.20-5.80               

  



             (test code = 761)                                        

 

             HEMOGLOBIN (BEAKER) (test code = 14.3 GM/DL   13.0-16.8            

     



             410)                                                

 

             HEMATOCRIT (BEAKER) (test code = 45.2 %       36.0-50.0            

     



             411)                                                

 

             MEAN CORPUSCULAR VOLUME (BEAKER) 83.9 fL      82.0-99.0            

     



             (test code = 753)                                        

 

             MEAN CORPUSCULAR HEMOGLOBIN 26.5 pg      27.0-33.0    L            



             (BEAKER) (test code = 751)                                        

 

             MEAN CORPUSCULAR HEMOGLOBIN CONC 31.6 GM/DL   32.0-36.0    L       

     



             (BEAKER) (test code = 752)                                        

 

             RED CELL DISTRIBUTION WIDTH 15.6 %       12.0-15.0    H            



             (BEAKER) (test code = 412)                                        

 

             PLATELET COUNT (BEAKER) (test 173 K/CU MM  150-430                 

  



             code = 756)                                         

 

             MEAN PLATELET VOLUME (BEAKER) 12.1 fL      6.0-11.5     H          

  



             (test code = 754)                                        

 

             NUCLEATED RED BLOOD CELLS 0 /100 WBC   0-0                       



             (BEAKER) (test code = 413)                                        

 

             NEUTROPHILS RELATIVE PERCENT 68 %                                  

 



             (BEAKER) (test code = 429)                                        

 

             LYMPHOCYTES RELATIVE PERCENT 20 %                                  

 



             (BEAKER) (test code = 430)                                        

 

             MONOCYTES RELATIVE PERCENT 9 %                                    



             (BEAKER) (test code = 431)                                        

 

             EOSINOPHILS RELATIVE PERCENT 2 %                                   

 



             (BEAKER) (test code = 432)                                        

 

             BASOPHILS RELATIVE PERCENT 1 %                                    



             (BEAKER) (test code = 437)                                        

 

             NEUTROPHILS ABSOLUTE COUNT 4.18 K/ L    1.80-8.00                 



             (BEAKER) (test code = 670)                                        

 

             LYMPHOCYTES ABSOLUTE COUNT 1.22 K/ L    1.48-4.50    L            



             (BEAKER) (test code = 414)                                        

 

             MONOCYTES ABSOLUTE COUNT (BEAKER) 0.56 K/ L    0.00-1.30           

      



             (test code = 415)                                        

 

             EOSINOPHILS ABSOLUTE COUNT 0.11 K/ L    0.00-0.50                 



             (BEAKER) (test code = 416)                                        

 

             BASOPHILS ABSOLUTE COUNT (BEAKER) 0.03 K/ L    0.00-0.20           

      



             (test code = 417)                                        

 

             IMMATURE GRANULOCYTES-RELATIVE 1 %          0-0          H         

   



             PERCENT (BEAKER) (test code =                                      

  



             2801)                                               



RAD, CHEST, 1 VIEW, NON [id number] 17:12:00Reason for exam:-&gt;CHEST 
PAINReason for exam:-&gt;BACK PAINReason for exam:-&gt;ARM PAINleftReasonfor 
exam:-&gt;Should this be performed at the bedside?-&gt;Yes
************************************************************[facility]ENTERName: SAVAGE CONNORS : 1960 Sex: 
M************************************************************FINAL REPORT 
PATIENT ID: [id number] Chest, one view History: Chest pain Comparison: 2020 
Findings:The lungs are hypoexpanded, but grossly clear. Normal size heart. No 
pleural effusion or pneumothorax. Impression:No acute findings in the chest 
Signed: Moose Camacho Verified Date/Time: 2021 17:12:15 Reading
Location: [address] Transitional Reading Room Electronically signed by: MOOSE CAMACHO MD on 2021 05:12 PMBLOOD [id number] 07:01:00





             Test Item    Value        Reference Range Interpretation Comments

 

             CULTURE (BEAKER) (test No growth in 5 days                         

  



             code = 1095)                                        



BLOOD [id number] 07:01:00





             Test Item    Value        Reference Range Interpretation Comments

 

             CULTURE (BEAKER) (test No growth in 5 days                         

  



             code = 1095)                                        



BASIC METABOLIC [id number] 05:26:00





             Test Item    Value        Reference Range Interpretation Comments

 

             SODIUM (BEAKER) 137 meq/L    135-148                   



             (test code = 381)                                        

 

             POTASSIUM (BEAKER) 4.2 meq/L    3.6-5.5                   



             (test code = 379)                                        

 

             CHLORIDE (BEAKER) 102 meq/L                        



             (test code = 382)                                        

 

             CO2 (BEAKER) (test 27 meq/L     20-29                     



             code = 355)                                         

 

             BLOOD UREA NITROGEN 20 mg/dL     10-26                     



             (BEAKER) (test code                                        



             = 354)                                              

 

             CREATININE (BEAKER) 0.79 mg/dL   0.50-1.20                 



             (test code = 358)                                        

 

             GLUCOSE RANDOM 97 mg/dL                         



             (BEAKER) (test code                                        



             = 652)                                              

 

             CALCIUM (BEAKER) 8.5 mg/dL    8.5-10.5                  



             (test code = 697)                                        

 

             EGFR (BEAKER) (test 100 mL/min/1.73                           ESTIM

ATED GFR IS



             code = 1092) sq m                                   NOT AS ACCURATE

 AS



                                                                 CREATININE



                                                                 CLEARANCE IN



                                                                 PREDICTING



                                                                 GLOMERULAR



                                                                 FILTRATION RATE

.



                                                                 ESTIMATED GFR I

S



                                                                 NOT APPLICABLE 

FOR



                                                                 DIALYSIS PATIEN

TS.



 ID - ANSBERTOGOperator ID - ANSBERTOGOperator ID - ANSBERTOGOperator ID
- ANSBERTOGOperatorID - ANSBERTOGOperator ID - ANSBERTOGOperator ID - 
ANSBERTOGOperator ID - ANSBERTOGOperator ID - ANSBERTOGOperator ID - 
ANSBERTOGOperator ID - ANSBERTOGOperator ID - ANSBERTOGCBC W/PLT COUNT &amp; 
AUTO [id number] 05:22:00





             Test Item    Value        Reference Range Interpretation Comments

 

             WHITE BLOOD CELL COUNT (BEAKER) 5.9 K/ L     4.0-10.0              

    



             (test code = 775)                                        

 

             RED BLOOD CELL COUNT (BEAKER) 4.69 M/ L    4.20-5.80               

  



             (test code = 761)                                        

 

             HEMOGLOBIN (BEAKER) (test code = 12.8 GM/DL   13.0-16.8    L       

     



             410)                                                

 

             HEMATOCRIT (BEAKER) (test code = 40.8 %       36.0-50.0            

     



             411)                                                

 

             MEAN CORPUSCULAR VOLUME (BEAKER) 87.0 fL      82.0-99.0            

     



             (test code = 753)                                        

 

             MEAN CORPUSCULAR HEMOGLOBIN 27.3 pg      27.0-33.0                 



             (BEAKER) (test code = 751)                                        

 

             MEAN CORPUSCULAR HEMOGLOBIN CONC 31.4 GM/DL   32.0-36.0    L       

     



             (BEAKER) (test code = 752)                                        

 

             RED CELL DISTRIBUTION WIDTH 14.9 %       12.0-15.0                 



             (BEAKER) (test code = 412)                                        

 

             PLATELET COUNT (BEAKER) (test 258 K/CU MM  150-430                 

  



             code = 756)                                         

 

             MEAN PLATELET VOLUME (BEAKER) 10.8 fL      6.0-11.5                

  



             (test code = 754)                                        

 

             NUCLEATED RED BLOOD CELLS 0 /100 WBC   0-0                       



             (BEAKER) (test code = 413)                                        

 

             NEUTROPHILS RELATIVE PERCENT 59 %                                  

 



             (BEAKER) (test code = 429)                                        

 

             LYMPHOCYTES RELATIVE PERCENT 23 %                                  

 



             (BEAKER) (test code = 430)                                        

 

             MONOCYTES RELATIVE PERCENT 9 %                                    



             (BEAKER) (test code = 431)                                        

 

             EOSINOPHILS RELATIVE PERCENT 7 %                                   

 



             (BEAKER) (test code = 432)                                        

 

             BASOPHILS RELATIVE PERCENT 1 %                                    



             (BEAKER) (test code = 437)                                        

 

             NEUTROPHILS ABSOLUTE COUNT 3.49 K/ L    1.80-8.00                 



             (BEAKER) (test code = 670)                                        

 

             LYMPHOCYTES ABSOLUTE COUNT 1.35 K/ L    1.48-4.50    L            



             (BEAKER) (test code = 414)                                        

 

             MONOCYTES ABSOLUTE COUNT (BEAKER) 0.55 K/ L    0.00-1.30           

      



             (test code = 415)                                        

 

             EOSINOPHILS ABSOLUTE COUNT 0.42 K/ L    0.00-0.50                 



             (BEAKER) (test code = 416)                                        

 

             BASOPHILS ABSOLUTE COUNT (BEAKER) 0.03 K/ L    0.00-0.20           

      



             (test code = 417)                                        

 

             IMMATURE GRANULOCYTES-RELATIVE 1 %          0-0          H         

   



             PERCENT (BEAKER) (test code =                                      

  



             2801)                                               



BASIC METABOLIC [id number] 05:47:00





             Test Item    Value        Reference Range Interpretation Comments

 

             SODIUM (BEAKER) 139 meq/L    135-148                   



             (test code = 381)                                        

 

             POTASSIUM (BEAKER) 4.3 meq/L    3.6-5.5                   Specimen 

slightly



             (test code = 379)                                        hemolyzed

 

             CHLORIDE (BEAKER) 99 meq/L                         



             (test code = 382)                                        

 

             CO2 (BEAKER) (test 31 meq/L     20-29        H            



             code = 355)                                         

 

             BLOOD UREA NITROGEN 23 mg/dL     10-26                     



             (BEAKER) (test code                                        



             = 354)                                              

 

             CREATININE (BEAKER) 0.87 mg/dL   0.50-1.20                 Specimen

 slightly



             (test code = 358)                                        hemolyzed

 

             GLUCOSE RANDOM 112 mg/dL           H            



             (BEAKER) (test code                                        



             = 652)                                              

 

             CALCIUM (BEAKER) 8.7 mg/dL    8.5-10.5                  



             (test code = 697)                                        

 

             EGFR (BEAKER) (test 90 mL/min/1.73                           ESTIMA

[place] GFR IS



             code = 1092) sq m                                   NOT AS ACCURATE

 AS



                                                                 CREATININE



                                                                 CLEARANCE IN



                                                                 PREDICTING



                                                                 GLOMERULAR



                                                                 FILTRATION RATE

.



                                                                 ESTIMATED GFR I

S



                                                                 NOT APPLICABLE 

FOR



                                                                 DIALYSIS PATIEN

TS.



 ID - ANSBERTOGOperator ID - ANSBERTOGOperator ID - ANSBERTOGOperator ID
- ANSBERTOGOperatorID - ANSBERTOGOperator ID - ANSBERTOGOperator ID - 
ANSBERTOGOperator ID - ANSBERTOGOperator ID - ANSBERTOGOperator ID - 
ANSBERTOGOperator ID - ANSBERTOGOperator ID - ANSBERTOGCBC W/PLT COUNT &amp; 
AUTO [id number] 05:13:00





             Test Item    Value        Reference Range Interpretation Comments

 

             WHITE BLOOD CELL COUNT (BEAKER) 6.6 K/ L     4.0-10.0              

    



             (test code = 775)                                        

 

             RED BLOOD CELL COUNT (BEAKER) 4.46 M/ L    4.20-5.80               

  



             (test code = 761)                                        

 

             HEMOGLOBIN (BEAKER) (test code = 12.1 GM/DL   13.0-16.8    L       

     



             410)                                                

 

             HEMATOCRIT (BEAKER) (test code = 38.3 %       36.0-50.0            

     



             411)                                                

 

             MEAN CORPUSCULAR VOLUME (BEAKER) 85.9 fL      82.0-99.0            

     



             (test code = 753)                                        

 

             MEAN CORPUSCULAR HEMOGLOBIN 27.1 pg      27.0-33.0                 



             (BEAKER) (test code = 751)                                        

 

             MEAN CORPUSCULAR HEMOGLOBIN CONC 31.6 GM/DL   32.0-36.0    L       

     



             (BEAKER) (test code = 752)                                        

 

             RED CELL DISTRIBUTION WIDTH 15.1 %       12.0-15.0    H            



             (BEAKER) (test code = 412)                                        

 

             PLATELET COUNT (BEAKER) (test 271 K/CU MM  150-430                 

  



             code = 756)                                         

 

             MEAN PLATELET VOLUME (BEAKER) 10.8 fL      6.0-11.5                

  



             (test code = 754)                                        

 

             NUCLEATED RED BLOOD CELLS 0 /100 WBC   0-0                       



             (BEAKER) (test code = 413)                                        

 

             NEUTROPHILS RELATIVE PERCENT 60 %                                  

 



             (BEAKER) (test code = 429)                                        

 

             LYMPHOCYTES RELATIVE PERCENT 20 %                                  

 



             (BEAKER) (test code = 430)                                        

 

             MONOCYTES RELATIVE PERCENT 11 %                                   



             (BEAKER) (test code = 431)                                        

 

             EOSINOPHILS RELATIVE PERCENT 8 %                                   

 



             (BEAKER) (test code = 432)                                        

 

             BASOPHILS RELATIVE PERCENT 1 %                                    



             (BEAKER) (test code = 437)                                        

 

             NEUTROPHILS ABSOLUTE COUNT 3.96 K/ L    1.80-8.00                 



             (BEAKER) (test code = 670)                                        

 

             LYMPHOCYTES ABSOLUTE COUNT 1.34 K/ L    1.48-4.50    L            



             (BEAKER) (test code = 414)                                        

 

             MONOCYTES ABSOLUTE COUNT (BEAKER) 0.72 K/ L    0.00-1.30           

      



             (test code = 415)                                        

 

             EOSINOPHILS ABSOLUTE COUNT 0.50 K/ L    0.00-0.50                 



             (BEAKER) (test code = 416)                                        

 

             BASOPHILS ABSOLUTE COUNT (BEAKER) 0.04 K/ L    0.00-0.20           

      



             (test code = 417)                                        

 

             IMMATURE GRANULOCYTES-RELATIVE 1 %          0-0          H         

   



             PERCENT (BEAKER) (test code =                                      

  



             2801)                                               



BASIC METABOLIC [id number] 06:33:00





             Test Item    Value        Reference Range Interpretation Comments

 

             SODIUM (BEAKER) 137 meq/L    135-148                   



             (test code = 381)                                        

 

             POTASSIUM (BEAKER) 4.3 meq/L    3.6-5.5                   



             (test code = 379)                                        

 

             CHLORIDE (BEAKER) 101 meq/L                        



             (test code = 382)                                        

 

             CO2 (BEAKER) (test 27 meq/L     20-29                     



             code = 355)                                         

 

             BLOOD UREA NITROGEN 18 mg/dL     10-26                     



             (BEAKER) (test code                                        



             = 354)                                              

 

             CREATININE (BEAKER) 0.77 mg/dL   0.50-1.20                 



             (test code = 358)                                        

 

             GLUCOSE RANDOM 104 mg/dL                        



             (BEAKER) (test code                                        



             = 652)                                              

 

             CALCIUM (BEAKER) 8.7 mg/dL    8.5-10.5                  



             (test code = 697)                                        

 

             EGFR (BEAKER) (test 103 mL/min/1.73                           ESTIM

ATED GFR IS



             code = 1092) sq m                                   NOT AS ACCURATE

 AS



                                                                 CREATININE



                                                                 CLEARANCE IN



                                                                 PREDICTING



                                                                 GLOMERULAR



                                                                 FILTRATION RATE

.



                                                                 ESTIMATED GFR I

S



                                                                 NOT APPLICABLE 

FOR



                                                                 DIALYSIS PATIEN

TS.



 ID - JUSTINOperator ID - JUSTINOperator ID - JUSTINOperator ID - 
JUSTINOperator ID - JUSTINOperator ID - JUSTINOperator ID - JUSTINOperator ID - 
JUSTINOperator ID - JUSTINOperator ID - JUSTINOperator ID - JUSTINOperator ID - 
JUSTINCBC W/PLT COUNT &amp; AUTO [id number] 05:55:00





             Test Item    Value        Reference Range Interpretation Comments

 

             WHITE BLOOD CELL COUNT (BEAKER) 5.4 K/ L     4.0-10.0              

    



             (test code = 775)                                        

 

             RED BLOOD CELL COUNT (BEAKER) 4.63 M/ L    4.20-5.80               

  



             (test code = 761)                                        

 

             HEMOGLOBIN (BEAKER) (test code = 12.4 GM/DL   13.0-16.8    L       

     



             410)                                                

 

             HEMATOCRIT (BEAKER) (test code = 39.8 %       36.0-50.0            

     



             411)                                                

 

             MEAN CORPUSCULAR VOLUME (BEAKER) 86.0 fL      82.0-99.0            

     



             (test code = 753)                                        

 

             MEAN CORPUSCULAR HEMOGLOBIN 26.8 pg      27.0-33.0    L            



             (BEAKER) (test code = 751)                                        

 

             MEAN CORPUSCULAR HEMOGLOBIN CONC 31.2 GM/DL   32.0-36.0    L       

     



             (BEAKER) (test code = 752)                                        

 

             RED CELL DISTRIBUTION WIDTH 14.9 %       12.0-15.0                 



             (BEAKER) (test code = 412)                                        

 

             PLATELET COUNT (BEAKER) (test 259 K/CU MM  150-430                 

  



             code = 756)                                         

 

             MEAN PLATELET VOLUME (BEAKER) 9.8 fL       6.0-11.5                

  



             (test code = 754)                                        

 

             NUCLEATED RED BLOOD CELLS 0 /100 WBC   0-0                       



             (BEAKER) (test code = 413)                                        

 

             NEUTROPHILS RELATIVE PERCENT 55 %                                  

 



             (BEAKER) (test code = 429)                                        

 

             LYMPHOCYTES RELATIVE PERCENT 27 %                                  

 



             (BEAKER) (test code = 430)                                        

 

             MONOCYTES RELATIVE PERCENT 9 %                                    



             (BEAKER) (test code = 431)                                        

 

             EOSINOPHILS RELATIVE PERCENT 8 %                                   

 



             (BEAKER) (test code = 432)                                        

 

             BASOPHILS RELATIVE PERCENT 1 %                                    



             (BEAKER) (test code = 437)                                        

 

             NEUTROPHILS ABSOLUTE COUNT 2.94 K/ L    1.80-8.00                 



             (BEAKER) (test code = 670)                                        

 

             LYMPHOCYTES ABSOLUTE COUNT 1.46 K/ L    1.48-4.50    L            



             (BEAKER) (test code = 414)                                        

 

             MONOCYTES ABSOLUTE COUNT (BEAKER) 0.51 K/ L    0.00-1.30           

      



             (test code = 415)                                        

 

             EOSINOPHILS ABSOLUTE COUNT 0.42 K/ L    0.00-0.50                 



             (BEAKER) (test code = 416)                                        

 

             BASOPHILS ABSOLUTE COUNT (BEAKER) 0.04 K/ L    0.00-0.20           

      



             (test code = 417)                                        

 

             IMMATURE GRANULOCYTES-RELATIVE 1 %          0-0          H         

   



             PERCENT (BEAKER) (test code =                                      

  



             2801)                                               



URINALYSIS W/ REFLEX URINE [id number] 05:42:00





             Test Item    Value        Reference Range Interpretation Comments

 

             COLOR (BEAKER) (test code = Yellow                                 



             470)                                                

 

             CLARITY (BEAKER) (test code = Clear                                

  



             469)                                                

 

             SPECIFIC GRAVITY UA (BEAKER) 1.020        1.001-1.035              

 



             (test code = 468)                                        

 

             PH UA (BEAKER) (test code = 6.0          5.0-8.0                   



             467)                                                

 

             PROTEIN UA (BEAKER) (test code Negative     Negative               

   



             = 464)                                              

 

             GLUCOSE UA (BEAKER) (test code Negative     Negative               

   



             = 365)                                              

 

             KETONES UA (BEAKER) (test code Negative     Negative               

   



             = 371)                                              

 

             BILIRUBIN UA (BEAKER) (test Negative     Negative                  



             code = 462)                                         

 

             BLOOD UA (BEAKER) (test code = Negative     Negative               

   



             461)                                                

 

             NITRITE UA (BEAKER) (test code Negative     Negative               

   



             = 465)                                              

 

             LEUKOCYTE ESTERASE UA (BEAKER) Negative     Negative               

   



             (test code = 466)                                        

 

             UROBILINOGEN UA (BEAKER) (test 0.2 mg/dL    0.2-1.0                

   



             code = 463)                                         

 

             BACTERIA (BEAKER) (test code = None Seen                           

   



             517)                                                

 

             RBC UA-MANUAL (BEAKER) (test None Seen /HPF                        

   



             code = 1659)                                        

 

             WBC UA-MANUAL (BEAKER) (test <5 /HPF                               

 



             code = 1661)                                        

 

             SQUAMOUS EPITHELIAL MANUAL None Seen /HPF                          

 



             (BEAKER) (test code = 1663)                                        

 

             SOURCE(BEAKER) (test code =                                        



             2795)                                               



CT, BRAIN, WITHOUT [id number] 05:20:00Unlisted Reason for Exam - Click 
Yes and Enter Reason Below-&gt;No
************************************************************[facility] : 1960 Sex: 
M************************************************************FINAL REPORT 
PATIENT ID: [id number] CT, BRAIN, WITHOUT CONTRAST CLINICAL INDICATION: Headache, 
acute, normal neuro exam COMPARISON: None TECHNIQUE: Noncontrast axial CT 
imaging of the brain and skull. Coronal and sagittal reformats obtained. DOSE 
REDUCTION: Dose modulation, iterative reconstruction, and/or weight-based 
adjustment of the mA/kV was utilized to reduce the radiation dose to as low as 
reasonably achievable. FINDINGS:Cerebral parenchyma: No mass, acute intracranial
hemorrhage or evidence of acute cortical infarct.Cerebellum and brainstem: No 
acute finding.Ventricles: No evidence of hydrocephalus.Extra-axial spaces: 
Unremarkable. Calvarium and skull base: Intact.Paranasal sinuses and mastoid air
cells: Trace fluid within the right mastoid air cells. Minimal ethmoid sinus 
mucosal thickening.Orbital contents: Included portions unremarkable. Additional 
findings: None. IMPRESSION: No acute intracranial abnormality. If there is 
persistent clinical concern for intracranial pathology, MR examination is 
recommended for further characterization. Signed: Shahbaz Gibson MDReport 
Verified Date/Time: 2020 05:20:00 Electronically signed by: SHAHBAZ GIBSON MD on 2020 05:20 [id number] 04:06:00





             Test Item    Value        Reference Range Interpretation Comments

 

             MAGNESIUM (BEAKER) (test code = 1.8 mg/dL    1.5-3.0               

    



             627)                                                



 ID - JUSTINOperator ID - JUSTINOperator ID - JUSTINOperator ID - GALINA
B-TYPE NATRIURETIC FACTOR (BNP)2020 03:58:00





             Test Item    Value        Reference Range Interpretation Comments

 

             B-TYPE NATRIURETIC PEPTIDE (BEAKER) 389 pg/mL    0-100        H    

        



             (test code = 700)                                        



 ID - JUSTINSARS-COV2/RT-PCR ([facility]; REF LAB[facility])2020 03:42:00





             Test Item    Value        Reference Range Interpretation Comments

 

             SARS-COV2/RT-PCR (test Negative     Not Detected,              



             code = 8568308)              Negative, See              



                                       external report for              



                                       linked test               

 

             SARS-COV-2 PERFORMING                                        [facility]'s



             LAB (test code =                                        Health - [facility]



             4986439)                                            Hospital



TSH/FREE T4 IF [id number] 03:32:00





             Test Item    Value        Reference Range Interpretation Comments

 

             THYROID STIMULATING HORMONE 1.720 uIU/mL 0.350-5.500               



             (BEAKER) (test code = 772)                                        



 ID - JUSTINLACTIC ACID, [id number] 03:10:00





             Test Item    Value        Reference Range Interpretation Comments

 

             LACTATE BLOOD VENOUS (2) (BEAKER) 1.13 mmol/L  0.50-<2.00          

      



             (test code = 2872)                                        



 ID - JUSTINOperator ID - JUSTINOperator ID - JUSTINOperator ID - GALINA
PROTHROMBIN TIME/[id number] 03:10:00





             Test Item    Value        Reference Range Interpretation Comments

 

             PROTIME (BEAKER) 10.8 seconds 9.3-12.0                  Final Infor

mation



             (test code = 759)                                        (Auto Outp

ut)

 

             INR (BEAKER) (test 0.99         <=5.90                    Final Inf

ormation



             code = 370)                                         (Auto Output)



RECOMMENDED COUMADIN/WARFARIN INR THERAPY RANGESSTANDARD DOSE: 2.0 - 3.0 
Includes: PROPHYLAXIS for venous thrombosis, systemic embolization; TREATMENT 
for venous thrombosis and/or pulmonary embolus.HIGH RISK: Target INR is 2.5-3.5 
for patients with mechanical heart valves.RAD, CHEST, 1 VIEW, NON [id number]
22:47:00Reason for exam:-&gt;HYPERTENSIONcompliant with HTN meds , pt states on 
his BP machine it reads 240/160 Reason for exam:-&gt;hx of afib, HR fluctuates 
from  Reason for exam:-&gt;CHEST PAINchronic, left sideShould this be 
performed at the bedside?-&gt;Yes
************************************************************[facility] : 1960 Sex: 
M************************************************************FINAL REPORT 
PATIENT ID: [id number] INDICATION: HYPERTENSIONCHEST PAIN COMPARISON: 2020 
TECHNIQUE: Single frontal view of the chest. IMPRESSION: Lungs and pleura: 
Unchanged hypoinflated lungs with central venous congestion. No focal 
consolidation or sizable effusion. No pneumothorax.Heart and mediastinum: Stable
enlargement of the cardiac silhouette. Unremarkable mediastinal contours.Osseous
structures: No acute abnormality.Other: None. Signed: Alton Joyaepoziel 
Verified Date/Time: 2020 22:47:41 Electronically signed by: ALTON JOYA MD on 2020 10:47 PMTROPONIN -[id number] 22:14:00





             Test Item    Value        Reference Range Interpretation Comments

 

             TROPONIN I (BEAKER) (test code = 397) < ng/mL      0.00-0.15       

          








             Test Item    Value        Reference Range Interpretation Comments

 

             SODIUM (BEAKER) 137 meq/L    135-148                   



             (test code = 381)                                        

 

             POTASSIUM (BEAKER) 5.0 meq/L    3.6-5.5                   



             (test code = 379)                                        

 

             CHLORIDE (BEAKER) 102 meq/L                        



             (test code = 382)                                        

 

             CO2 (BEAKER) (test 28 meq/L     20-29                     



             code = 355)                                         

 

             BLOOD UREA NITROGEN 23 mg/dL     10-26                     



             (BEAKER) (test code                                        



             = 354)                                              

 

             CREATININE (BEAKER) 0.86 mg/dL   0.50-1.20                 



             (test code = 358)                                        

 

             GLUCOSE RANDOM 103 mg/dL                        



             (BEAKER) (test code                                        



             = 652)                                              

 

             CALCIUM (BEAKER) 9.1 mg/dL    8.5-10.5                  



             (test code = 697)                                        

 

             EGFR (BEAKER) (test 91 mL/min/1.73                           ESTIMA

SONIA GFR IS



             code = 1092) sq m                                   NOT AS ACCURATE

 AS



                                                                 CREATININE



                                                                 CLEARANCE IN



                                                                 PREDICTING



                                                                 GLOMERULAR



                                                                 FILTRATION RATE

.



                                                                 ESTIMATED GFR I

S



                                                                 NOT APPLICABLE 

FOR



                                                                 DIALYSIS PATIEN

TS.



 ID - JUSTINOperator ID - JUSTINOperator ID - JUSTINOperator ID - 
JUSTINOperator ID - JUSTINOperator ID - JUSTINOperator ID - JUSTINOperator ID - 
JUSTINOperator ID - JUSTINOperator ID - JUSTINOperator ID - JUSTINOperator ID - 
JUSTINCBC W/PLT COUNT &amp; AUTO [id number] 21:53:00





             Test Item    Value        Reference Range Interpretation Comments

 

             WHITE BLOOD CELL COUNT (BEAKER) 7.8 K/ L     4.0-10.0              

    



             (test code = 775)                                        

 

             RED BLOOD CELL COUNT (BEAKER) 4.72 M/ L    4.20-5.80               

  



             (test code = 761)                                        

 

             HEMOGLOBIN (BEAKER) (test code = 12.8 GM/DL   13.0-16.8    L       

     



             410)                                                

 

             HEMATOCRIT (BEAKER) (test code = 40.4 %       36.0-50.0            

     



             411)                                                

 

             MEAN CORPUSCULAR VOLUME (BEAKER) 85.6 fL      82.0-99.0            

     



             (test code = 753)                                        

 

             MEAN CORPUSCULAR HEMOGLOBIN 27.1 pg      27.0-33.0                 



             (BEAKER) (test code = 751)                                        

 

             MEAN CORPUSCULAR HEMOGLOBIN CONC 31.7 GM/DL   32.0-36.0    L       

     



             (BEAKER) (test code = 752)                                        

 

             RED CELL DISTRIBUTION WIDTH 14.6 %       12.0-15.0                 



             (BEAKER) (test code = 412)                                        

 

             PLATELET COUNT (BEAKER) (test 290 K/CU MM  150-430                 

  



             code = 756)                                         

 

             MEAN PLATELET VOLUME (BEAKER) 9.9 fL       6.0-11.5                

  



             (test code = 754)                                        

 

             NUCLEATED RED BLOOD CELLS 0 /100 WBC   0-0                       



             (BEAKER) (test code = 413)                                        

 

             NEUTROPHILS RELATIVE PERCENT 63 %                                  

 



             (BEAKER) (test code = 429)                                        

 

             LYMPHOCYTES RELATIVE PERCENT 20 %                                  

 



             (BEAKER) (test code = 430)                                        

 

             MONOCYTES RELATIVE PERCENT 10 %                                   



             (BEAKER) (test code = 431)                                        

 

             EOSINOPHILS RELATIVE PERCENT 6 %                                   

 



             (BEAKER) (test code = 432)                                        

 

             BASOPHILS RELATIVE PERCENT 0 %                                    



             (BEAKER) (test code = 437)                                        

 

             NEUTROPHILS ABSOLUTE COUNT 4.93 K/ L    1.80-8.00                 



             (BEAKER) (test code = 670)                                        

 

             LYMPHOCYTES ABSOLUTE COUNT 1.60 K/ L    1.48-4.50                 



             (BEAKER) (test code = 414)                                        

 

             MONOCYTES ABSOLUTE COUNT (BEAKER) 0.80 K/ L    0.00-1.30           

      



             (test code = 415)                                        

 

             EOSINOPHILS ABSOLUTE COUNT 0.44 K/ L    0.00-0.50                 



             (BEAKER) (test code = 416)                                        

 

             BASOPHILS ABSOLUTE COUNT (BEAKER) 0.03 K/ L    0.00-0.20           

      



             (test code = 417)                                        

 

             IMMATURE GRANULOCYTES-RELATIVE 1 %          0-0          H         

   



             PERCENT (BEAKER) (test code =                                      

  



             2801)                                               



RAPID DRUG SCREEN, [id number] 06:11:00





             Test Item    Value        Reference Range Interpretation Comments

 

             BARBITURATE URINE (BEAKER) (test Negative     Negative             

     



             code = 725)                                         

 

             BENZODIAZEPINE SCREEN URINE (BEAKER) Positive     Negative     A   

         



             (test code = 726)                                        

 

             COCAINE (METAB.) SCREEN (BEAKER) Negative     Negative             

     



             (test code = 1164)                                        

 

             METHADONE SCREEN (BEAKER) (test code Negative     Negative         

         



             = 1436)                                             

 

             OPIATE SCREEN URINE (BEAKER) (test Positive     Negative     A     

       



             code = 734)                                         

 

             CANNABINOID SCREEN URINE (BEAKER) Negative     Negative            

      



             (test code = 727)                                        

 

             AMPH/METHAMPH SCREEN (BEAKER) (test Negative     Negative          

        



             code = 1438)                                        

 

             PHENCYCLIDINE SCREEN URINE (BEAKER) Negative     Negative          

        



             (test code = 608)                                        

 

             PH UA (BEAKER) (test code = 467) 5.5          5.0-8.0              

     



DRUG CUTOFF CONC.Cocaine 300 ng/mL Cannabinoid 50 ng/mLBenzodiazepine 200 
ng/mLBarbiturate 200 ng/mLPhencyclidine 25 ng/mLOpiate 300 ng/mLMethadone 300 
ng/mLAmphetamine/ 1000 ng/mL MethamphetamineThis assay provides an unconfirmed 
qualitative test result for the clinical management of patients in emergency 
situations. Chain of custody not maintained. Some over-the-counter medications, 
as well as adulterants, may cause inaccurate results. Clinical correlation 
should be applied. A more comprehensive drug screen or confirmation of a 
detected drug may be performed upon request. ID - ZEESHAN MOperator ID - 
[auto]HEPATIC FUNCTION [id number] 05:10:00





             Test Item    Value        Reference Range Interpretation Comments

 

             TOTAL PROTEIN (BEAKER) (test code = 7.3 gm/dL    6.0-8.3           

        



             770)                                                

 

             ALBUMIN (BEAKER) (test code = 1145) 4.1 g/dL     3.5-5.0           

        

 

             BILIRUBIN TOTAL (BEAKER) (test code 0.5 mg/dL    0.2-1.2           

        



             = 377)                                              

 

             BILIRUBIN DIRECT (BEAKER) (test 0.3 mg/dL    0.1-0.5               

    



             code = 706)                                         

 

             ALKALINE PHOSPHATASE (BEAKER) (test 111 U/L                  

        



             code = 346)                                         

 

             AST (SGOT) (BEAKER) (test code = 18 U/L       5-34                 

     



             353)                                                

 

             ALT (SGPT) (BEAKER) (test code = 16 U/L       6-55                 

     



             347)                                                



 ID - ZEESHAN MBASIC METABOLIC [id number] 05:09:00





             Test Item    Value        Reference Range Interpretation Comments

 

             SODIUM (BEAKER) 136 meq/L    136-145                   



             (test code = 381)                                        

 

             POTASSIUM (BEAKER) 4.8 meq/L    3.5-5.1                   



             (test code = 379)                                        

 

             CHLORIDE (BEAKER) 99 meq/L                         



             (test code = 382)                                        

 

             CO2 (BEAKER) (test 29 meq/L     22-29                     



             code = 355)                                         

 

             BLOOD UREA NITROGEN 15 mg/dL     7-21                      



             (BEAKER) (test code                                        



             = 354)                                              

 

             CREATININE (BEAKER) 1.02 mg/dL   0.57-1.25                 



             (test code = 358)                                        

 

             GLUCOSE RANDOM 127 mg/dL           H            



             (BEAKER) (test code                                        



             = 652)                                              

 

             CALCIUM (BEAKER) 9.3 mg/dL    8.4-10.2                  



             (test code = 697)                                        

 

             EGFR (BEAKER) (test 75 mL/min/1.73                           ESTIMA

SONIA GFR IS



             code = 1092) sq m                                   NOT AS ACCURATE

 AS



                                                                 CREATININE



                                                                 CLEARANCE IN



                                                                 PREDICTING



                                                                 GLOMERULAR



                                                                 FILTRATION RATE

.



                                                                 ESTIMATED GFR I

S



                                                                 NOT APPLICABLE 

FOR



                                                                 DIALYSIS PATIEN

TS.



 ID - ZEESHAN MPT/[id number] 04:43:00





             Test Item    Value        Reference Range Interpretation Comments

 

             PROTIME (BEAKER) (test code = 13.7 seconds 11.9-14.2               

  



             759)                                                

 

             INR (BEAKER) (test code = 370) 1.08         <=5.90                 

   

 

             PARTIAL THROMBOPLASTIN TIME 29.4 seconds 22.5-36.0                 



             (BEAKER) (test code = 760)                                        



Effective 2019: PT Reference Range ChangeNew: 11.9-14.2 Previous: 11.7-
14.7RECOMMENDED COUMADIN/WARFARIN INR THERAPY RANGESSTANDARD DOSE: 2.0-3.0 
Includes: PROPHYLAXIS for venous thrombosis, systemic embolization; TREATMENT 
for venous thrombosis and/or pulmonary embolus.HIGH RISK: Target INR is 2.5-3.5 
for patients wiht mechanical heart valves.MR, SPINE, CERVICAL, [id number] 
22:45:00With anesthesiaUnlisted Reason for Exam - Click Yes and Enter Reason 
Below-&gt;No************************************************************[facility] : 1960 Sex: 
M************************************************************FINAL REPORT 
PATIENT ID: [id number] EXAM: MR, SPINE, THORACIC, WITH \T\ WITHOUT CONTRAST, MR, 
SPINE, CERVICAL, WITH \T\ WITHOUT CONTRAST INDICATION: Mid-back pain, 
compression fracture suspected TECHNIQUE: Pre-contrast sagittal T1-, T2-, and 
T2-w fat-saturated images, and axial T1-w and T2-w images of the cervical and 
thoracic spine. Post-contrast axial T1-w and sagittal T1-w fat-saturated images.
 Intravenous contrast material was administered for the examination. COMPARISON:
 2020 CT FINDINGS:CERVICAL SPINE: Alignment: No malalignment or 
significant listhesis. Vertebral Bodies: No acute fracture. Marrow Signal: 
Expected Intervertebral Discs: Multilevel disc dessication with moderate loss of
 disc space height at C4-C5. Spinal Cord: Normal in signal intensity. Included 
Intracranial Structures: Unremarkable Paraspinal Soft Tissues: Unremarkable 
Individual Levels: C1-C2: Mild degenerative changes ofthe atlantoaxial joint C2-
C3: No spinal canal or foraminal stenosis. C3-C4: Moderate right facet arth
ropathy. No spinal canal or foraminal stenosis. C4-C5: Minimal posterior disc 
osteophyte complex with uncovertebral spurring and mild bilateral facet 
arthropathy. No spinal canal stenosis. There is moderate bilateral foraminal 
stenosis. C5-C6: Small disc osteophyte complex, mild uncovertebral spurring, and
 mild bilateral facet arthropathy. No spinal canal stenosis. There is mild 
bilateral foraminal stenosis. C6-C7: Small disc osteophyte complex. No spinal 
canal or foraminal stenosis. C7-T1: No spinal canal or foraminal stenosis. 
Abnormal Enhancement: None THORACIC SPINE: Surgical: Bilateral fusion of the 
posterior elements from T8 to L2 extending out of the field-of-view. Alignment: 
There is moderate kyphosis centered at T11-T12 Vertebral Bodies: Severe 
compression deformity of T12 with greater than 75% height loss and moderate 
kyphotic angulation. No retropulsion. Mild concavity is at the superior 
endplates of T10 and T11 without significant height loss strangulation. Marrow 
Signal: Expected. No inflammatory marrow signal. Intervertebral Discs: 
Multilevel disc dessication without significant loss of disc space height Spinal
 cord: Focus increased T2 signal within the dorsal cord at T10-T11 suggestive of
 mild myelomalacia. Paraspinal Soft Tissues: There is bibasilar atelectasis or 
consolidations. Individual Levels: No evidence of disc herniation, spinal canal 
stenosis, or neural foraminal narrowing. Abnormal Enhancement: None IMPRESSION: 
1. No acute abnormality on MRI of the cervicothoracicspine. 2.Chronic severe 
anterior wedge compression deformity of T12 and chronic mild superior endplate 
concavities of T10 and T11.3. Modest multilevel degenerative changes without 
high-grade spinal canal stenosis. Moderate bilateral foraminal stenosis at C4-C5
 on a degenerative basis.4.Small focal cord myelomalacia suggested at the T10-
T11 level.5.Bibasilar lung atelectasis or consolidations. Signed:Alton Joya 
[facility] Verified Date/Time: 2020 22:45:35 Electronically signed by: ALTON JOYA MD on 2020 10:45 PMMR, SPINE, THORACIC, [id number] 22:45:00
With anesthesiaUnlisted Reason for Exam - Click Yes and Enter Reason 
Below-&gt;No************************************************************[facility] : 1960  Sex: 
M************************************************************FINAL REPORT 
PATIENT ID: [id number] EXAM: MR, SPINE, THORACIC, WITH \T\ WITHOUT CONTRAST, MR, 
SPINE, CERVICAL, WITH \T\ WITHOUT CONTRAST INDICATION: Mid-back pain, 
compression fracture suspected TECHNIQUE: Pre-contrast sagittal T1-, T2-, and 
T2-w fat-saturated images, and axial T1-w and T2-w images of the cervical and 
thoracic spine. Post-contrast axial T1-w and sagittal T1-w fat-saturated images.
 Intravenous contrast material was administered for the examination. COMPARISON:
 2020 CT FINDINGS:CERVICAL SPINE: Alignment: No malalignment or 
significant listhesis. Vertebral Bodies: No acute fracture. Marrow Signal: 
Expected Intervertebral Discs: Multilevel disc dessication with moderate loss of
 disc space height at C4-C5. Spinal Cord: Normal in signal intensity. Included 
Intracranial Structures: Unremarkable Paraspinal Soft Tissues: Unremarkable 
Individual Levels: C1-C2: Mild degenerative changes of the atlantoaxial joint 
C2-C3: No spinal canal or foraminal stenosis. C3-C4: Moderate right facet art
hropathy. No spinal canal or foraminal stenosis. C4-C5: Minimal posterior disc 
osteophyte complex with uncovertebral spurring and mild bilateral facet 
arthropathy. No spinal canal stenosis. There is moderate bilateral foraminal 
stenosis. C5-C6: Small disc osteophyte complex, mild uncovertebral spurring, and
 mild bilateral facet arthropathy. No spinal canal stenosis. There is mild 
bilateral foraminal stenosis. C6-C7: Small disc osteophyte complex. No spinal 
canal or foraminal stenosis. C7-T1: No spinal canal or foraminal stenosis. 
Abnormal Enhancement: None THORACIC SPINE: Surgical: Bilateral fusionof the 
posterior elements from T8 to L2 extending out of the field-of-view. Alignment: 
There is moderate kyphosis centered at T11-T12 Vertebral Bodies: Severe 
compression deformity of T12 with greater than 75% height loss and moderate 
kyphotic angulation. No retropulsion. Mild concavity is at the superior 
endplates of T10 and T11 without significant height loss strangulation. Marrow 
Signal: Expected.No inflammatory marrow signal. Intervertebral Discs: Multilevel
 disc dessication without significantloss of disc space height Spinal cord: 
Focus increased T2 signal within the dorsal cord at T10-T11 suggestive of mild 
myelomalacia. Paraspinal Soft Tissues: There is bibasilar atelectasis or 
consolidations. Individual Levels: No evidence of disc herniation, spinal canal 
stenosis, or neural foraminal narrowing. Abnormal Enhancement: None IMPRESSION: 
1. No acute abnormality on MRI of the cervicothoracic spine. 2.Chronic severe 
anterior wedge compression deformity of T12 and chronic mild superior endplate 
concavities of T10 and T11.3. Modest multilevel degenerative changes without 
high-grade spinal canal stenosis. Moderate bilateral foraminal stenosis at C4-C5
 on a degenerative basis.4.Small focal cord myelomalacia suggested at the T10-
T11 level.5.Bibasilar lung atelectasis or consolidations. Signed: Alton Joya 
MDReport Verified Date/Time: 2020 22:45:35 Electronically signed by: ALTON JOYA MD on 2020 10:45 PMCT, SPINE, THORACIC, WO [id number] 
12:40:00Unlisted Reason for Exam - Click Yes and Enter Reason Below-&gt;No
********************************[facility] 1960 Sex: 
M************************************************************FINAL REPORT 
PATIENT ID: [id number] CT Thoracic and Lumbar spine CLINICAL HISTORY: L/S-spine 
fusion, follow up TECHNIQUE: Contiguous noncontrast axial images of the thoracic
 and lumbar spine with coronal andsagittal reformations to assess the alignment.
 This exam [facility] 
which includes automated exposure control, adjustment of the mA and/or kV a
ccording to the patient size, and/or use of an iterative reconstruction 
technique. COMPARISON: Plainfilm 2020 FINDINGS: A severe T12 compression 
fracture is again seen. Mild superior endplate concavities of T10 and T11 are 
again noted. A L2 superior endplate Schmorl's node is again seen. The thoracic 
and lumbar vertebral body heights are otherwise maintained. There is bilateral 
posterior bone fusion graft with solid-appearing osseous fusion of the T8 
through L5 posterior elements. There is nospinal hardware. There is an 
exaggerated thoracic kyphosis centered at T11-T12. The thoracic alignment is 
preserved. There is grade 1 retrolisthesis of L4-L5. The lumbar alignment is 
otherwise maintained. The central canal contents are not well evaluated given 
the lack of intrathecal contrast. Allowing for this, and L4-L5, a mild disc 
osteophyte complex and severe facet arthrosis is seen with mild tomoderate 
appearing central canal stenosis. There is no other obvious significant central 
canal stenosis in the thoracolumbar spine. The visualized soft tissues are 
grossly unremarkable. IMPRESSION: Severe T12 compression fracture. Mild T10 and 
T11 superior endplate concavities. Solid-appearing bone graft fusion of the T8 
through L5 posterior elements. Signed: Tomasz Bedoya MDReport Verified 
Date/Time: 2020 12:40:04 Reading Location: [address] Neuro Reading Room
 Electronically signed by: TOMASZ BEDOYA M.D. on 2020 12:40 PMCT, SPINE,
 LUMBAR, WO [id number] 12:40:00Unlisted Reason for Exam - Click Yes and 
Enter Reason Below-&gt;No
************************************************************[facility] : 1960 Sex: 
M************************************************************FINAL REPORT 
PATIENT ID: [id number] CT Thoracic and Lumbar spine CLINICAL HISTORY: L/S-spine 
fusion, follow up TECHNIQUE: Contiguous noncontrast axial images of the thoracic
 and lumbar spine with coronal andsagittal reformations to assess the alignment.
 This exam was performed according to the [facility] optimization program 
which includes automated exposure control, adjustment of the mA and/or kV a
ccording to the patient size, and/or use of an iterative reconstruction 
technique. COMPARISON: Plainfilm 2020 FINDINGS: A severe T12 compression 
fracture is again seen. Mild superior endplate concavities of T10 and T11 are 
again noted. A L2 superior endplate Schmorl's node is again seen. The thoracic 
and lumbar vertebral body heights are otherwise maintained. There is bilateral 
posterior bone fusion graft with solid-appearing osseous fusion of the T8 
through L5 posterior elements. There is nospinal hardware. There is an 
exaggerated thoracic kyphosis centered at T11-T12. The thoracic alignment is 
preserved. There is grade 1 retrolisthesis of L4-L5. The lumbar alignment is 
otherwise maintained. The central canal contents are not well evaluated given 
the lack of intrathecal contrast. Allowing for this, and L4-L5, a mild disc 
osteophyte complex and severe facet arthrosis is seen with mild tomoderate 
appearing central canal stenosis. There is no other obvious significant central 
canal stenosis in the thoracolumbar spine. The visualized soft tissues are 
grossly unremarkable. IMPRESSION: Severe T12 compression fracture. Mild T10 and 
T11 superior endplate concavities. Solid-appearing bone graft fusion of the T8 
through L5 posterior elements. Signed: Tomasz Bedoya MDReport Verified 
Date/Time: 2020 12:40:04 Reading Location: [address] Neuro Reading Room
 Electronically signed by: TOMASZ BEDOYA M.D. on 2020 12:40 PMRAD, 
SPINE, SCOLIOSIS STUDY, 4 OR 5 [id number] 11:58:00Reason for exam:-&gt;New 
deformity; please obtain standing films
************************************************************[facility] : 1960 Sex: 
M************************************************************FINAL REPORT 
PATIENT ID: [id number] RAD, SPINE, SCOLIOSIS STUDY, 4 OR 5 VIEWS INDICATION: New 
deformity; please obtain standing films COMPARISON: 2020 TECHNIQUE:
 Multiple frontal and lateral views of the axial skeleton. FINDINGS:The 
examination is of limited by body habitus and degraded capability to stand 
upright. IMPRESSION: Exaggeration of thoracic kyphosis due to severe height 
loss/vertebra plana at the T12 vertebral body. There is approximately 50% height
 loss at the L3 vertebral body. Degenerative endplate changes are present 
throughout the thoracic skeleton. There is slight dextroconvex scoliotic 
curvature in the upper thoracic spine, with Glover angle of approximately 5.6 
degrees (measured from T6 inferior endplate and T12 inferior endplate). Signed: 
JR Berrios Robert MDReportVerified Date/Time: 2020 11:58:25 Reading
 Location: [facility] Radiology Reading Room Electronically signed by: MARIELENA BERRIOS on 2020 11:58 AMPOCT-GLUCOSE [id number] 09:25:00





             Test Item    Value        Reference Range Interpretation Comments

 

             POC-GLUCOSE METER 98 mg/dL                         : TESTED A

T [facility] [address]



             (BEAKER) (test code =                                        [place],



             1538)                                               [id number]:



                                                                 /Techni

fito ID =



                                                                 [id number] for BEN SIMON



BASIC METABOLIC [id number] 06:59:00





             Test Item    Value        Reference Range Interpretation Comments

 

             SODIUM (BEAKER) 135 meq/L    136-145      L            



             (test code = 381)                                        

 

             POTASSIUM (BEAKER) 4.2 meq/L    3.5-5.1                   



             (test code = 379)                                        

 

             CHLORIDE (BEAKER) 101 meq/L                        



             (test code = 382)                                        

 

             CO2 (BEAKER) (test 26 meq/L     22-29                     



             code = 355)                                         

 

             BLOOD UREA NITROGEN 18 mg/dL     7-21                      



             (BEAKER) (test code                                        



             = 354)                                              

 

             CREATININE (BEAKER) 0.83 mg/dL   0.57-1.25                 



             (test code = 358)                                        

 

             GLUCOSE RANDOM 128 mg/dL           H            



             (BEAKER) (test code                                        



             = 652)                                              

 

             CALCIUM (BEAKER) 8.8 mg/dL    8.4-10.2                  



             (test code = 697)                                        

 

             EGFR (BEAKER) (test 95 mL/min/1.73                           ESTIMA

SONIA GFR IS



             code = 1092) sq m                                   NOT AS ACCURATE

 AS



                                                                 CREATININE



                                                                 CLEARANCE IN



                                                                 PREDICTING



                                                                 GLOMERULAR



                                                                 FILTRATION RATE

.



                                                                 ESTIMATED GFR I

S



                                                                 NOT APPLICABLE 

FOR



                                                                 DIALYSIS PATIEN

TS.



 ID - ZEESHAN [id number] 06:59:00





             Test Item    Value        Reference Range Interpretation Comments

 

             MAGNESIUM (BEAKER) (test code = 2.2 mg/dL    1.6-2.6               

    



             627)                                                



 ID - ZEESHAN MHEPATIC FUNCTION [id number] 06:59:00





             Test Item    Value        Reference Range Interpretation Comments

 

             TOTAL PROTEIN (BEAKER) (test code = 6.8 gm/dL    6.0-8.3           

        



             770)                                                

 

             ALBUMIN (BEAKER) (test code = 1145) 3.8 g/dL     3.5-5.0           

        

 

             BILIRUBIN TOTAL (BEAKER) (test code 0.4 mg/dL    0.2-1.2           

        



             = 377)                                              

 

             BILIRUBIN DIRECT (BEAKER) (test 0.1 mg/dL    0.1-0.5               

    



             code = 706)                                         

 

             ALKALINE PHOSPHATASE (BEAKER) (test 108 U/L                  

        



             code = 346)                                         

 

             AST (SGOT) (BEAKER) (test code = 14 U/L       5-34                 

     



             353)                                                

 

             ALT (SGPT) (BEAKER) (test code = 14 U/L       6-55                 

     



             347)                                                



 ID - ZEESHAN MPROTHROMBIN [id number] 06:36:00





             Test Item    Value        Reference Range Interpretation Comments

 

             PROTIME (BEAKER) (test code = 13.7 seconds 11.9-14.2               

  



             759)                                                

 

             INR (BEAKER) (test code = 370) 1.09         <=5.90                 

   



Effective 2019: PT Reference Range ChangeNew: 11.9-14.2 Previous: 11.7-
14.7RECOMMENDED COUMADIN/WARFARIN INR THERAPY RANGESSTANDARD DOSE: 2.0-3.0 
Includes: PROPHYLAXIS for venous thrombosis, systemic embolization; TREATMENT 
for venous thrombosis and/or pulmonary embolus.HIGH RISK: Target INR is 2.5-3.5 
for patients wiht mechanical heart valves.CBC W/PLT COUNT &amp; AUTO 
[id number] 06:26:00





             Test Item    Value        Reference Range Interpretation Comments

 

             WHITE BLOOD CELL COUNT (BEAKER) 5.7 K/ L     3.5-10.5              

    



             (test code = 775)                                        

 

             RED BLOOD CELL COUNT (BEAKER) 4.69 M/ L    4.63-6.08               

  



             (test code = 761)                                        

 

             HEMOGLOBIN (BEAKER) (test code = 12.6 GM/DL   13.7-17.5    L       

     



             410)                                                

 

             HEMATOCRIT (BEAKER) (test code = 40.9 %       40.1-51.0            

     



             411)                                                

 

             MEAN CORPUSCULAR VOLUME (BEAKER) 87.2 fL      79.0-92.2            

     



             (test code = 753)                                        

 

             MEAN CORPUSCULAR HEMOGLOBIN 26.9 pg      25.7-32.2                 



             (BEAKER) (test code = 751)                                        

 

             MEAN CORPUSCULAR HEMOGLOBIN CONC 30.8 GM/DL   32.3-36.5    L       

     



             (BEAKER) (test code = 752)                                        

 

             RED CELL DISTRIBUTION WIDTH 14.7 %       11.6-14.4    H            



             (BEAKER) (test code = 412)                                        

 

             PLATELET COUNT (BEAKER) (test 252 K/CU MM  150-450                 

  



             code = 756)                                         

 

             MEAN PLATELET VOLUME (BEAKER) 10.6 fL      9.4-12.4                

  



             (test code = 754)                                        

 

             NUCLEATED RED BLOOD CELLS 0 /100 WBC   0-0                       



             (BEAKER) (test code = 413)                                        

 

             NEUTROPHILS RELATIVE PERCENT 61 %                                  

 



             (BEAKER) (test code = 429)                                        

 

             LYMPHOCYTES RELATIVE PERCENT 21 %                                  

 



             (BEAKER) (test code = 430)                                        

 

             MONOCYTES RELATIVE PERCENT 10 %                                   



             (BEAKER) (test code = 431)                                        

 

             EOSINOPHILS RELATIVE PERCENT 8 %                                   

 



             (BEAKER) (test code = 432)                                        

 

             BASOPHILS RELATIVE PERCENT 1 %                                    



             (BEAKER) (test code = 437)                                        

 

             NEUTROPHILS ABSOLUTE COUNT 3.44 K/ L    1.78-5.38                 



             (BEAKER) (test code = 670)                                        

 

             LYMPHOCYTES ABSOLUTE COUNT 1.16 K/ L    1.32-3.57    L            



             (BEAKER) (test code = 414)                                        

 

             MONOCYTES ABSOLUTE COUNT (BEAKER) 0.54 K/ L    0.30-0.82           

      



             (test code = 415)                                        

 

             EOSINOPHILS ABSOLUTE COUNT 0.46 K/ L    0.04-0.54                 



             (BEAKER) (test code = 416)                                        

 

             BASOPHILS ABSOLUTE COUNT (BEAKER) 0.03 K/ L    0.01-0.08           

      



             (test code = 417)                                        

 

             IMMATURE GRANULOCYTES-RELATIVE 1 %          0-1                    

   



             PERCENT (BEAKER) (test code =                                      

  



             2801)                                               



SARS-COV2/RT-PCR ([facility] &amp; REF LABS)2020 12:43:00





             Test Item    Value        Reference Range Interpretation Comments

 

             SARS-COV2/RT-PCR (test code Negative     Not Detected, Negative,   

           



             = [id number])                See external report for              



                                       linked test               

 

             SARS-COV-2 PERFORMING LAB                                        [facility]



             (test code = 9145979)                                        



BASIC METABOLIC [id number] 05:39:00





             Test Item    Value        Reference Range Interpretation Comments

 

             SODIUM (BEAKER) 137 meq/L    135-148                   



             (test code = 381)                                        

 

             POTASSIUM (BEAKER) 4.5 meq/L    3.6-5.5                   



             (test code = 379)                                        

 

             CHLORIDE (BEAKER) 102 meq/L                        



             (test code = 382)                                        

 

             CO2 (BEAKER) (test 26 meq/L     20-29                     



             code = 355)                                         

 

             BLOOD UREA NITROGEN 20 mg/dL     10-26                     



             (BEAKER) (test code                                        



             = 354)                                              

 

             CREATININE (BEAKER) 0.78 mg/dL   0.50-1.20                 



             (test code = 358)                                        

 

             GLUCOSE RANDOM 103 mg/dL                        



             (BEAKER) (test code                                        



             = 652)                                              

 

             CALCIUM (BEAKER) 8.5 mg/dL    8.5-10.5                  



             (test code = 697)                                        

 

             EGFR (BEAKER) (test 102 mL/min/1.73                           ESTIM

ATED GFR IS



             code = 1092) sq m                                   NOT AS ACCURATE

 AS



                                                                 CREATININE



                                                                 CLEARANCE IN



                                                                 PREDICTING



                                                                 GLOMERULAR



                                                                 FILTRATION RATE

.



                                                                 ESTIMATED GFR I

S



                                                                 NOT APPLICABLE 

FOR



                                                                 DIALYSIS PATIEN

TS.



 ID - [id number] ID - [id number] ID - [id number] ID - 
[id number] ID - [id number] ID - [id number] ID - [id number] ID - 
kywo25Cnqhphys ID - [id number] ID - [id number] ID - [id number] ID - 
[id number] W/PLT COUNT &amp; AUTO [id number] 05:22:00





             Test Item    Value        Reference Range Interpretation Comments

 

             WHITE BLOOD CELL COUNT (BEAKER) 6.4 K/ L     4.0-10.0              

    



             (test code = 775)                                        

 

             RED BLOOD CELL COUNT (BEAKER) 4.83 M/ L    4.20-5.80               

  



             (test code = 761)                                        

 

             HEMOGLOBIN (BEAKER) (test code = 13.1 GM/DL   13.0-16.8            

     



             410)                                                

 

             HEMATOCRIT (BEAKER) (test code = 41.9 %       36.0-50.0            

     



             411)                                                

 

             MEAN CORPUSCULAR VOLUME (BEAKER) 86.7 fL      82.0-99.0            

     



             (test code = 753)                                        

 

             MEAN CORPUSCULAR HEMOGLOBIN 27.1 pg      27.0-33.0                 



             (BEAKER) (test code = 751)                                        

 

             MEAN CORPUSCULAR HEMOGLOBIN CONC 31.3 GM/DL   32.0-36.0    L       

     



             (BEAKER) (test code = 752)                                        

 

             RED CELL DISTRIBUTION WIDTH 14.9 %       12.0-15.0                 



             (BEAKER) (test code = 412)                                        

 

             PLATELET COUNT (BEAKER) (test 243 K/CU MM  150-430                 

  



             code = 756)                                         

 

             MEAN PLATELET VOLUME (BEAKER) 10.6 fL      6.0-11.5                

  



             (test code = 754)                                        

 

             NUCLEATED RED BLOOD CELLS 0 /100 WBC   0-0                       



             (BEAKER) (test code = 413)                                        

 

             NEUTROPHILS RELATIVE PERCENT 58 %                                  

 



             (BEAKER) (test code = 429)                                        

 

             LYMPHOCYTES RELATIVE PERCENT 23 %                                  

 



             (BEAKER) (test code = 430)                                        

 

             MONOCYTES RELATIVE PERCENT 12 %                                   



             (BEAKER) (test code = 431)                                        

 

             EOSINOPHILS RELATIVE PERCENT 6 %                                   

 



             (BEAKER) (test code = 432)                                        

 

             BASOPHILS RELATIVE PERCENT 1 %                                    



             (BEAKER) (test code = 437)                                        

 

             NEUTROPHILS ABSOLUTE COUNT 3.71 K/ L    1.80-8.00                 



             (BEAKER) (test code = 670)                                        

 

             LYMPHOCYTES ABSOLUTE COUNT 1.47 K/ L    1.48-4.50    L            



             (BEAKER) (test code = 414)                                        

 

             MONOCYTES ABSOLUTE COUNT (BEAKER) 0.75 K/ L    0.00-1.30           

      



             (test code = 415)                                        

 

             EOSINOPHILS ABSOLUTE COUNT 0.39 K/ L    0.00-0.50                 



             (BEAKER) (test code = 416)                                        

 

             BASOPHILS ABSOLUTE COUNT (BEAKER) 0.05 K/ L    0.00-0.20           

      



             (test code = 417)                                        

 

             IMMATURE GRANULOCYTES-RELATIVE 1 %          0-0          H         

   



             PERCENT (BEAKER) (test code =                                      

  



             2801)                                               



BASIC METABOLIC [id number] 05:24:00





             Test Item    Value        Reference Range Interpretation Comments

 

             SODIUM (BEAKER) 137 meq/L    135-148                   



             (test code = 381)                                        

 

             POTASSIUM (BEAKER) 4.5 meq/L    3.6-5.5                   



             (test code = 379)                                        

 

             CHLORIDE (BEAKER) 102 meq/L                        



             (test code = 382)                                        

 

             CO2 (BEAKER) (test 27 meq/L     20-29                     



             code = 355)                                         

 

             BLOOD UREA NITROGEN 19 mg/dL     10-26                     



             (BEAKER) (test code                                        



             = 354)                                              

 

             CREATININE (BEAKER) 0.77 mg/dL   0.50-1.20                 



             (test code = 358)                                        

 

             GLUCOSE RANDOM 102 mg/dL                        



             (BEAKER) (test code                                        



             = 652)                                              

 

             CALCIUM (BEAKER) 8.9 mg/dL    8.5-10.5                  



             (test code = 697)                                        

 

             EGFR (BEAKER) (test 103 mL/min/1.73                           ESTIM

ATED GFR IS



             code = 1092) sq m                                   NOT AS ACCURATE

 AS



                                                                 CREATININE



                                                                 CLEARANCE IN



                                                                 PREDICTING



                                                                 GLOMERULAR



                                                                 FILTRATION RATE

.



                                                                 ESTIMATED GFR I

S



                                                                 NOT APPLICABLE 

FOR



                                                                 DIALYSIS PATIEN

TS.



 ID - [id number] ID - [id number] ID - [id number] ID - 
[id number] ID - [id number] ID - [id number] ID - 
[id number] ID - [id number] ID - [id number] ID - 
[id number] ID - [id number] ID - [id number] W/PLT COUNT &amp; AUTO 
[id number] 05:12:00





             Test Item    Value        Reference Range Interpretation Comments

 

             WHITE BLOOD CELL COUNT (BEAKER) 6.0 K/ L     4.0-10.0              

    



             (test code = 775)                                        

 

             RED BLOOD CELL COUNT (BEAKER) 4.91 M/ L    4.20-5.80               

  



             (test code = 761)                                        

 

             HEMOGLOBIN (BEAKER) (test code = 13.1 GM/DL   13.0-16.8            

     



             410)                                                

 

             HEMATOCRIT (BEAKER) (test code = 42.7 %       36.0-50.0            

     



             411)                                                

 

             MEAN CORPUSCULAR VOLUME (BEAKER) 87.0 fL      82.0-99.0            

     



             (test code = 753)                                        

 

             MEAN CORPUSCULAR HEMOGLOBIN 26.7 pg      27.0-33.0    L            



             (BEAKER) (test code = 751)                                        

 

             MEAN CORPUSCULAR HEMOGLOBIN CONC 30.7 GM/DL   32.0-36.0    L       

     



             (BEAKER) (test code = 752)                                        

 

             RED CELL DISTRIBUTION WIDTH 14.9 %       12.0-15.0                 



             (BEAKER) (test code = 412)                                        

 

             PLATELET COUNT (BEAKER) (test 222 K/CU MM  150-430                 

  



             code = 756)                                         

 

             MEAN PLATELET VOLUME (BEAKER) 10.2 fL      6.0-11.5                

  



             (test code = 754)                                        

 

             NUCLEATED RED BLOOD CELLS 0 /100 WBC   0-0                       



             (BEAKER) (test code = 413)                                        

 

             NEUTROPHILS RELATIVE PERCENT 56 %                                  

 



             (BEAKER) (test code = 429)                                        

 

             LYMPHOCYTES RELATIVE PERCENT 24 %                                  

 



             (BEAKER) (test code = 430)                                        

 

             MONOCYTES RELATIVE PERCENT 12 %                                   



             (BEAKER) (test code = 431)                                        

 

             EOSINOPHILS RELATIVE PERCENT 6 %                                   

 



             (BEAKER) (test code = 432)                                        

 

             BASOPHILS RELATIVE PERCENT 1 %                                    



             (BEAKER) (test code = 437)                                        

 

             NEUTROPHILS ABSOLUTE COUNT 3.41 K/ L    1.80-8.00                 



             (BEAKER) (test code = 670)                                        

 

             LYMPHOCYTES ABSOLUTE COUNT 1.42 K/ L    1.48-4.50    L            



             (BEAKER) (test code = 414)                                        

 

             MONOCYTES ABSOLUTE COUNT (BEAKER) 0.73 K/ L    0.00-1.30           

      



             (test code = 415)                                        

 

             EOSINOPHILS ABSOLUTE COUNT 0.36 K/ L    0.00-0.50                 



             (BEAKER) (test code = 416)                                        

 

             BASOPHILS ABSOLUTE COUNT (BEAKER) 0.05 K/ L    0.00-0.20           

      



             (test code = 417)                                        

 

             IMMATURE GRANULOCYTES-RELATIVE 1 %          0-0          H         

   



             PERCENT (BEAKER) (test code =                                      

  



             2801)                                               



BASIC METABOLIC [id number] 05:06:00





             Test Item    Value        Reference Range Interpretation Comments

 

             SODIUM (BEAKER) 137 meq/L    135-148                   



             (test code = 381)                                        

 

             POTASSIUM (BEAKER) 4.5 meq/L    3.6-5.5                   



             (test code = 379)                                        

 

             CHLORIDE (BEAKER) 101 meq/L                        



             (test code = 382)                                        

 

             CO2 (BEAKER) (test 28 meq/L     20-29                     



             code = 355)                                         

 

             BLOOD UREA NITROGEN 21 mg/dL     10-26                     



             (BEAKER) (test code                                        



             = 354)                                              

 

             CREATININE (BEAKER) 0.76 mg/dL   0.50-1.20                 



             (test code = 358)                                        

 

             GLUCOSE RANDOM 120 mg/dL           H            



             (BEAKER) (test code                                        



             = 652)                                              

 

             CALCIUM (BEAKER) 8.6 mg/dL    8.5-10.5                  



             (test code = 697)                                        

 

             EGFR (BEAKER) (test 105 mL/min/1.73                           ESTIM

ATED GFR IS



             code = 1092) sq m                                   NOT AS ACCURATE

 AS



                                                                 CREATININE



                                                                 CLEARANCE IN



                                                                 PREDICTING



                                                                 GLOMERULAR



                                                                 FILTRATION RATE

.



                                                                 ESTIMATED GFR I

S



                                                                 NOT APPLICABLE 

FOR



                                                                 DIALYSIS PATIEN

TS.



 ID - JBERNOperator ID - JBERNOperator ID - JBERNOperator ID - 
JBERNOperator ID - JBERNOperator ID - JBERNOperator ID - JBERNOperator ID - 
JBERNOperator ID - JBERNOperator ID - JBERNOperator ID- JBERNOperator ID - JBERN
CBC W/PLT COUNT &amp; AUTO [id number] 04:50:00





             Test Item    Value        Reference Range Interpretation Comments

 

             WHITE BLOOD CELL COUNT (BEAKER) 6.0 K/ L     4.0-10.0              

    



             (test code = 775)                                        

 

             RED BLOOD CELL COUNT (BEAKER) 4.75 M/ L    4.20-5.80               

  



             (test code = 761)                                        

 

             HEMOGLOBIN (BEAKER) (test code = 12.6 GM/DL   13.0-16.8    L       

     



             410)                                                

 

             HEMATOCRIT (BEAKER) (test code = 41.1 %       36.0-50.0            

     



             411)                                                

 

             MEAN CORPUSCULAR VOLUME (BEAKER) 86.5 fL      82.0-99.0            

     



             (test code = 753)                                        

 

             MEAN CORPUSCULAR HEMOGLOBIN 26.5 pg      27.0-33.0    L            



             (BEAKER) (test code = 751)                                        

 

             MEAN CORPUSCULAR HEMOGLOBIN CONC 30.7 GM/DL   32.0-36.0    L       

     



             (BEAKER) (test code = 752)                                        

 

             RED CELL DISTRIBUTION WIDTH 14.8 %       12.0-15.0                 



             (BEAKER) (test code = 412)                                        

 

             PLATELET COUNT (BEAKER) (test 204 K/CU MM  150-430                 

  



             code = 756)                                         

 

             MEAN PLATELET VOLUME (BEAKER) 10.2 fL      6.0-11.5                

  



             (test code = 754)                                        

 

             NUCLEATED RED BLOOD CELLS 0 /100 WBC   0-0                       



             (BEAKER) (test code = 413)                                        

 

             NEUTROPHILS RELATIVE PERCENT 56 %                                  

 



             (BEAKER) (test code = 429)                                        

 

             LYMPHOCYTES RELATIVE PERCENT 23 %                                  

 



             (BEAKER) (test code = 430)                                        

 

             MONOCYTES RELATIVE PERCENT 12 %                                   



             (BEAKER) (test code = 431)                                        

 

             EOSINOPHILS RELATIVE PERCENT 7 %                                   

 



             (BEAKER) (test code = 432)                                        

 

             BASOPHILS RELATIVE PERCENT 1 %                                    



             (BEAKER) (test code = 437)                                        

 

             NEUTROPHILS ABSOLUTE COUNT 3.35 K/ L    1.80-8.00                 



             (BEAKER) (test code = 670)                                        

 

             LYMPHOCYTES ABSOLUTE COUNT 1.35 K/ L    1.48-4.50    L            



             (BEAKER) (test code = 414)                                        

 

             MONOCYTES ABSOLUTE COUNT (BEAKER) 0.71 K/ L    0.00-1.30           

      



             (test code = 415)                                        

 

             EOSINOPHILS ABSOLUTE COUNT 0.44 K/ L    0.00-0.50                 



             (BEAKER) (test code = 416)                                        

 

             BASOPHILS ABSOLUTE COUNT (BEAKER) 0.04 K/ L    0.00-0.20           

      



             (test code = 417)                                        

 

             IMMATURE GRANULOCYTES-RELATIVE 1 %          0-0          H         

   



             PERCENT (BEAKER) (test code =                                      

  



             2801)                                               



RAD, SPINE, THORACIC, 2 [id number] 16:41:00Reason for exam:-&gt;Back pain
[facility] 1960 Sex: 
M************************************************************FINAL REPORT 
PATIENT ID: [id number] THORACIC SPINE 2 VIEWS HISTORY: Back pain COMPARISON: No 
comparison thoracic spine imaging FINDINGS: AP and lateral radiograph of the 
thoracic spine were performed. There is a moderate to severe compression 
fracture at the approximate T12 level. Mild superior endplate concavity at T11 
and T10 may reflect mild compression fractures. No comparison studies are 
available to determine the acuity of these findings. If clinically indicated, 
this could be further evaluated withMRI. There is ossification of the anterior 
longitudinal ligament and there are multilevel lateral syndesmophytes. It is not
 clear if this is secondary to underlying ankylosing spondylitis or if this is
just hypertrophic change related to idiopathic skeletal hyperostosis. 
IMPRESSION: 1. Age-indeterminate moderate to severe T12 and mild T10 and T11 
compression fractures. Signed: Andrei Souza Verified Date/Time: 
2020 16:41:11 Reading Location: [address] Reading Room 
Electronically signed by: ANDREI SOUZA MD on 2020 04:41 PMRAD, SPINE, 
CERVICAL, 2 OR 3 [id number] 16:38:00Reason for exam:-&gt;Neck pain
********************************[facility] 1960 Sex: 
M************************************************************FINAL REPORT 
PATIENT ID: [id number] CERVICAL SPINE 2 VIEWS HISTORY: Neck pain COMPARISON: No 
comparison cervical spine imaging FINDINGS: AP and lateral cervical spine radius
 were obtained. Mild disc space narrowing and endplate osteophyte formation at 
C3-C4. Moderate disc space narrowing at C4-C5 with mild C4 retrolisthesis. There
 may be partial osseous interbody fusion at C4-C5. No fracture or dislocationare
 visualized. C7 is obscured by the overlying soft tissues of the shoulders. The 
contour of the prevertebral soft tissues appears normal. IMPRESSION: Moderate 
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 Signed: Andrei Souza Verified Date/Time:
 2020 16:38:41 Reading Location: [facility] C0[facility] Transitional Reading Room 
Electronically signed by: ANDREI SOUZA MD on 2020 04:[facility] 
METABOLIC [id number] 05:32:00





             Test Item    Value        Reference Range Interpretation Comments

 

             SODIUM (BEAKER) 138 meq/L    135-148                   



             (test code = 381)                                        

 

             POTASSIUM (BEAKER) 4.5 meq/L    3.6-5.5                   



             (test code = 379)                                        

 

             CHLORIDE (BEAKER) 101 meq/L                        



             (test code = 382)                                        

 

             CO2 (BEAKER) (test 29 meq/L     20-29                     



             code = 355)                                         

 

             BLOOD UREA NITROGEN 19 mg/dL     10-26                     



             (BEAKER) (test code                                        



             = 354)                                              

 

             CREATININE (BEAKER) 0.84 mg/dL   0.50-1.20                 



             (test code = 358)                                        

 

             GLUCOSE RANDOM 96 mg/dL                         



             (BEAKER) (test code                                        



             = 652)                                              

 

             CALCIUM (BEAKER) 9.0 mg/dL    8.5-10.5                  



             (test code = 697)                                        

 

             EGFR (BEAKER) (test 94 mL/min/1.73                           ESTIMA

SONIA GFR IS



             code = 1092) sq m                                   NOT AS ACCURATE

 AS



                                                                 CREATININE



                                                                 CLEARANCE IN



                                                                 PREDICTING



                                                                 GLOMERULAR



                                                                 FILTRATION RATE

.



                                                                 ESTIMATED GFR I

S



                                                                 NOT APPLICABLE 

FOR



                                                                 DIALYSIS PATIEN

TS.



 ID - ANSBERTOGOperator ID - ANSBERTOGOperator ID - ANSBERTOGOperator ID
 - ANSBERTOGOperatorID - ANSBERTOGOperator ID - ANSBERTOGOperator ID - 
ANSBERTOGOperator ID - ANSBERTOGOperator ID - ANSBERTOGOperator ID - 
ANSBERTOGOperator ID - ANSBERTOGOperator ID - ANSBERTOGCBC W/PLT COUNT &amp; 
AUTO [id number] 04:49:00





             Test Item    Value        Reference Range Interpretation Comments

 

             WHITE BLOOD CELL COUNT (BEAKER) 6.1 K/ L     4.0-10.0              

    



             (test code = 775)                                        

 

             RED BLOOD CELL COUNT (BEAKER) 4.92 M/ L    4.20-5.80               

  



             (test code = 761)                                        

 

             HEMOGLOBIN (BEAKER) (test code = 13.3 GM/DL   13.0-16.8            

     



             410)                                                

 

             HEMATOCRIT (BEAKER) (test code = 42.9 %       36.0-50.0            

     



             411)                                                

 

             MEAN CORPUSCULAR VOLUME (BEAKER) 87.2 fL      82.0-99.0            

     



             (test code = 753)                                        

 

             MEAN CORPUSCULAR HEMOGLOBIN 27.0 pg      27.0-33.0                 



             (BEAKER) (test code = 751)                                        

 

             MEAN CORPUSCULAR HEMOGLOBIN CONC 31.0 GM/DL   32.0-36.0    L       

     



             (BEAKER) (test code = 752)                                        

 

             RED CELL DISTRIBUTION WIDTH 14.9 %       12.0-15.0                 



             (BEAKER) (test code = 412)                                        

 

             PLATELET COUNT (BEAKER) (test 220 K/CU MM  150-430                 

  



             code = 756)                                         

 

             MEAN PLATELET VOLUME (BEAKER) 10.6 fL      6.0-11.5                

  



             (test code = 754)                                        

 

             NUCLEATED RED BLOOD CELLS 0 /100 WBC   0-0                       



             (BEAKER) (test code = 413)                                        

 

             NEUTROPHILS RELATIVE PERCENT 58 %                                  

 



             (BEAKER) (test code = 429)                                        

 

             LYMPHOCYTES RELATIVE PERCENT 23 %                                  

 



             (BEAKER) (test code = 430)                                        

 

             MONOCYTES RELATIVE PERCENT 11 %                                   



             (BEAKER) (test code = 431)                                        

 

             EOSINOPHILS RELATIVE PERCENT 6 %                                   

 



             (BEAKER) (test code = 432)                                        

 

             BASOPHILS RELATIVE PERCENT 1 %                                    



             (BEAKER) (test code = 437)                                        

 

             NEUTROPHILS ABSOLUTE COUNT 3.54 K/ L    1.80-8.00                 



             (BEAKER) (test code = 670)                                        

 

             LYMPHOCYTES ABSOLUTE COUNT 1.39 K/ L    1.48-4.50    L            



             (BEAKER) (test code = 414)                                        

 

             MONOCYTES ABSOLUTE COUNT (BEAKER) 0.66 K/ L    0.00-1.30           

      



             (test code = 415)                                        

 

             EOSINOPHILS ABSOLUTE COUNT 0.39 K/ L    0.00-0.50                 



             (BEAKER) (test code = 416)                                        

 

             BASOPHILS ABSOLUTE COUNT (BEAKER) 0.05 K/ L    0.00-0.20           

      



             (test code = 417)                                        

 

             IMMATURE GRANULOCYTES-RELATIVE 1 %          0-0          H         

   



             PERCENT (BEAKER) (test code =                                      

  



             2809)                                               



BASIC METABOLIC [id number] 05:53:00





             Test Item    Value        Reference Range Interpretation Comments

 

             SODIUM (BEAKER) 136 meq/L    135-148                   



             (test code = 381)                                        

 

             POTASSIUM (BEAKER) 4.3 meq/L    3.6-5.5                   



             (test code = 379)                                        

 

             CHLORIDE (BEAKER) 101 meq/L                        



             (test code = 382)                                        

 

             CO2 (BEAKER) (test 26 meq/L     20-29                     



             code = 355)                                         

 

             BLOOD UREA NITROGEN 19 mg/dL     10-26                     



             (BEAKER) (test code                                        



             = 354)                                              

 

             CREATININE (BEAKER) 0.82 mg/dL   0.50-1.20                 



             (test code = 358)                                        

 

             GLUCOSE RANDOM 100 mg/dL                        



             (BEAKER) (test code                                        



             = 652)                                              

 

             CALCIUM (BEAKER) 8.6 mg/dL    8.5-10.5                  



             (test code = 697)                                        

 

             EGFR (BEAKER) (test 96 mL/min/1.73                           ESTIMA

SONIA GFR IS



             code = 1092) sq m                                   NOT AS ACCURATE

 AS



                                                                 CREATININE



                                                                 CLEARANCE IN



                                                                 PREDICTING



                                                                 GLOMERULAR



                                                                 FILTRATION RATE

.



                                                                 ESTIMATED GFR I

S



                                                                 NOT APPLICABLE 

FOR



                                                                 DIALYSIS PATIEN

TS.



 ID - [id number] ID - gkeg33Zyugsexx ID - bidi98Zevqiqhb ID - 
ffxj21Fgbzjcnw ID - ckut46Vlpgygzq ID - flvv99Guwhwiht ID - kwpj48Lkwkgwpr ID - 
kxsu70Afdyjxkl ID - mtji68Wtgfhovt ID - empy19Bxktgnwu ID - nyjw90Vqzfzzwk ID - 
[id number] W/PLT COUNT &amp; AUTO [id number] 05:26:00





             Test Item    Value        Reference Range Interpretation Comments

 

             WHITE BLOOD CELL COUNT (BEAKER) 7.4 K/ L     4.0-10.0              

    



             (test code = 775)                                        

 

             RED BLOOD CELL COUNT (BEAKER) 4.76 M/ L    4.20-5.80               

  



             (test code = 761)                                        

 

             HEMOGLOBIN (BEAKER) (test code = 12.6 GM/DL   13.0-16.8    L       

     



             410)                                                

 

             HEMATOCRIT (BEAKER) (test code = 40.7 %       36.0-50.0            

     



             411)                                                

 

             MEAN CORPUSCULAR VOLUME (BEAKER) 85.5 fL      82.0-99.0            

     



             (test code = 753)                                        

 

             MEAN CORPUSCULAR HEMOGLOBIN 26.5 pg      27.0-33.0    L            



             (BEAKER) (test code = 751)                                        

 

             MEAN CORPUSCULAR HEMOGLOBIN CONC 31.0 GM/DL   32.0-36.0    L       

     



             (BEAKER) (test code = 752)                                        

 

             RED CELL DISTRIBUTION WIDTH 14.8 %       12.0-15.0                 



             (BEAKER) (test code = 412)                                        

 

             PLATELET COUNT (BEAKER) (test 210 K/CU MM  150-430                 

  



             code = 756)                                         

 

             MEAN PLATELET VOLUME (BEAKER) 10.2 fL      6.0-11.5                

  



             (test code = 754)                                        

 

             NUCLEATED RED BLOOD CELLS 0 /100 WBC   0-0                       



             (BEAKER) (test code = 413)                                        

 

             NEUTROPHILS RELATIVE PERCENT 61 %                                  

 



             (BEAKER) (test code = 429)                                        

 

             LYMPHOCYTES RELATIVE PERCENT 21 %                                  

 



             (BEAKER) (test code = 430)                                        

 

             MONOCYTES RELATIVE PERCENT 11 %                                   



             (BEAKER) (test code = 431)                                        

 

             EOSINOPHILS RELATIVE PERCENT 6 %                                   

 



             (BEAKER) (test code = 432)                                        

 

             BASOPHILS RELATIVE PERCENT 1 %                                    



             (BEAKER) (test code = 437)                                        

 

             NEUTROPHILS ABSOLUTE COUNT 4.46 K/ L    1.80-8.00                 



             (BEAKER) (test code = 670)                                        

 

             LYMPHOCYTES ABSOLUTE COUNT 1.55 K/ L    1.48-4.50                 



             (BEAKER) (test code = 414)                                        

 

             MONOCYTES ABSOLUTE COUNT (BEAKER) 0.83 K/ L    0.00-1.30           

      



             (test code = 415)                                        

 

             EOSINOPHILS ABSOLUTE COUNT 0.41 K/ L    0.00-0.50                 



             (BEAKER) (test code = 416)                                        

 

             BASOPHILS ABSOLUTE COUNT (BEAKER) 0.05 K/ L    0.00-0.20           

      



             (test code = 417)                                        

 

             IMMATURE GRANULOCYTES-RELATIVE 1 %          0-0          H         

   



             PERCENT (BEAKER) (test code =                                      

  



             2801)                                               



[id number] 04:41:00





             Test Item    Value        Reference Range Interpretation Comments

 

             MAGNESIUM (BEAKER) (test code = 2.2 mg/dL    1.5-3.0               

    



             627)                                                



 ID - [id number] ID - [id number] ID - [id number] ID - [id number]
BASIC METABOLIC [id number] 04:40:00





             Test Item    Value        Reference Range Interpretation Comments

 

             SODIUM (BEAKER) 138 meq/L    135-148                   



             (test code = 381)                                        

 

             POTASSIUM (BEAKER) 4.4 meq/L    3.6-5.5                   



             (test code = 379)                                        

 

             CHLORIDE (BEAKER) 103 meq/L                        



             (test code = 382)                                        

 

             CO2 (BEAKER) (test 27 meq/L     20-29                     



             code = 355)                                         

 

             BLOOD UREA NITROGEN 16 mg/dL     10-26                     



             (BEAKER) (test code                                        



             = 354)                                              

 

             CREATININE (BEAKER) 0.81 mg/dL   0.50-1.20                 



             (test code = 358)                                        

 

             GLUCOSE RANDOM 105 mg/dL                        



             (BEAKER) (test code                                        



             = 652)                                              

 

             CALCIUM (BEAKER) 8.7 mg/dL    8.5-10.5                  



             (test code = 697)                                        

 

             EGFR (BEAKER) (test 98 mL/min/1.73                           ESTIMA

SONIA GFR IS



             code = 1092) sq m                                   NOT AS ACCURATE

 AS



                                                                 CREATININE



                                                                 CLEARANCE IN



                                                                 PREDICTING



                                                                 GLOMERULAR



                                                                 FILTRATION RATE

.



                                                                 ESTIMATED GFR I

S



                                                                 NOT APPLICABLE 

FOR



                                                                 DIALYSIS PATIEN

TS.



 ID - [id number] ID - [id number] ID - [id number] ID - 
[id number] ID - [id number] ID - [id number] ID - [id number] ID - 
KNKM73Ovbanypi ID - [id number] W/PLT COUNT &amp; AUTO [id number] 
04:30:00





             Test Item    Value        Reference Range Interpretation Comments

 

             WHITE BLOOD CELL COUNT (BEAKER) 5.0 K/ L     4.0-10.0              

    



             (test code = 775)                                        

 

             RED BLOOD CELL COUNT (BEAKER) 4.87 M/ L    4.20-5.80               

  



             (test code = 761)                                        

 

             HEMOGLOBIN (BEAKER) (test code = 13.0 GM/DL   13.0-16.8            

     



             410)                                                

 

             HEMATOCRIT (BEAKER) (test code = 41.8 %       36.0-50.0            

     



             411)                                                

 

             MEAN CORPUSCULAR VOLUME (BEAKER) 85.8 fL      82.0-99.0            

     



             (test code = 753)                                        

 

             MEAN CORPUSCULAR HEMOGLOBIN 26.7 pg      27.0-33.0    L            



             (BEAKER) (test code = 751)                                        

 

             MEAN CORPUSCULAR HEMOGLOBIN CONC 31.1 GM/DL   32.0-36.0    L       

     



             (BEAKER) (test code = 752)                                        

 

             RED CELL DISTRIBUTION WIDTH 15.0 %       12.0-15.0                 



             (BEAKER) (test code = 412)                                        

 

             PLATELET COUNT (BEAKER) (test 204 K/CU MM  150-430                 

  



             code = 756)                                         

 

             MEAN PLATELET VOLUME (BEAKER) 10.4 fL      6.0-11.5                

  



             (test code = 754)                                        

 

             NUCLEATED RED BLOOD CELLS 0 /100 WBC   0-0                       



             (BEAKER) (test code = 413)                                        

 

             NEUTROPHILS RELATIVE PERCENT 55 %                                  

 



             (BEAKER) (test code = 429)                                        

 

             LYMPHOCYTES RELATIVE PERCENT 26 %                                  

 



             (BEAKER) (test code = 430)                                        

 

             MONOCYTES RELATIVE PERCENT 11 %                                   



             (BEAKER) (test code = 431)                                        

 

             EOSINOPHILS RELATIVE PERCENT 6 %                                   

 



             (BEAKER) (test code = 432)                                        

 

             BASOPHILS RELATIVE PERCENT 1 %                                    



             (BEAKER) (test code = 437)                                        

 

             NEUTROPHILS ABSOLUTE COUNT 2.77 K/ L    1.80-8.00                 



             (BEAKER) (test code = 670)                                        

 

             LYMPHOCYTES ABSOLUTE COUNT 1.33 K/ L    1.48-4.50    L            



             (BEAKER) (test code = 414)                                        

 

             MONOCYTES ABSOLUTE COUNT (BEAKER) 0.55 K/ L    0.00-1.30           

      



             (test code = 415)                                        

 

             EOSINOPHILS ABSOLUTE COUNT 0.30 K/ L    0.00-0.50                 



             (BEAKER) (test code = 416)                                        

 

             BASOPHILS ABSOLUTE COUNT (BEAKER) 0.04 K/ L    0.00-0.20           

      



             (test code = 417)                                        

 

             IMMATURE GRANULOCYTES-RELATIVE 1 %          0-0          H         

   



             PERCENT (BEAKER) (test code =                                      

  



             2801)                                               



[id number] 05:02:00





             Test Item    Value        Reference Range Interpretation Comments

 

             MAGNESIUM (BEAKER) (test code = 2.2 mg/dL    1.5-3.0               

    



             627)                                                



 ID - ANSBERTOGOperator ID - ANSBERTOGOperator ID - ANSBERTOGOperator ID
 - ANSBERTOGBASIC METABOLIC [id number] 05:01:00





             Test Item    Value        Reference Range Interpretation Comments

 

             SODIUM (BEAKER) 138 meq/L    135-148                   



             (test code = 381)                                        

 

             POTASSIUM (BEAKER) 4.2 meq/L    3.6-5.5                   



             (test code = 379)                                        

 

             CHLORIDE (BEAKER) 104 meq/L                        



             (test code = 382)                                        

 

             CO2 (BEAKER) (test 26 meq/L     20-29                     



             code = 355)                                         

 

             BLOOD UREA NITROGEN 17 mg/dL     10-26                     



             (BEAKER) (test code                                        



             = 354)                                              

 

             CREATININE (BEAKER) 0.79 mg/dL   0.50-1.20                 



             (test code = 358)                                        

 

             GLUCOSE RANDOM 107 mg/dL                        



             (BEAKER) (test code                                        



             = 652)                                              

 

             CALCIUM (BEAKER) 8.5 mg/dL    8.5-10.5                  



             (test code = 697)                                        

 

             EGFR (BEAKER) (test 100 mL/min/1.73                           ESTIM

ATED GFR IS



             code = 1092) sq m                                   NOT AS ACCURATE

 AS



                                                                 CREATININE



                                                                 CLEARANCE IN



                                                                 PREDICTING



                                                                 GLOMERULAR



                                                                 FILTRATION RATE

.



                                                                 ESTIMATED GFR I

S



                                                                 NOT APPLICABLE 

FOR



                                                                 DIALYSIS PATIEN

TS.



 ID - ANSBERTOGOperator ID - ANSBERTOGOperator ID - ANSBERTOGOperator ID
 - ANSBERTOGOperatorID - ANSBERTOGOperator ID - ANSBERTOGOperator ID - 
ANSBERTOGOperator ID - ANSBERTOGOperator ID - ANSBERTOGCBC W/PLT COUNT &amp; 
AUTO [id number] 04:48:00





             Test Item    Value        Reference Range Interpretation Comments

 

             WHITE BLOOD CELL COUNT (BEAKER) 6.2 K/ L     4.0-10.0              

    



             (test code = 775)                                        

 

             RED BLOOD CELL COUNT (BEAKER) 4.80 M/ L    4.20-5.80               

  



             (test code = 761)                                        

 

             HEMOGLOBIN (BEAKER) (test code = 12.7 GM/DL   13.0-16.8    L       

     



             410)                                                

 

             HEMATOCRIT (BEAKER) (test code = 40.8 %       36.0-50.0            

     



             411)                                                

 

             MEAN CORPUSCULAR VOLUME (BEAKER) 85.0 fL      82.0-99.0            

     



             (test code = 753)                                        

 

             MEAN CORPUSCULAR HEMOGLOBIN 26.5 pg      27.0-33.0    L            



             (BEAKER) (test code = 751)                                        

 

             MEAN CORPUSCULAR HEMOGLOBIN CONC 31.1 GM/DL   32.0-36.0    L       

     



             (BEAKER) (test code = 752)                                        

 

             RED CELL DISTRIBUTION WIDTH 15.0 %       12.0-15.0                 



             (BEAKER) (test code = 412)                                        

 

             PLATELET COUNT (BEAKER) (test 216 K/CU MM  150-430                 

  



             code = 756)                                         

 

             MEAN PLATELET VOLUME (BEAKER) 10.3 fL      6.0-11.5                

  



             (test code = 754)                                        

 

             NUCLEATED RED BLOOD CELLS 0 /100 WBC   0-0                       



             (BEAKER) (test code = 413)                                        

 

             NEUTROPHILS RELATIVE PERCENT 63 %                                  

 



             (BEAKER) (test code = 429)                                        

 

             LYMPHOCYTES RELATIVE PERCENT 20 %                                  

 



             (BEAKER) (test code = 430)                                        

 

             MONOCYTES RELATIVE PERCENT 12 %                                   



             (BEAKER) (test code = 431)                                        

 

             EOSINOPHILS RELATIVE PERCENT 4 %                                   

 



             (BEAKER) (test code = 432)                                        

 

             BASOPHILS RELATIVE PERCENT 1 %                                    



             (BEAKER) (test code = 437)                                        

 

             NEUTROPHILS ABSOLUTE COUNT 3.86 K/ L    1.80-8.00                 



             (BEAKER) (test code = 670)                                        

 

             LYMPHOCYTES ABSOLUTE COUNT 1.23 K/ L    1.48-4.50    L            



             (BEAKER) (test code = 414)                                        

 

             MONOCYTES ABSOLUTE COUNT (BEAKER) 0.73 K/ L    0.00-1.30           

      



             (test code = 415)                                        

 

             EOSINOPHILS ABSOLUTE COUNT 0.27 K/ L    0.00-0.50                 



             (BEAKER) (test code = 416)                                        

 

             BASOPHILS ABSOLUTE COUNT (BEAKER) 0.04 K/ L    0.00-0.20           

      



             (test code = 417)                                        

 

             IMMATURE GRANULOCYTES-RELATIVE 1 %          0-0          H         

   



             PERCENT (BEAKER) (test code =                                      

  



             2801)                                               



LIPID PANEL2020-12-15 07:54:00





             Test Item    Value        Reference Range Interpretation Comments

 

             TRIGLYCERIDES (BEAKER) 141 mg/dL                              Speci

men slightly



             (test code = 540)                                        hemolyzed

 

             CHOLESTEROL (BEAKER) 184 mg/dL                              Specime

n slightly



             (test code = 631)                                        hemolyzed

 

             HDL CHOLESTEROL (BEAKER) 54 mg/dL                               



             (test code = 976)                                        

 

             LDL CHOLESTEROL 102 mg/dL                              



             CALCULATED (BEAKER)                                        



             (test code = 633)                                        



Triglyceride Reference Range: Low Risk  &lt;150 Borderline 150-199 High Risk 
200-499 Very High Risk &gt;=500Cholesterol Reference Range: Low Risk &lt;200 
Borderline 200-239 High Risk &gt;240HDL Cholesterol Reference Range: Low Risk 
&gt;=60 High Risk &lt;40LDL Cholesterol Reference Range: Optimal &lt;100 Near 
Optimal 100-129 Borderline 130-159 High 160-189 Very High &gt;=190  ID -
 [id number] ID - [id number] ID - [id number] 
07:54:00





             Test Item    Value        Reference Range Interpretation Comments

 

             MAGNESIUM (BEAKER) 2.0 mg/dL    1.5-3.0                   Specimen 

slightly



             (test code = 627)                                        hemolyzed



 ID - [id number] ID - [id number] ID - [id number] ID - 
[id number] METABOLIC PANEL2020-12-15 07:52:00





             Test Item    Value        Reference Range Interpretation Comments

 

             SODIUM (BEAKER) 137 meq/L    135-148                   



             (test code = 381)                                        

 

             POTASSIUM (BEAKER) 4.2 meq/L    3.6-5.5                   Specimen 

slightly



             (test code = 379)                                        hemolyzed

 

             CHLORIDE (BEAKER) 104 meq/L                        



             (test code = 382)                                        

 

             CO2 (BEAKER) (test 24 meq/L     20-29                     



             code = 355)                                         

 

             BLOOD UREA NITROGEN 14 mg/dL     10-26                     



             (BEAKER) (test code                                        



             = 354)                                              

 

             CREATININE (BEAKER) 0.80 mg/dL   0.50-1.20                 Specimen

 slightly



             (test code = 358)                                        hemolyzed

 

             GLUCOSE RANDOM 93 mg/dL                         



             (BEAKER) (test code                                        



             = 652)                                              

 

             CALCIUM (BEAKER) 8.7 mg/dL    8.5-10.5                  



             (test code = 697)                                        

 

             EGFR (BEAKER) (test 99 mL/min/1.73                           ESTIMA

SON[place] GFR IS



             code = 1092) sq m                                   NOT AS ACCURATE

 AS



                                                                 CREATININE



                                                                 CLEARANCE IN



                                                                 PREDICTING



                                                                 GLOMERULAR



                                                                 FILTRATION RATE

.



                                                                 ESTIMATED GFR I

S



                                                                 NOT APPLICABLE 

FOR



                                                                 DIALYSIS PATIEN

TS.



 ID - [id number] ID - [id number] ID - [id number] ID - 
[id number] ID - [id number] ID - [id number] ID - 
ALEXG006Walbsiwc ID - [id number] ID - [id number] -12-15 
05:23:00





             Test Item    Value        Reference Range Interpretation Comments

 

             TROPONIN I (BEAKER) (test code = 397) < ng/mL      0.00-0.15       

          








             Test Item    Value        Reference Range Interpretation Comments

 

             WHITE BLOOD CELL COUNT (BEAKER) 6.4 K/ L     4.0-10.0              

    



             (test code = 775)                                        

 

             RED BLOOD CELL COUNT (BEAKER) 4.89 M/ L    4.20-5.80               

  



             (test code = 761)                                        

 

             HEMOGLOBIN (BEAKER) (test code = 13.2 GM/DL   13.0-16.8            

     



             410)                                                

 

             HEMATOCRIT (BEAKER) (test code = 41.5 %       36.0-50.0            

     



             411)                                                

 

             MEAN CORPUSCULAR VOLUME (BEAKER) 84.9 fL      82.0-99.0            

     



             (test code = 753)                                        

 

             MEAN CORPUSCULAR HEMOGLOBIN 27.0 pg      27.0-33.0                 



             (BEAKER) (test code = 751)                                        

 

             MEAN CORPUSCULAR HEMOGLOBIN CONC 31.8 GM/DL   32.0-36.0    L       

     



             (BEAKER) (test code = 752)                                        

 

             RED CELL DISTRIBUTION WIDTH 15.7 %       12.0-15.0    H            



             (BEAKER) (test code = 412)                                        

 

             PLATELET COUNT (BEAKER) (test 301 K/CU MM  150-430                 

  



             code = 756)                                         

 

             MEAN PLATELET VOLUME (BEAKER) 12.1 fL      6.0-11.5     H          

  



             (test code = 754)                                        

 

             NUCLEATED RED BLOOD CELLS 0 /100 WBC   0-0                       



             (BEAKER) (test code = 413)                                        

 

             NEUTROPHILS RELATIVE PERCENT 63 %                                  

 



             (BEAKER) (test code = 429)                                        

 

             LYMPHOCYTES RELATIVE PERCENT 21 %                                  

 



             (BEAKER) (test code = 430)                                        

 

             MONOCYTES RELATIVE PERCENT 10 %                                   



             (BEAKER) (test code = 431)                                        

 

             EOSINOPHILS RELATIVE PERCENT 4 %                                   

 



             (BEAKER) (test code = 432)                                        

 

             BASOPHILS RELATIVE PERCENT 1 %                                    



             (BEAKER) (test code = 437)                                        

 

             NEUTROPHILS ABSOLUTE COUNT 4.03 K/ L    1.80-8.00                 



             (BEAKER) (test code = 670)                                        

 

             LYMPHOCYTES ABSOLUTE COUNT 1.37 K/ L    1.48-4.50    L            



             (BEAKER) (test code = 414)                                        

 

             MONOCYTES ABSOLUTE COUNT (BEAKER) 0.66 K/ L    0.00-1.30           

      



             (test code = 415)                                        

 

             EOSINOPHILS ABSOLUTE COUNT 0.23 K/ L    0.00-0.50                 



             (BEAKER) (test code = 416)                                        

 

             BASOPHILS ABSOLUTE COUNT (BEAKER) 0.05 K/ L    0.00-0.20           

      



             (test code = 417)                                        

 

             IMMATURE GRANULOCYTES-RELATIVE 1 %          0-0          H         

   



             PERCENT (BEAKER) (test code =                                      

  



             2801)                                               



TROPONIN -45-11 23:57:00





             Test Item    Value        Reference Range Interpretation Comments

 

             TROPONIN I (BEAKER) (test code = 397) < ng/mL      0.00-0.15       

          








             Test Item    Value        Reference Range Interpretation Comments

 

             POC-GLUCOSE METER 107 mg/dL                        : TESTED A

T \[facility]\"" 13[address]



             (BEAKER) (test code                                        [address],



             = 1538)                                             [facility] 77

478:



                                                                 /Techni

fito ID =



                                                                 [id number] for Maryann Mckeon



TROPONIN -12-55 18:39:00





             Test Item    Value        Reference Range Interpretation Comments

 

             TROPONIN I (BEAKER) (test code = 397) < ng/mL      0.00-0.15       

          








             Test Item    Value        Reference Range Interpretation Comments

 

             RSV RAPID ANTIGEN (BEAKER) Negative     Negative, Inconclusive     

         



             (test code = 1078)                                        



RAPID INFLUENZA A&amp;B [id number] 18:12:00





             Test Item    Value        Reference Range Interpretation Comments

 

             RAPID INFLUENZA A AG (BEAKER) Negative     Negative, Inconclusive  

            



             (test code = 1622)                                        

 

             RAPID INFLUENZA B AG (BEAKER) Negative     Negative, Inconclusive  

            



             (test code = 1623)                                        



SARS-COV2/RT-PCR ([facility] &amp; REF LABS)2020 16:10:00





             Test Item    Value        Reference Range Interpretation Comments

 

             SARS-COV2/RT-PCR (test code Negative     Not Detected, Negative,   

           



             = 4619996)                See external report for              



                                       linked test               

 

             SARS-COV-2 PERFORMING LAB                                        [facility]



             (test code = 4810386)                                        



B-TYPE NATRIURETIC FACTOR (BNP)2020 15:38:00





             Test Item    Value        Reference Range Interpretation Comments

 

             B-TYPE NATRIURETIC PEPTIDE (BEAKER) 42 pg/mL     0-100             

        



             (test code = 700)                                        



 ID - JUSTINTROPONIN -18-13 15:37:00





             Test Item    Value        Reference Range Interpretation Comments

 

             TROPONIN I (BEAKER) (test code = 397) < ng/mL      0.00-0.15       

          








             Test Item    Value        Reference Range Interpretation Comments

 

             PROTIME (BEAKER) (test 10.5 seconds 9.3-12.0                  Final

 Information



             code = 759)                                         (Auto Output)

 

             INR (BEAKER) (test 0.96         <=5.90                    Final Inf

ormation



             code = 370)                                         (Auto Output)

 

             PARTIAL THROMBOPLASTIN 21.4 seconds 23.0-35.0    L            Final

 Information



             TIME (BEAKER) (test                                        (Auto Ou

tput)



             code = 760)                                         



RECOMMENDED COUMADIN/WARFARIN INR THERAPY RANGESSTANDARD DOSE: 2.0 - 3.0 
Includes: PROPHYLAXIS for venous thrombosis, systemic embolization; TREATMENT 
for venous thrombosis and/or pulmonary embolus.HIGH RISK: Target INR is 2.5-3.5 
for patients with mechanical heart valves.[id number] 15:30:00





             Test Item    Value        Reference Range Interpretation Comments

 

             MAGNESIUM (BEAKER) (test code = 2.0 mg/dL    1.5-3.0               

    



             627)                                                



 ID - JUSTINOperator ID - JUSTINOperator ID - JUSTINOperator ID - GALINA
BASIC METABOLIC [id number] 15:29:00





             Test Item    Value        Reference Range Interpretation Comments

 

             SODIUM (BEAKER) 141 meq/L    135-148                   



             (test code = 381)                                        

 

             POTASSIUM (BEAKER) 4.2 meq/L    3.6-5.5                   



             (test code = 379)                                        

 

             CHLORIDE (BEAKER) 108 meq/L           H            



             (test code = 382)                                        

 

             CO2 (BEAKER) (test 22 meq/L     20-29                     



             code = 355)                                         

 

             BLOOD UREA NITROGEN 17 mg/dL     10-26                     



             (BEAKER) (test code                                        



             = 354)                                              

 

             CREATININE (BEAKER) 0.78 mg/dL   0.50-1.20                 



             (test code = 358)                                        

 

             GLUCOSE RANDOM 98 mg/dL                         



             (BEAKER) (test code                                        



             = 652)                                              

 

             CALCIUM (BEAKER) 8.7 mg/dL    8.5-10.5                  



             (test code = 697)                                        

 

             EGFR (BEAKER) (test 102 mL/min/1.73                           ESTIM

ATED GFR IS



             code = 1092) sq m                                   NOT AS ACCURATE

 AS



                                                                 CREATININE



                                                                 CLEARANCE IN



                                                                 PREDICTING



                                                                 GLOMERULAR



                                                                 FILTRATION RATE

.



                                                                 ESTIMATED GFR I

S



                                                                 NOT APPLICABLE 

FOR



                                                                 DIALYSIS PATIEN

TS.



 ID - JUSTINOperator ID - JUSTINOperator ID - JUSTINOperator ID - 
JUSTINOperator ID - JUSTINOperator ID - JUSTINOperator ID - JUSTINOperator ID - 
JUSTINOperator ID - JUSTINCBC W/PLT COUNT &amp; AUTO [id number] 
15:14:00





             Test Item    Value        Reference Range Interpretation Comments

 

             WHITE BLOOD CELL COUNT (BEAKER) 7.3 K/ L     4.0-10.0              

    



             (test code = 775)                                        

 

             RED BLOOD CELL COUNT (BEAKER) 5.00 M/ L    4.20-5.80               

  



             (test code = 761)                                        

 

             HEMOGLOBIN (BEAKER) (test code = 13.4 GM/DL   13.0-16.8            

     



             410)                                                

 

             HEMATOCRIT (BEAKER) (test code = 41.6 %       36.0-50.0            

     



             411)                                                

 

             MEAN CORPUSCULAR VOLUME (BEAKER) 83.2 fL      82.0-99.0            

     



             (test code = 753)                                        

 

             MEAN CORPUSCULAR HEMOGLOBIN 26.8 pg      27.0-33.0    L            



             (BEAKER) (test code = 751)                                        

 

             MEAN CORPUSCULAR HEMOGLOBIN CONC 32.2 GM/DL   32.0-36.0            

     



             (BEAKER) (test code = 752)                                        

 

             RED CELL DISTRIBUTION WIDTH 15.0 %       12.0-15.0                 



             (BEAKER) (test code = 412)                                        

 

             PLATELET COUNT (BEAKER) (test 216 K/CU MM  150-430                 

  



             code = 756)                                         

 

             MEAN PLATELET VOLUME (BEAKER) 10.2 fL      6.0-11.5                

  



             (test code = 754)                                        

 

             NUCLEATED RED BLOOD CELLS 0 /100 WBC   0-0                       



             (BEAKER) (test code = 413)                                        

 

             NEUTROPHILS RELATIVE PERCENT 73 %                                  

 



             (BEAKER) (test code = 429)                                        

 

             LYMPHOCYTES RELATIVE PERCENT 15 %                                  

 



             (BEAKER) (test code = 430)                                        

 

             MONOCYTES RELATIVE PERCENT 9 %                                    



             (BEAKER) (test code = 431)                                        

 

             EOSINOPHILS RELATIVE PERCENT 1 %                                   

 



             (BEAKER) (test code = 432)                                        

 

             BASOPHILS RELATIVE PERCENT 1 %                                    



             (BEAKER) (test code = 437)                                        

 

             NEUTROPHILS ABSOLUTE COUNT 5.36 K/ L    1.80-8.00                 



             (BEAKER) (test code = 670)                                        

 

             LYMPHOCYTES ABSOLUTE COUNT 1.11 K/ L    1.48-4.50    L            



             (BEAKER) (test code = 414)                                        

 

             MONOCYTES ABSOLUTE COUNT (BEAKER) 0.65 K/ L    0.00-1.30           

      



             (test code = 415)                                        

 

             EOSINOPHILS ABSOLUTE COUNT 0.07 K/ L    0.00-0.50                 



             (BEAKER) (test code = 416)                                        

 

             BASOPHILS ABSOLUTE COUNT (BEAKER) 0.05 K/ L    0.00-0.20           

      



             (test code = 417)                                        

 

             IMMATURE GRANULOCYTES-RELATIVE 1 %          0-0          H         

   



             PERCENT (BEAKER) (test code =                                      

  



             2801)                                               



RAD, CHEST, 1 VIEW, NON [id number] 15:08:00Reason for exam:-&gt;CHEST PAIN
********************************************************[facility] 1960 Sex: 
M************************************************************FINAL REPORT 
PATIENT ID: [id number] INDICATION: CHEST PAIN COMPARISON: None TECHNIQUE: Single 
frontal viewof the chest. FINDINGS: Lungs and pleura: Clear lungs. No 
effusion.Heart and mediastinum: Normal heart size. Unremarkable mediastinal 
contours.Osseous structures: No acute abnormality.Other: None. IMPRESSION: No 
acute intrathoracic abnormality. Signed: Marialuisa Cintroneport Verified 
Date/Time: 2020 15:08:51 [facility]
 Room Electronically signed by: MARIALUISA CINTRON MD on 2020 03:08
 PMURINE [id number] 10:33:00





             Test Item    Value        Reference Range Interpretation Comments

 

             Isolate 1 (test code = ISO1) Escherichia coli              A       

     

 

             ampicillin (test code = am)  ug/mL                    S            

 

             piperacillin/tazobactam  ug/mL                    S            



             (test code = tzp)                                        

 

             ceftazidime (test code =  ug/mL                    S            



             leon)                                                

 

             ceftriaxone1 (test code =  ug/mL                    S            



             ctr)                                                

 

             cefepime (test code = fep)  ug/mL                    S            

 

             aztreonam (test code = azm)  ug/mL                    S            

 

             ertapenem (test code = etp)  ug/mL                    S            

 

             meropenem (test code = mem)  ug/mL                    S            

 

             gentamicin (test code = gm)  ug/mL                    S            

 

             tobramycin (test code = tob)  ug/mL                    S           

 

 

             levofloxacin (test code =  ug/mL                    S            



             lev)                                                

 

             trimethoprim/sulfamethoxazol  ug/mL                    S           

 



             e (test code = sxt)                                        



URINALYSIS WITH [id number] 13:53:00





             Test Item    Value        Reference Range Interpretation Comments

 

             COLOR (test code = COLU) DK YELLOW    YELLOW       A            

 

             CLARITY (test code = CLA) CLOUDY       CLEAR        A            

 

             GLUCOSE UR (test code = UA NEGATIVE     NEGATIVE                  



             GLUCOSE)                                            

 

             BILI UR (test code = BILE) NEGATIVE     NEGATIVE                  

 

             KETONES UR (test code = ACE) TRACE        NEGATIVE     A           

 

 

             SP GRAVITY (test code = 1.014        1.005-1.030               



             SPGR)                                               

 

             PH UR (test code = PH) 6.0          4.5-8.0                   

 

             PROTEIN UR (test code = PU) 2+           NEGATIVE     A            

 

             UROBIL UR (test code = UROQ) 0.2 EU/dL    0.2-1.0                  

 

 

             NITRITE UR (test code = POSITIVE     NEGATIVE     A            



             NITRITE)                                            

 

             BLOOD UR (test code = UA 3+           NEGATIVE     A            



             BLOOD)                                              

 

             LEUK ES UR (test code = 2+           NEGATIVE     A            



             LEUK)                                               

 

             WBC UR (test code = UWBC) 25 /HPF      0-3          H            

 

             RBC UR (test code = URBC) 5 /HPF       0-2          H            

 

             EPITH UR (test code = UEPC) NONE /LPF    NONE                      

 

             BACTERIA UR (test code = MANY /HPF    NONE         A            



             UBACT)                                              

 

             CAST UR (test code = CAST) HYALINE FEW /LPF NONE         A         

   

 

             CRYSTAL UR (test code =  / LPF       NONE                      



             CRYU)                                               

 

             MUCUS UR (test code = MUC) MODERATE / HPF NONE         A           

 

 

             AMORPH UR (test code = UMU)  / HPF       NONE                     

 

 

             TRICH UR (test code =  /HPF        NONE                      



             UTRICH)                                             

 

             YEAST UR (test code = UY)  /HPF        NONE                      

 

             SPERM UR (test code =  /HPF        NONE                      



             USPERM)                                             



[id number] 11:44:00





             Test Item    Value        Reference Range Interpretation Comments

 

             COLOR (test code = COLU) YELLOW       YELLOW                    

 

             CLARITY (test code = CLA) CLEAR        CLEAR                     

 

             GLUCOSE UR (test code = UA GLUCOSE) NEGATIVE     NEGATIVE          

        

 

             BILI UR (test code = BILE) NEGATIVE     NEGATIVE                  

 

             KETONES UR (test code = ACE) NEGATIVE     NEGATIVE                 

 

 

             SP GRAVITY (test code = SPGR) 1.016        1.005-1.030             

  

 

             PH UR (test code = PH) 6.0          4.5-8.0                   

 

             PROTEIN UR (test code = PU) NEGATIVE     NEGATIVE                  

 

             UROBIL UR (test code = UROQ) 0.2 EU/dL    0.2-1.0                  

 

 

             NITRITE UR (test code = NITRITE) NEGATIVE     NEGATIVE             

     

 

             BLOOD UR (test code = UA BLOOD) NEGATIVE     NEGATIVE              

    

 

             LEUK ES UR (test code = LEUK) NEGATIVE     NEGATIVE                

  



COMPREHENSIVE METABOLIC [id number] 17:32:00





             Test Item    Value        Reference Range Interpretation Comments

 

             GLUCOSE (test code = 103 mg/dL           H            



             06D)                                                

 

             SODIUM (test code = 139 mmol/L   136-145                   



             01A)                                                

 

             POTASSIUM (test code = 4.7 mmol/L   3.6-5.1                   



             01B)                                                

 

             CHLORIDE (test code = 109 mmol/L          H            



             04A)                                                

 

             CO2 (test code = 02A) 25 mmol/L    22-32                     

 

             ANION GAP (test code = 9.7 mmol/L                             



             ANG)                                                

 

             BUN (test code = 05D) 25 mg/dL     7-18         H            

 

             CREATININE (test code 0.9 mg/dL    0.7-1.3                   



             = 03E)                                              

 

             GFR (test code = GFR) 94 mL/min/1.73m\S\2 >=90                     

 

 

             GFR AFRICAN AMERICAN 109 mL/min/1.73m\S\2 >=90                     

 



             (test code = GFRAA)                                        

 

             EGFR (test code = *** eGFR BY CKD-EPI                           



             EGFR)        CALCULATION IS NOT                           



                          RECOMMENDED FOR                           



                          PATIENTS UNDER 18 YEARS                           



                          OF AGE.***                             

 

             BUN/CREA (test code = 29           12-20        H            



             BCR)                                                

 

             CALCIUM (test code = 9.0 mg/dL    8.3-9.5                   



             09D)                                                

 

             BILI TOTAL (test code 0.3 mg/dL    0.2-1.0                   



             = 11A)                                              

 

             PROTEIN (test code = 8.0 g/dL     6.4-8.2                   



             07D)                                                

 

             ALBUMIN (test code = 3.5 g/dL     3.5-4.8                   



             08D)                                                

 

             GLOBULIN (test code = 4.5 g/dL     1.5-3.8      H            



             GLB)                                                

 

             ALB/GLOB (test code = 0.8          1.0-2.6      L            



             AGRR)                                               

 

             ALK PHOS (test code = 158 IU/L            H            



             35A)                                                

 

             AST (test code = 30A) 32 IU/L      <=42                      

 

             ALT (test code = 31A) 26 IU/L      <=78                      



[id number] 17:25:00





             Test Item    Value        Reference Range Interpretation Comments

 

             MAGNESIUM (test code = 48A) 2.4 mg/dL    1.8-2.4                   



PRO TIME AND [id number] 17:16:00





             Test Item    Value        Reference Range Interpretation Comments

 

             PT (test code = 12.7 s       9.8-13.6                  



             TT)                                                 

 

             INR (test code = 1.1                                    



             INR)                                                

 

             INRH (test code =  **** SUGGESTED THERAPEUTIC                      

     



             INRH)        RANGE FOR INR: **** 2.5 -                           



                          3.5 ** For Patients with                           



                          Prosthetic Valves or                           



                          Patients with recurrent                           



                          Thromboembolic Events **                           



                          2.0 - 3.0 ** For Most Other                           



                          Applications **                           

 

             PTT (test code = 19.5 s       20.2-38.0    L            



             PTT)                                                

 

             PTTH (test code = **** To monitor the                           



             PTTH)        effectiveness of heparin,                           



                          we offer the Anti-Xa                           



                          (Heparin Assay). It can be                           



                          used for either                           



                          unfractionated or LMW                           



                          Heparin. Order Code is                           



                          ANTI-XA ****                           



CBC (INCLUDES AUTOMATED DIFFERENTIAL)2020 17:11:00





             Test Item    Value        Reference Range Interpretation Comments

 

             WBC (test code = WBC) 6.8 10\S\3/uL 4.5-11.0                  

 

             RBC (test code = RBC) 5.21 10\S\6/uL 4.20-5.60                 

 

             HGB (test code = HBG) 14.0 g/dL    14.0-18.0                 

 

             HCT (test code = HCT) 44.6 %       35.0-46.0                 

 

             MCV (test code = MCV) 85.6 fL      80.0-94.0                 

 

             MCH (test code = MCH) 26.9 pg      27.0-31.0    L            

 

             MCHC (test code = MCHC) 31.4 g/dL    32.0-36.0    L            

 

             RDW (test code = RDW) 16.0 %       11.5-14.5    H            

 

             PLT (test code = PLT) 272 10\S\3/uL 130-400                   

 

             MPV (test code = MPV) 10.2 fL      9.4-12.4                  

 

             NEUTROP # (test code = NE#) 3.9 10\S\3/uL 2.0-8.0                  

 

 

             LYMPH # (test code = LY#) 1.8 10\S\3/uL 1.2-4.0                   

 

             MONOCYTE # (test code = MO#) 0.7 10\S\3/uL 0.0-1.1                 

  

 

             EOSINOPH # (test code = EO#) 0.3 10\S\3/uL 0.0-0.7                 

  

 

             BASOPHIL # (test code = BA#) 0.0 10\S\3/uL 0.0-0.3                 

  

 

             IG # (test code = IG#) 0.02 10\S\3/uL 0.00-0.06                 

 

             NRBC # (test code = NRBC#) 0.00 10\S\3/uL 0.00-0.01                

 

 

             NEUTROPH % (test code = NE%) 57.5 %       35.0-73.0                

 

 

             LYMPH % (test code = LY%) 26.6 %       20.0-55.0                 

 

             MONO % (test code = MO%) 10.2 %       2.5-10.0     H            

 

             EOSINOPH % (test code = EO%) 4.8 %        0.0-5.0                  

 

 

             BASOPHIL % (test code = BA%) 0.6 %        0.0-2.0                  

 

 

             IG % (test code = IG%) 0.3 %        0.0-0.8                   

 

             NRBC% (test code = NRBC%) 0.0 %        0.0-0.2                   

 

             MANDIFF (test code = MDIFF) NO           NO                        



BLOOD [id number] 22:19:00





             Test Item    Value        Reference Range Interpretation Comments

 

             Culture Observations (test NO GROWTH AFTER 5                       

    



             code = COB1) DAYS                                   



BLOOD [id number] 22:19:00





             Test Item    Value        Reference Range Interpretation Comments

 

             Culture Observations (test NO GROWTH AFTER 5                       

    



             code = COB1) DAYS                                   



GLUCOMETER GLUCOSE- LAB USE [id number] 11:36:00





             Test Item    Value        Reference Range Interpretation Comments

 

             GLUCOMETER (test 108 mg/dL           H            Result Not



             code = GMG)                                         ConfirmedMeter 

ID:



                                                                 [id number]

tor: [id number]



                                                                 JULIETH PENDLETON

N



[facility]  09:21:00





             Test Item    Value        Reference Range Interpretation Comments

 

             Culture Observations NO BETA HEMOLYTIC                           



             (test code = COB1) STREPTOCOCCUS ISOLATED                          

 

 

             Direct Exam (test code NEGATIVE FOR STREP A                        

   



             = DE1)       ANTIGEN                                



T4 [id number] 07:38:00





             Test Item    Value        Reference Range Interpretation Comments

 

             T4 FREE (test code = A91) 1.00 ng/dL   0.76-1.46                 



THYROID PANEL/SCREEN (TSH)2020 07:25:00





             Test Item    Value        Reference Range Interpretation Comments

 

             TSH (test code = A57) 3.850 uIU/mL 0.358-3.740  H            



COMPREHENSIVE METABOLIC [id number] 06:05:00





             Test Item    Value        Reference Range Interpretation Comments

 

             GLUCOSE (test code = 107 mg/dL           H            



             06D)                                                

 

             SODIUM (test code = 137 mmol/L   136-145                   



             01A)                                                

 

             POTASSIUM (test code = 3.7 mmol/L   3.6-5.1                   



             01B)                                                

 

             CHLORIDE (test code = 102 mmol/L                       



             04A)                                                

 

             CO2 (test code = 02A) 30 mmol/L    22-32                     

 

             ANION GAP (test code = 8.7 mmol/L                             



             ANG)                                                

 

             BUN (test code = 05D) 13 mg/dL     7-18                      

 

             CREATININE (test code 1.0 mg/dL    0.7-1.3                   



             = 03E)                                              

 

             GFR (test code = GFR) 80 mL/min/1.73m\S\2 >=90         L           

 

 

             GFR AFRICAN AMERICAN 93 mL/min/1.73m\S\2 >=90                      



             (test code = GFRAA)                                        

 

             EGFR (test code = *** eGFR BY CKD-EPI                           



             EGFR)        CALCULATION IS NOT                           



                          RECOMMENDED FOR                           



                          PATIENTS UNDER 18 YEARS                           



                          OF AGE.***                             

 

             BUN/CREA (test code = 13           12-20                     



             BCR)                                                

 

             CALCIUM (test code = 8.3 mg/dL    8.3-9.5                   



             09D)                                                

 

             BILI TOTAL (test code 0.4 mg/dL    0.2-1.0                   



             = 11A)                                              

 

             PROTEIN (test code = 7.5 g/dL     6.4-8.2                   



             07D)                                                

 

             ALBUMIN (test code = 2.9 g/dL     3.5-4.8      L            



             08D)                                                

 

             GLOBULIN (test code = 4.6 g/dL     1.5-3.8      H            



             GLB)                                                

 

             ALB/GLOB (test code = 0.6          1.0-2.6      L            



             AGRR)                                               

 

             ALK PHOS (test code = 116 IU/L                         



             35A)                                                

 

             AST (test code = 30A) 20 IU/L      <=42                      

 

             ALT (test code = 31A) 18 IU/L      <=78                      



CBC (INCLUDES AUTOMATED DIFFERENTIAL)2020 05:52:00





             Test Item    Value        Reference Range Interpretation Comments

 

             WBC (test code = WBC) 5.2 10\S\3/uL 4.5-11.0                  

 

             RBC (test code = RBC) 4.38 10\S\6/uL 4.20-5.60                 

 

             HGB (test code = HBG) 11.5 g/dL    14.0-18.0    L            

 

             HCT (test code = HCT) 37.1 %       35.0-46.0                 

 

             MCV (test code = MCV) 84.7 fL      80.0-94.0                 

 

             MCH (test code = MCH) 26.3 pg      27.0-31.0    L            

 

             MCHC (test code = MCHC) 31.0 g/dL    32.0-36.0    L            

 

             RDW (test code = RDW) 14.9 %       11.5-14.5    H            

 

             PLT (test code = PLT) 303 10\S\3/uL 130-400                   

 

             MPV (test code = MPV) 10.0 fL      9.4-12.4                  

 

             NEUTROP # (test code = NE#) 2.8 10\S\3/uL 2.0-8.0                  

 

 

             LYMPH # (test code = LY#) 1.3 10\S\3/uL 1.2-4.0                   

 

             MONOCYTE # (test code = MO#) 0.7 10\S\3/uL 0.0-1.1                 

  

 

             EOSINOPH # (test code = EO#) 0.4 10\S\3/uL 0.0-0.7                 

  

 

             BASOPHIL # (test code = BA#) 0.0 10\S\3/uL 0.0-0.3                 

  

 

             IG # (test code = IG#) 0.02 10\S\3/uL 0.00-0.06                 

 

             NRBC # (test code = NRBC#) 0.00 10\S\3/uL 0.00-0.01                

 

 

             NEUTROPH % (test code = NE%) 53.9 %       35.0-73.0                

 

 

             LYMPH % (test code = LY%) 24.3 %       20.0-55.0                 

 

             MONO % (test code = MO%) 13.0 %       2.5-10.0     H            

 

             EOSINOPH % (test code = EO%) 7.8 %        0.0-5.0      H           

 

 

             BASOPHIL % (test code = BA%) 0.6 %        0.0-2.0                  

 

 

             IG % (test code = IG%) 0.4 %        0.0-0.8                   

 

             NRBC% (test code = NRBC%) 0.0 %        0.0-0.2                   

 

             MANDIFF (test code = MDIFF) NO           NO                        

 

             RBC MORPH (test code = RBCMOR)              NORMAL                 

   



BASIC METABOLIC [id number] 06:59:00





             Test Item    Value        Reference Range Interpretation Comments

 

             GLUCOSE (test code = 90 mg/dL                         



             06D)                                                

 

             SODIUM (test code = 137 mmol/L   136-145                   



             01A)                                                

 

             POTASSIUM (test code = 3.7 mmol/L   3.6-5.1                   



             01B)                                                

 

             CHLORIDE (test code = 103 mmol/L                       



             04A)                                                

 

             CO2 (test code = 02A) 30 mmol/L    22-32                     

 

             ANION GAP (test code = 7.7 mmol/L                             



             ANG)                                                

 

             BUN (test code = 05D) 11 mg/dL     7-18                      

 

             CREATININE (test code 0.8 mg/dL    0.7-1.3                   



             = 03E)                                              

 

             GFR (test code = GFR) 97 mL/min/1.73m\S\2 >=90                     

 

 

             GFR AFRICAN AMERICAN 112 mL/min/1.73m\S\2 >=90                     

 



             (test code = GFRAA)                                        

 

             EGFR (test code = *** eGFR BY CKD-EPI                           



             EGFR)        CALCULATION IS NOT                           



                          RECOMMENDED FOR                           



                          PATIENTS UNDER 18 YEARS                           



                          OF AGE.***                             

 

             BUN/CREA (test code = 14           12-20                     



             BCR)                                                

 

             CALCIUM (test code = 8.4 mg/dL    8.3-9.5                   



             09D)                                                



CBC (INCLUDES AUTOMATED DIFFERENTIAL)2020 06:56:00





             Test Item    Value        Reference Range Interpretation Comments

 

             WBC (test code = WBC) 4.4 10\S\3/uL 4.5-11.0     L            

 

             RBC (test code = RBC) 4.20 10\S\6/uL 4.20-5.60                 

 

             HGB (test code = HBG) 11.2 g/dL    14.0-18.0    L            

 

             HCT (test code = HCT) 35.8 %       35.0-46.0                 

 

             MCV (test code = MCV) 85.2 fL      80.0-94.0                 

 

             MCH (test code = MCH) 26.7 pg      27.0-31.0    L            

 

             MCHC (test code = MCHC) 31.3 g/dL    32.0-36.0    L            

 

             RDW (test code = RDW) 14.7 %       11.5-14.5    H            

 

             PLT (test code = PLT) 248 10\S\3/uL 130-400                   

 

             MPV (test code = MPV) 10.7 fL      9.4-12.4                  

 

             NEUTROP # (test code = NE#) 2.3 10\S\3/uL 2.0-8.0                  

 

 

             LYMPH # (test code = LY#) 0.9 10\S\3/uL 1.2-4.0      L            

 

             MONOCYTE # (test code = MO#) 0.6 10\S\3/uL 0.0-1.1                 

  

 

             EOSINOPH # (test code = EO#) 0.5 10\S\3/uL 0.0-0.7                 

  

 

             BASOPHIL # (test code = BA#) 0.0 10\S\3/uL 0.0-0.3                 

  

 

             IG # (test code = IG#) 0.01 10\S\3/uL 0.00-0.06                 

 

             NRBC # (test code = NRBC#) 0.00 10\S\3/uL 0.00-0.01                

 

 

             NEUTROPH % (test code = NE%) 52.9 %       35.0-73.0                

 

 

             LYMPH % (test code = LY%) 21.6 %       20.0-55.0                 

 

             MONO % (test code = MO%) 13.6 %       2.5-10.0     H            

 

             EOSINOPH % (test code = EO%) 11.0 %       0.0-5.0      H           

 

 

             BASOPHIL % (test code = BA%) 0.7 %        0.0-2.0                  

 

 

             IG % (test code = IG%) 0.2 %        0.0-0.8                   

 

             NRBC% (test code = NRBC%) 0.0 %        0.0-0.2                   

 

             MANDIFF (test code = MDIFF) NO           NO                        

 

             RBC MORPH (test code = RBCMOR)              NORMAL                 

   



URINALYSIS WITH [id number] 00:05:00





             Test Item    Value        Reference Range Interpretation Comments

 

             COLOR (test code = COLU) YELLOW       YELLOW                    

 

             CLARITY (test code = CLA) CLOUDY       CLEAR        A            

 

             GLUCOSE UR (test code = UA GLUCOSE) NEGATIVE     NEGATIVE          

        

 

             BILI UR (test code = BILE) NEGATIVE     NEGATIVE                  

 

             KETONES UR (test code = ACE) NEGATIVE     NEGATIVE                 

 

 

             SP GRAVITY (test code = SPGR) 1.013        1.005-1.030             

  

 

             PH UR (test code = PH) 6.0          4.5-8.0                   

 

             PROTEIN UR (test code = PU) 1+           NEGATIVE     A            

 

             UROBIL UR (test code = UROQ) 0.2 EU/dL    0.2-1.0                  

 

 

             NITRITE UR (test code = NITRITE) NEGATIVE     NEGATIVE             

     

 

             BLOOD UR (test code = UA BLOOD) NEGATIVE     NEGATIVE              

    

 

             LEUK ES UR (test code = LEUK) NEGATIVE     NEGATIVE                

  

 

             WBC UR (test code = UWBC) 0 /HPF       0-3                       

 

             RBC UR (test code = URBC) 0 /HPF       0-2                       

 

             EPITH UR (test code = UEPC) FEW /LPF     NONE         A            

 

             BACTERIA UR (test code = UBACT) NONE /HPF    NONE                  

    

 

             CAST UR (test code = CAST)  /LPF        NONE                      

 

             CRYSTAL UR (test code = CRYU)  / LPF       NONE                    

  

 

             MUCUS UR (test code = MUC) FEW / HPF    NONE         A            

 

             AMORPH UR (test code = UMU)  / HPF       NONE                     

 

 

             TRICH UR (test code = UTRICH)  /HPF        NONE                    

  

 

             YEAST UR (test code = UY)  /HPF        NONE                      

 

             SPERM UR (test code = USPERM)  /HPF        NONE                    

  



C-REACTIVE PROTEIN [id number] 20:12:00





             Test Item    Value        Reference Range Interpretation Comments

 

             CRP QUANT (test code 12.2 mg/L    0.0-2.9      H            



             = CRPQ)                                             

 

             Method Change (test *** Please note the                           



             code = METHOD) change in Method and the                           



                          reference range***                           



SARS-CoV (RAPID ANTIGEN)2020 19:49:00





             Test Item    Value        Reference Range Interpretation Comments

 

             SARS-CoV (ANTIGEN) NEGATIVE     NEGATIVE                  



             (test code =                                        



             COVAG)                                              

 

             COVID AG (test *** This test has been                           



             code = COVAGC) marketed under the FDA                           



                          Emergency Use Authorization                           



                          (EUA) to meet challenges of                           



                          the COVID-19 pandemic. The                           



                          validation standards                           



                          normally enforced by the                           



                          FDA and the [employer] of the                           



                          [employer] ([employer])                           



                          are more stringent than                           



                          those required for this                           



                          test. Therefore, the result                           



                          should be interpreted with                           



                          caution and close attention                           



                          to other clinical and                           



                          epidemiological data ***                           



[id number] 19:45:00





             Test Item    Value        Reference Range Interpretation Comments

 

             FERRITIN (test code = A19) 108.6 ng/mL  26.0-388.0                



[id number] 19:40:00





             Test Item    Value        Reference Range Interpretation Comments

 

             CPK (test code = 32A) 241 IU/L                         



COMPREHENSIVE METABOLIC [id number] 19:40:00





             Test Item    Value        Reference Range Interpretation Comments

 

             GLUCOSE (test code = 90 mg/dL                         



             06D)                                                

 

             SODIUM (test code = 136 mmol/L   136-145                   



             01A)                                                

 

             POTASSIUM (test code = 4.3 mmol/L   3.6-5.1                   



             01B)                                                

 

             CHLORIDE (test code = 104 mmol/L                       



             04A)                                                

 

             CO2 (test code = 02A) 26 mmol/L    22-32                     

 

             ANION GAP (test code = 10.3 mmol/L                            



             ANG)                                                

 

             BUN (test code = 05D) 15 mg/dL     7-18                      

 

             CREATININE (test code 0.9 mg/dL    0.7-1.3                   



             = 03E)                                              

 

             GFR (test code = GFR) 93 mL/min/1.73m\S\2 >=90                     

 

 

             GFR AFRICAN AMERICAN 107 mL/min/1.73m\S\2 >=90                     

 



             (test code = GFRAA)                                        

 

             EGFR (test code = *** eGFR BY CKD-EPI                           



             EGFR)        CALCULATION IS NOT                           



                          RECOMMENDED FOR                           



                          PATIENTS UNDER 18 YEARS                           



                          OF AGE.***                             

 

             BUN/CREA (test code = 17           12-20                     



             BCR)                                                

 

             CALCIUM (test code = 8.5 mg/dL    8.3-9.5                   



             09D)                                                

 

             BILI TOTAL (test code 0.2 mg/dL    0.2-1.0                   



             = 11A)                                              

 

             PROTEIN (test code = 7.8 g/dL     6.4-8.2                   



             07D)                                                

 

             ALBUMIN (test code = 3.2 g/dL     3.5-4.8      L            



             08D)                                                

 

             GLOBULIN (test code = 4.6 g/dL     1.5-3.8      H            



             GLB)                                                

 

             ALB/GLOB (test code = 0.7          1.0-2.6      L            



             AGRR)                                               

 

             ALK PHOS (test code = 136 IU/L            H            



             35A)                                                

 

             AST (test code = 30A) 26 IU/L      <=42                      

 

             ALT (test code = 31A) 22 IU/L      <=78                      



LDH-LACTIC [id number] 19:39:00





             Test Item    Value        Reference Range Interpretation Comments

 

             LDH (test code = 33A) 292 IU/L     100-190      H            



BRAIN NATRIURETIC [id number] 19:38:00





             Test Item    Value        Reference Range Interpretation Comments

 

             proBNP (test code = PBNP) 1096 pg/mL   0-125        H            



LACTIC [id number] 19:37:00





             Test Item    Value        Reference Range Interpretation Comments

 

             LACTIC ACD (test code = LA) 1.7 mmol/L   0.4-2.0                   



[id number] 19:34:00





             Test Item    Value        Reference Range Interpretation Comments

 

             D-DIMER DILUTED (test 1743 ng/mL D-DU 0-234        H            



             code = DDD)                                         

 

             D-DIMER COMMENT (test *Level to rule out                           



             code = DDCOM) DVT or PE: <235 ng/mL                           



                          D-DU*                                  



TROPONIN [id number] 19:34:00





             Test Item    Value        Reference Range Interpretation Comments

 

             TROPONIN I (test code = A84) <0.015 ng/mL 0.000-0.045              

 



PRO TIME AND [id number] 19:33:00





             Test Item    Value        Reference Range Interpretation Comments

 

             PT (test code = 13.3 s       9.8-13.6                  



             TT)                                                 

 

             INR (test code = 1.2                                    



             INR)                                                

 

             INRH (test code =  **** SUGGESTED THERAPEUTIC                      

     



             INRH)        RANGE FOR INR: **** 2.5 -                           



                          3.5 ** For Patients with                           



                          Prosthetic Valves or                           



                          Patients with  recurrent                           



                          Thromboembolic Events **                           



                          2.0 - 3.0 ** For Most Other                           



                          Applications **                           

 

             PTT (test code = 31.0 s       20.2-38.0                 



             PTT)                                                

 

             PTTH (test code = **** To monitor the                           



             PTTH)        effectiveness of heparin,                           



                          we offer the Anti-Xa                           



                          (Heparin Assay). It can be                           



                          used for either                           



                          unfractionated or LMW                           



                          Heparin. Order Code is                           



                          ANTI-XA ****                           



[id number] 19:32:00





             Test Item    Value        Reference Range Interpretation Comments

 

             MAGNESIUM (test code = 48A) 2.2 mg/dL    1.8-2.4                   



CBC (INCLUDES AUTOMATED DIFFERENTIAL)2020 19:26:00





             Test Item    Value        Reference Range Interpretation Comments

 

             WBC (test code = WBC) 4.1 10\S\3/uL 4.5-11.0     L            

 

             RBC (test code = RBC) 4.35 10\S\6/uL 4.20-5.60                 

 

             HGB (test code = HBG) 11.8 g/dL    14.0-18.0    L            

 

             HCT (test code = HCT) 37.6 %       35.0-46.0                 

 

             MCV (test code = MCV) 86.4 fL      80.0-94.0                 

 

             MCH (test code = MCH) 27.1 pg      27.0-31.0                 

 

             MCHC (test code = MCHC) 31.4 g/dL    32.0-36.0    L            

 

             RDW (test code = RDW) 14.6 %       11.5-14.5    H            

 

             PLT (test code = PLT) 241 10\S\3/uL 130-400                   

 

             MPV (test code = MPV) 10.1 fL      9.4-12.4                  

 

             NEUTROP # (test code = NE#) 2.4 10\S\3/uL 2.0-8.0                  

 

 

             LYMPH # (test code = LY#) 0.9 10\S\3/uL 1.2-4.0      L            

 

             MONOCYTE # (test code = MO#) 0.4 10\S\3/uL 0.0-1.1                 

  

 

             EOSINOPH # (test code = EO#) 0.4 10\S\3/uL 0.0-0.7                 

  

 

             BASOPHIL # (test code = BA#) 0.0 10\S\3/uL 0.0-0.3                 

  

 

             IG # (test code = IG#) 0.02 10\S\3/uL 0.00-0.06                 

 

             NRBC # (test code = NRBC#) 0.00 10\S\3/uL 0.00-0.01                

 

 

             NEUTROPH % (test code = NE%) 57.7 %       35.0-73.0                

 

 

             LYMPH % (test code = LY%) 21.2 %       20.0-55.0                 

 

             MONO % (test code = MO%) 10.2 %       2.5-10.0     H            

 

             EOSINOPH % (test code = EO%) 9.7 %        0.0-5.0      H           

 

 

             BASOPHIL % (test code = BA%) 0.7 %        0.0-2.0                  

 

 

             IG % (test code = IG%) 0.5 %        0.0-0.8                   

 

             NRBC% (test code = NRBC%) 0.0 %        0.0-0.2                   

 

             MANDIFF (test code = MDIFF) NO           NO                        

 

             RBC MORPH (test code = RBCMOR)              NORMAL                 

   



BASIC METABOLIC PANEL2020-07-15 07:01:00





             Test Item    Value        Reference Range Interpretation Comments

 

             GLUCOSE (test code = 126 mg/dL           H            



             06D)                                                

 

             SODIUM (test code = 134 mmol/L   136-145      L            



             01A)                                                

 

             POTASSIUM (test code = 4.4 mmol/L   3.6-5.1                   



             01B)                                                

 

             CHLORIDE (test code = 100 mmol/L                       



             04A)                                                

 

             CO2 (test code = 02A) 30 mmol/L    22-32                     

 

             ANION GAP (test code = 8.4 mmol/L                             



             ANG)                                                

 

             BUN (test code = 05D) 19 mg/dL     7-18         H            

 

             CREATININE (test code 1.0 mg/dL    0.7-1.3                   



             = 03E)                                              

 

             GFR (test code = GFR) 86 mL/min/1.73m\S\2 >=90         L           

 

 

             GFR AFRICAN AMERICAN 100 mL/min/1.73m\S\2 >=90                     

 



             (test code = GFRAA)                                        

 

             EGFR (test code = *** eGFR BY CKD-EPI                           



             EGFR)        CALCULATION IS NOT                           



                          RECOMMENDED FOR                           



                          PATIENTS UNDER 18 YEARS                           



                          OF AGE.***                             

 

             BUN/CREA (test code = 20           12-20                     



             BCR)                                                

 

             CALCIUM (test code = 8.6 mg/dL    8.3-9.5                   



             09D)                                                



CBC (INCLUDES AUTOMATED DIFFERENTIAL)2020-07-15 06:52:00





             Test Item    Value        Reference Range Interpretation Comments

 

             WBC (test code = WBC) 5.8 10\S\3/uL 4.5-11.0                  

 

             RBC (test code = RBC) 4.46 10\S\6/uL 4.20-5.60                 

 

             HGB (test code = HBG) 12.0 g/dL    14.0-18.0    L            

 

             HCT (test code = HCT) 39.4 %       35.0-46.0                 

 

             MCV (test code = MCV) 88.3 fL      80.0-94.0                 

 

             MCH (test code = MCH) 26.9 pg      27.0-31.0    L            

 

             MCHC (test code = MCHC) 30.5 g/dL    32.0-36.0    L            

 

             RDW (test code = RDW) 14.6 %       11.5-14.5    H            

 

             PLT (test code = PLT) 345 10\S\3/uL 130-400                   

 

             MPV (test code = MPV) 8.9 fL       9.4-12.4     L            

 

             NEUTROP # (test code = NE#) 3.6 10\S\3/uL 2.0-8.0                  

 

 

             LYMPH # (test code = LY#) 1.3 10\S\3/uL 1.2-4.0                   

 

             MONOCYTE # (test code = MO#) 0.6 10\S\3/uL 0.0-1.1                 

  

 

             EOSINOPH # (test code = EO#) 0.1 10\S\3/uL 0.0-0.7                 

  

 

             BASOPHIL # (test code = BA#) 0.1 10\S\3/uL 0.0-0.3                 

  

 

             IG # (test code = IG#) 0.09 10\S\3/uL 0.00-0.06    H            

 

             NRBC # (test code = NRBC#) 0.00 10\S\3/uL 0.00-0.01                

 

 

             NEUTROPH % (test code = NE%) 62.2 %       35.0-73.0                

 

 

             LYMPH % (test code = LY%) 22.9 %       20.0-55.0                 

 

             MONO % (test code = MO%) 10.1 %       2.5-10.0     H            

 

             EOSINOPH % (test code = EO%) 2.4 %        0.0-5.0                  

 

 

             BASOPHIL % (test code = BA%) 0.9 %        0.0-2.0                  

 

 

             IG % (test code = IG%) 1.5 %        0.0-0.8      H            

 

             NRBC% (test code = NRBC%) 0.0 %        0.0-0.2                   

 

             MANDIFF (test code = MDIFF) NO           NO                        

 

             RBC MORPH (test code = RBCMOR)              NORMAL                 

   



URIC [id number] 15:35:00





             Test Item    Value        Reference Range Interpretation Comments

 

             URIC ACID (test code = 41A) 7.2 mg/dL    3.5-7.2                   



BASIC METABOLIC [id number] 05:38:00





             Test Item    Value        Reference Range Interpretation Comments

 

             GLUCOSE (test code = 101 mg/dL           H            



             06D)                                                

 

             SODIUM (test code = 135 mmol/L   136-145      L            



             01A)                                                

 

             POTASSIUM (test code = 4.3 mmol/L   3.6-5.1                   



             01B)                                                

 

             CHLORIDE (test code = 100 mmol/L                       



             04A)                                                

 

             CO2 (test code = 02A) 32 mmol/L    22-32                     

 

             ANION GAP (test code = 7.3 mmol/L                             



             ANG)                                                

 

             BUN (test code = 05D) 19 mg/dL     7-18         H            

 

             CREATININE (test code 0.8 mg/dL    0.7-1.3                   



             = 03E)                                              

 

             GFR (test code = GFR) 98 mL/min/1.73m\S\2 >=90                     

 

 

             GFR AFRICAN AMERICAN 114 mL/min/1.73m\S\2 >=90                     

 



             (test code = GFRAA)                                        

 

             EGFR (test code = *** eGFR BY CKD-EPI                           



             EGFR)        CALCULATION IS NOT                           



                          RECOMMENDED FOR                           



                          PATIENTS UNDER 18 YEARS                           



                          OF AGE.***                             

 

             BUN/CREA (test code = 24           12-20        H            



             BCR)                                                

 

             CALCIUM (test code = 8.4 mg/dL    8.3-9.5                   



             09D)                                                



CBC (INCLUDES AUTOMATED DIFFERENTIAL)2020 05:24:00





             Test Item    Value        Reference Range Interpretation Comments

 

             WBC (test code = WBC) 6.2 10\S\3/uL 4.5-11.0                  

 

             RBC (test code = RBC) 4.25 10\S\6/uL 4.20-5.60                 

 

             HGB (test code = HBG) 11.5 g/dL    14.0-18.0    L            

 

             HCT (test code = HCT) 36.5 %       35.0-46.0                 

 

             MCV (test code = MCV) 85.9 fL      80.0-94.0                 

 

             MCH (test code = MCH) 27.1 pg      27.0-31.0                 

 

             MCHC (test code = MCHC) 31.5 g/dL    32.0-36.0    L            

 

             RDW (test code = RDW) 14.4 %       11.5-14.5                 

 

             PLT (test code = PLT) 347 10\S\3/uL 130-400                   

 

             MPV (test code = MPV) 9.0 fL       9.4-12.4     L            

 

             NEUTROP # (test code = NE#) 4.6 10\S\3/uL 2.0-8.0                  

 

 

             LYMPH # (test code = LY#) 1.0 10\S\3/uL 1.2-4.0      L            

 

             MONOCYTE # (test code = MO#) 0.5 10\S\3/uL 0.0-1.1                 

  

 

             EOSINOPH # (test code = EO#) 0.1 10\S\3/uL 0.0-0.7                 

  

 

             BASOPHIL # (test code = BA#) 0.0 10\S\3/uL 0.0-0.3                 

  

 

             IG # (test code = IG#) 0.05 10\S\3/uL 0.00-0.06                 

 

             NRBC # (test code = NRBC#) 0.00 10\S\3/uL 0.00-0.01                

 

 

             NEUTROPH % (test code = NE%) 73.6 %       35.0-73.0    H           

 

 

             LYMPH % (test code = LY%) 16.2 %       20.0-55.0    L            

 

             MONO % (test code = MO%) 7.6 %        2.5-10.0                  

 

             EOSINOPH % (test code = EO%) 1.5 %        0.0-5.0                  

 

 

             BASOPHIL % (test code = BA%) 0.3 %        0.0-2.0                  

 

 

             IG % (test code = IG%) 0.8 %        0.0-0.8                   

 

             NRBC% (test code = NRBC%) 0.0 %        0.0-0.2                   

 

             MANDIFF (test code = MDIFF) NO           NO                        

 

             RBC MORPH (test code = RBCMOR)              NORMAL                 

   



BASIC METABOLIC [id number] 05:43:00





             Test Item    Value        Reference Range Interpretation Comments

 

             GLUCOSE (test code = 161 mg/dL           H            



             06D)                                                

 

             SODIUM (test code = 132 mmol/L   136-145      L            



             01A)                                                

 

             POTASSIUM (test code = 5.0 mmol/L   3.6-5.1                   



             01B)                                                

 

             CHLORIDE (test code = 99 mmol/L                        



             04A)                                                

 

             CO2 (test code = 02A) 30 mmol/L    22-32                     

 

             ANION GAP (test code = 8.0 mmol/L                             



             ANG)                                                

 

             BUN (test code = 05D) 17 mg/dL     7-18                      

 

             CREATININE (test code 0.9 mg/dL    0.7-1.3                   



             = 03E)                                              

 

             GFR (test code = GFR) 94 mL/min/1.73m\S\2 >=90                     

 

 

             GFR AFRICAN AMERICAN 109 mL/min/1.73m\S\2 >=90                     

 



             (test code = GFRAA)                                        

 

             EGFR (test code = *** eGFR BY CKD-EPI                           



             EGFR)        CALCULATION IS NOT                           



                          RECOMMENDED FOR                           



                          PATIENTS UNDER 18 YEARS                           



                          OF AGE.***                             

 

             BUN/CREA (test code = 19           12-20                     



             BCR)                                                

 

             CALCIUM (test code = 8.8 mg/dL    8.3-9.5                   



             09D)                                                



CBC (INCLUDES AUTOMATED DIFFERENTIAL)2020 05:34:00





             Test Item    Value        Reference Range Interpretation Comments

 

             WBC (test code = WBC) 9.5 10\S\3/uL 4.5-11.0                  

 

             RBC (test code = RBC) 4.20 10\S\6/uL 4.20-5.60                 

 

             HGB (test code = HBG) 11.4 g/dL    14.0-18.0    L            

 

             HCT (test code = HCT) 36.1 %       35.0-46.0                 

 

             MCV (test code = MCV) 86.0 fL      80.0-94.0                 

 

             MCH (test code = MCH) 27.1 pg      27.0-31.0                 

 

             MCHC (test code = MCHC) 31.6 g/dL    32.0-36.0    L            

 

             RDW (test code = RDW) 13.8 %       11.5-14.5                 

 

             PLT (test code = PLT) 358 10\S\3/uL 130-400                   

 

             MPV (test code = MPV) 8.9 fL       9.4-12.4     L            

 

             NEUTROP # (test code = NE#) 8.4 10\S\3/uL 2.0-8.0      H           

 

 

             LYMPH # (test code = LY#) 0.7 10\S\3/uL 1.2-4.0      L            

 

             MONOCYTE # (test code = MO#) 0.3 10\S\3/uL 0.0-1.1                 

  

 

             EOSINOPH # (test code = EO#) 0.0 10\S\3/uL 0.0-0.7                 

  

 

             BASOPHIL # (test code = BA#) 0.0 10\S\3/uL 0.0-0.3                 

  

 

             IG # (test code = IG#) 0.05 10\S\3/uL 0.00-0.06                 

 

             NRBC # (test code = NRBC#) 0.00 10\S\3/uL 0.00-0.01                

 

 

             NEUTROPH % (test code = NE%) 88.5 %       35.0-73.0    H           

 

 

             LYMPH % (test code = LY%) 7.8 %        20.0-55.0    L            

 

             MONO % (test code = MO%) 3.0 %        2.5-10.0                  

 

             EOSINOPH % (test code = EO%) 0.0 %        0.0-5.0                  

 

 

             BASOPHIL % (test code = BA%) 0.2 %        0.0-2.0                  

 

 

             IG % (test code = IG%) 0.5 %        0.0-0.8                   

 

             NRBC% (test code = NRBC%) 0.0 %        0.0-0.2                   

 

             MANDIFF (test code = MDIFF) NO           NO                        

 

             RBC MORPH (test code = RBCMOR)              NORMAL                 

   



BASIC METABOLIC [id number] 06:00:00





             Test Item    Value        Reference Range Interpretation Comments

 

             GLUCOSE (test code = 110 mg/dL           H            



             06D)                                                

 

             SODIUM (test code = 132 mmol/L   136-145      L            



             01A)                                                

 

             POTASSIUM (test code = 4.2 mmol/L   3.6-5.1                   



             01B)                                                

 

             CHLORIDE (test code = 98 mmol/L                        



             04A)                                                

 

             CO2 (test code = 02A) 30 mmol/L    22-32                     

 

             ANION GAP (test code = 8.2 mmol/L                             



             ANG)                                                

 

             BUN (test code = 05D) 15 mg/dL     7-18                      

 

             CREATININE (test code 0.9 mg/dL    0.7-1.3                   



             = 03E)                                              

 

             GFR (test code = GFR) 94 mL/min/1.73m\S\2 >=90                     

 

 

             GFR AFRICAN AMERICAN 109 mL/min/1.73m\S\2 >=90                     

 



             (test code = GFRAA)                                        

 

             EGFR (test code = *** eGFR BY CKD-EPI                           



             EGFR)        CALCULATION IS NOT                           



                          RECOMMENDED FOR                           



                          PATIENTS UNDER 18 YEARS                           



                          OF AGE.***                             

 

             BUN/CREA (test code = 17           12-20                     



             BCR)                                                

 

             CALCIUM (test code = 8.6 mg/dL    8.3-9.5                   



             09D)                                                



CBC (INCLUDES AUTOMATED DIFFERENTIAL)2020 05:54:00





             Test Item    Value        Reference Range Interpretation Comments

 

             WBC (test code = WBC) 7.2 10\S\3/uL 4.5-11.0                  

 

             RBC (test code = RBC) 4.27 10\S\6/uL 4.20-5.60                 

 

             HGB (test code = HBG) 11.6 g/dL    14.0-18.0    L            

 

             HCT (test code = HCT) 36.8 %       35.0-46.0                 

 

             MCV (test code = MCV) 86.2 fL      80.0-94.0                 

 

             MCH (test code = MCH) 27.2 pg      27.0-31.0                 

 

             MCHC (test code = MCHC) 31.5 g/dL    32.0-36.0    L            

 

             RDW (test code = RDW) 14.6 %       11.5-14.5    H            

 

             PLT (test code = PLT) 364 10\S\3/uL 130-400                   

 

             MPV (test code = MPV) 9.1 fL       9.4-12.4     L            

 

             NEUTROP # (test code = NE#) 5.5 10\S\3/uL 2.0-8.0                  

 

 

             LYMPH # (test code = LY#) 0.9 10\S\3/uL 1.2-4.0      L            

 

             MONOCYTE # (test code = MO#) 0.5 10\S\3/uL 0.0-1.1                 

  

 

             EOSINOPH # (test code = EO#) 0.2 10\S\3/uL 0.0-0.7                 

  

 

             BASOPHIL # (test code = BA#) 0.0 10\S\3/uL 0.0-0.3                 

  

 

             IG # (test code = IG#) 0.09 10\S\3/uL 0.00-0.06    H            

 

             NRBC # (test code = NRBC#) 0.00 10\S\3/uL 0.00-0.01                

 

 

             NEUTROPH % (test code = NE%) 76.5 %       35.0-73.0    H           

 

 

             LYMPH % (test code = LY%) 12.8 %       20.0-55.0    L            

 

             MONO % (test code = MO%) 6.8 %        2.5-10.0                  

 

             EOSINOPH % (test code = EO%) 2.2 %        0.0-5.0                  

 

 

             BASOPHIL % (test code = BA%) 0.4 %        0.0-2.0                  

 

 

             IG % (test code = IG%) 1.3 %        0.0-0.8      H            

 

             NRBC% (test code = NRBC%) 0.0 %        0.0-0.2                   

 

             MANDIFF (test code = MDIFF) NO           NO                        

 

             RBC MORPH (test code = RBCMOR)              NORMAL                 

   



BASIC METABOLIC [id number] 07:26:00





             Test Item    Value        Reference Range Interpretation Comments

 

             GLUCOSE (test code = 110 mg/dL           H            



             06D)                                                

 

             SODIUM (test code = 131 mmol/L   136-145      L            



             01A)                                                

 

             POTASSIUM (test code = 4.4 mmol/L   3.6-5.1                   



             01B)                                                

 

             CHLORIDE (test code = 97 mmol/L           L            



             04A)                                                

 

             CO2 (test code = 02A) 29 mmol/L    22-32                     

 

             ANION GAP (test code = 9.4 mmol/L                             



             ANG)                                                

 

             BUN (test code = 05D) 10 mg/dL     7-18                      

 

             CREATININE (test code 0.8 mg/dL    0.7-1.3                   



             = 03E)                                              

 

             GFR (test code = GFR) 96 mL/min/1.73m\S\2 >=90                     

 

 

             GFR AFRICAN AMERICAN 112 mL/min/1.73m\S\2 >=90                     

 



             (test code = GFRAA)                                        

 

             EGFR (test code = *** eGFR BY CKD-EPI                           



             EGFR)        CALCULATION IS NOT                           



                          RECOMMENDED FOR                           



                          PATIENTS UNDER 18 YEARS                           



                          OF AGE.***                             

 

             BUN/CREA (test code = 12           12-20                     



             BCR)                                                

 

             CALCIUM (test code = 8.5 mg/dL    8.3-9.5                   



             09D)                                                



CBC (INCLUDES AUTOMATED DIFFERENTIAL)2020 07:15:00





             Test Item    Value        Reference Range Interpretation Comments

 

             WBC (test code = WBC) 7.8 10\S\3/uL 4.5-11.0                  

 

             RBC (test code = RBC) 4.46 10\S\6/uL 4.20-5.60                 

 

             HGB (test code = HBG) 12.0 g/dL    14.0-18.0    L            

 

             HCT (test code = HCT) 37.9 %       35.0-46.0                 

 

             MCV (test code = MCV) 85.0 fL      80.0-94.0                 

 

             MCH (test code = MCH) 26.9 pg      27.0-31.0    L            

 

             MCHC (test code = MCHC) 31.7 g/dL    32.0-36.0    L            

 

             RDW (test code = RDW) 14.2 %       11.5-14.5                 

 

             PLT (test code = PLT) 373 10\S\3/uL 130-400                   

 

             MPV (test code = MPV) 9.4 fL       9.4-12.4                  

 

             NEUTROP # (test code = NE#) 6.2 10\S\3/uL 2.0-8.0                  

 

 

             LYMPH # (test code = LY#) 0.8 10\S\3/uL 1.2-4.0      L            

 

             MONOCYTE # (test code = MO#) 0.6 10\S\3/uL 0.0-1.1                 

  

 

             EOSINOPH # (test code = EO#) 0.1 10\S\3/uL 0.0-0.7                 

  

 

             BASOPHIL # (test code = BA#) 0.0 10\S\3/uL 0.0-0.3                 

  

 

             IG # (test code = IG#) 0.10 10\S\3/uL 0.00-0.06    H            

 

             NRBC # (test code = NRBC#) 0.00 10\S\3/uL 0.00-0.01                

 

 

             NEUTROPH % (test code = NE%) 78.9 %       35.0-73.0    H           

 

 

             LYMPH % (test code = LY%) 10.3 %       20.0-55.0    L            

 

             MONO % (test code = MO%) 7.2 %        2.5-10.0                  

 

             EOSINOPH % (test code = EO%) 1.8 %        0.0-5.0                  

 

 

             BASOPHIL % (test code = BA%) 0.5 %        0.0-2.0                  

 

 

             IG % (test code = IG%) 1.3 %        0.0-0.8      H            

 

             NRBC% (test code = NRBC%) 0.0 %        0.0-0.2                   

 

             MANDIFF (test code = MDIFF) NO           NO                        

 

             RBC MORPH (test code = RBCMOR)              NORMAL                 

   



BASIC METABOLIC PANEL2020-07-10 07:11:00





             Test Item    Value        Reference Range Interpretation Comments

 

             GLUCOSE (test code = 127 mg/dL           H            



             06D)                                                

 

             SODIUM (test code = 131 mmol/L   136-145      L            



             01A)                                                

 

             POTASSIUM (test code = 4.1 mmol/L   3.6-5.1                   



             01B)                                                

 

             CHLORIDE (test code = 96 mmol/L           L            



             04A)                                                

 

             CO2 (test code = 02A) 28 mmol/L    22-32                     

 

             ANION GAP (test code = 11.1 mmol/L                            



             ANG)                                                

 

             BUN (test code = 05D) 13 mg/dL     7-18                      

 

             CREATININE (test code 0.9 mg/dL    0.7-1.3                   



             = 03E)                                              

 

             GFR (test code = GFR) 95 mL/min/1.73m\S\2 >=90                     

 

 

             GFR AFRICAN AMERICAN 110 mL/min/1.73m\S\2 >=90                     

 



             (test code = GFRAA)                                        

 

             EGFR (test code = *** eGFR BY CKD-EPI                           



             EGFR)        CALCULATION IS NOT                           



                          RECOMMENDED FOR                           



                          PATIENTS UNDER 18 YEARS                           



                          OF AGE.***                             

 

             BUN/CREA (test code = 15           12-20                     



             BCR)                                                

 

             CALCIUM (test code = 8.3 mg/dL    8.3-9.5                   



             09D)                                                



CBC (INCLUDES AUTOMATED DIFFERENTIAL)2020-07-10 07:05:00





             Test Item    Value        Reference Range Interpretation Comments

 

             WBC (test code = WBC) 7.1 10\S\3/uL 4.5-11.0                  

 

             RBC (test code = RBC) 4.23 10\S\6/uL 4.20-5.60                 

 

             HGB (test code = HBG) 11.6 g/dL    14.0-18.0    L            

 

             HCT (test code = HCT) 36.5 %       35.0-46.0                 

 

             MCV (test code = MCV) 86.3 fL      80.0-94.0                 

 

             MCH (test code = MCH) 27.4 pg      27.0-31.0                 

 

             MCHC (test code = MCHC) 31.8 g/dL    32.0-36.0    L            

 

             RDW (test code = RDW) 14.6 %       11.5-14.5    H            

 

             PLT (test code = PLT) 314 10\S\3/uL 130-400                   

 

             MPV (test code = MPV) 10.4 fL      9.4-12.4                  

 

             NEUTROP # (test code = NE#) 5.0 10\S\3/uL 2.0-8.0                  

 

 

             LYMPH # (test code = LY#) 1.2 10\S\3/uL 1.2-4.0                   

 

             MONOCYTE # (test code = MO#) 0.6 10\S\3/uL 0.0-1.1                 

  

 

             EOSINOPH # (test code = EO#) 0.2 10\S\3/uL 0.0-0.7                 

  

 

             BASOPHIL # (test code = BA#) 0.1 10\S\3/uL 0.0-0.3                 

  

 

             IG # (test code = IG#) 0.14 10\S\3/uL 0.00-0.06    H            

 

             NRBC # (test code = NRBC#) 0.00 10\S\3/uL 0.00-0.01                

 

 

             NEUTROPH % (test code = NE%) 69.9 %       35.0-73.0                

 

 

             LYMPH % (test code = LY%) 16.2 %       20.0-55.0    L            

 

             MONO % (test code = MO%) 8.5 %        2.5-10.0                  

 

             EOSINOPH % (test code = EO%) 2.4 %        0.0-5.0                  

 

 

             BASOPHIL % (test code = BA%) 1.0 %        0.0-2.0                  

 

 

             IG % (test code = IG%) 2.0 %        0.0-0.8      H            

 

             NRBC% (test code = NRBC%) 0.0 %        0.0-0.2                   

 

             MANDIFF (test code = MDIFF) NO           NO                        

 

             RBC MORPH (test code = RBCMOR)              NORMAL                 

   



CBC WITH MANUAL [id number] 05:33:00





             Test Item    Value        Reference Range Interpretation Comments

 

             WBC (test code = WBC) 7.3 10\S\3/uL 4.5-11.0                  

 

             RBC (test code = RBC) 4.14 10\S\6/uL 4.20-5.60    L            

 

             HGB (test code = HBG) 11.3 g/dL    14.0-18.0    L            

 

             HCT (test code = HCT) 35.4 %       35.0-46.0                 

 

             MCV (test code = MCV) 85.5 fL      80.0-94.0                 

 

             MCH (test code = MCH) 27.3 pg      27.0-31.0                 

 

             MCHC (test code = MCHC) 31.9 g/dL    32.0-36.0    L            

 

             RDW (test code = RDW) 14.7 %       11.5-14.5    H            

 

             PLT (test code = PLT) 309 10\S\3/uL 130-400                   

 

             MPV (test code = MPV) 10.4 fL      9.4-12.4                  

 

             NEUTROP # (test code = 4.7 10\S\3/uL 2.0-8.0                   



             NE#)                                                

 

             LYMPH # (test code = 1.4 10\S\3/uL 1.2-4.0                   



             LY#)                                                

 

             MONOCYTE # (test code = 0.7 10\S\3/uL 0.0-1.1                   



             MO#)                                                

 

             EOSINOPH # (test code = 0.2 10\S\3/uL 0.0-0.7                   



             EO#)                                                

 

             BASOPHIL # (test code = 0.0 10\S\3/uL 0.0-0.3                   



             BA#)                                                

 

             IG # (test code = IG#) 0.37 10\S\3/uL 0.00-0.06    H            

 

             NRBC # (test code = 0.00 10\S\3/uL 0.00-0.01                 



             NRBC#)                                              

 

             NEUTROPH % (test code = 63.4 %       35.0-73.0                 



             NE%)                                                

 

             LYMPH % (test code = 19.3 %       20.0-55.0    L            



             LY%)                                                

 

             MONO % (test code = 9.3 %        2.5-10.0                  



             MO%)                                                

 

             EOSINOPH % (test code = 2.5 %        0.0-5.0                   



             EO%)                                                

 

             BASOPHIL % (test code = 0.4 %        0.0-2.0                   



             BA%)                                                

 

             IG % (test code = IG%) 5.1 %        0.0-0.8      H            

 

             NRBC% (test code = 0.0 %        0.0-0.2                   



             NRBC%)                                              

 

             MAN DIFF (test code = ***MANUAL                              



             HMDIFF)      DIFFERENTIAL***                           

 

             SEG (test code = SEG) 61 %         42-75                     

 

             BAND (test code = BAND) 3 %          0-8                       

 

             LYMPH (test code = 19 %         20-51        L            



             LYMPH)                                              

 

             MONO (test code = MONO) 9 %          3-11                      

 

             EOS (test code = EOS) 6 %          <=10                      

 

             BASO (test code = BASO) 1 %          0-2                       

 

             META (test code = META) 1 %          <=1                       

 

             RBC MORPH (test code = ABNORMAL     NORMAL       A            



             RBCMORN)                                            

 

             PLT EST (test code = ADEQUATE     ADEQUATE                  



             PLTEST)                                             

 

             PLT MORPH (test code = NORMAL (1.5-3 um) NORMAL                    



             PLTMOR)                                             

 

             ANISO (test code = 1+           NONE         A            



             ANISO)                                              

 

             HYPOCHROM (test code = 1+           NONE         A            



             HYPOC)                                              



BASIC METABOLIC [id number] 05:09:00





             Test Item    Value        Reference Range Interpretation Comments

 

             GLUCOSE (test code = 92 mg/dL                         



             06D)                                                

 

             SODIUM (test code = 133 mmol/L   136-145      L            



             01A)                                                

 

             POTASSIUM (test code = 4.3 mmol/L   3.6-5.1                   



             01B)                                                

 

             CHLORIDE (test code = 97 mmol/L           L            



             04A)                                                

 

             CO2 (test code = 02A) 31 mmol/L    22-32                     

 

             ANION GAP (test code = 9.3 mmol/L                             



             ANG)                                                

 

             BUN (test code = 05D) 11 mg/dL     7-18                      

 

             CREATININE (test code 0.9 mg/dL    0.7-1.3                   



             = 03E)                                              

 

             GFR (test code = GFR) 94 mL/min/1.73m\S\2 >=90                     

 

 

             GFR AFRICAN AMERICAN 109 mL/min/1.73m\S\2 >=90                     

 



             (test code = GFRAA)                                        

 

             EGFR (test code = *** eGFR BY CKD-EPI                           



             EGFR)        CALCULATION IS NOT                           



                          RECOMMENDED FOR                           



                          PATIENTS UNDER 18 YEARS                           



                          OF AGE.***                             

 

             BUN/CREA (test code = 13           12-20                     



             BCR)                                                

 

             CALCIUM (test code = 8.4 mg/dL    8.3-9.5                   



             09D)                                                



BLOOD [id number] 18:11:00





             Test Item    Value        Reference Range Interpretation Comments

 

             Culture Observations (test NO GROWTH AFTER 5                       

    



             code = COB1) DAYS                                   



BLOOD [id number] 18:11:00





             Test Item    Value        Reference Range Interpretation Comments

 

             Culture Observations (test NO GROWTH AFTER 5                       

    



             code = COB1) DAYS                                   



URINE [id number] 10:59:00





             Test Item    Value        Reference Range Interpretation Comments

 

             Culture Observations (test NO GROWTH (<1,000                       

    



             code = COB1) CFU/ML)                                



BASIC METABOLIC [id number] 07:09:00





             Test Item    Value        Reference Range Interpretation Comments

 

             GLUCOSE (test code = 106 mg/dL           H            



             06D)                                                

 

             SODIUM (test code = 133 mmol/L   136-145      L            



             01A)                                                

 

             POTASSIUM (test code = 3.9 mmol/L   3.6-5.1                   



             01B)                                                

 

             CHLORIDE (test code = 98 mmol/L                        



             04A)                                                

 

             CO2 (test code = 02A) 31 mmol/L    22-32                     

 

             ANION GAP (test code = 7.9 mmol/L                             



             ANG)                                                

 

             BUN (test code = 05D) 9 mg/dL      7-18                      

 

             CREATININE (test code 0.8 mg/dL    0.7-1.3                   



             = 03E)                                              

 

             GFR (test code = GFR) 96 mL/min/1.73m\S\2 >=90                     

 

 

             GFR AFRICAN AMERICAN 111 mL/min/1.73m\S\2 >=90                     

 



             (test code = GFRAA)                                        

 

             EGFR (test code = *** eGFR BY CKD-EPI                           



             EGFR)        CALCULATION IS NOT                           



                          RECOMMENDED FOR                           



                          PATIENTS UNDER 18 YEARS                           



                          OF AGE.***                             

 

             BUN/CREA (test code = 11           12-20        L            



             BCR)                                                

 

             CALCIUM (test code = 8.3 mg/dL    8.3-9.5                   



             09D)                                                



CBC (INCLUDES AUTOMATED DIFFERENTIAL)2020 06:55:00





             Test Item    Value        Reference Range Interpretation Comments

 

             WBC (test code = WBC) 9.2 10\S\3/uL 4.5-11.0                  

 

             RBC (test code = RBC) 4.36 10\S\6/uL 4.20-5.60                 

 

             HGB (test code = HBG) 11.7 g/dL    14.0-18.0    L            

 

             HCT (test code = HCT) 37.4 %       35.0-46.0                 

 

             MCV (test code = MCV) 85.8 fL      80.0-94.0                 

 

             MCH (test code = MCH) 26.8 pg      27.0-31.0    L            

 

             MCHC (test code = MCHC) 31.3 g/dL    32.0-36.0    L            

 

             RDW (test code = RDW) 14.6 %       11.5-14.5    H            

 

             PLT (test code = PLT) 288 10\S\3/uL 130-400                   

 

             MPV (test code = MPV) 9.8 fL       9.4-12.4                  

 

             NEUTROP # (test code = NE#) 6.3 10\S\3/uL 2.0-8.0                  

 

 

             LYMPH # (test code = LY#) 1.4 10\S\3/uL 1.2-4.0                   

 

             MONOCYTE # (test code = MO#) 1.0 10\S\3/uL 0.0-1.1                 

  

 

             EOSINOPH # (test code = EO#) 0.2 10\S\3/uL 0.0-0.7                 

  

 

             BASOPHIL # (test code = BA#) 0.1 10\S\3/uL 0.0-0.3                 

  

 

             IG # (test code = IG#) 0.28 10\S\3/uL 0.00-0.06    H            

 

             NRBC # (test code = NRBC#) 0.00 10\S\3/uL 0.00-0.01                

 

 

             NEUTROPH % (test code = NE%) 68.1 %       35.0-73.0                

 

 

             LYMPH % (test code = LY%) 15.7 %       20.0-55.0    L            

 

             MONO % (test code = MO%) 10.8 %       2.5-10.0     H            

 

             EOSINOPH % (test code = EO%) 1.6 %        0.0-5.0                  

 

 

             BASOPHIL % (test code = BA%) 0.7 %        0.0-2.0                  

 

 

             IG % (test code = IG%) 3.1 %        0.0-0.8      H            

 

             NRBC% (test code = NRBC%) 0.0 %        0.0-0.2                   

 

             MANDIFF (test code = MDIFF) NO           NO                        

 

             RBC MORPH (test code = RBCMOR)              NORMAL                 

   



BASIC METABOLIC [id number] 04:37:00





             Test Item    Value        Reference Range Interpretation Comments

 

             GLUCOSE (test code = 154 mg/dL           H            



             06D)                                                

 

             SODIUM (test code = 129 mmol/L   136-145      L            



             01A)                                                

 

             POTASSIUM (test code = 3.4 mmol/L   3.6-5.1      L            



             01B)                                                

 

             CHLORIDE (test code = 98 mmol/L                        



             04A)                                                

 

             CO2 (test code = 02A) 26 mmol/L    22-32                     

 

             ANION GAP (test code = 8.4 mmol/L                             



             ANG)                                                

 

             BUN (test code = 05D) 13 mg/dL     7-18                      

 

             CREATININE (test code 1.0 mg/dL    0.7-1.3                   



             = 03E)                                              

 

             GFR (test code = GFR) 83 mL/min/1.73m\S\2 >=90         L           

 

 

             GFR AFRICAN AMERICAN 97 mL/min/1.73m\S\2 >=90                      



             (test code = GFRAA)                                        

 

             EGFR (test code = *** eGFR BY CKD-EPI                           



             EGFR)        CALCULATION IS NOT                           



                          RECOMMENDED FOR                           



                          PATIENTS UNDER 18 YEARS                           



                          OF AGE.***                             

 

             BUN/CREA (test code = 13           12-20                     



             BCR)                                                

 

             CALCIUM (test code = 8.1 mg/dL    8.3-9.5      L            



             09D)                                                



CBC (INCLUDES AUTOMATED DIFFERENTIAL)2020 04:20:00





             Test Item    Value        Reference Range Interpretation Comments

 

             WBC (test code = WBC) 9.3 10\S\3/uL 4.5-11.0                  

 

             RBC (test code = RBC) 4.36 10\S\6/uL 4.20-5.60                 

 

             HGB (test code = HBG) 11.9 g/dL    14.0-18.0    L            

 

             HCT (test code = HCT) 36.8 %       35.0-46.0                 

 

             MCV (test code = MCV) 84.4 fL      80.0-94.0                 

 

             MCH (test code = MCH) 27.3 pg      27.0-31.0                 

 

             MCHC (test code = MCHC) 32.3 g/dL    32.0-36.0                 

 

             RDW (test code = RDW) 14.6 %       11.5-14.5    H            

 

             PLT (test code = PLT) 204 10\S\3/uL 130-400                   

 

             MPV (test code = MPV) 10.5 fL      9.4-12.4                  

 

             NEUTROP # (test code = NE#) 7.0 10\S\3/uL 2.0-8.0                  

 

 

             LYMPH # (test code = LY#) 1.2 10\S\3/uL 1.2-4.0                   

 

             MONOCYTE # (test code = MO#) 0.9 10\S\3/uL 0.0-1.1                 

  

 

             EOSINOPH # (test code = EO#) 0.1 10\S\3/uL 0.0-0.7                 

  

 

             BASOPHIL # (test code = BA#) 0.0 10\S\3/uL 0.0-0.3                 

  

 

             IG # (test code = IG#) 0.12 10\S\3/uL 0.00-0.06    H            

 

             NRBC # (test code = NRBC#) 0.00 10\S\3/uL 0.00-0.01                

 

 

             NEUTROPH % (test code = NE%) 75.1 %       35.0-73.0    H           

 

 

             LYMPH % (test code = LY%) 12.9 %       20.0-55.0    L            

 

             MONO % (test code = MO%) 9.3 %        2.5-10.0                  

 

             EOSINOPH % (test code = EO%) 1.2 %        0.0-5.0                  

 

 

             BASOPHIL % (test code = BA%) 0.2 %        0.0-2.0                  

 

 

             IG % (test code = IG%) 1.3 %        0.0-0.8      H            

 

             NRBC% (test code = NRBC%) 0.0 %        0.0-0.2                   

 

             MANDIFF (test code = MDIFF) NO           NO                        

 

             RBC MORPH (test code = RBCMOR)              NORMAL                 

   



BASIC METABOLIC [id number] 07:16:00





             Test Item    Value        Reference Range Interpretation Comments

 

             GLUCOSE (test code = 122 mg/dL           H            



             06D)                                                

 

             SODIUM (test code = 131 mmol/L   136-145      L            



             01A)                                                

 

             POTASSIUM (test code = 3.3 mmol/L   3.6-5.1      L            



             01B)                                                

 

             CHLORIDE (test code = 98 mmol/L                        



             04A)                                                

 

             CO2 (test code = 02A) 28 mmol/L    22-32                     

 

             ANION GAP (test code = 8.3 mmol/L                             



             ANG)                                                

 

             BUN (test code = 05D) 17 mg/dL     7-18                      

 

             CREATININE (test code 1.1 mg/dL    0.7-1.3                   



             = 03E)                                              

 

             GFR (test code = GFR) 73 mL/min/1.73m\S\2 >=90         L           

 

 

             GFR AFRICAN AMERICAN 85 mL/min/1.73m\S\2 >=90         L            



             (test code = GFRAA)                                        

 

             EGFR (test code = *** eGFR BY CKD-EPI                           



             EGFR)        CALCULATION IS NOT                           



                          RECOMMENDED FOR                           



                          PATIENTS UNDER 18 YEARS                           



                          OF AGE.***                             

 

             BUN/CREA (test code = 16           12-20                     



             BCR)                                                

 

             CALCIUM (test code = 8.2 mg/dL    8.3-9.5      L            



             09D)                                                



CBC (INCLUDES AUTOMATED DIFFERENTIAL)2020 07:03:00





             Test Item    Value        Reference Range Interpretation Comments

 

             WBC (test code = WBC) 8.6 10\S\3/uL 4.5-11.0                  

 

             RBC (test code = RBC) 4.53 10\S\6/uL 4.20-5.60                 

 

             HGB (test code = HBG) 12.3 g/dL    14.0-18.0    L            

 

             HCT (test code = HCT) 38.8 %       35.0-46.0                 

 

             MCV (test code = MCV) 85.7 fL      80.0-94.0                 

 

             MCH (test code = MCH) 27.2 pg      27.0-31.0                 

 

             MCHC (test code = MCHC) 31.7 g/dL    32.0-36.0    L            

 

             RDW (test code = RDW) 14.7 %       11.5-14.5    H            

 

             PLT (test code = PLT) 179 10\S\3/uL 130-400                   

 

             MPV (test code = MPV) 10.5 fL      9.4-12.4                  

 

             NEUTROP # (test code = NE#) 6.3 10\S\3/uL 2.0-8.0                  

 

 

             LYMPH # (test code = LY#) 1.1 10\S\3/uL 1.2-4.0      L            

 

             MONOCYTE # (test code = MO#) 1.0 10\S\3/uL 0.0-1.1                 

  

 

             EOSINOPH # (test code = EO#) 0.1 10\S\3/uL 0.0-0.7                 

  

 

             BASOPHIL # (test code = BA#) 0.0 10\S\3/uL 0.0-0.3                 

  

 

             IG # (test code = IG#) 0.05 10\S\3/uL 0.00-0.06                 

 

             NRBC # (test code = NRBC#) 0.00 10\S\3/uL 0.00-0.01                

 

 

             NEUTROPH % (test code = NE%) 72.9 %       35.0-73.0                

 

 

             LYMPH % (test code = LY%) 13.3 %       20.0-55.0    L            

 

             MONO % (test code = MO%) 12.0 %       2.5-10.0     H            

 

             EOSINOPH % (test code = EO%) 0.9 %        0.0-5.0                  

 

 

             BASOPHIL % (test code = BA%) 0.3 %        0.0-2.0                  

 

 

             IG % (test code = IG%) 0.6 %        0.0-0.8                   

 

             NRBC% (test code = NRBC%) 0.0 %        0.0-0.2                   

 

             MANDIFF (test code = MDIFF) NO           NO                        

 

             RBC MORPH (test code = RBCMOR)              NORMAL                 

   



[id number] 14:56:00





             Test Item    Value        Reference Range Interpretation Comments

 

             Culture Observations NO BETA HEMOLYTIC                           



             (test code = COB1) STREPTOCOCCUS ISOLATED                          

 

 

             Direct Exam (test code NEGATIVE FOR STREP A                        

   



             = DE3)       ANTIGEN                                



BASIC METABOLIC [id number] 07:36:00





             Test Item    Value        Reference Range Interpretation Comments

 

             GLUCOSE (test code = 128 mg/dL           H            



             06D)                                                

 

             SODIUM (test code = 131 mmol/L   136-145      L            



             01A)                                                

 

             POTASSIUM (test code = 3.5 mmol/L   3.6-5.1      L            



             01B)                                                

 

             CHLORIDE (test code = 99 mmol/L                        



             04A)                                                

 

             CO2 (test code = 02A) 27 mmol/L    22-32                     

 

             ANION GAP (test code = 8.5 mmol/L                             



             ANG)                                                

 

             BUN (test code = 05D) 17 mg/dL     7-18                      

 

             CREATININE (test code 1.0 mg/dL    0.7-1.3                   



             = 03E)                                              

 

             GFR (test code = GFR) 80 mL/min/1.73m\S\2 >=90         L           

 

 

             GFR AFRICAN AMERICAN 93 mL/min/1.73m\S\2 >=90                      



             (test code = GFRAA)                                        

 

             EGFR (test code = *** eGFR BY CKD-EPI                           



             EGFR)        CALCULATION IS NOT                           



                          RECOMMENDED FOR                           



                          PATIENTS UNDER 18 YEARS                           



                          OF AGE.***                             

 

             BUN/CREA (test code = 17           12-20                     



             BCR)                                                

 

             CALCIUM (test code = 8.0 mg/dL    8.3-9.5      L            



             09D)                                                



CBC (INCLUDES AUTOMATED DIFFERENTIAL)2020 07:35:00





             Test Item    Value        Reference Range Interpretation Comments

 

             WBC (test code = WBC) 11.0 10\S\3/uL 4.5-11.0                  

 

             RBC (test code = RBC) 4.80 10\S\6/uL 4.20-5.60                 

 

             HGB (test code = HBG) 12.8 g/dL    14.0-18.0    L            

 

             HCT (test code = HCT) 41.2 %       35.0-46.0                 

 

             MCV (test code = MCV) 85.8 fL      80.0-94.0                 

 

             MCH (test code = MCH) 26.7 pg      27.0-31.0    L            

 

             MCHC (test code = MCHC) 31.1 g/dL    32.0-36.0    L            

 

             RDW (test code = RDW) 14.6 %       11.5-14.5    H            

 

             PLT (test code = PLT) 182 10\S\3/uL 130-400                   

 

             MPV (test code = MPV) 10.7 fL      9.4-12.4                  

 

             NEUTROP # (test code = NE#) 8.3 10\S\3/uL 2.0-8.0      H           

 

 

             LYMPH # (test code = LY#) 1.4 10\S\3/uL 1.2-4.0                   

 

             MONOCYTE # (test code = MO#) 1.2 10\S\3/uL 0.0-1.1      H          

  

 

             EOSINOPH # (test code = EO#) 0.1 10\S\3/uL 0.0-0.7                 

  

 

             BASOPHIL # (test code = BA#) 0.0 10\S\3/uL 0.0-0.3                 

  

 

             IG # (test code = IG#) 0.05 10\S\3/uL 0.00-0.06                 

 

             NRBC # (test code = NRBC#) 0.00 10\S\3/uL 0.00-0.01                

 

 

             NEUTROPH % (test code = NE%) 75.3 %       35.0-73.0    H           

 

 

             LYMPH % (test code = LY%) 12.9 %       20.0-55.0    L            

 

             MONO % (test code = MO%) 10.5 %       2.5-10.0     H            

 

             EOSINOPH % (test code = EO%) 0.5 %        0.0-5.0                  

 

 

             BASOPHIL % (test code = BA%) 0.3 %        0.0-2.0                  

 

 

             IG % (test code = IG%) 0.5 %        0.0-0.8                   

 

             NRBC% (test code = NRBC%) 0.0 %        0.0-0.2                   

 

             MANDIFF (test code = MDIFF) NO           NO                        

 

             RBC MORPH (test code = RBCMOR)              NORMAL                 

   



THYROID PANEL/SCREEN (TSH)2020 07:01:00





             Test Item    Value        Reference Range Interpretation Comments

 

             TSH (test code = A57) 2.550 uIU/mL 0.358-3.740               



COMPREHENSIVE METABOLIC [id number] 06:59:00





             Test Item    Value        Reference Range Interpretation Comments

 

             GLUCOSE (test code = 105 mg/dL           H            



             06D)                                                

 

             SODIUM (test code = 133 mmol/L   136-145      L            



             01A)                                                

 

             POTASSIUM (test code = 3.6 mmol/L   3.6-5.1                   



             01B)                                                

 

             CHLORIDE (test code = 100 mmol/L                       



             04A)                                                

 

             CO2 (test code = 02A) 26 mmol/L    22-32                     

 

             ANION GAP (test code = 10.6 mmol/L                            



             ANG)                                                

 

             BUN (test code = 05D) 16 mg/dL     7-18                      

 

             CREATININE (test code 1.2 mg/dL    0.7-1.3                   



             = 03E)                                              

 

             GFR (test code = GFR) 66 mL/min/1.73m\S\2 >=90         L           

 

 

             GFR AFRICAN AMERICAN 77 mL/min/1.73m\S\2 >=90         L            



             (test code = GFRAA)                                        

 

             EGFR (test code = *** eGFR BY CKD-EPI                           



             EGFR)        CALCULATION IS NOT                           



                          RECOMMENDED FOR                           



                          PATIENTS UNDER 18 YEARS                           



                          OF AGE.***                             

 

             BUN/CREA (test code = 13           12-20                     



             BCR)                                                

 

             CALCIUM (test code = 8.2 mg/dL    8.3-9.5      L            



             09D)                                                

 

             BILI TOTAL (test code 1.0 mg/dL    0.2-1.0                   



             = 11A)                                              

 

             PROTEIN (test code = 7.8 g/dL     6.4-8.2                   



             07D)                                                

 

             ALBUMIN (test code = 3.2 g/dL     3.5-4.8      L            



             08D)                                                

 

             GLOBULIN (test code = 4.6 g/dL     1.5-3.8      H            



             GLB)                                                

 

             ALB/GLOB (test code = 0.7          1.0-2.6      L            



             AGRR)                                               

 

             ALK PHOS (test code = 111 IU/L                         



             35A)                                                

 

             AST (test code = 30A) 23 IU/L      <=42                      

 

             ALT (test code = 31A) 28 IU/L      <=78                      



CBC (INCLUDES AUTOMATED DIFFERENTIAL)2020 06:26:00





             Test Item    Value        Reference Range Interpretation Comments

 

             WBC (test code = WBC) 11.9 10\S\3/uL 4.5-11.0     H            

 

             RBC (test code = RBC) 5.59 10\S\6/uL 4.20-5.60                 

 

             HGB (test code = HBG) 15.1 g/dL    14.0-18.0                 

 

             HCT (test code = HCT) 48.1 %       35.0-46.0    H            

 

             MCV (test code = MCV) 86.0 fL      80.0-94.0                 

 

             MCH (test code = MCH) 27.0 pg      27.0-31.0                 

 

             MCHC (test code = MCHC) 31.4 g/dL    32.0-36.0    L            

 

             RDW (test code = RDW) 14.8 %       11.5-14.5    H            

 

             PLT (test code = PLT) 199 10\S\3/uL 130-400                   

 

             MPV (test code = MPV) 10.4 fL      9.4-12.4                  

 

             NEUTROP # (test code = NE#) 9.8 10\S\3/uL 2.0-8.0      H           

 

 

             LYMPH # (test code = LY#) 1.3 10\S\3/uL 1.2-4.0                   

 

             MONOCYTE # (test code = MO#) 0.7 10\S\3/uL 0.0-1.1                 

  

 

             EOSINOPH # (test code = EO#) 0.0 10\S\3/uL 0.0-0.7                 

  

 

             BASOPHIL # (test code = BA#) 0.0 10\S\3/uL 0.0-0.3                 

  

 

             IG # (test code = IG#) 0.04 10\S\3/uL 0.00-0.06                 

 

             NRBC # (test code = NRBC#) 0.00 10\S\3/uL 0.00-0.01                

 

 

             NEUTROPH % (test code = NE%) 82.4 %       35.0-73.0    H           

 

 

             LYMPH % (test code = LY%) 11.1 %       20.0-55.0    L            

 

             MONO % (test code = MO%) 5.9 %        2.5-10.0                  

 

             EOSINOPH % (test code = EO%) 0.2 %        0.0-5.0                  

 

 

             BASOPHIL % (test code = BA%) 0.1 %        0.0-2.0                  

 

 

             IG % (test code = IG%) 0.3 %        0.0-0.8                   

 

             NRBC% (test code = NRBC%) 0.0 %        0.0-0.2                   

 

             MANDIFF (test code = MDIFF) NO           NO                        



DRUGS OF [id number] 20:48:00





             Test Item    Value        Reference Range Interpretation Comments

 

             DRUG SCRN (test code = ****URINE DRUG                           



             HDOA)        SCREEN*** ***This is an                           



                          unconfirmed screening                           



                          result and should not                           



                          be used for non-medical                           



                          purposes***                            

 

             CANNABINOD (test code Negative     NEGATIVE                  



             = 88C)                                              

 

             AMPHETAMINE (test code Negative     NEGATIVE                  



             = 84A)                                              

 

             BENZODIAZP (test code Negative     NEGATIVE                  



             = 86A)                                              

 

             BARBITURAT (test code Negative     NEGATIVE                  



             = 85A)                                              

 

             OPIATES (test code = POSITIVE     NEGATIVE     A            



             92B)                                                

 

             COCAINE (test code = Negative     NEGATIVE                  



             87A)                                                

 

             PHENCYCLID (test code Negative     NEGATIVE                  



             = 66A)                                              

 

             METHADONE (test code = Negative     NEGATIVE                  



             64A)                                                

 

             DOAH (test code = ***********************                          

 



             DOAH.)       **URINE DRUG                           



                          SCREEN*****************                           



                          ************* Cut-off                           



                          values are as follows:                           



                          Cannabinoids 50 ng/mL                           



                          Cocaine 300 ng/mL                           



                          Amphetamines 1000 ng/mL                           



                          Phencyclidine 25 ng/mL                           



                          Benzodiazepines 200                           



                          ng.mL Methadone 300                           



                          ng/mL  Barbiturates 200                           



                          ng/mL Opiates 2000                           



                          ng/mL                                  



                          ***********************                           



                          ***********************                           



                          ***********************                           



                          ***********                            



[id number] 20:42:00





             Test Item    Value        Reference Range Interpretation Comments

 

             COLOR (test code = COLU) YELLOW       YELLOW                    

 

             CLARITY (test code = CLA) CLEAR        CLEAR                     

 

             GLUCOSE UR (test code = UA GLUCOSE) NEGATIVE     NEGATIVE          

        

 

             BILI UR (test code = BILE) NEGATIVE     NEGATIVE                  

 

             KETONES UR (test code = ACE) NEGATIVE     NEGATIVE                 

 

 

             SP GRAVITY (test code = SPGR) 1.071        1.005-1.030  H          

  

 

             PH UR (test code = PH) 6.0          4.5-8.0                   

 

             PROTEIN UR (test code = PU) NEGATIVE     NEGATIVE                  

 

             UROBIL UR (test code = UROQ) 0.2 EU/dL    0.2-1.0                  

 

 

             NITRITE UR (test code = NITRITE) NEGATIVE     NEGATIVE             

     

 

             BLOOD UR (test code = UA BLOOD) NEGATIVE     NEGATIVE              

    

 

             LEUK ES UR (test code = LEUK) NEGATIVE     NEGATIVE                

  

 

             AUAM (test code = AUAM) NO           NO                        



LACTIC [id number] 20:06:00





             Test Item    Value        Reference Range Interpretation Comments

 

             LACTIC ACD (test code = LA) 1.7 mmol/L   0.4-2.0                   



[id number] 20:02:00





             Test Item    Value        Reference Range Interpretation Comments

 

             FERRITIN (test code = A19) 245.4 ng/mL  26.0-388.0                



DIRECT INFLUENZA A AND B [id number] 17:41:00





             Test Item    Value        Reference Range Interpretation Comments

 

             Direct Exam (test PRESUMPTIVE NEGATIVE FOR                         

  



             code = DE3)  THE PRESENCE OF INFLUENZA                           



                          ANTIGEN                                



SARS-CoV (RAPID ANTIGEN)2020 17:32:00





             Test Item    Value        Reference Range Interpretation Comments

 

             SARS-CoV (ANTIGEN) NEGATIVE     NEGATIVE                  



             (test code =                                        



             COVAG)                                              

 

             COVID AG (test *** This test has been                           



             code = COVAGC) marketed under the FDA                           



                          Emergency Use Authorization                           



                          (EUA) to meet challenges of                           



                          the COVID-19 pandemic. The                           



                          validation standards                           



                          normally enforced by the                           



                          FDA and the [employer] of the                           



                          [employer] ([employer])                           



                          are more stringent than                           



                          those required for this                           



                          test. Therefore, the result                           



                          should be interpreted with                           



                          caution and close attention                           



                          to other clinical and                           



                          epidemiological data ***                           



[id number] 17:30:00





             Test Item    Value        Reference Range Interpretation Comments

 

             D-DIMER DILUTED (test 2904 ng/mL D-DU 0-234        H            



             code = DDD)                                         

 

             D-DIMER COMMENT (test *Level to rule out                           



             code = DDCOM) DVT or PE: <235 ng/mL                           



                          D-DU*                                  



PRO TIME AND [id number] 17:30:00





             Test Item    Value        Reference Range Interpretation Comments

 

             PT (test code = 13.3 s       9.8-13.6                  



             TT)                                                 

 

             INR (test code = 1.2                                    



             INR)                                                

 

             INRH (test code =  **** SUGGESTED THERAPEUTIC                      

     



             INRH)        RANGE FOR INR: **** 2.5 -                           



                          3.5 ** For Patients with                           



                          Prosthetic Valves or                           



                          Patients with recurrent                           



                          Thromboembolic Events **                           



                          2.0 - 3.0 ** For Most Other                           



                          Applications **                           

 

             PTT (test code = 30.4 s       20.2-38.0                 



             PTT)                                                

 

             PTTH (test code = **** To monitor the                           



             PTTH)        effectiveness of heparin,                           



                          we offer the Anti-Xa                           



                          (Heparin Assay). It can be                           



                          used for either                           



                          unfractionated or LMW                           



                          Heparin. Order Code is                           



                          ANTI-XA ****                           



BRAIN NATRIURETIC [id number] 17:27:00





             Test Item    Value        Reference Range Interpretation Comments

 

             proBNP (test code = PBNP) 4159 pg/mL   0-125        H            



LACTIC [id number] 17:23:00





             Test Item    Value        Reference Range Interpretation Comments

 

             LACTIC ACD (test code = LA) 4.3 mmol/L   0.4-2.0      H            



C-REACTIVE PROTEIN [id number] 17:23:00





             Test Item    Value        Reference Range Interpretation Comments

 

             CRP (test code = CRP) POSITIVE (>=6MG/L) NEGATIVE (<6MG/L) A       

     



COMPREHENSIVE METABOLIC [id number] 17:20:00





             Test Item    Value        Reference Range Interpretation Comments

 

             GLUCOSE (test code = 119 mg/dL           H            



             06D)                                                

 

             SODIUM (test code = 131 mmol/L   136-145      L            



             01A)                                                

 

             POTASSIUM (test code = 3.7 mmol/L   3.6-5.1                   



             01B)                                                

 

             CHLORIDE (test code = 101 mmol/L                       



             04A)                                                

 

             CO2 (test code = 02A) 21 mmol/L    22-32        L            

 

             ANION GAP (test code = 12.7 mmol/L                            



             ANG)                                                

 

             BUN (test code = 05D) 18 mg/dL     7-18                      

 

             CREATININE (test code 1.4 mg/dL    0.7-1.3      H            



             = 03E)                                              

 

             GFR (test code = GFR) 54 mL/min/1.73m\S\2 >=90         L           

 

 

             GFR AFRICAN AMERICAN 63 mL/min/1.73m\S\2 >=90         L            



             (test code = GFRAA)                                        

 

             EGFR (test code = *** eGFR BY CKD-EPI                           



             EGFR)        CALCULATION IS NOT                           



                          RECOMMENDED FOR                           



                          PATIENTS UNDER 18 YEARS                           



                          OF AGE.***                             

 

             BUN/CREA (test code = 13           12-20                     



             BCR)                                                

 

             CALCIUM (test code = 8.6 mg/dL    8.3-9.5                   



             09D)                                                

 

             BILI TOTAL (test code 0.7 mg/dL    0.2-1.0                   



             = 11A)                                              

 

             PROTEIN (test code = 8.0 g/dL     6.4-8.2                   



             07D)                                                

 

             ALBUMIN (test code = 3.6 g/dL     3.5-4.8                   



             08D)                                                

 

             GLOBULIN (test code = 4.4 g/dL     1.5-3.8      H            



             GLB)                                                

 

             ALB/GLOB (test code = 0.8          1.0-2.6      L            



             AGRR)                                               

 

             ALK PHOS (test code = 112 IU/L                         



             35A)                                                

 

             AST (test code = 30A) 24 IU/L      <=42                      

 

             ALT (test code = 31A) 33 IU/L      <=78                      



LDH-LACTIC [id number] 17:20:00





             Test Item    Value        Reference Range Interpretation Comments

 

             LDH (test code = 33A) 256 IU/L     100-190      H            



CARDIAC [id number] 17:20:00





             Test Item    Value        Reference Range Interpretation Comments

 

             TROPONIN I (test code = A84) <0.015 ng/mL 0.000-0.045              

 



[id number] 17:16:00





             Test Item    Value        Reference Range Interpretation Comments

 

             MAGNESIUM (test code = 48A) 1.8 mg/dL    1.8-2.4                   



CBC (INCLUDES AUTOMATED DIFFERENTIAL)2020 17:10:00





             Test Item    Value        Reference Range Interpretation Comments

 

             WBC (test code = WBC) 17.5 10\S\3/uL 4.5-11.0     H            

 

             RBC (test code = RBC) 5.95 10\S\6/uL 4.20-5.60    H            

 

             HGB (test code = HBG) 16.0 g/dL    14.0-18.0                 

 

             HCT (test code = HCT) 49.9 %       35.0-46.0    H            

 

             MCV (test code = MCV) 83.9 fL      80.0-94.0                 

 

             MCH (test code = MCH) 26.9 pg      27.0-31.0    L            

 

             MCHC (test code = MCHC) 32.1 g/dL    32.0-36.0                 

 

             RDW (test code = RDW) 14.6 %       11.5-14.5    H            

 

             PLT (test code = PLT) 234 10\S\3/uL 130-400                   

 

             MPV (test code = MPV) 10.6 fL      9.4-12.4                  

 

             NEUTROP # (test code = NE#) 15.1 10\S\3/uL 2.0-8.0      H          

  

 

             LYMPH # (test code = LY#) 1.7 10\S\3/uL 1.2-4.0                   

 

             MONOCYTE # (test code = MO#) 0.6 10\S\3/uL 0.0-1.1                 

  

 

             EOSINOPH # (test code = EO#) 0.0 10\S\3/uL 0.0-0.7                 

  

 

             BASOPHIL # (test code = BA#) 0.0 10\S\3/uL 0.0-0.3                 

  

 

             IG # (test code = IG#) 0.12 10\S\3/uL 0.00-0.06    H            

 

             NRBC # (test code = NRBC#) 0.00 10\S\3/uL 0.00-0.01                

 

 

             NEUTROPH % (test code = NE%) 86.1 %       35.0-73.0    H           

 

 

             LYMPH % (test code = LY%) 9.5 %        20.0-55.0    L            

 

             MONO % (test code = MO%) 3.5 %        2.5-10.0                  

 

             EOSINOPH % (test code = EO%) 0.0 %        0.0-5.0                  

 

 

             BASOPHIL % (test code = BA%) 0.2 %        0.0-2.0                  

 

 

             IG % (test code = IG%) 0.7 %        0.0-0.8                   

 

             NRBC% (test code = NRBC%) 0.0 %        0.0-0.2                   

 

             MANDIFF (test code = MDIFF) NO           NO                        

 

             RBC MORPH (test code = RBCMOR)              NORMAL                 

   



TROPONIN [id number] 15:11:00





             Test Item    Value        Reference Range Interpretation Comments

 

             TROPONIN I (test code = A84) <0.015 ng/mL 0.000-0.045              

 



AMYLASE AND [id number] 15:11:00





             Test Item    Value        Reference Range Interpretation Comments

 

             AMYLASE (test code = 10A) 23 U/L              L            

 

             LIPASE (test code = 60A) 58 IU/L             L            



COMPREHENSIVE METABOLIC [id number] 15:11:00





             Test Item    Value        Reference Range Interpretation Comments

 

             GLUCOSE (test code = 92 mg/dL                         



             06D)                                                

 

             SODIUM (test code = 140 mmol/L   136-145                   



             01A)                                                

 

             POTASSIUM (test code = 4.2 mmol/L   3.6-5.1                   



             01B)                                                

 

             CHLORIDE (test code = 107 mmol/L                       



             04A)                                                

 

             CO2 (test code = 02A) 27 mmol/L    22-32                     

 

             ANION GAP (test code = 10.2 mmol/L                            



             ANG)                                                

 

             BUN (test code = 05D) 11 mg/dL     7-18                      

 

             CREATININE (test code 0.8 mg/dL    0.7-1.3                   



             = 03E)                                              

 

             GFR (test code = GFR) 96 mL/min/1.73m\S\2 >=90                     

 

 

             GFR AFRICAN AMERICAN 112 mL/min/1.73m\S\2 >=90                     

 



             (test code = GFRAA)                                        

 

             EGFR (test code = *** eGFR BY CKD-EPI                           



             EGFR)        CALCULATION IS NOT                           



                          RECOMMENDED FOR                           



                          PATIENTS UNDER 18 YEARS                           



                          OF AGE.***                             

 

             BUN/CREA (test code = 13           12-20                     



             BCR)                                                

 

             CALCIUM (test code = 8.5 mg/dL    8.3-9.5                   



             09D)                                                

 

             BILI TOTAL (test code 0.5 mg/dL    0.2-1.0                   



             = 11A)                                              

 

             PROTEIN (test code = 7.0 g/dL     6.4-8.2                   



             07D)                                                

 

             ALBUMIN (test code = 3.4 g/dL     3.5-4.8      L            



             08D)                                                

 

             GLOBULIN (test code = 3.6 g/dL     1.5-3.8                   



             GLB)                                                

 

             ALB/GLOB (test code = 0.9          1.0-2.6      L            



             AGRR)                                               

 

             ALK PHOS (test code = 116 IU/L                         



             35A)                                                

 

             AST (test code = 30A) 28 IU/L      <=42                      

 

             ALT (test code = 31A) 35 IU/L      <=78                      



CBC (INCLUDES AUTOMATED DIFFERENTIAL)2020 14:58:00





             Test Item    Value        Reference Range Interpretation Comments

 

             WBC (test code = WBC) 6.2 10\S\3/uL 4.5-11.0                  

 

             RBC (test code = RBC) 5.12 10\S\6/uL 4.20-5.60                 

 

             HGB (test code = HBG) 14.1 g/dL    14.0-18.0                 

 

             HCT (test code = HCT) 42.2 %       35.0-46.0                 

 

             MCV (test code = MCV) 82.4 fL      80.0-94.0                 

 

             MCH (test code = MCH) 27.5 pg      27.0-31.0                 

 

             MCHC (test code = MCHC) 33.4 g/dL    32.0-36.0                 

 

             RDW (test code = RDW) 13.8 %       11.5-14.5                 

 

             PLT (test code = PLT) 253 10\S\3/uL 130-400                   

 

             MPV (test code = MPV) 10.1 fL      9.4-12.4                  

 

             NEUTROP # (test code = NE#) 4.1 10\S\3/uL 2.0-8.0                  

 

 

             LYMPH # (test code = LY#) 1.3 10\S\3/uL 1.2-4.0                   

 

             MONOCYTE # (test code = MO#) 0.6 10\S\3/uL 0.0-1.1                 

  

 

             EOSINOPH # (test code = EO#) 0.2 10\S\3/uL 0.0-0.7                 

  

 

             BASOPHIL # (test code = BA#) 0.0 10\S\3/uL 0.0-0.3                 

  

 

             IG # (test code = IG#) 0.01 10\S\3/uL 0.00-0.06                 

 

             NRBC # (test code = NRBC#) 0.00 10\S\3/uL 0.00-0.01                

 

 

             NEUTROPH % (test code = NE%) 65.6 %       35.0-73.0                

 

 

             LYMPH % (test code = LY%) 21.0 %       20.0-55.0                 

 

             MONO % (test code = MO%) 10.0 %       2.5-10.0                  

 

             EOSINOPH % (test code = EO%) 2.6 %        0.0-5.0                  

 

 

             BASOPHIL % (test code = BA%) 0.6 %        0.0-2.0                  

 

 

             IG % (test code = IG%) 0.2 %        0.0-0.8                   

 

             NRBC% (test code = NRBC%) 0.0 %        0.0-0.2                   

 

             MANDIFF (test code = MDIFF) NO           NO                        



PRO TIME AND [id number] 14:58:00





             Test Item    Value        Reference Range Interpretation Comments

 

             PT (test code = 14.1 s       9.8-13.6     H            



             TT)                                                 

 

             INR (test code = 1.2                                    



             INR)                                                

 

             INRH (test code =  **** SUGGESTED THERAPEUTIC                      

     



             INRH)        RANGE FOR INR: **** 2.5 -                           



                          3.5 ** For Patients with                           



                          Prosthetic Valves or                           



                          Patients with recurrent                           



                          Thromboembolic Events **                           



                          2.0 - 3.0 ** For Most Other                           



                          Applications **                           

 

             PTT (test code = 30.2 s       20.2-38.0                 



             PTT)                                                

 

             PTTH (test code = **** To monitor the                           



             PTTH)        effectiveness of heparin,                           



                          we offer the Anti-Xa                           



                          (Heparin Assay). It can be                           



                          used for either                           



                          unfractionated or LMW                           



                          Heparin. Order Code is                           



                          ANTI-XA ****                           



C-REACTIVE PROTEIN QUALITAT2020-06-10 18:04:00





             Test Item    Value        Reference Range Interpretation Comments

 

             CRP (test code POSITIVE (>=6MG/L) NEGATIVE (<6MG/L) A            Pr

eviously reported



             = CRP)                                              as: NEGATIVE



                                                                 (<6MG/L) On



                                                                 06/10/2020 18:0

4 By



                                                                 [id number]



LDH-LACTIC DEHYDROGENASE2020-06-10 18:02:00





             Test Item    Value        Reference Range Interpretation Comments

 

             LDH (test code = 33A) 260 IU/L     100-190      H            



[id number] 18:01:00





             Test Item    Value        Reference Range Interpretation Comments

 

             D-DIMER (test code = 1715 ng/mL D-DU 0-234        H            



             DDI)                                                

 

             D-DIMER COMMENT (test *Level to rule out                           



             code = DDCOM) DVT or PE: <235 ng/mL                           



                          D-DU*                                  



SARS-CoV (RAPID ANTIGEN)2020-06-10 17:56:00





             Test Item    Value        Reference Range Interpretation Comments

 

             SARS-CoV (ANTIGEN) NEGATIVE     NEGATIVE                  



             (test code =                                        



             COVAG)                                              

 

             COVID AG (test *** This test has been                           



             code = COVAGC) marketed under the FDA                           



                          Emergency Use Authorization                           



                          (EUA) to meet challenges of                           



                          the COVID-19 pandemic. The                           



                          validation standards                           



                          normally enforced by the                           



                          FDA and the [employer] the                           



                          [employer] ([employer])                           



                          are more stringent than                           



                          those required for this                           



                          test. Therefore, the result                           



                          should be interpreted with                           



                          caution and close attention                           



                          to other clinical and                           



                          epidemiological data ***                           



BRAIN NATRIURETIC PEPTIDE2020-06-10 17:56:00





             Test Item    Value        Reference Range Interpretation Comments

 

             proBNP (test code = PBNP) 3805 pg/mL   0-125        H            



COMPREHENSIVE METABOLIC PAN2020-06-10 17:56:00





             Test Item    Value        Reference Range Interpretation Comments

 

             GLUCOSE (test code = 108 mg/dL           H            



             06D)                                                

 

             SODIUM (test code = 136 mmol/L   136-145                   



             01A)                                                

 

             POTASSIUM (test code = 4.4 mmol/L   3.6-5.1                   



             01B)                                                

 

             CHLORIDE (test code = 105 mmol/L                       



             04A)                                                

 

             CO2 (test code = 02A) 28 mmol/L    22-32                     

 

             ANION GAP (test code = 7.4 mmol/L                             



             ANG)                                                

 

             BUN (test code = 05D) 15 mg/dL     7-18                      

 

             CREATININE (test code 1.0 mg/dL    0.7-1.3                   



             = 03E)                                              

 

             GFR (test code = GFR) 79 mL/min/1.73m\S\2 >=90         L           

 

 

             GFR AFRICAN AMERICAN 92 mL/min/1.73m\S\2 >=90                      



             (test code = GFRAA)                                        

 

             EGFR (test code = *** eGFR BY CKD-EPI                           



             EGFR)        CALCULATION IS NOT                           



                          RECOMMENDED FOR                           



                          PATIENTS UNDER 18 YEARS                           



                          OF AGE.***                             

 

             BUN/CREA (test code = 15           12-20                     



             BCR)                                                

 

             CALCIUM (test code = 8.2 mg/dL    8.3-9.5      L            



             09D)                                                

 

             BILI TOTAL (test code 0.4 mg/dL    0.2-1.0                   



             = 11A)                                              

 

             PROTEIN (test code = 7.1 g/dL     6.4-8.2                   



             07D)                                                

 

             ALBUMIN (test code = 3.2 g/dL     3.5-4.8      L            



             08D)                                                

 

             GLOBULIN (test code = 3.9 g/dL     1.5-3.8      H            



             GLB)                                                

 

             ALB/GLOB (test code = 0.8          1.0-2.6      L            



             AGRR)                                               

 

             ALK PHOS (test code = 114 IU/L                         



             35A)                                                

 

             AST (test code = 30A) 23 IU/L      <=42                      

 

             ALT (test code = 31A) 34 IU/L      <=78                      



TROPONIN -06-10 17:54:00





             Test Item    Value        Reference Range Interpretation Comments

 

             TROPONIN I (test code = A84) <0.015 ng/mL 0.000-0.045              

 



FERRITIN2020-06-10 17:54:00





             Test Item    Value        Reference Range Interpretation Comments

 

             FERRITIN (test code = A19) 123.5 ng/mL  26.0-388.0                



PRO TIME AND PTT2020-06-10 17:49:00





             Test Item    Value        Reference Range Interpretation Comments

 

             PT (test code = 14.5 s       9.8-13.6     H            



             TT)                                                 

 

             INR (test code = 1.3                                    



             INR)                                                

 

             INRH (test code =  **** SUGGESTED THERAPEUTIC                      

     



             INRH)        RANGE FOR INR: **** 2.5 -                           



                          3.5 ** For Patients with                           



                          Prosthetic Valves or                           



                          Patients with recurrent                           



                          Thromboembolic Events **                           



                          2.0 - 3.0 ** For Most Other                           



                          Applications **                           

 

             PTT (test code = 30.5 s       20.2-38.0                 



             PTT)                                                

 

             PTTH (test code = **** To monitor the                           



             PTTH)        effectiveness of heparin,                           



                          we offer the Anti-Xa                           



                          (Heparin Assay). It can be                           



                          used for either                           



                          unfractionated or LMW                           



                          Heparin. Order Code is                           



                          ANTI-XA ****                           



CBC (INCLUDES AUTOMATED DIFFERENTIAL)2020-06-10 17:39:00





             Test Item    Value        Reference Range Interpretation Comments

 

             WBC (test code = WBC) 7.5 10\S\3/uL 4.5-11.0                  

 

             RBC (test code = RBC) 4.76 10\S\6/uL 4.20-5.60                 

 

             HGB (test code = HBG) 13.1 g/dL    14.0-18.0    L            

 

             HCT (test code = HCT) 40.2 %       35.0-46.0                 

 

             MCV (test code = MCV) 84.5 fL      80.0-94.0                 

 

             MCH (test code = MCH) 27.5 pg      27.0-31.0                 

 

             MCHC (test code = MCHC) 32.6 g/dL    32.0-36.0                 

 

             RDW (test code = RDW) 13.8 %       11.5-14.5                 

 

             PLT (test code = PLT) 237 10\S\3/uL 130-400                   

 

             MPV (test code = MPV) 10.3 fL      9.4-12.4                  

 

             NEUTROP # (test code = NE#) 5.2 10\S\3/uL 2.0-8.0                  

 

 

             LYMPH # (test code = LY#) 1.2 10\S\3/uL 1.2-4.0                   

 

             MONOCYTE # (test code = MO#) 0.9 10\S\3/uL 0.0-1.1                 

  

 

             EOSINOPH # (test code = EO#) 0.3 10\S\3/uL 0.0-0.7                 

  

 

             BASOPHIL # (test code = BA#) 0.0 10\S\3/uL 0.0-0.3                 

  

 

             IG # (test code = IG#) 0.03 10\S\3/uL 0.00-0.06                 

 

             NRBC # (test code = NRBC#) 0.00 10\S\3/uL 0.00-0.01                

 

 

             NEUTROPH % (test code = NE%) 68.8 %       35.0-73.0                

 

 

             LYMPH % (test code = LY%) 15.5 %       20.0-55.0    L            

 

             MONO % (test code = MO%) 11.3 %       2.5-10.0     H            

 

             EOSINOPH % (test code = EO%) 3.5 %        0.0-5.0                  

 

 

             BASOPHIL % (test code = BA%) 0.5 %        0.0-2.0                  

 

 

             IG % (test code = IG%) 0.4 %        0.0-0.8                   

 

             NRBC% (test code = NRBC%) 0.0 %        0.0-0.2                   

 

             MANDIFF (test code = MDIFF) NO           NO                        

 

             RBC MORPH (test code = RBCMOR)              NORMAL                 

   



COMPREHENSIVE METABOLIC [id number] 14:25:00





             Test Item    Value        Reference Range Interpretation Comments

 

             GLUCOSE (test code = 83 mg/dL                         



             06D)                                                

 

             SODIUM (test code = 137 mmol/L   136-145                   



             01A)                                                

 

             POTASSIUM (test code = 4.8 mmol/L   3.6-5.1                   



             01B)                                                

 

             CHLORIDE (test code = 105 mmol/L                       



             04A)                                                

 

             CO2 (test code = 02A) 30 mmol/L    22-32                     

 

             ANION GAP (test code = 6.8 mmol/L                             



             ANG)                                                

 

             BUN (test code = 05D) 10 mg/dL     7-18                      

 

             CREATININE (test code 0.9 mg/dL    0.7-1.3                   



             = 03E)                                              

 

             GFR (test code = GFR) 90 mL/min/1.73m\S\2 >=90                     

 

 

             GFR AFRICAN AMERICAN 104 mL/min/1.73m\S\2 >=90                     

 



             (test code = GFRAA)                                        

 

             EGFR (test code = *** eGFR BY CKD-EPI                           



             EGFR)        CALCULATION IS NOT                           



                          RECOMMENDED FOR                           



                          PATIENTS UNDER 18 YEARS                           



                          OF AGE.***                             

 

             BUN/CREA (test code = 11           12-20        L            



             BCR)                                                

 

             CALCIUM (test code = 8.5 mg/dL    8.3-9.5                   



             09D)                                                

 

             BILI TOTAL (test code 0.3 mg/dL    0.2-1.0                   



             = 11A)                                              

 

             PROTEIN (test code = 7.0 g/dL     6.4-8.2                   



             07D)                                                

 

             ALBUMIN (test code = 3.3 g/dL     3.5-4.8      L            



             08D)                                                

 

             GLOBULIN (test code = 3.7 g/dL     1.5-3.8                   



             GLB)                                                

 

             ALB/GLOB (test code = 0.9          1.0-2.6      L            



             AGRR)                                               

 

             ALK PHOS (test code = 126 IU/L            H            



             35A)                                                

 

             AST (test code = 30A) 18 IU/L      <=42                      

 

             ALT (test code = 31A) 27 IU/L      <=78                      



BRAIN NATRIURETIC [id number] 14:23:00





             Test Item    Value        Reference Range Interpretation Comments

 

             proBNP (test code = PBNP) 440 pg/mL    0-125        H            



TROPONIN -[id number] 14:18:00





             Test Item    Value        Reference Range Interpretation Comments

 

             TROPONIN I (test code = A84) <0.015 ng/mL 0.000-0.045              

 



AMYLASE AND [id number] 14:15:00





             Test Item    Value        Reference Range Interpretation Comments

 

             AMYLASE (test code = 10A) 28 U/L                           

 

             LIPASE (test code = 60A) 48 IU/L             L            



PRO TIME AND [id number] 14:10:00





             Test Item    Value        Reference Range Interpretation Comments

 

             PT (test code = 12.2 s       9.8-13.6                  



             TT)                                                 

 

             INR (test code = 1.1                                    



             INR)                                                

 

             INRH (test code =  **** SUGGESTED THERAPEUTIC                      

     



             INRH)        RANGE FOR INR: **** 2.5 -                           



                          3.5 ** For Patients with                           



                          Prosthetic Valves or                           



                          Patients with recurrent                           



                          Thromboembolic Events **                           



                          2.0 - 3.0 ** For Most Other                           



                          Applications **                           

 

             PTT (test code = 29.7 s       20.2-38.0                 



             PTT)                                                

 

             PTTH (test code = **** To monitor the                           



             PTTH)        effectiveness of heparin,                           



                          we offer the Anti-Xa                           



                          (Heparin Assay). It can be                           



                          used for either                           



                          unfractionated or LMW                           



                          Heparin. Order Code is                           



                          ANTI-XA ****                           



[id number] 14:10:00





             Test Item    Value        Reference Range Interpretation Comments

 

             MAGNESIUM (test code = 48A) 2.3 mg/dL    1.8-2.4                   



CBC (INCLUDES AUTOMATED DIFFERENTIAL)2020 13:57:00





             Test Item    Value        Reference Range Interpretation Comments

 

             WBC (test code = WBC) 5.6 10\S\3/uL 4.5-11.0                  

 

             RBC (test code = RBC) 4.90 10\S\6/uL 4.20-5.60                 

 

             HGB (test code = HBG) 13.7 g/dL    14.0-18.0    L            

 

             HCT (test code = HCT) 42.3 %       35.0-46.0                 

 

             MCV (test code = MCV) 86.3 fL      80.0-94.0                 

 

             MCH (test code = MCH) 28.0 pg      27.0-31.0                 

 

             MCHC (test code = MCHC) 32.4 g/dL    32.0-36.0                 

 

             RDW (test code = RDW) 14.0 %       11.5-14.5                 

 

             PLT (test code = PLT) 215 10\S\3/uL 130-400                   

 

             MPV (test code = MPV) 10.3 fL      9.4-12.4                  

 

             NEUTROP # (test code = NE#) 3.4 10\S\3/uL 2.0-8.0                  

 

 

             LYMPH # (test code = LY#) 1.3 10\S\3/uL 1.2-4.0                   

 

             MONOCYTE # (test code = MO#) 0.6 10\S\3/uL 0.0-1.1                 

  

 

             EOSINOPH # (test code = EO#) 0.3 10\S\3/uL 0.0-0.7                 

  

 

             BASOPHIL # (test code = BA#) 0.0 10\S\3/uL 0.0-0.3                 

  

 

             IG # (test code = IG#) 0.02 10\S\3/uL 0.00-0.06                 

 

             NRBC # (test code = NRBC#) 0.00 10\S\3/uL 0.00-0.01                

 

 

             NEUTROPH % (test code = NE%) 60.3 %       35.0-73.0                

 

 

             LYMPH % (test code = LY%) 23.2 %       20.0-55.0                 

 

             MONO % (test code = MO%) 10.4 %       2.5-10.0     H            

 

             EOSINOPH % (test code = EO%) 5.0 %        0.0-5.0                  

 

 

             BASOPHIL % (test code = BA%) 0.7 %        0.0-2.0                  

 

 

             IG % (test code = IG%) 0.4 %        0.0-0.8                   

 

             NRBC% (test code = NRBC%) 0.0 %        0.0-0.2                   

 

             MANDIFF (test code = MDIFF) NO           NO                        

 

             RBC MORPH (test code = RBCMOR)              NORMAL                 

   



CBC (INCLUDES AUTOMATED DIFFERENTIAL)2020 06:12:00





             Test Item    Value        Reference Range Interpretation Comments

 

             WBC (test code = WBC) 5.4 10\S\3/uL 4.5-11.0                  

 

             RBC (test code = RBC) 4.74 10\S\6/uL 4.20-5.60                 

 

             HGB (test code = HBG) 13.2 g/dL    14.0-18.0    L            

 

             HCT (test code = HCT) 41.5 %       35.0-46.0                 

 

             MCV (test code = MCV) 87.6 fL      80.0-94.0                 

 

             MCH (test code = MCH) 27.8 pg      27.0-31.0                 

 

             MCHC (test code = MCHC) 31.8 g/dL    32.0-36.0    L            

 

             RDW (test code = RDW) 14.2 %       11.5-14.5                 

 

             PLT (test code = PLT) 205 10\S\3/uL 130-400                   

 

             MPV (test code = MPV) 10.7 fL      9.4-12.4                  

 

             NEUTROP # (test code = NE#) 3.3 10\S\3/uL 2.0-8.0                  

 

 

             LYMPH # (test code = LY#) 1.2 10\S\3/uL 1.2-4.0                   

 

             MONOCYTE # (test code = MO#) 0.5 10\S\3/uL 0.0-1.1                 

  

 

             EOSINOPH # (test code = EO#) 0.3 10\S\3/uL 0.0-0.7                 

  

 

             BASOPHIL # (test code = BA#) 0.0 10\S\3/uL 0.0-0.3                 

  

 

             IG # (test code = IG#) 0.03 10\S\3/uL 0.00-0.06                 

 

             NRBC # (test code = NRBC#) 0.00 10\S\3/uL 0.00-0.01                

 

 

             NEUTROPH % (test code = NE%) 60.3 %       35.0-73.0                

 

 

             LYMPH % (test code = LY%) 22.9 %       20.0-55.0                 

 

             MONO % (test code = MO%) 10.0 %       2.5-10.0                  

 

             EOSINOPH % (test code = EO%) 5.5 %        0.0-5.0      H           

 

 

             BASOPHIL % (test code = BA%) 0.7 %        0.0-2.0                  

 

 

             IG % (test code = IG%) 0.6 %        0.0-0.8                   

 

             NRBC% (test code = NRBC%) 0.0 %        0.0-0.2                   

 

             MANDIFF (test code = MDIFF) NO           NO                        

 

             RBC MORPH (test code = RBCMOR)              NORMAL                 

   



BASIC METABOLIC [id number] 05:56:00





             Test Item    Value        Reference Range Interpretation Comments

 

             GLUCOSE (test code = 90 mg/dL                         



             06D)                                                

 

             SODIUM (test code = 136 mmol/L   136-145                   



             01A)                                                

 

             POTASSIUM (test code = 4.5 mmol/L   3.6-5.1                   



             01B)                                                

 

             CHLORIDE (test code = 104 mmol/L                       



             04A)                                                

 

             CO2 (test code = 02A) 29 mmol/L    22-32                     

 

             ANION GAP (test code = 7.5 mmol/L                             



             ANG)                                                

 

             BUN (test code = 05D) 21 mg/dL     7-18         H            

 

             CREATININE (test code 1.1 mg/dL    0.7-1.3                   



             = 03E)                                              

 

             GFR (test code = GFR) 72 mL/min/1.73m\S\2 >=90         L           

 

 

             GFR AFRICAN AMERICAN 83 mL/min/1.73m\S\2 >=90         L            



             (test code = GFRAA)                                        

 

             EGFR (test code = *** eGFR BY CKD-EPI                           



             EGFR)        CALCULATION IS NOT                           



                          RECOMMENDED FOR                           



                          PATIENTS UNDER 18 YEARS                           



                          OF AGE.***                             

 

             BUN/CREA (test code = 19           12-20                     



             BCR)                                                

 

             CALCIUM (test code = 8.3 mg/dL    8.3-9.5                   



             09D)                                                



T4 [id number] 07:05:00





             Test Item    Value        Reference Range Interpretation Comments

 

             T4 FREE (test code = A91) 1.06 ng/dL   0.76-1.46                 



THYROID PANEL/SCREEN (TSH)2020 06:54:00





             Test Item    Value        Reference Range Interpretation Comments

 

             TSH (test code = A57) 4.570 uIU/mL 0.358-3.740  H            



BASIC METABOLIC [id number] 06:26:00





             Test Item    Value        Reference Range Interpretation Comments

 

             GLUCOSE (test code = 144 mg/dL           H            



             06D)                                                

 

             SODIUM (test code = 136 mmol/L   136-145                   



             01A)                                                

 

             POTASSIUM (test code = 3.7 mmol/L   3.6-5.1                   



             01B)                                                

 

             CHLORIDE (test code = 103 mmol/L                       



             04A)                                                

 

             CO2 (test code = 02A) 26 mmol/L    22-32                     

 

             ANION GAP (test code = 10.7 mmol/L                            



             ANG)                                                

 

             BUN (test code = 05D) 21 mg/dL     7-18         H            

 

             CREATININE (test code 1.2 mg/dL    0.7-1.3                   



             = 03E)                                              

 

             GFR (test code = GFR) 65 mL/min/1.73m\S\2 >=90         L           

 

 

             GFR AFRICAN AMERICAN 76 mL/min/1.73m\S\2 >=90         L            



             (test code = GFRAA)                                        

 

             EGFR (test code = *** eGFR BY CKD-EPI                           



             EGFR)        CALCULATION IS NOT                           



                          RECOMMENDED FOR                           



                          PATIENTS UNDER 18 YEARS                           



                          OF AGE.***                             

 

             BUN/CREA (test code = 17           12-20                     



             BCR)                                                

 

             CALCIUM (test code = 8.3 mg/dL    8.3-9.5                   



             09D)                                                



CBC (INCLUDES AUTOMATED DIFFERENTIAL)2020 06:17:00





             Test Item    Value        Reference Range Interpretation Comments

 

             WBC (test code = WBC) 5.8 10\S\3/uL 4.5-11.0                  

 

             RBC (test code = RBC) 4.99 10\S\6/uL 4.20-5.60                 

 

             HGB (test code = HBG) 13.8 g/dL    14.0-18.0    L            

 

             HCT (test code = HCT) 44.3 %       35.0-46.0                 

 

             MCV (test code = MCV) 88.8 fL      80.0-94.0                 

 

             MCH (test code = MCH) 27.7 pg      27.0-31.0                 

 

             MCHC (test code = MCHC) 31.2 g/dL    32.0-36.0    L            

 

             RDW (test code = RDW) 14.2 %       11.5-14.5                 

 

             PLT (test code = PLT) 237 10\S\3/uL 130-400                   

 

             MPV (test code = MPV) 10.9 fL      9.4-12.4                  

 

             NEUTROP # (test code = NE#) 3.5 10\S\3/uL 2.0-8.0                  

 

 

             LYMPH # (test code = LY#) 1.4 10\S\3/uL 1.2-4.0                   

 

             MONOCYTE # (test code = MO#) 0.6 10\S\3/uL 0.0-1.1                 

  

 

             EOSINOPH # (test code = EO#) 0.2 10\S\3/uL 0.0-0.7                 

  

 

             BASOPHIL # (test code = BA#) 0.0 10\S\3/uL 0.0-0.3                 

  

 

             IG # (test code = IG#) 0.03 10\S\3/uL 0.00-0.06                 

 

             NRBC # (test code = NRBC#) 0.00 10\S\3/uL 0.00-0.01                

 

 

             NEUTROPH % (test code = NE%) 60.1 %       35.0-73.0                

 

 

             LYMPH % (test code = LY%) 23.6 %       20.0-55.0                 

 

             MONO % (test code = MO%) 11.0 %       2.5-10.0     H            

 

             EOSINOPH % (test code = EO%) 4.1 %        0.0-5.0                  

 

 

             BASOPHIL % (test code = BA%) 0.7 %        0.0-2.0                  

 

 

             IG % (test code = IG%) 0.5 %        0.0-0.8                   

 

             NRBC% (test code = NRBC%) 0.0 %        0.0-0.2                   

 

             MANDIFF (test code = MDIFF) NO           NO                        

 

             RBC MORPH (test code = RBCMOR)              NORMAL                 

   



BRAIN NATRIURETIC [id number] 14:08:00





             Test Item    Value        Reference Range Interpretation Comments

 

             proBNP (test code = PBNP) 603 pg/mL    0-125        H            



CARDIAC [id number] 14:03:00





             Test Item    Value        Reference Range Interpretation Comments

 

             TROPONIN I (test code = A84) <0.015 ng/mL 0.000-0.045              

 



BASIC METABOLIC [id number] 13:59:00





             Test Item    Value        Reference Range Interpretation Comments

 

             GLUCOSE (test code = 121 mg/dL           H            



             06D)                                                

 

             SODIUM (test code = 138 mmol/L   136-145                   



             01A)                                                

 

             POTASSIUM (test code = 3.9 mmol/L   3.6-5.1                   



             01B)                                                

 

             CHLORIDE (test code = 106 mmol/L                       



             04A)                                                

 

             CO2 (test code = 02A) 26 mmol/L    22-32                     

 

             ANION GAP (test code = 9.9 mmol/L                             



             ANG)                                                

 

             BUN (test code = 05D) 22 mg/dL     7-18         H            

 

             CREATININE (test code 1.2 mg/dL    0.7-1.3                   



             = 03E)                                              

 

             GFR (test code = GFR) 67 mL/min/1.73m\S\2 >=90         L           

 

 

             GFR AFRICAN AMERICAN 77 mL/min/1.73m\S\2 >=90         L            



             (test code = GFRAA)                                        

 

             EGFR (test code = *** eGFR BY CKD-EPI                           



             EGFR)        CALCULATION IS NOT                           



                          RECOMMENDED FOR                           



                          PATIENTS UNDER 18 YEARS                           



                          OF AGE.***                             

 

             BUN/CREA (test code = 19           -20                     



             BCR)                                                

 

             CALCIUM (test code = 8.9 mg/dL    8.3-9.5                   



             09D)                                                



PROTHROMBIN [id number] 13:58:00





             Test Item    Value        Reference Range Interpretation Comments

 

             PT (test code = 12.7 s       9.8-13.6                  



             TT)                                                 

 

             INR (test code = 1.1                                    



             INR)                                                

 

             INRH (test code =  **** SUGGESTED THERAPEUTIC                      

     



             INRH)        RANGE FOR INR: **** 2.5 -                           



                          3.5 ** For Patients with                           



                          Prosthetic Valves or                           



                          Patients with recurrent                           



                          Thromboembolic Events **                           



                          2.0 - 3.0 ** For Most Other                           



                          Applications **                           



PTT (PARTIAL THROMBOPLASTIN TIME)2020 13:58:00





             Test Item    Value        Reference Range Interpretation Comments

 

             PTT (test code = 26.0 s       20.2-38.0                 



             PTT)                                                

 

             PTTH (test code = **** To monitor the                           



             PTTH)        effectiveness of heparin,                           



                          we offer the Anti-Xa                           



                          (Heparin Assay). It can be                           



                          used for either                           



                          unfractionated or LMW                           



                          Heparin. Order Code is                           



                          ANTI-XA ****                           



[id number] 13:58:00





             Test Item    Value        Reference Range Interpretation Comments

 

             D-DIMER (test code = 546 ng/mL D-DU 0-234        H            



             DDI)                                                

 

             D-DIMER COMMENT (test *Level to rule out                           



             code = DDCOM) DVT or PE: <235 ng/mL                           



                          D-DU*                                  



CBC (INCLUDES AUTOMATED DIFFERENTIAL)2020 13:57:00





             Test Item    Value        Reference Range Interpretation Comments

 

             WBC (test code = WBC) 9.2 10\S\3/uL 4.5-11.0                  

 

             RBC (test code = RBC) 5.69 10\S\6/uL 4.20-5.60    H            

 

             HGB (test code = HBG) 15.8 g/dL    14.0-18.0                 

 

             HCT (test code = HCT) 48.2 %       35.0-46.0    H            

 

             MCV (test code = MCV) 84.7 fL      80.0-94.0                 

 

             MCH (test code = MCH) 27.8 pg      27.0-31.0                 

 

             MCHC (test code = MCHC) 32.8 g/dL    32.0-36.0                 

 

             RDW (test code = RDW) 14.3 %       11.5-14.5                 

 

             PLT (test code = PLT) 295 10\S\3/uL 130-400                   

 

             MPV (test code = MPV) 10.5 fL      9.4-12.4                  

 

             NEUTROP # (test code = NE#) 6.4 10\S\3/uL 2.0-8.0                  

 

 

             LYMPH # (test code = LY#) 1.7 10\S\3/uL 1.2-4.0                   

 

             MONOCYTE # (test code = MO#) 0.9 10\S\3/uL 0.0-1.1                 

  

 

             EOSINOPH # (test code = EO#) 0.2 10\S\3/uL 0.0-0.7                 

  

 

             BASOPHIL # (test code = BA#) 0.1 10\S\3/uL 0.0-0.3                 

  

 

             IG # (test code = IG#) 0.04 10\S\3/uL 0.00-0.06                 

 

             NRBC # (test code = NRBC#) 0.00 10\S\3/uL 0.00-0.01                

 

 

             NEUTROPH % (test code = NE%) 69.7 %       35.0-73.0                

 

 

             LYMPH % (test code = LY%) 18.0 %       20.0-55.0    L            

 

             MONO % (test code = MO%) 9.8 %        2.5-10.0                  

 

             EOSINOPH % (test code = EO%) 1.6 %        0.0-5.0                  

 

 

             BASOPHIL % (test code = BA%) 0.5 %        0.0-2.0                  

 

 

             IG % (test code = IG%) 0.4 %        0.0-0.8                   

 

             NRBC% (test code = NRBC%) 0.0 %        0.0-0.2                   

 

             MANDIFF (test code = MDIFF) NO           NO                        

 

             RBC MORPH (test code = RBCMOR)              NORMAL                 

   



URINE [id number] 10:22:00





             Test Item    Value        Reference Range Interpretation Comments

 

             Culture Observations THREE OR MORE SPECIES                         

  



             (test code = COB1) OF BACTERIA ISOLATED.                           



                          PROBABLE CONTAMINATION.                           

 

             Culture Observations IDENTIFICATION AND                           



             (test code = COB17) SUSCEPTIBILITY NOT                           



                          INDICATED.                             



                          ***RECOLLECTION                           



                          RECOMMENDED***                           



[id number] 11:43:00





             Test Item    Value        Reference Range Interpretation Comments

 

             COLOR (test code = COLU) YELLOW       YELLOW                    

 

             CLARITY (test code = CLA) CLEAR        CLEAR                     

 

             GLUCOSE UR (test code = UA GLUCOSE) NEGATIVE     NEGATIVE          

        

 

             BILI UR (test code = BILE) NEGATIVE     NEGATIVE                  

 

             KETONES UR (test code = ACE) NEGATIVE     NEGATIVE                 

 

 

             SP GRAVITY (test code = SPGR) 1.022        1.005-1.030             

  

 

             PH UR (test code = PH) 6.0          4.5-8.0                   

 

             PROTEIN UR (test code = PU) NEGATIVE     NEGATIVE                  

 

             UROBIL UR (test code = UROQ) 0.2 EU/dL    0.2-1.0                  

 

 

             NITRITE UR (test code = NITRITE) NEGATIVE     NEGATIVE             

     

 

             BLOOD UR (test code = UA BLOOD) NEGATIVE     NEGATIVE              

    

 

             LEUK ES UR (test code = LEUK) NEGATIVE     NEGATIVE                

  



T4 [id number] 07:03:00





             Test Item    Value        Reference Range Interpretation Comments

 

             T4 FREE (test code = A91) 1.12 ng/dL   0.76-1.46                 



THYROID PANEL/SCREEN (TSH)2020 05:36:00





             Test Item    Value        Reference Range Interpretation Comments

 

             TSH (test code = A57) 3.770 uIU/mL 0.358-3.740  H            



PROSTATIC SPECIFIC [id number] 05:06:00





             Test Item    Value        Reference Range Interpretation Comments

 

             PROST S AG (test code = A62) 0.44 ng/mL   0.00-4.00                

 



BASIC METABOLIC [id number] 04:32:00





             Test Item    Value        Reference Range Interpretation Comments

 

             GLUCOSE (test code = 126 mg/dL           H            



             06D)                                                

 

             SODIUM (test code = 135 mmol/L   136-145      L            



             01A)                                                

 

             POTASSIUM (test code = 3.7 mmol/L   3.6-5.1                   



             01B)                                                

 

             CHLORIDE (test code = 101 mmol/L                       



             04A)                                                

 

             CO2 (test code = 02A) 28 mmol/L    22-32                     

 

             ANION GAP (test code = 9.7 mmol/L                             



             ANG)                                                

 

             BUN (test code = 05D) 12 mg/dL     7-18                      

 

             CREATININE (test code 1.1 mg/dL    0.7-1.3                   



             = 03E)                                              

 

             GFR (test code = GFR) 76 mL/min/1.73m\S\2 >=90         L           

 

 

             GFR AFRICAN AMERICAN 88 mL/min/1.73m\S\2 >=90         L            



             (test code = GFRAA)                                        

 

             EGFR (test code = *** eGFR BY CKD-EPI                           



             EGFR)        CALCULATION IS NOT                           



                          RECOMMENDED FOR                           



                          PATIENTS UNDER 18 YEARS                           



                          OF AGE.***                             

 

             BUN/CREA (test code = 11           12-20        L            



             BCR)                                                

 

             CALCIUM (test code = 8.5 mg/dL    8.3-9.5                   



             09D)                                                



CBC (INCLUDES AUTOMATED DIFFERENTIAL)2020 04:28:00





             Test Item    Value        Reference Range Interpretation Comments

 

             WBC (test code = WBC) 5.4 10\S\3/uL 4.5-11.0                  

 

             RBC (test code = RBC) 5.03 10\S\6/uL 4.20-5.60                 

 

             HGB (test code = HBG) 14.0 g/dL    14.0-18.0                 

 

             HCT (test code = HCT) 43.0 %       35.0-46.0                 

 

             MCV (test code = MCV) 85.5 fL      80.0-94.0                 

 

             MCH (test code = MCH) 27.8 pg      27.0-31.0                 

 

             MCHC (test code = MCHC) 32.6 g/dL    32.0-36.0                 

 

             RDW (test code = RDW) 13.5 %       11.5-14.5                 

 

             PLT (test code = PLT) 267 10\S\3/uL 130-400                   

 

             MPV (test code = MPV) 10.6 fL      9.4-12.4                  

 

             NEUTROP # (test code = NE#) 3.3 10\S\3/uL 2.0-8.0                  

 

 

             LYMPH # (test code = LY#) 1.1 10\S\3/uL 1.2-4.0      L            

 

             MONOCYTE # (test code = MO#) 0.6 10\S\3/uL 0.0-1.1                 

  

 

             EOSINOPH # (test code = EO#) 0.3 10\S\3/uL 0.0-0.7                 

  

 

             BASOPHIL # (test code = BA#) 0.0 10\S\3/uL 0.0-0.3                 

  

 

             IG # (test code = IG#) 0.03 10\S\3/uL 0.00-0.06                 

 

             NRBC # (test code = NRBC#) 0.00 10\S\3/uL 0.00-0.01                

 

 

             NEUTROPH % (test code = NE%) 61.4 %       35.0-73.0                

 

 

             LYMPH % (test code = LY%) 20.3 %       20.0-55.0                 

 

             MONO % (test code = MO%) 11.0 %       2.5-10.0     H            

 

             EOSINOPH % (test code = EO%) 6.1 %        0.0-5.0      H           

 

 

             BASOPHIL % (test code = BA%) 0.6 %        0.0-2.0                  

 

 

             IG % (test code = IG%) 0.6 %        0.0-0.8                   

 

             NRBC% (test code = NRBC%) 0.0 %        0.0-0.2                   

 

             MANDIFF (test code = MDIFF) NO           NO                        

 

             RBC MORPH (test code = RBCMOR)              NORMAL                 

   



BASIC METABOLIC [id number] 18:44:00





             Test Item    Value        Reference Range Interpretation Comments

 

             GLUCOSE (test code = 146 mg/dL           H            



             06D)                                                

 

             SODIUM (test code = 134 mmol/L   136-145      L            



             01A)                                                

 

             POTASSIUM (test code = 3.8 mmol/L   3.6-5.1                   



             01B)                                                

 

             CHLORIDE (test code = 101 mmol/L                       



             04A)                                                

 

             CO2 (test code = 02A) 27 mmol/L    22-32                     

 

             ANION GAP (test code = 9.8 mmol/L                             



             ANG)                                                

 

             BUN (test code = 05D) 12 mg/dL     7-18                      

 

             CREATININE (test code 1.2 mg/dL    0.7-1.3                   



             = 03E)                                              

 

             GFR (test code = GFR) 69 mL/min/1.73m\S\2 >=90         L           

 

 

             GFR AFRICAN AMERICAN 80 mL/min/1.73m\S\2 >=90         L            



             (test code = GFRAA)                                        

 

             EGFR (test code = *** eGFR BY CKD-EPI                           



             EGFR)        CALCULATION IS NOT                           



                          RECOMMENDED FOR                           



                          PATIENTS UNDER 18 YEARS                           



                          OF AGE.***                             

 

             BUN/CREA (test code = 10           12-20        L            



             BCR)                                                

 

             CALCIUM (test code = 8.4 mg/dL    8.3-9.5                   



             09D)                                                



CBC (INCLUDES AUTOMATED DIFFERENTIAL)2020 18:36:00





             Test Item    Value        Reference Range Interpretation Comments

 

             WBC (test code = WBC) 5.9 10\S\3/uL 4.5-11.0                  

 

             RBC (test code = RBC) 5.18 10\S\6/uL 4.20-5.60                 

 

             HGB (test code = HBG) 14.6 g/dL    14.0-18.0                 

 

             HCT (test code = HCT) 44.0 %       35.0-46.0                 

 

             MCV (test code = MCV) 84.9 fL      80.0-94.0                 

 

             MCH (test code = MCH) 28.2 pg      27.0-31.0                 

 

             MCHC (test code = MCHC) 33.2 g/dL    32.0-36.0                 

 

             RDW (test code = RDW) 13.3 %       11.5-14.5                 

 

             PLT (test code = PLT) 283 10\S\3/uL 130-400                   

 

             MPV (test code = MPV) 10.7 fL      9.4-12.4                  

 

             NEUTROP # (test code = NE#) 3.8 10\S\3/uL 2.0-8.0                  

 

 

             LYMPH # (test code = LY#) 1.2 10\S\3/uL 1.2-4.0                   

 

             MONOCYTE # (test code = MO#) 0.6 10\S\3/uL 0.0-1.1                 

  

 

             EOSINOPH # (test code = EO#) 0.3 10\S\3/uL 0.0-0.7                 

  

 

             BASOPHIL # (test code = BA#) 0.0 10\S\3/uL 0.0-0.3                 

  

 

             IG # (test code = IG#) 0.03 10\S\3/uL 0.00-0.06                 

 

             NRBC # (test code = NRBC#) 0.00 10\S\3/uL 0.00-0.01                

 

 

             NEUTROPH % (test code = NE%) 64.4 %       35.0-73.0                

 

 

             LYMPH % (test code = LY%) 19.5 %       20.0-55.0    L            

 

             MONO % (test code = MO%) 9.8 %        2.5-10.0                  

 

             EOSINOPH % (test code = EO%) 5.3 %        0.0-5.0      H           

 

 

             BASOPHIL % (test code = BA%) 0.5 %        0.0-2.0                  

 

 

             IG % (test code = IG%) 0.5 %        0.0-0.8                   

 

             NRBC% (test code = NRBC%) 0.0 %        0.0-0.2                   

 

             MANDIFF (test code = MDIFF) NO           NO                        

 

             RBC MORPH (test code = RBCMOR)              NORMAL                 

   



CARDIAC [id number] 05:40:00





             Test Item    Value        Reference Range Interpretation Comments

 

             TROPONIN I (test code = A84) <0.015 ng/mL 0.000-0.045              

 



CBC (INCLUDES AUTOMATED DIFFERENTIAL)2020 05:26:00





             Test Item    Value        Reference Range Interpretation Comments

 

             WBC (test code = WBC) 4.8 10\S\3/uL 4.5-11.0                  

 

             RBC (test code = RBC) 4.80 10\S\6/uL 4.20-5.60                 

 

             HGB (test code = HBG) 13.7 g/dL    14.0-18.0    L            

 

             HCT (test code = HCT) 40.8 %       35.0-46.0                 

 

             MCV (test code = MCV) 85.0 fL      80.0-94.0                 

 

             MCH (test code = MCH) 28.5 pg      27.0-31.0                 

 

             MCHC (test code = MCHC) 33.6 g/dL    32.0-36.0                 

 

             RDW (test code = RDW) 13.3 %       11.5-14.5                 

 

             PLT (test code = PLT) 254 10\S\3/uL 130-400                   

 

             MPV (test code = MPV) 10.2 fL      9.4-12.4                  

 

             NEUTROP # (test code = NE#) 3.0 10\S\3/uL 2.0-8.0                  

 

 

             LYMPH # (test code = LY#) 1.0 10\S\3/uL 1.2-4.0      L            

 

             MONOCYTE # (test code = MO#) 0.5 10\S\3/uL 0.0-1.1                 

  

 

             EOSINOPH # (test code = EO#) 0.3 10\S\3/uL 0.0-0.7                 

  

 

             BASOPHIL # (test code = BA#) 0.0 10\S\3/uL 0.0-0.3                 

  

 

             IG # (test code = IG#) 0.02 10\S\3/uL 0.00-0.06                 

 

             NRBC # (test code = NRBC#) 0.00 10\S\3/uL 0.00-0.01                

 

 

             NEUTROPH % (test code = NE%) 62.8 %       35.0-73.0                

 

 

             LYMPH % (test code = LY%) 20.2 %       20.0-55.0                 

 

             MONO % (test code = MO%) 10.2 %       2.5-10.0     H            

 

             EOSINOPH % (test code = EO%) 5.8 %        0.0-5.0      H           

 

 

             BASOPHIL % (test code = BA%) 0.6 %        0.0-2.0                  

 

 

             IG % (test code = IG%) 0.4 %        0.0-0.8                   

 

             NRBC% (test code = NRBC%) 0.0 %        0.0-0.2                   

 

             MANDIFF (test code = MDIFF) NO           NO                        

 

             RBC MORPH (test code = RBCMOR)              NORMAL                 

   



BASIC METABOLIC [id number] 05:26:00





             Test Item    Value        Reference Range Interpretation Comments

 

             GLUCOSE (test code = 118 mg/dL           H            



             06D)                                                

 

             SODIUM (test code = 137 mmol/L   136-145                   



             01A)                                                

 

             POTASSIUM (test code = 3.7 mmol/L   3.6-5.1                   



             01B)                                                

 

             CHLORIDE (test code = 105 mmol/L                       



             04A)                                                

 

             CO2 (test code = 02A) 26 mmol/L    22-32                     

 

             ANION GAP (test code = 9.7 mmol/L                             



             ANG)                                                

 

             BUN (test code = 05D) 12 mg/dL     7-18                      

 

             CREATININE (test code 0.9 mg/dL    0.7-1.3                   



             = 03E)                                              

 

             GFR (test code = GFR) 94 mL/min/1.73m\S\2 >=90                     

 

 

             GFR AFRICAN AMERICAN 109 mL/min/1.73m\S\2 >=90                     

 



             (test code = GFRAA)                                        

 

             EGFR (test code = *** eGFR BY CKD-EPI                           



             EGFR)        CALCULATION IS NOT                           



                          RECOMMENDED FOR                           



                          PATIENTS UNDER 18 YEARS                           



                          OF AGE.***                             

 

             BUN/CREA (test code = 14           12-20                     



             BCR)                                                

 

             CALCIUM (test code = 8.3 mg/dL    8.3-9.5                   



             09D)                                                



CARDIAC [id number] 21:49:00





             Test Item    Value        Reference Range Interpretation Comments

 

             TROPONIN I (test code = A84) <0.015 ng/mL 0.000-0.045              

 



[id number] 13:57:00





             Test Item    Value        Reference Range Interpretation Comments

 

             COLOR (test code = COLU) YELLOW       YELLOW                    

 

             CLARITY (test code = CLA) CLEAR        CLEAR                     

 

             GLUCOSE UR (test code = UA GLUCOSE) NEGATIVE     NEGATIVE          

        

 

             BILI UR (test code = BILE) NEGATIVE     NEGATIVE                  

 

             KETONES UR (test code = ACE) TRACE        NEGATIVE     A           

 

 

             SP GRAVITY (test code = SPGR) 1.022        1.005-1.030             

  

 

             PH UR (test code = PH) 8.0          4.5-8.0                   

 

             PROTEIN UR (test code = PU) NEGATIVE     NEGATIVE                  

 

             UROBIL UR (test code = UROQ) 0.2 EU/dL    0.2-1.0                  

 

 

             NITRITE UR (test code = NITRITE) NEGATIVE     NEGATIVE             

     

 

             BLOOD UR (test code = UA BLOOD) NEGATIVE     NEGATIVE              

    

 

             LEUK ES UR (test code = LEUK) NEGATIVE     NEGATIVE                

  



[id number] 12:58:00





             Test Item    Value        Reference Range Interpretation Comments

 

             D-DIMER (test code = 682 ng/mL D-DU 0-234        H            



             DDI)                                                

 

             D-DIMER COMMENT (test *Level to rule out                           



             code = DDCOM) DVT or PE: <235 ng/mL                           



                          D-DU*                                  



COMPREHENSIVE METABOLIC [id number] 12:56:00





             Test Item    Value        Reference Range Interpretation Comments

 

             GLUCOSE (test code = 129 mg/dL           H            



             06D)                                                

 

             SODIUM (test code = 138 mmol/L   136-145                   



             01A)                                                

 

             POTASSIUM (test code = 4.1 mmol/L   3.6-5.1                   



             01B)                                                

 

             CHLORIDE (test code = 107 mmol/L                       



             04A)                                                

 

             CO2 (test code = 02A) 26 mmol/L    22-32                     

 

             ANION GAP (test code = 9.1 mmol/L                             



             ANG)                                                

 

             BUN (test code = 05D) 11 mg/dL     7-18                      

 

             CREATININE (test code 0.9 mg/dL    0.7-1.3                   



             = 03E)                                              

 

             GFR (test code = GFR) 89 mL/min/1.73m\S\2 >=90         L           

 

 

             GFR AFRICAN AMERICAN 104 mL/min/1.73m\S\2 >=90                     

 



             (test code = GFRAA)                                        

 

             EGFR (test code = *** eGFR BY CKD-EPI                           



             EGFR)        CALCULATION IS NOT                           



                          RECOMMENDED FOR                           



                          PATIENTS UNDER 18 YEARS                           



                          OF AGE.***                             

 

             BUN/CREA (test code = 12           12-20                     



             BCR)                                                

 

             CALCIUM (test code = 8.4 mg/dL    8.3-9.5                   



             09D)                                                

 

             BILI TOTAL (test code 0.7 mg/dL    0.2-1.0                   



             = 11A)                                              

 

             PROTEIN (test code = 6.3 g/dL     6.4-8.2      L            



             07D)                                                

 

             ALBUMIN (test code = 3.3 g/dL     3.5-4.8      L            



             08D)                                                

 

             GLOBULIN (test code = 3.0 g/dL     1.5-3.8                   



             GLB)                                                

 

             ALB/GLOB (test code = 1.1          1.0-2.6                   



             AGRR)                                               

 

             ALK PHOS (test code = 123 IU/L            H            



             35A)                                                

 

             AST (test code = 30A) 24 IU/L      <=42                      

 

             ALT (test code = 31A) 40 IU/L      <=78                      



BRAIN NATRIURETIC [id number] 12:55:00





             Test Item    Value        Reference Range Interpretation Comments

 

             proBNP (test code = PBNP) 2160 pg/mL   0-125        H            



CARDIAC [id number] 12:49:00





             Test Item    Value        Reference Range Interpretation Comments

 

             TROPONIN I (test code = A84) <0.015 ng/mL 0.000-0.045              

 



PRO TIME AND [id number] 12:48:00





             Test Item    Value        Reference Range Interpretation Comments

 

             PT (test code = 13.2 s       9.8-13.6                  



             TT)                                                 

 

             INR (test code = 1.1                                    



             INR)                                                

 

             INRH (test code =  **** SUGGESTED THERAPEUTIC                      

     



             INRH)        RANGE FOR INR: **** 2.5 -                           



                          3.5 ** For Patients with                           



                          Prosthetic Valves or                           



                          Patients with recurrent                           



                          Thromboembolic Events **                           



                          2.0 - 3.0 ** For Most Other                           



                          Applications **                           

 

             PTT (test code = 19.5 s       20.2-38.0    L            



             PTT)                                                

 

             PTTH (test code = **** To monitor the                           



             PTTH)        effectiveness of heparin,                           



                          we offer the Anti-Xa                           



                          (Heparin Assay). It can be                           



                          used for either                           



                          unfractionated or LMW                           



                          Heparin. Order Code is                           



                          ANTI-XA ****                           



CBC (INCLUDES AUTOMATED DIFFERENTIAL)2020 12:39:00





             Test Item    Value        Reference Range Interpretation Comments

 

             WBC (test code = WBC) 5.5 10\S\3/uL 4.5-11.0                  

 

             RBC (test code = RBC) 4.92 10\S\6/uL 4.20-5.60                 

 

             HGB (test code = HBG) 13.8 g/dL    14.0-18.0    L            

 

             HCT (test code = HCT) 42.1 %       35.0-46.0                 

 

             MCV (test code = MCV) 85.6 fL      80.0-94.0                 

 

             MCH (test code = MCH) 28.0 pg      27.0-31.0                 

 

             MCHC (test code = MCHC) 32.8 g/dL    32.0-36.0                 

 

             RDW (test code = RDW) 13.2 %       11.5-14.5                 

 

             PLT (test code = PLT) 257 10\S\3/uL 130-400                   

 

             MPV (test code = MPV) 10.4 fL      9.4-12.4                  

 

             NEUTROP # (test code = NE#) 3.9 10\S\3/uL 2.0-8.0                  

 

 

             LYMPH # (test code = LY#) 1.0 10\S\3/uL 1.2-4.0      L            

 

             MONOCYTE # (test code = MO#) 0.4 10\S\3/uL 0.0-1.1                 

  

 

             EOSINOPH # (test code = EO#) 0.1 10\S\3/uL 0.0-0.7                 

  

 

             BASOPHIL # (test code = BA#) 0.0 10\S\3/uL 0.0-0.3                 

  

 

             IG # (test code = IG#) 0.03 10\S\3/uL 0.00-0.06                 

 

             NRBC # (test code = NRBC#) 0.00 10\S\3/uL 0.00-0.01                

 

 

             NEUTROPH % (test code = NE%) 70.7 %       35.0-73.0                

 

 

             LYMPH % (test code = LY%) 17.9 %       20.0-55.0    L            

 

             MONO % (test code = MO%) 7.9 %        2.5-10.0                  

 

             EOSINOPH % (test code = EO%) 2.5 %        0.0-5.0                  

 

 

             BASOPHIL % (test code = BA%) 0.5 %        0.0-2.0                  

 

 

             IG % (test code = IG%) 0.5 %        0.0-0.8                   

 

             NRBC% (test code = NRBC%) 0.0 %        0.0-0.2                   

 

             MANDIFF (test code = MDIFF) NO           NO                        

 

             RBC MORPH (test code = RBCMOR)              NORMAL                 

   



TROPONIN [id number] 16:51:00





             Test Item    Value        Reference Range Interpretation Comments

 

             TROPONIN I (test code = A84) <0.015 ng/mL 0.000-0.045              

 



COMPREHENSIVE METABOLIC [id number] 16:51:00





             Test Item    Value        Reference Range Interpretation Comments

 

             GLUCOSE (test code = 90 mg/dL                         



             06D)                                                

 

             SODIUM (test code = 138 mmol/L   136-145                   



             01A)                                                

 

             POTASSIUM (test code = 4.4 mmol/L   3.6-5.1                   



             01B)                                                

 

             CHLORIDE (test code = 107 mmol/L                       



             04A)                                                

 

             CO2 (test code = 02A) 26 mmol/L    22-32                     

 

             ANION GAP (test code = 9.4 mmol/L                             



             ANG)                                                

 

             BUN (test code = 05D) 19 mg/dL     7-18         H            

 

             CREATININE (test code 0.8 mg/dL    0.7-1.3                   



             = 03E)                                              

 

             GFR (test code = GFR) 97 mL/min/1.73m\S\2 >=90                     

 

 

             GFR AFRICAN AMERICAN 112 mL/min/1.73m\S\2 >=90                     

 



             (test code = GFRAA)                                        

 

             EGFR (test code = *** eGFR BY CKD-EPI                           



             EGFR)        CALCULATION IS NOT                           



                          RECOMMENDED FOR                           



                          PATIENTS UNDER 18 YEARS                           



                          OF AGE.***                             

 

             BUN/CREA (test code = 23           12-20        H            



             BCR)                                                

 

             CALCIUM (test code = 8.6 mg/dL    8.3-9.5                   



             09D)                                                

 

             BILI TOTAL (test code 0.6 mg/dL    0.2-1.0                   



             = 11A)                                              

 

             PROTEIN (test code = 6.8 g/dL     6.4-8.2                   



             07D)                                                

 

             ALBUMIN (test code = 3.5 g/dL     3.5-4.8                   



             08D)                                                

 

             GLOBULIN (test code = 3.3 g/dL     1.5-3.8                   



             GLB)                                                

 

             ALB/GLOB (test code = 1.1          1.0-2.6                   



             AGRR)                                               

 

             ALK PHOS (test code = 153 IU/L            H            



             35A)                                                

 

             AST (test code = 30A) 60 IU/L      <=42         H            

 

             ALT (test code = 31A) 88 IU/L      <=78         H            



CBC (INCLUDES AUTOMATED DIFFERENTIAL)2020 16:38:00





             Test Item    Value        Reference Range Interpretation Comments

 

             WBC (test code = WBC) 6.1 10\S\3/uL 4.5-11.0                  

 

             RBC (test code = RBC) 5.38 10\S\6/uL 4.20-5.60                 

 

             HGB (test code = HBG) 15.3 g/dL    14.0-18.0                 

 

             HCT (test code = HCT) 46.4 %       35.0-46.0    H            

 

             MCV (test code = MCV) 86.2 fL      80.0-94.0                 

 

             MCH (test code = MCH) 28.4 pg      27.0-31.0                 

 

             MCHC (test code = MCHC) 33.0 g/dL    32.0-36.0                 

 

             RDW (test code = RDW) 13.3 %       11.5-14.5                 

 

             PLT (test code = PLT) 212 10\S\3/uL 130-400                   

 

             MPV (test code = MPV) 11.9 fL      9.4-12.4                  

 

             NEUTROP # (test code = NE#) 4.1 10\S\3/uL 2.0-8.0                  

 

 

             LYMPH # (test code = LY#) 1.4 10\S\3/uL 1.2-4.0                   

 

             MONOCYTE # (test code = MO#) 0.5 10\S\3/uL 0.0-1.1                 

  

 

             EOSINOPH # (test code = EO#) 0.1 10\S\3/uL 0.0-0.7                 

  

 

             BASOPHIL # (test code = BA#) 0.0 10\S\3/uL 0.0-0.3                 

  

 

             IG # (test code = IG#) 0.03 10\S\3/uL 0.00-0.06                 

 

             NRBC # (test code = NRBC#) 0.00 10\S\3/uL 0.00-0.01                

 

 

             NEUTROPH % (test code = NE%) 66.4 %       35.0-73.0                

 

 

             LYMPH % (test code = LY%) 22.6 %       20.0-55.0                 

 

             MONO % (test code = MO%) 7.7 %        2.5-10.0                  

 

             EOSINOPH % (test code = EO%) 2.1 %        0.0-5.0                  

 

 

             BASOPHIL % (test code = BA%) 0.7 %        0.0-2.0                  

 

 

             IG % (test code = IG%) 0.5 %        0.0-0.8                   

 

             NRBC% (test code = NRBC%) 0.0 %        0.0-0.2                   

 

             MANDIFF (test code = MDIFF) NO           NO                        

 

             RBC MORPH (test code = RBCMOR)              NORMAL                 

   



TROPONIN -[id number] 07:21:00





             Test Item    Value        Reference Range Interpretation Comments

 

             TROPONIN I (test code = A84) <0.015 ng/mL 0.000-0.045              

 



BASIC METABOLIC [id number] 07:18:00





             Test Item    Value        Reference Range Interpretation Comments

 

             GLUCOSE (test code = 108 mg/dL           H            



             06D)                                                

 

             SODIUM (test code = 137 mmol/L   136-145                   



             01A)                                                

 

             POTASSIUM (test code = 3.8 mmol/L   3.6-5.1                   



             01B)                                                

 

             CHLORIDE (test code = 105 mmol/L                       



             04A)                                                

 

             CO2 (test code = 02A) 28 mmol/L    22-32                     

 

             ANION GAP (test code = 7.8 mmol/L                             



             ANG)                                                

 

             BUN (test code = 05D) 9 mg/dL      7-18                      

 

             CREATININE (test code 0.8 mg/dL    0.7-1.3                   



             = 03E)                                              

 

             GFR (test code = GFR) 96 mL/min/1.73m\S\2 >=90                     

 

 

             GFR AFRICAN AMERICAN 111 mL/min/1.73m\S\2 >=90                     

 



             (test code = GFRAA)                                        

 

             EGFR (test code = *** eGFR BY CKD-EPI                           



             EGFR)        CALCULATION IS NOT                           



                          RECOMMENDED FOR                           



                          PATIENTS UNDER 18 YEARS                           



                          OF AGE.***                             

 

             BUN/CREA (test code = 11           12-20        L            



             BCR)                                                

 

             CALCIUM (test code = 8.6 mg/dL    8.3-9.5                   



             09D)                                                



CBC (INCLUDES AUTOMATED DIFFERENTIAL)2020 07:12:00





             Test Item    Value        Reference Range Interpretation Comments

 

             WBC (test code = WBC) 5.2 10\S\3/uL 4.5-11.0                  

 

             RBC (test code = RBC) 5.41 10\S\6/uL 4.20-5.60                 

 

             HGB (test code = HBG) 15.4 g/dL    14.0-18.0                 

 

             HCT (test code = HCT) 45.6 %       35.0-46.0                 

 

             MCV (test code = MCV) 84.3 fL      80.0-94.0                 

 

             MCH (test code = MCH) 28.5 pg      27.0-31.0                 

 

             MCHC (test code = MCHC) 33.8 g/dL    32.0-36.0                 

 

             RDW (test code = RDW) 13.2 %       11.5-14.5                 

 

             PLT (test code = PLT) 280 10\S\3/uL 130-400                   

 

             MPV (test code = MPV) 10.5 fL      9.4-12.4                  

 

             NEUTROP # (test code = NE#) 3.3 10\S\3/uL 2.0-8.0                  

 

 

             LYMPH # (test code = LY#) 1.1 10\S\3/uL 1.2-4.0      L            

 

             MONOCYTE # (test code = MO#) 0.5 10\S\3/uL 0.0-1.1                 

  

 

             EOSINOPH # (test code = EO#) 0.2 10\S\3/uL 0.0-0.7                 

  

 

             BASOPHIL # (test code = BA#) 0.0 10\S\3/uL 0.0-0.3                 

  

 

             IG # (test code = IG#) 0.03 10\S\3/uL 0.00-0.06                 

 

             NRBC # (test code = NRBC#) 0.00 10\S\3/uL 0.00-0.01                

 

 

             NEUTROPH % (test code = NE%) 63.8 %       35.0-73.0                

 

 

             LYMPH % (test code = LY%) 20.4 %       20.0-55.0                 

 

             MONO % (test code = MO%) 10.2 %       2.5-10.0     H            

 

             EOSINOPH % (test code = EO%) 4.4 %        0.0-5.0                  

 

 

             BASOPHIL % (test code = BA%) 0.6 %        0.0-2.0                  

 

 

             IG % (test code = IG%) 0.6 %        0.0-0.8                   

 

             NRBC% (test code = NRBC%) 0.0 %        0.0-0.2                   

 

             MANDIFF (test code = MDIFF) NO           NO                        

 

             RBC MORPH (test code = RBCMOR)              NORMAL                 

   



URINALYSIS WITH [id number] 15:19:00





             Test Item    Value        Reference Range Interpretation Comments

 

             COLOR (test code = COLU) ORANGE       YELLOW       A            

 

             CLARITY (test code = CLA) CLOUDY       CLEAR        A            

 

             GLUCOSE UR (test code = UA NEGATIVE     NEGATIVE                  



             GLUCOSE)                                            

 

             BILI UR (test code = BILE) 1+           NEGATIVE     A            

 

             KETONES UR (test code = ACE) TRACE        NEGATIVE     A           

 

 

             SP GRAVITY (test code = SPGR) 1.042        1.005-1.030  H          

  

 

             PH UR (test code = PH) 5.0          4.5-8.0                   

 

             PROTEIN UR (test code = PU) 2+           NEGATIVE     A            

 

             UROBIL UR (test code = UROQ) 1.0 EU/dL    0.2-1.0                  

 

 

             NITRITE UR (test code = NEGATIVE     NEGATIVE                  



             NITRITE)                                            

 

             BLOOD UR (test code = UA NEGATIVE     NEGATIVE                  



             BLOOD)                                              

 

             LEUK ES UR (test code = LEUK) TRACE        NEGATIVE     A          

  

 

             WBC UR (test code = UWBC) 4 /HPF       0-3          H            

 

             RBC UR (test code = URBC) 0 /HPF       0-2                       

 

             EPITH UR (test code = UEPC) FEW /LPF     NONE         A            

 

             BACTERIA UR (test code = NONE /HPF    NONE                      



             UBACT)                                              

 

             CAST UR (test code = CAST)  /LPF        NONE                      

 

             CRYSTAL UR (test code = CRYU)  / LPF       NONE                    

  

 

             MUCUS UR (test code = MUC)  / HPF       NONE                      

 

             AMORPH UR (test code = UMU) MODERATE / HPF NONE         A         

   

 

             TRICH UR (test code = UTRICH)  /HPF        NONE                    

  

 

             YEAST UR (test code = UY)  /HPF        NONE                      

 

             SPERM UR (test code = USPERM)  /HPF        NONE                    

  



BRAIN NATRIURETIC [id number] 13:14:00





             Test Item    Value        Reference Range Interpretation Comments

 

             proBNP (test code = PBNP) 1008 pg/mL   0-125        H            



COMPREHENSIVE METABOLIC [id number] 13:14:00





             Test Item    Value        Reference Range Interpretation Comments

 

             GLUCOSE (test code = 101 mg/dL           H            



             06D)                                                

 

             SODIUM (test code = 141 mmol/L   136-145                   



             01A)                                                

 

             POTASSIUM (test code = 4.2 mmol/L   3.6-5.1                   



             01B)                                                

 

             CHLORIDE (test code = 110 mmol/L          H            



             04A)                                                

 

             CO2 (test code = 02A) 24 mmol/L    22-32                     

 

             ANION GAP (test code = 11.2 mmol/L                            



             ANG)                                                

 

             BUN (test code = 05D) 16 mg/dL     7-18                      

 

             CREATININE (test code 1.1 mg/dL    0.7-1.3                   



             = 03E)                                              

 

             GFR (test code = GFR) 73 mL/min/1.73m\S\2 >=90         L           

 

 

             GFR AFRICAN AMERICAN 85 mL/min/1.73m\S\2 >=90         L            



             (test code = GFRAA)                                        

 

             EGFR (test code = *** eGFR BY CKD-EPI                           



             EGFR)        CALCULATION IS NOT                           



                          RECOMMENDED FOR                           



                          PATIENTS UNDER 18 YEARS                           



                          OF AGE.***                             

 

             BUN/CREA (test code = 15           12-20                     



             BCR)                                                

 

             CALCIUM (test code = 8.7 mg/dL    8.3-9.5                   



             09D)                                                

 

             BILI TOTAL (test code 0.6 mg/dL    0.2-1.0                   



             = 11A)                                              

 

             PROTEIN (test code = 6.8 g/dL     6.4-8.2                   



             07D)                                                

 

             ALBUMIN (test code = 3.7 g/dL     3.5-4.8                   



             08D)                                                

 

             GLOBULIN (test code = 3.1 g/dL     1.5-3.8                   



             GLB)                                                

 

             ALB/GLOB (test code = 1.2          1.0-2.6                   



             AGRR)                                               

 

             ALK PHOS (test code = 110 IU/L                         



             35A)                                                

 

             AST (test code = 30A) 27 IU/L      <=42                      

 

             ALT (test code = 31A) 46 IU/L      <=78                      



PRO TIME AND [id number] 13:10:00





             Test Item    Value        Reference Range Interpretation Comments

 

             PT (test code = 13.1 s       9.8-13.6                  



             TT)                                                 

 

             INR (test code = 1.1                                    



             INR)                                                

 

             INRH (test code =  **** SUGGESTED THERAPEUTIC                      

     



             INRH)        RANGE FOR INR: **** 2.5 -                           



                          3.5 ** For Patients with                           



                          Prosthetic Valves or                           



                          Patients with recurrent                           



                          Thromboembolic Events **                           



                          2.0 - 3.0 ** For Most Other                           



                          Applications **                           

 

             PTT (test code = 28.2 s       20.2-38.0                 



             PTT)                                                

 

             PTTH (test code = **** To monitor the                           



             PTTH)        effectiveness of heparin,                           



                          we offer the Anti-Xa                           



                          (Heparin Assay). It can be                           



                          used for either                           



                          unfractionated or LMW                           



                          Heparin. Order Code is                           



                          ANTI-XA ****                           



TROPONIN -[id number] 13:08:00





             Test Item    Value        Reference Range Interpretation Comments

 

             TROPONIN I (test code = A84) <0.015 ng/mL 0.000-0.045              

 



CBC (INCLUDES AUTOMATED DIFFERENTIAL)2020 12:50:00





             Test Item    Value        Reference Range Interpretation Comments

 

             WBC (test code = WBC) 8.0 10\S\3/uL 4.5-11.0                  

 

             RBC (test code = RBC) 5.61 10\S\6/uL 4.20-5.60    H            

 

             HGB (test code = HBG) 16.1 g/dL    14.0-18.0                 

 

             HCT (test code = HCT) 48.4 %       35.0-46.0    H            

 

             MCV (test code = MCV) 86.3 fL      80.0-94.0                 

 

             MCH (test code = MCH) 28.7 pg      27.0-31.0                 

 

             MCHC (test code = MCHC) 33.3 g/dL    32.0-36.0                 

 

             RDW (test code = RDW) 13.8 %       11.5-14.5                 

 

             PLT (test code = PLT) 276 10\S\3/uL 130-400                   

 

             MPV (test code = MPV) 10.7 fL      9.4-12.4                  

 

             NEUTROP # (test code = NE#) 6.1 10\S\3/uL 2.0-8.0                  

 

 

             LYMPH # (test code = LY#) 1.0 10\S\3/uL 1.2-4.0      L            

 

             MONOCYTE # (test code = MO#) 0.7 10\S\3/uL 0.0-1.1                 

  

 

             EOSINOPH # (test code = EO#) 0.1 10\S\3/uL 0.0-0.7                 

  

 

             BASOPHIL # (test code = BA#) 0.0 10\S\3/uL 0.0-0.3                 

  

 

             IG # (test code = IG#) 0.04 10\S\3/uL 0.00-0.06                 

 

             NRBC # (test code = NRBC#) 0.00 10\S\3/uL 0.00-0.01                

 

 

             NEUTROPH % (test code = NE%) 76.4 %       35.0-73.0    H           

 

 

             LYMPH % (test code = LY%) 12.8 %       20.0-55.0    L            

 

             MONO % (test code = MO%) 8.5 %        2.5-10.0                  

 

             EOSINOPH % (test code = EO%) 1.3 %        0.0-5.0                  

 

 

             BASOPHIL % (test code = BA%) 0.5 %        0.0-2.0                  

 

 

             IG % (test code = IG%) 0.5 %        0.0-0.8                   

 

             NRBC% (test code = NRBC%) 0.0 %        0.0-0.2                   

 

             MANDIFF (test code = MDIFF) NO           NO                        

 

             RBC MORPH (test code = RBCMOR)              NORMAL                 

   



[id number] 16:42:00





             Test Item    Value        Reference Range Interpretation Comments

 

             COLOR (test code = COLU) YELLOW       YELLOW                    

 

             CLARITY (test code = CLA) CLEAR        CLEAR                     

 

             GLUCOSE UR (test code = UA GLUCOSE) NEGATIVE     NEGATIVE          

        

 

             BILI UR (test code = BILE) NEGATIVE     NEGATIVE                  

 

             KETONES UR (test code = ACE) NEGATIVE     NEGATIVE                 

 

 

             SP GRAVITY (test code = SPGR) 1.022        1.005-1.030             

  

 

             PH UR (test code = PH) 6.0          4.5-8.0                   

 

             PROTEIN UR (test code = PU) NEGATIVE     NEGATIVE                  

 

             UROBIL UR (test code = UROQ) 0.2 EU/dL    0.2-1.0                  

 

 

             NITRITE UR (test code = NITRITE) NEGATIVE     NEGATIVE             

     

 

             BLOOD UR (test code = UA BLOOD) NEGATIVE     NEGATIVE              

    

 

             LEUK ES UR (test code = LEUK) NEGATIVE     NEGATIVE                

  



XR SPINE LUMBAR [id number] 16:30:58LOCATION: [id number]: XR SPINE LUMBAR 
COMPLETEHISTORY: [id number]: Low back pain TECHNIQUE: Frontal, lateral, bilateral
 oblique and [place] views of thelumbar spine.COMPARISON: None.FINDINGS: Stable
 greater than 50% compression deformity of L1. Stable mild superiorendplate 
deformity at T11 and T12. Vertebral body alignment and height areotherwise 
maintained. A stable rounded 10 mm sclerotic lesion is noted withinthe L3 
vertebral body. The bone marrow mineralization is otherwise 
homogeneous.Prominent facet sclerosis is noted at L5-S1, unchanged. Essential 
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 
noted.IMPRESSION:Stable radiographic appearance of the lumbar spine.URINALYSIS
2019-10-13 23:36:00





             Test Item    Value        Reference Range Interpretation Comments

 

             COLOR (test code = COLU) YELLOW       YELLOW                    

 

             CLARITY (test code = CLA) CLEAR        CLEAR                     

 

             GLUCOSE UR (test code = UA GLUCOSE) NEGATIVE     NEGATIVE          

        

 

             BILI UR (test code = BILE) NEGATIVE     NEGATIVE                  

 

             KETONES UR (test code = ACE) NEGATIVE     NEGATIVE                 

 

 

             SP GRAVITY (test code = SPGR) 1.031        1.005-1.030  H          

  

 

             PH UR (test code = PH) 6.5          4.5-8.0                   

 

             PROTEIN UR (test code = PU) NEGATIVE     NEGATIVE                  

 

             UROBIL UR (test code = UROQ) 0.2 EU/dL    0.2-1.0                  

 

 

             NITRITE UR (test code = NITRITE) NEGATIVE     NEGATIVE             

     

 

             BLOOD UR (test code = UA BLOOD) NEGATIVE     NEGATIVE              

    

 

             LEUK ES UR (test code = LEUK) NEGATIVE     NEGATIVE                

  



BRAIN NATRIURETIC PROTEIN2019-10-13 22:04:00





             Test Item    Value        Reference Range Interpretation Comments

 

             proBNP (test code = PBNP) 766 pg/mL    0-125        H            



CT ABDOMEN AND PELVIS W/O CONTRAST2019-10-13 22:03:01Exam: CT abdomen and pelvis
 without contrast.Location: H 12History: [id number]: Low back painComparison: 
2018.Technique: Unenhanced spiral slices were taken from the domes of the 
diaphragm,throughthe pubic symphysis. Coronal reformations were performed. One 
or moreof the following radiation dosereduction techniques was used: 
automatedexposure control, adjustment of mA and/or KV according to patient size,
 and/orutilization of iterative reconstruction technique.Findings:The liver is 
diffusely of decreased attenuation consistent fatty infiltration.No mass is 
seen. The intra-and extrahepatic biliary tree is normal. Thegallbladder is 
unremarkable. No pericholecystic fluid or wall thickening ispresent.The pancreas
 is normal. The pancreatic duct is normal in caliber. The spleenand adrenal 
glands are normal in size and shape.The right kidney is slightly atrophic. The 
kidneys are otherwise unremarkable.No nephrolithiasis, perinephric fluid 
collections or hydronephrosis is seen.The large and smallintestine are normal in
 caliber. The appendix is normal. No inflammatory change is seen.No lymphadeno
bel or free fluid is found in the abdomen or the pelvis. Asmall, fatty 
umbilical hernia is noted.The pelvic structures are 
unremarkable.Atherosclerosis, spondylosis and osteoarthritis are noted The lung 
bases areclear.No incidental abdominal findings are seen.Impression:1. No acute 
abdominal findings.2. Fatty liver.3. Small, fatty umbilical hernia.4. Otherwise 
stable exam.TROPONIN -10-13 22:02:00





             Test Item    Value        Reference Range Interpretation Comments

 

             TROPONIN I (test code = A84) <0.015 ng/mL 0.000-0.045              

 



COMPREHENSIVE METABOLIC PAN2019-10-13 22:01:00





             Test Item    Value        Reference Range Interpretation Comments

 

             GLUCOSE (test code = 06D) 121 mg/dL           H            

 

             SODIUM (test code = 01A) 141 mmol/L   136-145                   

 

             POTASSIUM (test code = 01B) 4.3 mmol/L   3.6-5.1                   

 

             CHLORIDE (test code = 04A) 108 mmol/L          H            

 

             CO2 (test code = 02A) 30 mmol/L    22-32                     

 

             ANION GAP (test code = ANG) 7.3 mmol/L                             

 

             BUN (test code = 05D) 13 mg/dL     7-18                      

 

             CREATININE (test code = 03E) 1.3 mg/dL    0.7-1.3                  

 

 

             BUN/CREA (test code = BCR) 10           12-20        L            

 

             CALCIUM (test code = 09D) 8.5 mg/dL    8.3-9.5                   

 

             BILI TOTAL (test code = 11A) 0.3 mg/dL    0.2-1.0                  

 

 

             PROTEIN (test code = 07D) 6.7 g/dL     6.4-8.2                   

 

             ALBUMIN (test code = 08D) 3.4 g/dL     3.5-4.8      L            

 

             GLOBULIN (test code = GLB) 3.3 g/dL     1.5-3.8                   

 

             ALB/GLOB (test code = AGRR) 1.0          1.0-2.6                   

 

             ALK PHOS (test code = 35A) 116 IU/L                         

 

             AST (test code = 30A) 28 IU/L      <=42                      

 

             ALT (test code = 31A) 44 IU/L      <=78                      



XR CHEST 2 VIEW2019-10-13 21:59:50LOCATION: H43 EXAM: XR CHEST 2 VIEWHISTORY: 
[id number]: Chest pain TECHNIQUE: Frontal and lateral views of the 
chest.COMPARISON: None.FINDINGS:The lungs are well inflated. Mild pulmonary 
vascular congestive changes arepresent. There is no focal consolidation.No 
evidence of pneumothorax or pleural effusion. Stable cardiomegaly. Chronic 
compression deformity lower thoracic spine. IMPRESSION:Mild pulmonary vascular 
congestion.MAGNESIUM2019-10-13 21:56:00





             Test Item    Value        Reference Range Interpretation Comments

 

             MAGNESIUM (test code = 48A) 2.1 mg/dL    1.8-2.4                   



PRO TIME AND [id number] 21:53:00





             Test Item    Value        Reference Range Interpretation Comments

 

             PT (test code = 10.7 s       9.8-13.6                  



             TT)                                                 

 

             INR (test code = 0.9                                    



             INR)                                                

 

             INRH (test code =  **** SUGGESTED THERAPEUTIC                      

     



             INRH)        RANGE FOR INR: **** 2.5 -                           



                          3.5 ** For Patients with                           



                          Prosthetic Valves or                           



                          Patients with recurrent                           



                          Thromboembolic Events **                           



                          2.0 - 3.0 ** For Most Other                           



                          Applications **                           

 

             PTT (test code = 29.0 s       20.2-38.0                 



             PTT)                                                

 

             PTTH (test code = **** To monitor the                           



             PTTH)        effectiveness of heparin,                           



                          we offer the Anti-Xa                           



                          (Heparin Assay). It can be                           



                          used for either                           



                          unfractionated or LMW                           



                          Heparin. Order Code is                           



                          ANTI-XA ****                           



CBC (INCLUDES AUTOMATED DIFFERENTIAL)2019-10-13 21:46:00





             Test Item    Value        Reference Range Interpretation Comments

 

             WBC (test code = WBC) 5.5 10\S\3/uL 4.5-11.0                  

 

             RBC (test code = RBC) 4.92 10\S\6/uL 4.20-5.60                 

 

             HGB (test code = HBG) 13.7 g/dL    14.0-18.0    L            

 

             HCT (test code = HCT) 42.2 %       35.0-46.0                 

 

             MCV (test code = MCV) 85.8 fL      80.0-94.0                 

 

             MCH (test code = MCH) 27.8 pg      27.0-31.0                 

 

             MCHC (test code = MCHC) 32.5 g/dL    32.0-36.0                 

 

             RDW (test code = RDW) 13.3 %       11.5-14.5                 

 

             PLT (test code = PLT) 241 10\S\3/uL 130-400                   

 

             MPV (test code = MPV) 10.0 fL      9.4-12.4                  

 

             NEUTROP # (test code = NE#) 3.6 10\S\3/uL 2.0-8.0                  

 

 

             LYMPH # (test code = LY#) 1.2 10\S\3/uL 1.2-4.0                   

 

             MONOCYTE # (test code = MO#) 0.5 10\S\3/uL 0.0-1.1                 

  

 

             EOSINOPH # (test code = EO#) 0.1 10\S\3/uL 0.0-0.7                 

  

 

             BASOPHIL # (test code = BA#) 0.0 10\S\3/uL 0.0-0.3                 

  

 

             IG # (test code = IG#) 0.03 10\S\3/uL 0.00-0.06                 

 

             NRBC # (test code = NRBC#) 0.00 10\S\3/uL 0.00-0.01                

 

 

             NEUTROPH % (test code = NE%) 65.6 %       35.0-73.0                

 

 

             LYMPH % (test code = LY%) 21.5 %       20.0-55.0                 

 

             MONO % (test code = MO%) 9.3 %        2.5-10.0                  

 

             EOSINOPH % (test code = EO%) 2.6 %        0.0-5.0                  

 

 

             BASOPHIL % (test code = BA%) 0.5 %        0.0-2.0                  

 

 

             IG % (test code = IG%) 0.5 %        0.0-0.8                   

 

             NRBC% (test code = NRBC%) 0.0 %        0.0-0.2                   

 

             MANDIFF (test code = MDIFF) NO           NO                        

 

             RBC MORPH (test code = RBCMOR)              NORMAL                 

   



BASIC METABOLIC PANEL *WW*2019 06:09:00





             Test Item    Value        Reference Range Interpretation Comments

 

             GLUCOSE (test code = 06D) 101 mg/dL           H            

 

             SODIUM (test code = 01A) 135 mmol/L   136-145      L            

 

             POTASSIUM (test code = 01B) 3.9 mmol/L   3.6-5.1                   

 

             CHLORIDE (test code = 04A) 99 mmol/L                        

 

             CO2 (test code = 02A) 29 mmol/L    22-32                     

 

             ANION GAP (test code = ANG) 11.1 mmol/L                            

 

             BUN (test code = 05D) 23 mg/dL     7-18         H            

 

             CREATININE (test code = 03E) 0.8 mg/dL    0.7-1.3                  

 

 

             BUN/CREA (test code = BCR) 27           12-20        H            

 

             CALCIUM (test code = 09D) 7.9 mg/dL    8.3-9.5      L            



COMPREHENSIVE METABOLIC PAN **2019 05:59:00





             Test Item    Value        Reference Range Interpretation Comments

 

             GLUCOSE (test code = 06D) 96 mg/dL                         

 

             SODIUM (test code = 01A) 136 mmol/L   136-145                   

 

             POTASSIUM (test code = 01B) 3.8 mmol/L   3.6-5.1                   

 

             CHLORIDE (test code = 04A) 99 mmol/L                        

 

             CO2 (test code = 02A) 30 mmol/L    22-32                     

 

             ANION GAP (test code = ANG) 10.6 mmol/L                            

 

             BUN (test code = 05D) 19 mg/dL     7-18         H            

 

             CREATININE (test code = 03E) 0.9 mg/dL    0.7-1.3                  

 

 

             BUN/CREA (test code = BCR) 20           12-20                     

 

             CALCIUM (test code = 09D) 8.2 mg/dL    8.3-9.5      L            

 

             BILI TOTAL (test code = 11A) 0.4 mg/dL    0.2-1.0                  

 

 

             PROTEIN (test code = 07D) 6.6 g/dL     6.4-8.2                   

 

             ALBUMIN (test code = 08D) 3.3 g/dL     3.5-4.8      L            

 

             GLOBULIN (test code = GLB) 3.3 g/dL     1.5-3.8                   

 

             ALB/GLOB (test code = AGRR) 1.0          1.0-2.6                   

 

             ALK PHOS (test code = 35A) 122 IU/L            H            

 

             AST (test code = 30A) 44 IU/L      <=42         H            

 

             ALT (test code = 31A) 95 IU/L      <=78         H            



BRAIN NATRIURETIC PROTEIN *WW*2019 05:57:00





             Test Item    Value        Reference Range Interpretation Comments

 

             proBNP (test code = PBNP) 1112 pg/mL   0-125        H            



CBC (INCLUDES AUTOMATED DIFFERENTIAL)*[id number] 16:18:00





             Test Item    Value        Reference Range Interpretation Comments

 

             WBC (test code = WBC) 6.6 10\S\3/uL 4.5-11.0                  

 

             RBC (test code = RBC) 4.78 10\S\6/uL 4.20-5.60                 

 

             HGB (test code = HBG) 13.7 g/dL    14.0-18.0    L            

 

             HCT (test code = HCT) 41.2 %       35.0-46.0                 

 

             MCV (test code = MCV) 86.2 fL      80.0-94.0                 

 

             MCH (test code = MCH) 28.7 pg      27.0-31.0                 

 

             MCHC (test code = MCHC) 33.3 g/dL    32.0-36.0                 

 

             RDW (test code = RDW) 13.7 %       11.5-14.5                 

 

             PLT (test code = PLT) 243 10\S\3/uL 130-400                   

 

             MPV (test code = MPV) 10.6 fL      9.4-12.4                  

 

             NEUTROP # (test code = NE#) 4.6 10\S\3/uL 2.0-8.0                  

 

 

             LYMPH # (test code = LY#) 1.3 10\S\3/uL 1.2-4.0                   

 

             MONOCYTE # (test code = MO#) 0.6 10\S\3/uL 0.0-1.1                 

  

 

             EOSINOPH # (test code = EO#) 0.1 10\S\3/uL 0.0-0.7                 

  

 

             BASOPHIL # (test code = BA#) 0.0 10\S\3/uL 0.0-0.3                 

  

 

             IG # (test code = IG#) 0.07 10\S\3/uL 0.00-0.06    H            

 

             NRBC # (test code = NRBC#) 0.00 10\S\3/uL 0.00-0.01                

 

 

             NEUTROPH % (test code = NE%) 68.9 %       35.0-73.0                

 

 

             LYMPH % (test code = LY%) 19.7 %       20.0-55.0    L            

 

             MONO % (test code = MO%) 8.5 %        2.5-10.0                  

 

             EOSINOPH % (test code = EO%) 1.5 %        0.0-5.0                  

 

 

             BASOPHIL % (test code = BA%) 0.3 %        0.0-2.0                  

 

 

             IG % (test code = IG%) 1.1 %        0.0-0.8      H            

 

             NRBC% (test code = NRBC%) 0.0 %        0.0-0.2                   

 

             MANDIFF (test code = WMDIFF) NO           NO                       

 

 

             RBC MORPH (test code =              NORMAL                    



             WRBCMOR)                                            



BRAIN NATRIURETIC PROTEIN **2019 16:18:00





             Test Item    Value        Reference Range Interpretation Comments

 

             proBNP (test code = PBNP) 1426 pg/mL   0-125        H            



XR CHEST 2 VIEW *WW*2019 16:04:35EXAMINATION: XR CHEST 2 VIEW 
*WW*.LOCATION: S17.HISTORY: Chest pain.COMPARISON: Chest x-ray 19.
FINDINGS: Examination is limited due to low lung volumes and patient 
bodyhabitus.Cardiac silhouette/Mediastinal contour: Enlargement of cardiac 
silhouette.Atherosclerotic calcification of aortic arch.Lungs: No focal 
consolidation. No pleural effusion. Osseous Structures: Degenerative changes of 
thoracic spine. Chronic appearingcompression fracture of lower thoracic spine 
vertebral body.IMPRESSION: Low lung volumes, no focal consolidation.
COMPREHENSIVE METABOLIC [id number] 16:18:00





             Test Item    Value        Reference Range Interpretation Comments

 

             GLUCOSE (test code = 06D) 95 mg/dL                         

 

             SODIUM (test code = 01A) 139 mmol/L   136-145                   

 

             POTASSIUM (test code = 01B) 4.1 mmol/L   3.6-5.1                   

 

             CHLORIDE (test code = 04A) 104 mmol/L                       

 

             CO2 (test code = 02A) 27 mmol/L    22-32                     

 

             ANION GAP (test code = ANG) 12.1 mmol/L                            

 

             BUN (test code = 05D) 16 mg/dL     7-18                      

 

             CREATININE (test code = 03E) 1.0 mg/dL    0.7-1.3                  

 

 

             BUN/CREA (test code = BCR) 16           12-20                     

 

             CALCIUM (test code = 09D) 8.5 mg/dL    8.3-9.5                   

 

             BILI TOTAL (test code = 11A) 0.3 mg/dL    0.2-1.0                  

 

 

             PROTEIN (test code = 07D) 7.3 g/dL     6.4-8.2                   

 

             ALBUMIN (test code = 08D) 3.9 g/dL     3.5-4.8                   

 

             GLOBULIN (test code = GLB) 3.4 g/dL     1.5-3.8                   

 

             ALB/GLOB (test code = AGRR) 1.1          1.0-2.6                   

 

             ALK PHOS (test code = 35A) 109 IU/L                         

 

             AST (test code = 30A) 82 IU/L      <=42         H            

 

             ALT (test code = 31A) 116 IU/L     <=78         H            



TROPONIN -16-52 16:17:00





             Test Item    Value        Reference Range Interpretation Comments

 

             TROPONIN I (test code = A84) <0.015 ng/mL 0.000-0.045              

 



PTT (PARTIAL THROMBOPLASTIN TIME)2019 16:10:00





             Test Item    Value        Reference Range Interpretation Comments

 

             PTT (test code = 25.3 s       20.2-38.0                 



             PTT)                                                

 

             PTTH (test code = **** To monitor the                           



             PTTH)        effectiveness of heparin,                           



                          we offer the Anti-Xa                           



                          (Heparin Assay). It can be                           



                          used for either                           



                          unfractionated or LMW                           



                          Heparin. Order Code is                           



                          ANTI-XA ****                           



PROTHROMBIN [id number] 16:10:00





             Test Item    Value        Reference Range Interpretation Comments

 

             PT (test code = 11.0 s       9.8-13.6                  



             TT)                                                 

 

             INR (test code = 1.0                                    



             INR)                                                

 

             INRH (test code =  **** SUGGESTED THERAPEUTIC                      

     



             INRH)        RANGE FOR INR: **** 2.5 -                           



                          3.5 ** For Patients with                           



                          Prosthetic Valves or                           



                          Patients with recurrent                           



                          Thromboembolic Events **                           



                          2.0 - 3.0 ** For Most Other                           



                          Applications **                           



XR CHEST 1 VIEW [id number] 15:59:58Portable AP chest, 1 viewLocation 
Code: [id number] HISTORY: PainCOMPARISON: 3/30/2019COMMENT: There is mild 
prominence of the central pulmonary vasculature and interstitium.Mild airspace 
disease is present within the lower lobes. Small left effusion issuspected. 
Cardiomegaly is stableIMPRESSION: Persistent mild congestion with left greater 
than right mildbibasilar airspace disease and likely small left effusion.CBC 
(INCLUDES AUTOMATED DIFFERENTIAL)2019 15:57:00





             Test Item    Value        Reference Range Interpretation Comments

 

             WBC (test code = WBC) 8.2 10\S\3/uL 4.5-11.0                  

 

             RBC (test code = RBC) 4.97 10\S\6/uL 4.20-5.60                 

 

             HGB (test code = HBG) 14.1 g/dL    14.0-18.0                 

 

             HCT (test code = HCT) 43.5 %       35.0-46.0                 

 

             MCV (test code = MCV) 87.5 fL      80.0-94.0                 

 

             MCH (test code = MCH) 28.4 pg      27.0-31.0                 

 

             MCHC (test code = MCHC) 32.4 g/dL    32.0-36.0                 

 

             RDW (test code = RDW) 14.3 %       11.5-14.5                 

 

             PLT (test code = PLT) 248 10\S\3/uL 130-400                   

 

             MPV (test code = MPV) 10.2 fL      9.4-12.4                  

 

             NEUTROP # (test code = NE#) 5.8 10\S\3/uL 2.0-8.0                  

 

 

             LYMPH # (test code = LY#) 1.5 10\S\3/uL 1.2-4.0                   

 

             MONOCYTE # (test code = MO#) 0.7 10\S\3/uL 0.0-1.1                 

  

 

             EOSINOPH # (test code = EO#) 0.0 10\S\3/uL 0.0-0.7                 

  

 

             BASOPHIL # (test code = BA#) 0.0 10\S\3/uL 0.0-0.3                 

  

 

             IG # (test code = IG#) 0.09 10\S\3/uL 0.00-0.06    H            

 

             NRBC # (test code = NRBC#) 0.00 10\S\3/uL 0.00-0.01                

 

 

             NEUTROPH % (test code = NE%) 70.4 %       35.0-73.0                

 

 

             LYMPH % (test code = LY%) 18.7 %       20.0-55.0    L            

 

             MONO % (test code = MO%) 8.8 %        2.5-10.0                  

 

             EOSINOPH % (test code = EO%) 0.5 %        0.0-5.0                  

 

 

             BASOPHIL % (test code = BA%) 0.5 %        0.0-2.0                  

 

 

             IG % (test code = IG%) 1.1 %        0.0-0.8      H            

 

             NRBC% (test code = NRBC%) 0.0 %        0.0-0.2                   

 

             MANDIFF (test code = MDIFF) NO           NO                        

 

             RBC MORPH (test code = RBCMOR)              NORMAL                 

   



CT CHEST W/ [id number] 17:40:59Chest CT without contrast.Location Code: 
[id number] HISTORY: [id number]: DyspneaCOMPARISON: 16Technique: Helical CT 
of the chest was performed without intravenous contrast.5 mm axial, sagittal and
coronal images were obtained. One or more of thefollowing dose reduction 
techniques were used: Automated exposure control,adjustment of the mA and or KV 
according to patient size, and/or utilization ofiterative reconstruction 
technique. DLP: 729 mGy-cm.FINDINGS:Mild central congestion seen. Slightly im
proved since prior exam. No focalconsolidations, pulmonary nodules, or 
significant effusions.There is no mediastinal, hilar, or axillary adenopathy. 
There is no pericardialeffusion. The heart is not enlarged. There is no 
mediastinal hematoma.Images through the upper abdomen demonstrate hepatic 
steatosis without change..The bones, skin, and surrounding soft tissues are 
unremarkable.IMPRESSION: 1. Borderline cardiomegaly with mild pulmonary 
congestion improved since priorexam.BRAIN NATRIURETIC [id number] 17:21:00





             Test Item    Value        Reference Range Interpretation Comments

 

             proBNP (test code = PBNP) 574 pg/mL    0-125        H            



XR CHEST 1 VIEW [id number] 16:25:26LOCATION: V20 EXAM: XR CHEST 1 VIEW 
PORTABLEHISTORY: [id number]: Psychiatric treatment changed TECHNIQUE: Frontal 
view of the chest.COMPARISON: 3/18/2018FINDINGS: Lungs are hypoinflated. Mild 
vascular congestion is present. Asymmetricopacity in the medial right lung base 
may represent asymmetric edema oratelectasis.No evidence of pneumothorax or 
sizable pleural effusion. The heart is mildly enlarged. The mediastinal contours
 are not well assesseddue to patient rotation.Osseous structures are intact. I
MPRESSION:Mild pulmonary vascular congestion. Asymmetric opacity in the right 
lung basemay representatelectasis or edema. Cardiomegaly.DRUGS OF ABUSE
2019 16:01:00





             Test Item    Value        Reference Range Interpretation Comments

 

             DRUG SCRN (test code = ****URINE DRUG                           



             HDOA)        SCREEN***  ***This is                           



                          an unconfirmed                           



                          screening result and                           



                          should not be used for                           



                          non-medical purposes***                           

 

             CANNABINOD (test code Negative     NEGATIVE                  



             = 88C)                                              

 

             AMPHETAMINE (test code Negative     NEGATIVE                  



             = 84A)                                              

 

             BENZODIAZP (test code Negative     NEGATIVE                  



             = 86A)                                              

 

             BARBITURAT (test code Negative     NEGATIVE                  



             = 85A)                                              

 

             OPIATES (test code = POSITIVE     NEGATIVE     A            



             92B)                                                

 

             COCAINE (test code = Negative     NEGATIVE                  



             87A)                                                

 

             PHENCYCLID (test code Negative     NEGATIVE                  



             = 66A)                                              

 

             METHADONE (test code = Negative     NEGATIVE                  



             64A)                                                

 

             DOAH (test code = ***********************                          

 



             DOAH.)       **URINE DRUG                           



                          SCREEN*****************                           



                          ************* Cut-off                           



                          values are as follows:                           



                          Cannabinoids 50 ng/mL                           



                          Cocaine 300 ng/mL                           



                          Amphetamines 1000 ng/mL                           



                          Phencyclidine 25 ng/mL                           



                          Benzodiazepines 200                           



                          ng.mL Methadone 300                           



                          ng/mL Barbiturates 200                           



                          ng/mL Opiates 2000                           



                          ng/mL                                  



                          ***********************                           



                          ***********************                           



                          ***********************                           



                          ***********                            



[id number] 16:00:00





             Test Item    Value        Reference Range Interpretation Comments

 

             ACETAMINPH (test code = 94M) <2.0 ug/mL   10.0-30.0    L           

 



CARDIAC [id number] 15:58:00





             Test Item    Value        Reference Range Interpretation Comments

 

             TROPONIN I (test code = A84) <0.015 ng/mL 0.000-0.045              

 



ALCOHOL BLOOD (ETOH)2019 15:56:00





             Test Item    Value        Reference Range Interpretation Comments

 

             ETOH (test code = HALC) ****ETHANOL*** ***The                      

     



                          result is to be used                           



                          only for medical                           



                          purposes***                            

 

             ALCOHOL (test code = <10 mg/dL    <=10                      



             56A)                                                



AMMONIA [id number] 15:54:00





             Test Item    Value        Reference Range Interpretation Comments

 

             AMMONIA (test code = 54A) 21 umol/L    11-32                     



[id number] 15:53:00





             Test Item    Value        Reference Range Interpretation Comments

 

             SALICYLATE (test code = 94B) 3.4 mg/dL    2.8-20.0                 

 



[id number] 14:04:00





             Test Item    Value        Reference Range Interpretation Comments

 

             COLOR (test code = COLU) YELLOW       YELLOW                    

 

             CLARITY (test code = CLA) CLEAR        CLEAR                     

 

             GLUCOSE UR (test code = UA GLUCOSE) NEGATIVE     NEGATIVE          

        

 

             BILI UR (test code = BILE) NEGATIVE     NEGATIVE                  

 

             KETONES UR (test code = ACE) NEGATIVE     NEGATIVE                 

 

 

             SP GRAVITY (test code = SPGR) 1.026        1.005-1.030             

  

 

             PH UR (test code = PH) 6.0          4.5-8.0                   

 

             PROTEIN UR (test code = PU) NEGATIVE     NEGATIVE                  

 

             UROBIL UR (test code = UROQ) 0.2 EU/dL    0.2-1.0                  

 

 

             NITRITE UR (test code = NITRITE) NEGATIVE     NEGATIVE             

     

 

             BLOOD UR (test code = UA BLOOD) NEGATIVE     NEGATIVE              

    

 

             LEUK ES UR (test code = LEUK) NEGATIVE     NEGATIVE                

  



XR SPINE LUMBAR [id number] 13:06:44Lumbar spine series, 5 viewsLocation 
Code: [id number] HISTORY: [id number]: Low back painCOMPARISON: None.COMMENTS: AP,
 oblique, and lateral views of the lumbar spine demonstrate noacute fracture or 
malalignment. There is moderate multilevel disc spacenarrowing with endplate 
sclerosis and osteophyte formation. Congenitalappearing ankylosing spondylitis 
is suggested. Chronic appearing 60%compression fracture of L1 is unchanged from 
18. The soft tissues areunremarkable.IMPRESSION: Moderate multilevel 
lumbar spondylosis with otherwise no acuteabnormality. Chronic appearing L1 
compression fracture without change.COMPREHENSIVE METABOLIC [id number] 
12:48:00





             Test Item    Value        Reference Range Interpretation Comments

 

             GLUCOSE (test code = 06D) 122 mg/dL           H            

 

             SODIUM (test code = 01A) 142 mmol/L   136-145                   

 

             POTASSIUM (test code = 01B) 4.4 mmol/L   3.6-5.1                   

 

             CHLORIDE (test code = 04A) 110 mmol/L          H            

 

             CO2 (test code = 02A) 24 mmol/L    22-32                     

 

             ANION GAP (test code = ANG) 12.4 mmol/L                            

 

             BUN (test code = 05D) 13 mg/dL     7-18                      

 

             CREATININE (test code = 03E) 0.8 mg/dL    0.7-1.3                  

 

 

             BUN/CREA (test code = BCR) 15           12-20                     

 

             CALCIUM (test code = 09D) 8.3 mg/dL    8.3-9.5                   

 

             BILI TOTAL (test code = 11A) 0.2 mg/dL    0.2-1.0                  

 

 

             PROTEIN (test code = 07D) 6.6 g/dL     6.4-8.2                   

 

             ALBUMIN (test code = 08D) 3.3 g/dL     3.5-4.8      L            

 

             GLOBULIN (test code = GLB) 3.3 g/dL     1.5-3.8                   

 

             ALB/GLOB (test code = AGRR) 1.0          1.0-2.6                   

 

             ALK PHOS (test code = 35A) 124 IU/L            H            

 

             AST (test code = 30A) 26 IU/L      <=42                      

 

             ALT (test code = 31A) 37 IU/L      <=78                      



[id number] 12:48:00





             Test Item    Value        Reference Range Interpretation Comments

 

             MAGNESIUM (test code = 48A) 1.8 mg/dL    1.8-2.4                   



PRO TIME AND [id number] 12:27:00





             Test Item    Value        Reference Range Interpretation Comments

 

             PT (test code = 10.8 s       9.8-13.6                  



             TT)                                                 

 

             INR (test code = 1.0                                    



             INR)                                                

 

             INRH (test code =  **** SUGGESTED THERAPEUTIC                      

     



             INRH)        RANGE FOR INR: **** 2.5 -                           



                          3.5 ** For Patients with                           



                          Prosthetic Valves or                           



                          Patients with recurrent                           



                          Thromboembolic Events **                           



                          2.0 - 3.0 ** For Most Other                           



                          Applications **                           

 

             PTT (test code = 19.1 s       20.2-38.0    L            



             PTT)                                                

 

             PTTH (test code = **** To monitor the                           



             PTTH)        effectiveness of heparin,                           



                          we offer the Anti-Xa                           



                          (Heparin Assay). It can be                           



                          used for either                           



                          unfractionated or LMW                           



                          Heparin. Order Code is                           



                          ANTI-XA ****                           



CBC (INCLUDES AUTOMATED DIFFERENTIAL)2019 12:19:00





             Test Item    Value        Reference Range Interpretation Comments

 

             WBC (test code = WBC) 5.8 10\S\3/uL 4.5-11.0                  

 

             RBC (test code = RBC) 4.89 10\S\6/uL 4.20-5.60                 

 

             HGB (test code = HBG) 13.9 g/dL    14.0-18.0    L            

 

             HCT (test code = HCT) 42.9 %       35.0-46.0                 

 

             MCV (test code = MCV) 87.7 fL      80.0-94.0                 

 

             MCH (test code = MCH) 28.4 pg      27.0-31.0                 

 

             MCHC (test code = MCHC) 32.4 g/dL    32.0-36.0                 

 

             RDW (test code = RDW) 13.4 %       11.5-14.5                 

 

             PLT (test code = PLT) 189 10\S\3/uL 130-400                   

 

             MPV (test code = MPV) 10.7 fL      9.4-12.4                  

 

             NEUTROP # (test code = NE#) 4.0 10\S\3/uL 2.0-8.0                  

 

 

             LYMPH # (test code = LY#) 1.2 10\S\3/uL 1.2-4.0                   

 

             MONOCYTE # (test code = MO#) 0.4 10\S\3/uL 0.0-1.1                 

  

 

             EOSINOPH # (test code = EO#) 0.2 10\S\3/uL 0.0-0.7                 

  

 

             BASOPHIL # (test code = BA#) 0.0 10\S\3/uL 0.0-0.3                 

  

 

             IG # (test code = IG#) 0.03 10\S\3/uL 0.00-0.06                 

 

             NRBC # (test code = NRBC#) 0.00 10\S\3/uL 0.00-0.01                

 

 

             NEUTROPH % (test code = NE%) 69.3 %       35.0-73.0                

 

 

             LYMPH % (test code = LY%) 20.3 %       20.0-55.0                 

 

             MONO % (test code = MO%) 6.8 %        2.5-10.0                  

 

             EOSINOPH % (test code = EO%) 2.8 %        0.0-5.0                  

 

 

             BASOPHIL % (test code = BA%) 0.3 %        0.0-2.0                  

 

 

             IG % (test code = IG%) 0.5 %        0.0-0.8                   

 

             NRBC% (test code = NRBC%) 0.0 %        0.0-0.2                   

 

             MANDIFF (test code = MDIFF) NO           NO                        

 

             RBC MORPH (test code = RBCMOR)              NORMAL                 

   



URINALYSIS WITH [id number] 01:48:00





             Test Item    Value        Reference Range Interpretation Comments

 

             COLOR (test code = COLU) YELLOW       YELLOW                    

 

             CLARITY (test code = CLA) CLEAR        CLEAR                     

 

             GLUCOSE UR (test code = UA GLUCOSE) NEGATIVE     NEGATIVE          

        

 

             BILI UR (test code = BILE) NEGATIVE     NEGATIVE                  

 

             KETONES UR (test code = ACE) NEGATIVE     NEGATIVE                 

 

 

             SP GRAVITY (test code = SPGR) 1.035        1.005-1.030  H          

  

 

             PH UR (test code = PH) 5.5          4.5-8.0                   

 

             PROTEIN UR (test code = PU) TRACE        NEGATIVE     A            

 

             UROBIL UR (test code = UROQ) 0.2 EU/dL    0.2-1.0                  

 

 

             NITRITE UR (test code = NITRITE) NEGATIVE     NEGATIVE             

     

 

             BLOOD UR (test code = UA BLOOD) NEGATIVE     NEGATIVE              

    

 

             LEUK ES UR (test code = LEUK) NEGATIVE     NEGATIVE                

  

 

             WBC UR (test code = UWBC) 0 /HPF       0-3                       

 

             RBC UR (test code = URBC) 0 /HPF       0-2                       

 

             EPITH UR (test code = UEPC) NONE /LPF    NONE                      

 

             BACTERIA UR (test code = UBACT) FEW /HPF     NONE         A        

    

 

             CAST UR (test code = CAST)  /LPF        NONE                      

 

             CRYSTAL UR (test code = CRYU)  / LPF       NONE                    

  

 

             MUCUS UR (test code = MUC) FEW / HPF    NONE         A            

 

             AMORPH UR (test code = UMU)  / HPF       NONE                     

 

 

             TRICH UR (test code = UTRICH)  /HPF        NONE                    

  

 

             YEAST UR (test code = UY)  /HPF        NONE                      

 

             SPERM UR (test code = USPERM)  /HPF        NONE                    

  



CT LUMBAR SPINE W/O [id number] 01:14:56LOCATION: [id number]: 58-year-old
 male who suffered a fall.COMMENT: Field 3Axial CT imaging of this patient's 
lumbar spine was obtained. Soft tissue andbone window images were submitted in 
the axial plane. Coronal and sagittal bonereconstructions were included. Plain 
radiographs of the lumbar spine obtained 18 are available 
forcomparison.One or more of the following dose reduction techniques were used: 
Automatedexposure control, adjustment of the mA and/or kV according to the 
patient size,and/or utilization of iterative reconstruction technique.DLP: 
1216.52 mGy-cmCONTRAST: NoneFINDINGS:The lumbar skeleton exhibits no evidence of
 an acute bony injury. The alignmentis essentially unchanged from the plain 
radiographic study obtained last year.Again seen is considerable of vertebral 
body heightloss of T12 which appearsunchanged from the prior study. The 
paraspinous soft tissues are unremarkable.No significant posterior facet 
arthropathy is seen.IMPRESSION:No acute bony injury is seen in thispatient's 
lumbar spine and this CTexamination. The overall appearance of the anatomy is 
stable when compared to aradiographic examination of the lumbar spine obtained 
.CT PELVIS W/O [id number] 01:11:26LOCATION: Field 
1HISTORY: Field 2.COMMENT: Field 3Axial CT imaging of this patient's bony pelvis
 wasobtained. Soft tissue andbone window images were submitted in the axial 
plane. Coronal and sagittal bonereconstructions were included. Field 4One or 
more of the following dose reduction techniques wereused: Automatedexposure 
control, adjustment of the mA and/or kV according to the patient size,and/or
utilization of iterative reconstruction technique.DLP: 645.19 mGy-cmCONTRAST: 
NoneFINDINGS:No acute bony injury is seen in the pelvis. The visualized lower 
lumbar spineexhibits no acute or destructive bony lesion. The proximal femora 
are intact.The soft tissues are unremarkable.IMPRESSION:UnremarkableCT 
examination of the bony pelvis.XR KNEE LEFT 3 VIEWS **2019 16:40:46Left 
knee, 3 viewsLocation Code: [id number] HISTORY: PainCOMMENTS: AP, lateral, and 
oblique views ofthe left knee demonstrate no acutefracture or malalignment. 
Fixation screws are noted within the proximal tibia.There is mild joint space 
narrowing within all 3 compartments of the knee withsubchondralsclerosis and 
osteophyte formation. The soft tissues areunremarkable.IMPRESSION: Mild 
osteoarthritiswith otherwise no acute radiographicabnormality.XR HIP LEFT 
UNILATERAL 2 VIEWS**2019 16:40:07Left tibia/fibula, 2 views.Location 
code: [id number] HISTORY: PainCOMPARISON: None.COMMENTS: AP and lateral views of
 the left tibia and fibula demonstrate noacute fracture or malalignment. The 
soft tissues are unremarkable.IMPRESSION: No acute radiographic abnormality.XR 
SPINE THORACIC W/SWIM 3VWS**2018 17:25:58XR SPINE THORACIC W/SWIM 
3VWS*WW*CLINICAL INFORMATION: Fall  COMPARISONS: CT lumbar spine dated 10/30/17,
 thoracic spine series dated1FINDINGS:. There is a chronic severe 
compression fracture ofthe T12 vertebra. Hyperkyphosis is centered at this 
level. Otherwise no acute fracture is identified.Multilevel disc space 
narrowing, endplate sclerosis, and spondylosis are noted.IMPRESSION:1. Chronic
severe T12 compression fracture.2. No acute fracture is identified.Location: R16
XR SPINE LUMBAR COMPLETE**2018 17:23:16XR SPINE LUMBAR 
COMPLETE*WW*CLINICAL INFORMATION: : Unspecified fall COMPARISONS: None 
available.FINDINGS:AP, bilateral oblique, and lateral images of the lumbar spine
 were obtained.A chronic severe T12 compression fracture is redemonstrated. No 
acute lumbarspine fracture is identified. Endplate sclerosis and disc space 
narrowing arenoted at all levels.IMPRESSION:1. No acute lumbar spine fracture is
 identified.2. Chronic severe T12 compression fracture.Location: [id number] 
NATRIURETIC PROTEIN *WW*2018 09:26:00





             Test Item    Value        Reference Range Interpretation Comments

 

             proBNP (test code = PBNP) 949 pg/mL    0-125        H            



AMYLASE AND LIPASE *WW*2018 09:23:00





             Test Item    Value        Reference Range Interpretation Comments

 

             AMYLASE (test code = 10A) 23 U/L              L            

 

             LIPASE (test code = 60A) 45 IU/L             L            



COMPREHENSIVE METABOLIC PAN *WW*2018 09:23:00





             Test Item    Value        Reference Range Interpretation Comments

 

             GLUCOSE (test code = 06D) 102 mg/dL           H            

 

             SODIUM (test code = 01A) 140 mmol/L   136-145                   

 

             POTASSIUM (test code = 01B) 4.0 mmol/L   3.6-5.1                   

 

             CHLORIDE (test code = 04A) 109 mmol/L          H            

 

             CO2 (test code = 02A) 25 mmol/L    22-32                     

 

             ANION GAP (test code = ANG) 10.0 mmol/L                            

 

             BUN (test code = 05D) 10 mg/dL     7-18                      

 

             CREATININE (test code = 03E) 0.9 mg/dL    0.7-1.3                  

 

 

             BUN/CREA (test code = BCR) 12           12-20                     

 

             CALCIUM (test code = 09D) 8.3 mg/dL    8.3-9.5                   

 

             BILI TOTAL (test code = 11A) 0.3 mg/dL    0.2-1.0                  

 

 

             PROTEIN (test code = 07D) 6.7 g/dL     6.4-8.2                   

 

             ALBUMIN (test code = 08D) 3.4 g/dL     3.5-4.8      L            

 

             GLOBULIN (test code = GLB) 3.3 g/dL     1.5-3.8                   

 

             ALB/GLOB (test code = AGRR) 1.0          1.0-2.6                   

 

             ALK PHOS (test code = 35A) 92 IU/L                          

 

             AST (test code = 30A) 31 IU/L      <=42                      

 

             ALT (test code = 31A) 52 IU/L      <=78                      



MAGNESIUM **WW**2018 09:23:00





             Test Item    Value        Reference Range Interpretation Comments

 

             MAGNESIUM (test code = 48A) 2.2 mg/dL    1.8-2.4                   



PRO TIME AND PTT *WW*2018 09:21:00





             Test Item    Value        Reference Range Interpretation Comments

 

             PT (test code = 11.5 s       9.8-13.6                  



             TT)                                                 

 

             INR (test code = 1.0                                    



             INR)                                                

 

             INRH (test code =  **** SUGGESTED THERAPEUTIC                      

     



             INRH)        RANGE FOR INR: **** 2.5 -                           



                          3.5 ** For Patients with                           



                          Prosthetic Valves or                           



                          Patients with recurrent                           



                          Thromboembolic Events **                           



                          2.0 - 3.0 ** For Most Other                           



                          Applications **                           

 

             PTT (test code = 24.5 s       20.2-38.0                 



             PTT)                                                

 

             PTTH (test code = **** To monitor the                           



             PTTH)        effectiveness of heparin,                           



                          we offer the Anti-Xa                           



                          (Heparin Assay). It can be                           



                          used for either                           



                          unfractionated or LMW                           



                          Heparin. Order Code is                           



                          ANTI-XA ****                           



CBC (INCLUDES AUTOMATED DIFFERENTIAL)*[id number] 09:09:00





             Test Item    Value        Reference Range Interpretation Comments

 

             WBC (test code = WBC) 5.2 10\S\3/uL 4.5-11.0                  

 

             RBC (test code = RBC) 4.51 10\S\6/uL 4.20-5.60                 

 

             HGB (test code = HBG) 12.7 g/dL    14.0-18.0    L            

 

             HCT (test code = HCT) 38.8 %       35.0-46.0                 

 

             MCV (test code = MCV) 86.0 fL      80.0-94.0                 

 

             MCH (test code = MCH) 28.2 pg      27.0-31.0                 

 

             MCHC (test code = MCHC) 32.7 g/dL    32.0-36.0                 

 

             RDW (test code = RDW) 13.7 %       11.5-14.5                 

 

             PLT (test code = PLT) 214 10\S\3/uL 130-400                   

 

             MPV (test code = MPV) 10.4 fL      9.4-12.4                  

 

             NEUTROP # (test code = NE#) 3.5 10\S\3/uL 2.0-8.0                  

 

 

             LYMPH # (test code = LY#) 1.1 10\S\3/uL 1.2-4.0      L            

 

             MONOCYTE # (test code = MO#) 0.4 10\S\3/uL 0.0-1.1                 

  

 

             EOSINOPH # (test code = EO#) 0.2 10\S\3/uL 0.0-0.7                 

  

 

             BASOPHIL # (test code = BA#) 0.0 10\S\3/uL 0.0-0.3                 

  

 

             IG # (test code = IG#) 0.01 10\S\3/uL 0.00-0.06                 

 

             NRBC # (test code = NRBC#) 0.00 10\S\3/uL 0.00-0.01                

 

 

             NEUTROPH % (test code = NE%) 66.6 %       35.0-73.0                

 

 

             LYMPH % (test code = LY%) 21.0 %       20.0-55.0                 

 

             MONO % (test code = MO%) 7.8 %        2.5-10.0                  

 

             EOSINOPH % (test code = EO%) 4.0 %        0.0-5.0                  

 

 

             BASOPHIL % (test code = BA%) 0.4 %        0.0-2.0                  

 

 

             IG % (test code = IG%) 0.2 %        0.0-0.8                   

 

             NRBC% (test code = NRBC%) 0.0 %        0.0-0.2                   

 

             MANDIFF (test code = WMDIFF) NO           NO                       

 

 

             RBC MORPH (test code =              NORMAL                    



             WRBCMOR)                                            



BASIC METABOLIC [id number] 09:50:00





             Test Item    Value        Reference Range Interpretation Comments

 

             GLUCOSE (test code = 06D) 106 mg/dL           H            

 

             SODIUM (test code = 01A) 135 mmol/L   136-145      L            

 

             POTASSIUM (test code = 01B) 4.2 mmol/L   3.6-5.1                   

 

             CHLORIDE (test code = 04A) 101 mmol/L                       

 

             CO2 (test code = 02A) 28 mmol/L    22-32                     

 

             ANION GAP (test code = ANG) 10.2 mmol/L                            

 

             BUN (test code = 05D) 14 mg/dL     7-18                      

 

             CREATININE (test code = 03E) 0.8 mg/dL    0.7-1.3                  

 

 

             BUN/CREA (test code = BCR) 18           12-20                     

 

             CALCIUM (test code = 09D) 8.6 mg/dL    8.3-9.5                   



PRO TIME AND [id number] 09:39:00





             Test Item    Value        Reference Range Interpretation Comments

 

             PT (test code = 11.0 s       9.8-13.6                  



             TT)                                                 

 

             INR (test code = 1.0                                    



             INR)                                                

 

             INRH (test code =  **** SUGGESTED THERAPEUTIC                      

     



             INRH)        RANGE FOR INR: **** 2.5 -                           



                          3.5 ** For Patients with                           



                          Prosthetic Valves or                           



                          Patients with recurrent                           



                          Thromboembolic Events **                           



                          2.0 - 3.0 ** For Most Other                           



                          Applications **                           

 

             PTT (test code = 24.7 s       20.2-38.0                 



             PTT)                                                

 

             PTTH (test code = **** To monitor the                           



             PTTH)        effectiveness of heparin,                           



                          we offer the Anti-Xa                           



                          (Heparin Assay). It can be                           



                          used for either                           



                          unfractionated or LMW                           



                          Heparin. Order Code is                           



                          ANTI-XA ****                           



CBC (INCLUDES AUTOMATED DIFFERENTIAL)2018 09:28:00





             Test Item    Value        Reference Range Interpretation Comments

 

             WBC (test code = WBC) 6.6 10\S\3/uL 4.5-11.0                  

 

             RBC (test code = RBC) 4.77 10\S\6/uL 4.20-5.60                 

 

             HGB (test code = HBG) 13.3 g/dL    14.0-18.0    L            

 

             HCT (test code = HCT) 40.4 %       35.0-46.0                 

 

             MCV (test code = MCV) 84.7 fL      80.0-94.0                 

 

             MCH (test code = MCH) 27.9 pg      27.0-31.0                 

 

             MCHC (test code = MCHC) 32.9 g/dL    32.0-36.0                 

 

             RDW (test code = RDW) 13.6 %       11.5-14.5                 

 

             PLT (test code = PLT) 246 10\S\3/uL 130-400                   

 

             MPV (test code = MPV) 10.0 fL      9.4-12.4                  

 

             NEUTROP # (test code = NE#) 4.6 10\S\3/uL 2.0-8.0                  

 

 

             LYMPH # (test code = LY#) 1.3 10\S\3/uL 1.2-4.0                   

 

             MONOCYTE # (test code = MO#) 0.5 10\S\3/uL 0.0-1.1                 

  

 

             EOSINOPH # (test code = EO#) 0.1 10\S\3/uL 0.0-0.7                 

  

 

             BASOPHIL # (test code = BA#) 0.0 10\S\3/uL 0.0-0.3                 

  

 

             IG # (test code = IG#) 0.05 10\S\3/uL 0.00-0.06                 

 

             NRBC # (test code = NRBC#) 0.00 10\S\3/uL 0.00-0.01                

 

 

             NEUTROPH % (test code = NE%) 70.7 %       35.0-73.0                

 

 

             LYMPH % (test code = LY%) 19.2 %       20.0-55.0    L            

 

             MONO % (test code = MO%) 7.3 %        2.5-10.0                  

 

             EOSINOPH % (test code = EO%) 1.4 %        0.0-5.0                  

 

 

             BASOPHIL % (test code = BA%) 0.6 %        0.0-2.0                  

 

 

             IG % (test code = IG%) 0.8 %        0.0-0.8                   

 

             NRBC% (test code = NRBC%) 0.0 %        0.0-0.2                   

 

             MANDIFF (test code = MDIFF) NO           NO                        

 

             RBC MORPH (test code = RBCMOR)              NORMAL                 

   



XR SHOULDER RIGHT 3 [id number] 09:31:20Location: D4EXAM: RIGHT SHOULDER, 3 
VIEWSHistory: Shoulder painComparison: Shoulder radiograph dated
indings: Glenohumeral and acromioclavicular alignment are maintained. 
No acute orhealing fracture. Mild hypertrophic change at the acromioclavicular 
joint isredemonstrated.Impression: 1. No fracture/malalignment.2. Moderate 
chromic clavicular joint hypertrophic change.BASIC METABOLIC [id number] 
09:38:00





             Test Item    Value        Reference Range Interpretation Comments

 

             GLUCOSE (test code = 06D) 109 mg/dL           H            

 

             SODIUM (test code = 01A) 135 mmol/L   136-145      L            

 

             POTASSIUM (test code = 01B) 4.1 mmol/L   3.6-5.1                   

 

             CHLORIDE (test code = 04A) 102 mmol/L                       

 

             CO2 (test code = 02A) 27 mmol/L    22-32                     

 

             ANION GAP (test code = ANG) 10.1 mmol/L                            

 

             BUN (test code = 05D) 12 mg/dL     7-18                      

 

             CREATININE (test code = 03E) 0.8 mg/dL    0.7-1.3                  

 

 

             BUN/CREA (test code = BCR) 14           12-20                     

 

             CALCIUM (test code = 09D) 8.7 mg/dL    8.3-9.5                   



XR HIP LEFT UNILATERAL 2 VIEWS**2018 02:39:26LEFT HIP RADIOGRAPHS, 2 
VIEWSLOCATION: R16.INDICATION: : Unspecified fall.COMPARISON: None.ESTELLE
HNIQUE: AP and frog-leg radiographs of the left hip.FINDINGS:There is no acute 
fracture or dislocation. The joint spaces are maintained.IMPRESSION:No acute 
osseous abnormality.XR SPINE LUMBAR COMPLETE**2018 01:39:49LUMBAR SPINE 
RADIOGRAPHS, 5 VIEWSLOCATION: R16.INDICATION: : Unspecified 
fall.TECHNIQUE: AP, lateral, oblique, and spot radiographs of the lumbar 
spine.COMPARISON: CT dated 10/30/17.FINDINGS:A T12 compression fracture is 
unchanged. The vertebral body heights andalignment are otherwise maintained. No 
acute fracture is visualized.Degenerative changes are present throughout the 
spine. Visualized soft tissuesare normal.IMPRESSION:No acute abnormality in the 
lumbar spine. Old T12 compression fracture.U/S VENOUS DOPPLER TOBI LOW EXT**
2018 12:56:39Exam: Bilateral lower extremity venous Doppler 
ultrasoundHistory: Pain and swellingLocation: [id number]: High-resolution 
grayscale, color Doppler, and spectral analysis ofthe deep venous system of the
lower extremities bilaterally was performed.Findings:There is normal compression
 and augmentation ofthe common and superficialfemoral as well as the popliteal 
veins and tibioperoneal trunks as well astheanterior and posterior tibial as 
well as peroneal veins. Normal flowidentified.Impression:1. No evidence of 
venous thrombosis.NM HIDA SCAN WITH [id number] 11:58:24HIDA scanLocation 
code: [id number] history: Calculus of bile duct without cholangitis or 
cholecystitisComments: Following the intravenous administration of 6.0 mCi of 
technetium 99mCholetec, sequentialimaging of the abdomen was performed. There is
 promptclearance and uptake by the liver with prompt excretion into the 
biliarysystem. There is visualization of the common bile duct and gallbladder at
 15minutes and opacification of the small bowel at 30 minutes.Impression:1. No 
evidence of acute cholecystitis or biliary obstruction.XR SHOULDER RIGHT 3 VIEWS
2018 11:18:16Right shoulder, 3 viewsLocation Code: [id number] HISTORY: 
Shoulder painCOMMENTS: AP views in internal and external rotation along with a 
transscapularY view of the right shoulder were obtained. There is no acute 
fracture ormalalignment. Osteoarthritic changes are noted at the 
acromioclavicular jointwith mild spurring. The soft tissues are 
unremarkable.IMPRESSION: Mild right acromioclavicular osteoarthritis with 
otherwise no acuteabnormality.CT ABDOMEN AND PELVIS WITHOUT CONTRAST *YAJAIRA*
2018 19:16:06AFTER HOURS SERVICE ON: 3/18/2018 7:11 PM *Study available 
for review on PACS at 7:02 PMCT Scan of the Abdomen and Pelvis Without 
ContrastLocation Code X82Qmbyivl: [id number]: Lower abdominal painTechnique: Axial
 and reconstructed coronal scans were performed on a helicalscanner pre oral and
 IV contrast. Study is limited secondary to lack of oraland IV contrast. One or 
more of the following dose reduction techniques were used: Automatedexposure 
control, adjustment of the mA and/or kV according to patient size,and/or 
utilization of iterative reconstruction technique.Findings: Liver: No 
significant findings. Gallbladder/Biliary: No significant findings. Pancreas: No
 significant findings. Spleen: No significant findings.Adrenals: No significant 
findings. Kidneys: Right kidney is smaller/atrophic compared to the left. 
Nonephrolithiasis or hydronephrosis. Bladder: No significant findings. Bowel: No
 significant findings. The appendix is unremarkable.Other: There is a small fat-
containing umbilical hernia without evidence ofstrangulation measuring 2.6 
cm.Impression:No acute findings in the abdomen or pelvis.XR CHEST 2 VIEW *YAJAIRA*
2018 19:04:12EXAM: XR CHEST 2 VIEW *WW*HISTORY: [id number]: Upper abdominal 
pain TECHNIQUE: Frontal and lateral views of the chest.COMPARISON: 2018FINDINGS: The lungs are hypoinflated. Patchy atelectasisor infiltrate is
 present at thebilateral lung bases.No evidence of pneumothorax or pleural 
effusion.The heart is at the upperlimits of normal in size. Mediastinal contours
 are unremarkable. Partially visualized wedge compression deformity of the lower
 thoracic spine isstable. IMPRESSION:Bibasilar atelectasis/infiltrate.U/S 
GALLBLADDER*WW*2018 19:01:50EXAM: ABDOMINAL ULTRASOUND-LIMITEDHISTORY: 
[id number]: Upper abdominal painTECHNIQUE: Sonographic imaging of the right upper 
quadrant in the transverseand sagittal plane employing gray scale and color belén
ging.COMPARISON: 2018FINDINGS:Overlying bowel gas limits assessment.The 
liver is mildly enlarged measuring 18.0 cm in length. The hepaticparenchymal 
echogenicity is not well assessed. No focal masses are identified.The 
gallbladder is appropriately distended. There is no gallbladder wallthickeningor
 pericholecystic fluid. A sonographic Larson's sign is notelicited per the 
performing sonographer.There is no biliary ductal dilatation.The common bile 
duct measures 4 mm.The pancreas is not well visualized.The right kidney is 
slightly small in size measuring 8.5 cm. The right kidneyis suboptimally
visualized. The renal parenchymal echogenicity appears mildlyincreased.No 
contour deforming masses on the submitted still images. There isno evidence of 
nephrolithiasis or hydronephrosis. The abdominalaorta and inferior vena cava are
 not well seen.IMPRESSION:Limited examination.No evidence of cholelithiasis or 
findings to indicate acute cholecystitis.The renal parenchymal echogenicity 
appears mildlyincreased. Correlate withhistory/laboratory values to exclude 
medical renal disease.THYROID PANEL/SCREEN (TSH) *WW*2018 18:58:00





             Test Item    Value        Reference Range Interpretation Comments

 

             TSH (test code = WTSH) 3.230 uIU/mL 0.358-3.740               



MAGNESIUM **WW**2018 18:51:00





             Test Item    Value        Reference Range Interpretation Comments

 

             MAGNESIUM (test code = 48A) 2.2 mg/dL    1.8-2.4                   



BRAIN NATRIURETIC PROTEIN *WW*2018 18:22:00





             Test Item    Value        Reference Range Interpretation Comments

 

             proBNP (test code = PBNP) 69 pg/mL     0-125                     



CARDIAC PROFILE *WW*2018 18:14:00





             Test Item    Value        Reference Range Interpretation Comments

 

             TROPONIN I (test code = A84) <0.015 ng/mL 0.000-0.045              

 

 

             CKMB (test code = A49) 12.3 ng/mL   <=3.6        HH           

 

             CPK (test code = 32A) 286 IU/L                         



COMPREHENSIVE METABOLIC PAN **2018 18:13:00





             Test Item    Value        Reference Range Interpretation Comments

 

             GLUCOSE (test code = 06D) 133 mg/dL           H            

 

             SODIUM (test code = 01A) 138 mmol/L   136-145                   

 

             POTASSIUM (test code = 01B) 4.0 mmol/L   3.6-5.1                   

 

             CHLORIDE (test code = 04A) 104 mmol/L                       

 

             CO2 (test code = 02A) 27 mmol/L    22-32                     

 

             ANION GAP (test code = ANG) 11.0 mmol/L                            

 

             BUN (test code = 05D) 15 mg/dL     7-18                      

 

             CREATININE (test code = 03E) 1.0 mg/dL    0.7-1.3                  

 

 

             BUN/CREA (test code = BCR) 15           12-20                     

 

             CALCIUM (test code = 09D) 8.6 mg/dL    8.3-9.5                   

 

             BILI TOTAL (test code = 11A) 0.2 mg/dL    0.2-1.0                  

 

 

             PROTEIN (test code = 07D) 7.5 g/dL     6.4-8.2                   

 

             ALBUMIN (test code = 08D) 3.6 g/dL     3.5-4.8                   

 

             GLOBULIN (test code = GLB) 3.9 g/dL     1.5-3.8      H            

 

             ALB/GLOB (test code = AGRR) 0.9          1.0-2.6      L            

 

             ALK PHOS (test code = 35A) 142 IU/L            H            

 

             AST (test code = 30A) 34 IU/L      <=42                      

 

             ALT (test code = 31A) 36 IU/L      <=78                      



AMYLASE AND LIPASE **2018 18:13:00





             Test Item    Value        Reference Range Interpretation Comments

 

             AMYLASE (test code = 10A) 34 U/L                           

 

             LIPASE (test code = 60A) 71 IU/L             L            



PRO TIME AND PTT **2018 18:07:00





             Test Item    Value        Reference Range Interpretation Comments

 

             PT (test code = 11.9 s       9.8-13.6                  



             TT)                                                 

 

             INR (test code = 1.0                                    



             INR)                                                

 

             INRH (test code =  **** SUGGESTED THERAPEUTIC                      

     



             INRH)        RANGE FOR INR: **** 2.5 -                           



                          3.5 ** For Patients with                           



                          Prosthetic Valves or                           



                          Patients with recurrent                           



                          Thromboembolic Events **                           



                          2.0 - 3.0 ** For Most Other                           



                          Applications **                           

 

             PTT (test code = 20.3 s       20.2-38.0                 



             PTT)                                                

 

             PTTH (test code = **** To monitor the                           



             PTTH)        effectiveness of heparin,                           



                          we offer the Anti-Xa                           



                          (Heparin Assay). It can be                           



                          used for either                           



                          unfractionated or LMW                           



                          Heparin. Order Code is                           



                          ANTI-XA ****                           



CBC (INCLUDES AUTOMATED DIFFERENTIAL)*[id number] 17:52:00





             Test Item    Value        Reference Range Interpretation Comments

 

             WBC (test code = WBC) 5.2 10\S\3/uL 4.5-11.0                  

 

             RBC (test code = RBC) 4.89 10\S\6/uL 4.20-5.60                 

 

             HGB (test code = HBG) 13.8 g/dL    14.0-18.0    L            

 

             HCT (test code = HCT) 42.1 %       35.0-46.0                 

 

             MCV (test code = MCV) 86.1 fL      80.0-94.0                 

 

             MCH (test code = MCH) 28.2 pg      27.0-31.0                 

 

             MCHC (test code = MCHC) 32.8 g/dL    32.0-36.0                 

 

             RDW (test code = RDW) 13.1 %       11.5-14.5                 

 

             PLT (test code = PLT) 259 10\S\3/uL 130-400                   

 

             MPV (test code = MPV) 9.9 fL       9.4-12.4                  

 

             NEUTROP # (test code = NE#) 3.0 10\S\3/uL 2.0-8.0                  

 

 

             LYMPH # (test code = LY#) 1.4 10\S\3/uL 1.2-4.0                   

 

             MONOCYTE # (test code = MO#) 0.6 10\S\3/uL 0.0-1.1                 

  

 

             EOSINOPH # (test code = EO#) 0.2 10\S\3/uL 0.0-0.7                 

  

 

             BASOPHIL # (test code = BA#) 0.0 10\S\3/uL 0.0-0.3                 

  

 

             IG # (test code = IG#) 0.01 10\S\3/uL 0.00-0.06                 

 

             NRBC # (test code = NRBC#) 0.00 10\S\3/uL 0.00-0.01                

 

 

             NEUTROPH % (test code = NE%) 58.1 %       35.0-73.0                

 

 

             LYMPH % (test code = LY%) 26.4 %       20.0-55.0                 

 

             MONO % (test code = MO%) 11.8 %       2.5-10.0     H            

 

             EOSINOPH % (test code = EO%) 2.9 %        0.0-5.0                  

 

 

             BASOPHIL % (test code = BA%) 0.6 %        0.0-2.0                  

 

 

             IG % (test code = IG%) 0.2 %        0.0-0.8                   

 

             NRBC% (test code = NRBC%) 0.0 %        0.0-0.2                   

 

             MANDIFF (test code = WMDIFF) NO           NO                       

 

 

             RBC MORPH (test code =              NORMAL                    



             WRBCMOR)                                            



U/S ABDOMEN*WW*2018 09:53:16ABDOMINAL ULTRASOUND Location code: [id number]
 HISTORY: Abdominal pain COMPARISON: NoneTECHNIQUE: Transverse and longitudinal 
sonographic images of the abdomen wereobtained. FINDINGS: The liver is increased
 in echogenicity with no focal mass orintrahepatic biliary dilatation. The right
 lobe measures approximately 18.3 cm. There are no intraluminal gallstones 
however some sludge is seen. Gallbladderwall is normal at 2 mm. The common duct 
is normal at 7 mm in diameter. Thereis no pericholecystic fluid. Sonographic 
Larson sign is negative. The kidneys are normal in echogenicity with no focal 
mass, calcification orhydronephrosis. The right kidney measures 10.8 x 4.7 x 6.5
 cm. The leftkidney measures 12.5 x 6.2 x 6.4cm. The spleen is unremarkable and 
measuresapproximately 11.0 cm in span. The visualized portions of the pancreas, 
abdominal aorta and inferior vena cavaare unremarkable but very limited due to 
body habitusIMPRESSION:Mild hepatomegaly with moderate hepatic steatosis.Sludge 
in the gallbladder without stones. HIDA scan may be of further benefit.CT EXTREM
 LOWER W/O CONTRA [id number] 13:05:59CT left knee without contrastLocation 
Code: [id number] history: PainTechnique: Helical CT of the left knee was 
performed without contrast. Thinsection axial, coronal, and sagittal images were
 obtained.Automatic exposurecontrol was utilized. Findings:There is no acute 
fracture. Postsurgical changes ofORIF with 2 lateralfixation screws along the 
lateral tibial plateau are noted. There is persistentmild depression 
irregularity along the lateral tibial plateau articularsurface. There is 
prominent subchondral sclerosis and osteophyte formation atthe medial and 
patellofemoral compartments, as well. Calcifications are notedinvolving the 
menisci and posterior cruciate ligament. There is a trace kneeeffusion. The 
periarticular soft tissues are otherwise unremarkable.Impression:1. Postsurgical
 changes ofORIF of the lateral plateau with residual articularsurface 
irregularity and associated posttraumaticosteoarthritis.2. Trace effusion.CT 
EXTREM LOWER W/O CONTRA [id number] 13:03:22CT right knee without 
contrastLocation Code: [id number] history: PainTechnique: Helical CT of the rig
ht knee was performed without contrast. Thinsection axial, coronal, and sagittal
 images were obtained. Automatic exposurecontrol was utilized. Findings:There is
 no acute fracture. There is mild narrowing of joint spaces withsubchondral 
sclerosis and osteophyte formation. There is no articular surfacedefect. 
Calcifications are noted involving the menisci and cruciate ligaments.There is 
no effusion. The periarticular soft tissues are unremarkable.Impression:1. Mild 
osteoarthritis.2. Chondrocalcinosis.3. Otherwise, no acute abnormality.BRAIN 
NATRIURETIC PROTEIN **2018 15:13:00





             Test Item    Value        Reference Range Interpretation Comments

 

             proBNP (test code = PBNP) 170 pg/mL    0-125        H            



CARDIAC PROFILE **2018 14:51:00





             Test Item    Value        Reference Range Interpretation Comments

 

             TROPONIN I (test code = A84) <0.015 ng/mL 0.000-0.045              

 

 

             CKMB (test code = A49) 4.4 ng/mL    <=3.6        HH           

 

             CPK (test code = 32A) 214 IU/L                         



CBC (INCLUDES AUTOMATED DIFFERENTIAL)*[id number] 14:49:00





             Test Item    Value        Reference Range Interpretation Comments

 

             WBC (test code = WBC) 9.5 10\S\3/uL 4.5-11.0                  

 

             RBC (test code = RBC) 5.17 10\S\6/uL 4.20-5.60                 

 

             HGB (test code = HBG) 15.0 g/dL    14.0-18.0                 

 

             HCT (test code = HCT) 44.3 %       35.0-46.0                 

 

             MCV (test code = MCV) 85.7 fL      80.0-94.0                 

 

             MCH (test code = MCH) 29.0 pg      27.0-31.0                 

 

             MCHC (test code = MCHC) 33.9 g/dL    32.0-36.0                 

 

             RDW (test code = RDW) 13.7 %       11.5-14.5                 

 

             PLT (test code = PLT) 378 10\S\3/uL 130-400                   

 

             MPV (test code = MPV) 9.5 fL       9.4-12.4                  

 

             NEUTROP # (test code = NE#) 6.8 10\S\3/uL 2.0-8.0                  

 

 

             LYMPH # (test code = LY#) 1.8 10\S\3/uL 1.2-4.0                   

 

             MONOCYTE # (test code = MO#) 0.8 10\S\3/uL 0.0-1.1                 

  

 

             EOSINOPH # (test code = EO#) 0.1 10\S\3/uL 0.0-0.7                 

  

 

             BASOPHIL # (test code = BA#) 0.1 10\S\3/uL 0.0-0.3                 

  

 

             IG # (test code = IG#) 0.03 10\S\3/uL 0.00-0.06                 

 

             NRBC # (test code = NRBC#) 0.00 10\S\3/uL 0.00-0.01                

 

 

             NEUTROPH % (test code = NE%) 71.5 %       35.0-73.0                

 

 

             LYMPH % (test code = LY%) 19.1 %       20.0-55.0    L            

 

             MONO % (test code = MO%) 8.1 %        2.5-10.0                  

 

             EOSINOPH % (test code = EO%) 0.5 %        0.0-5.0                  

 

 

             BASOPHIL % (test code = BA%) 0.5 %        0.0-2.0                  

 

 

             IG % (test code = IG%) 0.3 %        0.0-0.8                   

 

             NRBC% (test code = NRBC%) 0.0 %        0.0-0.2                   

 

             MANDIFF (test code = WMDIFF) NO           NO                       

 

 

             RBC MORPH (test code =              NORMAL                    



             WRBCMOR)                                            



AMYLASE AND LIPASE **2018 14:46:00





             Test Item    Value        Reference Range Interpretation Comments

 

             AMYLASE (test code = 10A) 30 U/L                           

 

             LIPASE (test code = 60A) 67 IU/L             L            



COMPREHENSIVE METABOLIC PAN *WW*2018 14:46:00





             Test Item    Value        Reference Range Interpretation Comments

 

             GLUCOSE (test code = 06D) 99 mg/dL                         

 

             SODIUM (test code = 01A) 136 mmol/L   136-145                   

 

             POTASSIUM (test code = 01B) 4.0 mmol/L   3.6-5.1                   

 

             CHLORIDE (test code = 04A) 104 mmol/L                       

 

             CO2 (test code = 02A) 25 mmol/L    22-32                     

 

             ANION GAP (test code = ANG) 11.0 mmol/L                            

 

             BUN (test code = 05D) 13 mg/dL     7-18                      

 

             CREATININE (test code = 03E) 0.9 mg/dL    0.7-1.3                  

 

 

             BUN/CREA (test code = BCR) 15           12-20                     

 

             CALCIUM (test code = 09D) 8.8 mg/dL    8.3-9.5                   

 

             BILI TOTAL (test code = 11A) 0.6 mg/dL    0.2-1.0                  

 

 

             PROTEIN (test code = 07D) 8.0 g/dL     6.4-8.2                   

 

             ALBUMIN (test code = 08D) 4.0 g/dL     3.5-4.8                   

 

             GLOBULIN (test code = GLB) 4.0 g/dL     1.5-3.8      H            

 

             ALB/GLOB (test code = AGRR) 1.0          1.0-2.6                   

 

             ALK PHOS (test code = 35A) 128 IU/L            H            

 

             AST (test code = 30A) 31 IU/L      <=42                      

 

             ALT (test code = 31A) 50 IU/L      <=78                      



DIRECT INFLUENZA A AND B [id number] 14:43:00





             Test Item    Value        Reference Range Interpretation Comments

 

             Direct Exam (test PRESUMPTIVE NEGATIVE FOR                         

  



             code = DE3)  THE PRESENCE OF INFLUENZA                           



                          ANTIGEN                                



XR CHEST 1 VIEW PORTABLE **2018 14:13:22CLINICAL HISTORY: Cough. 
LOCATION: D4.FINDINGS: Comparison is made with a previous study dated 2017. Aportable AP view of the chest is dated 2018 at 1403 hours.
 There isstable mild cardiomegaly. There is central vascular congestion and 
mildinterstitial prominence. No focal consolidation or pleural effusions. There 
aremild degenerative changes at the acromioclavicular joints. IMPRESSION:1. Mild
 cardiomegaly, central vascular congestion, and mild diffuseinterstitial 
prominence is suggestive of volume overload/edema. Pleasecorrelate clinically.XR
 CHEST 1 VIEW PORTABLE **2017 12:06:28Portable AP chest, 1 viewLocation 
Code: [id number] HISTORY: SOBCOMPARISON: 17COMMENT: The heart size is 
enlarged with mild central congestion and some minimalbibasilar subsegmental 
atelectasis similar to prior exam. No alveolarconsolidations. Osseous structures
 are intact.IMPRESSION: Cardiomegaly with mild central congestion and mild 
bibasilarsubsegmental atelectasis.CARDIAC PROFILE **2017 11:45:00





             Test Item    Value        Reference Range Interpretation Comments

 

             TROPONIN I (test code = A84) <0.015 ng/mL 0.000-0.045              

 

 

             CKMB (test code = A49) 7.3 ng/mL    <=3.6        HH           

 

             CPK (test code = 32A) 229 IU/L                         



BRAIN NATRIURETIC PROTEIN **2017 11:41:00





             Test Item    Value        Reference Range Interpretation Comments

 

             proBNP (test code = PBNP) 232 pg/mL    0-125        H            



COMPREHENSIVE METABOLIC PAN **2017 11:35:00





             Test Item    Value        Reference Range Interpretation Comments

 

             GLUCOSE (test code = 06D) 86 mg/dL                         

 

             SODIUM (test code = 01A) 134 mmol/L   136-145      L            

 

             POTASSIUM (test code = 01B) 4.2 mmol/L   3.6-5.1                   

 

             CHLORIDE (test code = 04A) 100 mmol/L                       

 

             CO2 (test code = 02A) 28 mmol/L    22-32                     

 

             ANION GAP (test code = ANG) 10.2 mmol/L                            

 

             BUN (test code = 05D) 12 mg/dL     7-18                      

 

             CREATININE (test code = 03E) 1.0 mg/dL    0.7-1.3                  

 

 

             BUN/CREA (test code = BCR) 12           12-20                     

 

             CALCIUM (test code = 09D) 9.3 mg/dL    8.3-9.5                   

 

             BILI TOTAL (test code = 11A) 0.6 mg/dL    0.2-1.0                  

 

 

             PROTEIN (test code = 07D) 7.8 g/dL     6.4-8.2                   

 

             ALBUMIN (test code = 08D) 4.0 g/dL     3.5-4.8                   

 

             GLOBULIN (test code = GLB) 3.8 g/dL     1.5-3.8                   

 

             ALB/GLOB (test code = AGRR) 1.1          1.0-2.6                   

 

             ALK PHOS (test code = 35A) 154 IU/L            H            

 

             AST (test code = 30A) 23 IU/L      <=42                      

 

             ALT (test code = 31A) 41 IU/L      <=78                      



CBC (INCLUDES AUTOMATED DIFFERENTIAL)*[id number] 11:22:00





             Test Item    Value        Reference Range Interpretation Comments

 

             WBC (test code = WBC) 7.7 10\S\3/uL 4.5-11.0                  

 

             RBC (test code = RBC) 4.82 10\S\6/uL 4.20-5.60                 

 

             HGB (test code = HBG) 13.8 g/dL    14.0-18.0    L            

 

             HCT (test code = HCT) 41.7 %       35.0-46.0                 

 

             MCV (test code = MCV) 86.5 fL      80.0-94.0                 

 

             MCH (test code = MCH) 28.6 pg      27.0-31.0                 

 

             MCHC (test code = MCHC) 33.1 g/dL    32.0-36.0                 

 

             RDW (test code = RDW) 14.1 %       11.5-14.5                 

 

             PLT (test code = PLT) 334 10\S\3/uL 130-400                   

 

             MPV (test code = MPV) 9.7 fL       9.4-12.4                  

 

             NEUTROP # (test code = NE#) 4.9 10\S\3/uL 2.0-8.0                  

 

 

             LYMPH # (test code = LY#) 1.7 10\S\3/uL 1.2-4.0                   

 

             MONOCYTE # (test code = MO#) 0.8 10\S\3/uL 0.0-1.1                 

  

 

             EOSINOPH # (test code = EO#) 0.2 10\S\3/uL 0.0-0.7                 

  

 

             BASOPHIL # (test code = BA#) 0.1 10\S\3/uL 0.0-0.3                 

  

 

             IG # (test code = IG#) 0.05 10\S\3/uL 0.00-0.06                 

 

             NRBC # (test code = NRBC#) 0.00 10\S\3/uL 0.00-0.01                

 

 

             NEUTROPH % (test code = NE%) 63.0 %       35.0-73.0                

 

 

             LYMPH % (test code = LY%) 21.8 %       20.0-55.0                 

 

             MONO % (test code = MO%) 10.9 %       2.5-10.0     H            

 

             EOSINOPH % (test code = EO%) 3.1 %        0.0-5.0                  

 

 

             BASOPHIL % (test code = BA%) 0.6 %        0.0-2.0                  

 

 

             IG % (test code = IG%) 0.6 %        0.0-0.8                   

 

             NRBC% (test code = NRBC%) 0.0 %        0.0-0.2                   

 

             MANDIFF (test code = WMDIFF) NO           NO                       

 

 

             RBC MORPH (test code =              NORMAL                    



             WRBCMOR)                                            



CT LUMBAR SPINE W/O CONTRAST2017-10-30 10:17:33Exam: CT scan of the lumbar spine
 without contrastHistory: Back pain with radiculopathyLocation: N1Dsymzzewt: 
Multiple contiguous thin section axial images of the lumbar spinewas performed 
with sagittal and coronal reconstructions. One or more of thefollowing dose 
reduction techniques were used: Automated exposure control,adjustment of the mA 
and or KV according to patient size, and/or utilization ofiterative 
reconstruction technique. DLP: 1335 mGy-cm. Findings:There is estimated 60% 
chronic appearing compression fracture of T12 withoutsignificant 
retropulsion.Extensive postoperative decompression of the thoracic spine noted 
extendingfrom T12 to the S1 level.Mild retrolisthesis of L4 relative to L5 noted
 with vacuum phenomenon withinthe disc along with bilateral posterolateral 4 mm 
disc protrusion/osteophytecomplexes with moderate bilateral facet arthropathy 
creating moderate stenosiswith bilateral L5 nerve root and foraminal 
encroachment.Mild posterior spondylosis with facet arthropathy at theL1-2, L2-3,
 and L5-V8tsbvwq but without significant stenosis or nerve root compromise.No 
evidence ofparaspinal soft tissue mass or fluid collection.Impression:1. 
Extensive postoperative posterior decompression as detailed above. There louie 
chronic appearing 60% compression fracture of T12 although if indicated MRImay 
be of further benefit.2. Mild retrolisthesis of L4 relative to L5 with bilateral
 posterolateral 4 mmdisc protrusion/osteophyte complexes with moderate bilateral
 facet arthropathycreating moderate stenosis with bilateral L5 nerve root and 
foraminalencroachment. Please see above.XR SPINE THORACIC W/SWIM 3VWS*WW*
2017-10-11 14:06:41Thoracic spine, 3 viewsLocation Code: C2Fisfwbgr history: 
PainComparison: CT dated 2016Comments: AP, lateral, and swimmer lateral 
views of the thoracic spine wereobtained. Chronic anterior wedge compression 
fracture at T12 with resultingkyphosis appears stable. There is no acute 
fracture or malalignment. There ismoderate multilevel disc space narrowing with 
endplate sclerosis and osteophyteformation. The paraspinal soft tissues are 
unremarkable.Impression: 1. Stable chronic anterior wedge compression fracture 
at T12.2. Moderatemultilevel thoracic spondylosis.3. Otherwise, no acute 
abnormality.BASIC METABOLIC [id number] 10:03:00





             Test Item    Value        Reference Range Interpretation Comments

 

             GLUCOSE (test code = 06D) 107 mg/dL           H            

 

             SODIUM (test code = 01A) 138 mmol/L   136-145                   

 

             POTASSIUM (test code = 01B) 4.5 mmol/L   3.6-5.1                   

 

             CHLORIDE (test code = 04A) 104 mmol/L                       

 

             CO2 (test code = 02A) 25 mmol/L    22-32                     

 

             ANION GAP (test code = ANG) 13.5 mmol/L                            

 

             BUN (test code = 05D) 13 mg/dL     7-18                      

 

             CREATININE (test code = 03E) 0.9 mg/dL    0.7-1.3                  

 

 

             BUN/CREA (test code = BCR) 15           12-20                     

 

             CALCIUM (test code = 09D) 9.0 mg/dL    8.3-9.5                   



PRO TIME AND [id number] 09:21:00





             Test Item    Value        Reference Range Interpretation Comments

 

             PT (test code = 10.9 s       9.8-13.6                  



             TT)                                                 

 

             INR (test code = 1.0                                    



             INR)                                                

 

             INRH (test code =  **** SUGGESTED THERAPEUTIC                      

     



             INRH)        RANGE FOR INR: **** 2.5 -                           



                          3.5 ** For Patients with                           



                          Prosthetic Valves or                           



                          Patients with recurrent                           



                          Thromboembolic Events **                           



                          2.0 - 3.0 ** For Most Other                           



                          Applications **                           

 

             PTT (test code = 28.5 s       20.2-38.0                 



             PTT)                                                

 

             PTTH (test code = **** To monitor the                           



             PTTH)        effectiveness of heparin,                           



                          we offer the Anti-Xa                           



                          (Heparin Assay). It can be                           



                          used for either                           



                          unfractionated or LMW                           



                          Heparin. Order Code is                           



                          ANTI-XA ****                           



XR RIBS LT UNIL 3VWS W/PA CHEST*WW*2017 15:12:20Left rib series, 5 
views.Location Code: D4 CLINICAL HISTORY: Left rib pain.COMMENT: Frontal view of
 the chest shows the lungs to be clear with nopneumothorax, consolidation, or 
effusion. The cardiomediastinal silhouette isunremarkable. AP and oblique views 
of the left ribs demonstrate no displaced fracture. Thesoft tissues are 
unremarkable.IMPRESSION: No acute abnormality.BASIC METABOLIC PANEL2017-06-15 
11:33:00





             Test Item    Value        Reference Range Interpretation Comments

 

             GLUCOSE (test code = 06D) 113 mg/dL           H            

 

             SODIUM (test code = 01A) 136 mmol/L   136-145                   

 

             POTASSIUM (test code = 01B) 4.7 mmol/L   3.6-5.1                   

 

             CHLORIDE (test code = 04A) 104 mmol/L                       

 

             CO2 (test code = 02A) 26 mmol/L    22-32                     

 

             ANION GAP (test code = ANG) 10.7 mmol/L                            

 

             BUN (test code = 05D) 24 mg/dL     7-18         H            

 

             CREATININE (test code = 03E) 0.8 mg/dL    0.7-1.3                  

 

 

             BUN/CREA R (test code = BCR) 31           12-20        H           

 

 

             CALCIUM (test code = 09D) 9.1 mg/dL    8.3-9.5                   



CBC (INCLUDES AUTOMATED DIFFERENTIAL)2017-06-15 11:20:00





             Test Item    Value        Reference Range Interpretation Comments

 

             WBC (test code = WBC) 6.3 10\S\3/uL 4.5-11.0                  

 

             RBC (test code = RBC) 5.00 10\S\6/uL 4.20-5.60                 

 

             HGB (test code = HBG) 14.8 g/dL    14.0-18.0                 

 

             HCT (test code = HCT) 43.8 %       35.0-46.0                 

 

             MCV (test code = MCV) 87.6 fL      80.0-94.0                 

 

             MCH (test code = MCH) 29.6 pg      27.0-31.0                 

 

             MCHC (test code = MCHC) 33.8 g/dL    32.0-36.0                 

 

             RDW (test code = RDW) 13.7 %       11.5-14.5                 

 

             PLT (test code = PLT) 248 10\S\3/uL 130-400                   

 

             MPV (test code = MPV) 10.0 fL      9.4-12.4                  

 

             NEUTROP # (test code = NE#) 4.2 10\S\3/uL 2.0-8.0                  

 

 

             LYMPH # (test code = LY#) 1.3 10\S\3/uL 1.2-4.0                   

 

             MONOCYTE # (test code = MO#) 0.6 10\S\3/uL 0.0-1.1                 

  

 

             EOSINOPH # (test code = EO#) 0.1 10\S\3/uL 0.0-0.7                 

  

 

             BASOPHIL # (test code = BA#) 0.1 10\S\3/uL 0.0-0.3                 

  

 

             IG # (test code = IG#) 0.05 10\S\3/uL 0.00-0.06                 

 

             NRBC # (test code = NRBC#) 0.00 10\S\3/uL 0.00-0.01                

 

 

             NEUTROPH % (test code = NE%) 67.2 %       35.0-73.0                

 

 

             LYMPH % (test code = LY%) 21.0 %       20.0-55.0                 

 

             MONO % (test code = MO%) 8.9 %        2.5-10.0                  

 

             EOSINOPH % (test code = EO%) 1.3 %        0.0-5.0                  

 

 

             BASOPHIL % (test code = BA%) 0.8 %        0.0-2.0                  

 

 

             IG % (test code = IG%) 0.8 %        0.0-0.8                   

 

             NRBC% (test code = NRBC%) 0.0 %        0.0-0.2                   

 

             MANDIFF (test code = MDIFF) NO           NO                        

 

             RBC MORPH (test code = RBCMOR)              NORMAL                 

   



ALCOHOL BLOOD (ETOH)2017 01:14:00





             Test Item    Value        Reference Range Interpretation Comments

 

             ALCOHOL (test code = 69 mg/dL     <=10         H            



             56A)                                                

 

             Ref Range Change (test ***** Please note the                       

    



             code = REF RANGE) change in reference                           



                          range ***                              



ALCOHOL BLOOD (ETOH)2017 23:48:00





             Test Item    Value        Reference Range Interpretation Comments

 

             ALCOHOL (test code = 106 mg/dL    <=10         H            



             56A)                                                

 

             Ref Range Change (test ***** Please note the                       

    



             code = REF RANGE) change in reference                           



                          range ***                              



CARDIAC [id number] 19:00:00





             Test Item    Value        Reference Range Interpretation Comments

 

             TROPONIN I (test code = A84) 0.048 ng/mL  0.000-0.045  H           

 

 

             CKMB (test code = A49) 1.3 ng/mL    <=3.6                     

 

             CPK (test code = 32A) 189 IU/L                         



[id number] 15:50:00





             Test Item    Value        Reference Range Interpretation Comments

 

             ACETAMINPH (test code = 94M) <2.0 ug/mL   10.0-30.0    L           

 



DRUGS OF [id number] 15:49:00





             Test Item    Value        Reference Range Interpretation Comments

 

             DRUG SCRN (test code ****URINE DRUG SCREEN***                      

     



             = HDOA)      ***This is an unconfirmed                           



                          screening result and                           



                          should not be used for                           



                          non-medical purposes***                           

 

             CANNABINOD (test Negative     NEGATIVE                  



             code = 88C)                                         

 

             AMPHETHETM (test Negative     NEGATIVE                  



             code = 84A)                                         

 

             BENZODIAZP (test POSITIVE     NEGATIVE     A            



             code = 86A)                                         

 

             BARBITURAT (test Negative     NEGATIVE                  



             code = 85A)                                         

 

             OPIATES (test code = Negative     NEGATIVE                  



             92B)                                                

 

             COCAINE (test code = Negative     NEGATIVE                  



             87A)                                                

 

             PHENCYCLID (test Negative     NEGATIVE                  



             code = 66A)                                         

 

             METHADONE (test code Negative     NEGATIVE                  



             = 64A)                                              

 

             DOAH (test code = *************************                        

   



             DOAH)        URINE DRUG                             



                          SCREEN*******************                           



                          *********** Cut-off                           



                          values are as follows:                           



                          -------------------------                           



                          ----------- Cannabinoids                           



                          50 ng/mL Cocaine 300                           



                          ng/mL Amphetamines 1000                           



                          ng/mL Phencyclidine 25                           



                          ng/mL Benzodiazepines 200                           



                          ng.mL Methadone 300 ng/mL                           



                          Barbiturates 200 ng/mL                           



                          Opiates 2000 ng/mL                           



                          *************************                           



                          *************************                           



                          *************************                           



                          *****                                  



URINALYSIS WITH [id number] 15:48:00





             Test Item    Value        Reference Range Interpretation Comments

 

             COLOR (test code = COLU) YELLOW       YELLOW                    

 

             CLARITY (test code = CLA) CLEAR        CLEAR                     

 

             GLUCOSE UR (test code = UA GLUCOSE) NEGATIVE     NEGATIVE          

        

 

             BILI UR (test code = BILE) NEGATIVE     NEGATIVE                  

 

             KETONES UR (test code = ACE) NEGATIVE     NEGATIVE                 

 

 

             SP GRAVITY (test code = SPGR) 1.017        1.005-1.030             

  

 

             PH UR (test code = PH) 6.0          4.5-8.0                   

 

             PROTEIN UR (test code = PU) 1+           NEGATIVE     A            

 

             UROBIL UR (test code = UROQ) 0.2 EU/dL    0.2-1.0                  

 

 

             NITRITE UR (test code = NITRITE) NEGATIVE     NEGATIVE             

     

 

             BLOOD UR (test code = UA BLOOD) NEGATIVE     NEGATIVE              

    

 

             LEUK ES UR (test code = LEUK) NEGATIVE     NEGATIVE                

  

 

             WBC UR (test code = UWBC) 0 /HPF       0-3                       

 

             RBC UR (test code = URBC) 0 /HPF       0-2                       

 

             EPITH UR (test code = UEPC) FEW /LPF     NONE         A            

 

             BACTERIA UR (test code = UBACT) NONE /HPF    NONE                  

    

 

             CAST UR (test code = CAST)  /LPF        NONE                      

 

             CRYSTAL UR (test code = CRYU)  / LPF       NONE                    

  

 

             MUCUS UR (test code = MUC) FEW / HPF    NONE         A            

 

             AMORPH UR (test code = UMU)  / HPF       NONE                     

 

 

             TRICH UR (test code = UTRICH)  /HPF        NONE                    

  

 

             YEAST UR (test code = UY)  /HPF        NONE                      

 

             SPERM UR (test code = USPERM)  /HPF        NONE                    

  



[id number] 15:48:00





             Test Item    Value        Reference Range Interpretation Comments

 

             SALICYLATE (test code = 94B) 2.1 mg/dL    2.8-20.0     L           

 



XR CHEST 1 VIEW [id number] 15:47:15Location: [id number], AP ViewHistory: 
Altered mental statusComparison: Chest radiograph dated 17Findings: With a
 somewhat shallow inspiration with mild right basilar atelectasis,similar to the
 priorexam. No evidence of acute airspace consolidation.Cardiomediastinal 
silhouette is stable as well.No acute osseous findings.Impression: Shallow 
inspiration with right basilar atelectasis.AMMONIA [id number] 15:44:00





             Test Item    Value        Reference Range Interpretation Comments

 

             AMMONIA (test code = 54A) 47 umol/L    11-32        H            



ALCOHOL BLOOD (ETOH)2017 15:42:00





             Test Item    Value        Reference Range Interpretation Comments

 

             ALCOHOL (test code = 290 mg/dL    <=10         H            



             56A)                                                

 

             Ref Range Change (test ***** Please note the                       

    



             code = REF RANGE) change in reference                           



                          range ***                              



COMPREHENSIVE METABOLIC [id number] 15:41:00





             Test Item    Value        Reference Range Interpretation Comments

 

             GLUCOSE (test code = 06D) 123 mg/dL           H            

 

             SODIUM (test code = 01A) 139 mmol/L   136-145                   

 

             POTASSIUM (test code = 01B) 4.3 mmol/L   3.6-5.1                   

 

             CHLORIDE (test code = 04A) 105 mmol/L                       

 

             CO2 (test code = 02A) 24 mmol/L    22-32                     

 

             ANION GAP (test code = ANG) 14.3 mmol/L                            

 

             BUN (test code = 05D) 18 mg/dL     7-18                      

 

             CREATININE (test code = 03E) 0.9 mg/dL    0.7-1.3                  

 

 

             BUN/CREA R (test code = BCR) 19           12-20                    

 

 

             CALCIUM (test code = 09D) 7.8 mg/dL    8.3-9.5      L            

 

             BILI TOTAL (test code = 11A) 0.2 mg/dL    0.2-1.0                  

 

 

             PROTEIN (test code = 07D) 7.6 g/dL     6.4-8.2                   

 

             ALBUMIN (test code = 08D) 3.8 g/dL     3.5-4.8                   

 

             GLOBULIN (test code = GLB) 3.8 g/dL     1.5-3.8                   

 

             ALB/GLOB (test code = AGRR) 1.0          1.0-2.6                   

 

             ALK PHOS (test code = 35A) 130 IU/L            H            

 

             AST (test code = 30A) 35 IU/L      <=42                      

 

             ALT (test code = 31A) 58 IU/L      <=78                      



CARDIAC [id number] 15:41:00





             Test Item    Value        Reference Range Interpretation Comments

 

             TROPONIN I (test code = A84) 0.048 ng/mL  0.000-0.045  H           

 

 

             CKMB (test code = A49) 1.4 ng/mL    <=3.6                     

 

             CPK (test code = 32A) 113 IU/L                         



PRO TIME AND [id number] 15:34:00





             Test Item    Value        Reference Range Interpretation Comments

 

             PT (test code = 11.1 s       9.8-13.6                  



             TT)                                                 

 

             INR (test code = 1.0                                    



             INR)                                                

 

             INRH (test code =  **** SUGGESTED THERAPEUTIC                      

     



             INRH)        RANGE FOR INR: **** 2.5 -                           



                          3.5 ** For Patients with                           



                          Prosthetic Valves or                           



                          Patients with  recurrent                           



                          Thromboembolic Events **                           



                          2.0 - 3.0 ** For Most Other                           



                          Applications **                           

 

             PTT (test code = 23.1 s       20.2-38.0                 



             PTT)                                                

 

             PTTH (test code = **** To monitor the                           



             PTTH)        effectiveness of heparin,                           



                          we offer the Anti-Xa                           



                          (Heparin Assay). It can be                           



                          used for either                           



                          unfractinated or LMW                           



                          Heparin. Order Code is                           



                          ANTI-XA ****                           



CBC (INCLUDES AUTOMATED DIFFERENTIAL)2017 15:28:00





             Test Item    Value        Reference Range Interpretation Comments

 

             WBC (test code = WBC) 8.0 10\S\3/uL 4.5-11.0                  

 

             RBC (test code = RBC) 5.51 10\S\6/uL 4.20-5.60                 

 

             HGB (test code = HBG) 16.3 g/dL    14.0-18.0                 

 

             HCT (test code = HCT) 47.4 %       35.0-46.0    H            

 

             MCV (test code = MCV) 86.0 fL      80.0-94.0                 

 

             MCH (test code = MCH) 29.6 pg      27.0-31.0                 

 

             MCHC (test code = MCHC) 34.4 g/dL    32.0-36.0                 

 

             RDW (test code = RDW) 13.0 %       11.5-14.5                 

 

             PLT (test code = PLT) 356 10\S\3/uL 130-400                   

 

             MPV (test code = MPV) 10.2 fL      9.4-12.4                  

 

             NEUTROP # (test code = NE#) 5.3 10\S\3/uL 2.0-8.0                  

 

 

             LYMPH # (test code = LY#) 2.0 10\S\3/uL 1.2-4.0                   

 

             MONOCYTE # (test code = MO#) 0.7 10\S\3/uL 0.0-1.1                 

  

 

             EOSINOPH # (test code = EO#) 0.0 10\S\3/uL 0.0-0.7                 

  

 

             BASOPHIL # (test code = BA#) 0.1 10\S\3/uL 0.0-0.3                 

  

 

             IG # (test code = IG#) 0.07 10\S\3/uL 0.00-0.06    H            

 

             NRBC # (test code = NRBC#) 0.00 10\S\3/uL 0.00-0.01                

 

 

             NEUTROPH % (test code = NE%) 65.5 %       35.0-73.0                

 

 

             LYMPH % (test code = LY%) 24.3 %       20.0-55.0                 

 

             MONO % (test code = MO%) 8.7 %        2.5-10.0                  

 

             EOSINOPH % (test code = EO%) 0.0 %        0.0-5.0                  

 

 

             BASOPHIL % (test code = BA%) 0.6 %        0.0-2.0                  

 

 

             IG % (test code = IG%) 0.9 %        0.0-0.8      H            

 

             NRBC% (test code = NRBC%) 0.0 %        0.0-0.2                   

 

             MANDIFF (test code = MDIFF) NO           NO                        

 

             RBC MORPH (test code = RBCMOR)              NORMAL                 

   



CT HEAD W/O [id number] 15:23:17Location [id number] CT BRAINHistory: 
Altered mental statusCOMPARISON: CT brain dated 17.TECHNIQUE : Serial 
axial CT of the brain obtained without the use ofintravenous contrast in the 
brain and bone window settings. Sagittalreconstruction is provided. One or more 
of the following dose reductiontechniques were used: Automated exposure control,
 adjustment of the mAs and Kv.According to patient size, use of iterative 
reconstruction reconstructiontechnique. DLP 1091 mGy-cm.FINDINGS:There is no
evidence of acute cerebrovascular injury, mass effect or midlineshift. No 
evidence of subarachnoid hemorrhage, intracerebral hematoma, orextraaxial fluid 
collections.There is mild generalized parenchymal volume loss with compensatory 
widening ofthe ventricular system. Mild periventricular white matterhypodensity 
iscompatible with chronic microangiopathy changes.Note ectasia and tortuosity of
 the basilar artery. There is atherosclerosis ofthe carotid arteries as 
well.Osseous structures are unremarkable. The orbits appear normal. The 
visibleparanasal sinuses and mastoid air spaces are clear. If symptomatology 
persists, correlation with MRI may be of further benefit.IMPRESSION:1. No CT 
evidence for acute intracranial process. 2. Note of ectatic tortuous basilar 
artery.3. If symptoms persist or progress, correlation with MRI may be obtained.

## 2023-04-27 NOTE — EDPHYS
Physician Documentation                                                                           

 [facility]                                                                 

Name: Blu Morrell                                                                              

Age: 62 yrs                                                                                       

Sex: Male                                                                                         

: 1960                                                                                   

MRN: [id number]                                                                                   

Arrival Date: 2023                                                                          

Time: 13:17                                                                                       

Account#: [id number]                                                                            

Bed DIS1                                                                                          

Private MD:                                                                                       

ED Physician Matthew Olivares                                                                         

HPI:                                                                                              

                                                                                             

13:30 This 62 yrs old Male presents to ER via EMS with complaints of Fall Injury, Chest Wall  ms3 

      Injury.                                                                                     

13:30 62-year-old male presents via [facility] for fall this morning. Patient states he is   ms3 

      having 10/10 left-sided sharp rib pain. Patient denies alleviating factors. Patient         

      states his pain is worse with inspiration. Patient states he urinated and was walking       

      back to his bed when his walker caught the corner of his bed causing him to fall over       

      his walker and landed on his left side. Patient denies loss of consciousness.               

                                                                                                  

Historical:                                                                                       

- Allergies:                                                                                      

13:25 No Known Allergies;                                                                     ss  

                                                                                                  

                                                                                                  

                                                                                                  

ROS:                                                                                              

13:30 Constitutional: Negative for fever, and chills. Neck: Negative for injury, pain, and    ms3 

      swelling, Respiratory: Negative for shortness of breath, cough, wheezing, and pleuritic     

      chest pain, Abdomen/GI: Negative for abdominal pain, nausea, vomiting, diarrhea, and        

      constipation, MS/Extremity: Negative for injury and deformity, Skin: Negative for           

      injury, rash, and discoloration, Neuro: Negative for headache, weakness, numbness,          

      tingling.                                                                                   

13:30 Cardiovascular: Positive for Left sided rib pain.                                           

                                                                                                  

Exam:                                                                                             

13:30 Constitutional:  This is a well developed, well nourished patient who is awake, alert,  ms3 

      and in no acute distress. Head/Face:  Normocephalic, atraumatic. Neck:  Trachea             

      midline, no cervical lymphadenopathy.  Supple, full range of motion without nuchal          

      rigidity, or vertebral point tenderness.  No Meningismus. Chest/axilla:  Normal chest       

      wall appearance and motion.  Nontender with no deformity.   Cardiovascular:  Regular        

      rate and rhythm with a normal S1 and S2.  No gallops, murmurs, or rubs.  Normal PMI, no     

      JVD.  No pulse deficits.                                                                    

13:30 Chest/axilla: Inspection: normal, Palpation: no acute changes, crepitus, is not             

      appreciated, tenderness, that is moderate, of the  left lateral anterior chest.             

13:30 Respiratory: the patient does not display signs of respiratory distress,  Respirations:     

      normal, Breath sounds: are clear throughout.                                                

13:30 Back: pain, that is moderate, of the  left subscapular area.                                

                                                                                                  

Vital Signs:                                                                                      

13:20  / 83; Pulse 71; Resp 16; Temp 97(TE); Pulse Ox 99% on R/A; Weight 111.58 kg;     ss  

      Height 5 ft. 7 in. ; Pain 10/10;                                                            

14:48  / 91; Pulse 75; Resp 18; Pulse Ox 93% on R/A;                                    nj1 

15:27  / 91; Pulse 70; Resp 18; Pulse Ox 93% ; Pain 7/10;                               nj1 

13:20 Body Mass Index 38.53 (111.58 kg, 170.18 cm)                                            ss  

13:20 Pain Scale: Adult                                                                       ss  

15:27 Pain Scale: Adult                                                                       nj1 

                                                                                                  

MDM:                                                                                              

13:30 Differential diagnosis: closed head injury, contusion, fracture, sprain, strain, PTX.   ms3 

13:34 Patient medically screened.                                                             ms3 

19:06 Data reviewed: vital signs, nurses notes, lab test result(s), radiologic studies, CT    ms3 

      scan, plain films, and as a result, I will discharge patient. I considered the              

      following discharge prescriptions or medication management in the emergency department      

      Medications were administered in the Emergency Department. See MAR. Independent             

      interpretation of the following test(s) in the Emergency Department X-Ray: My               

      interpretation is X-ray images reviewed by me do not reveal rib fracture.. Historians       

      other than the Patient: EMS: [facility]. Counseling: I had a detailed discussion with      

      the patient and/or guardian regarding: the historical points, exam findings, and any        

      diagnostic results supporting the discharge/admit diagnosis, lab results, radiology         

      results, the need for outpatient follow up, to return to the emergency department if        

      symptoms worsen or persist or if there are any questions or concerns that arise at          

      home. ED course: Discussed imaging and labs with patient. Patient to follow-up with         

      primary care physician in 2 to 3 days. Patient understands agrees with plan. All            

      questions were answered. Return precautions discussed to include worsening symptoms, or     

      any other concerns..                                                                        

                                                                                                  

                                                                                             

13:29 Order name: Basic Metabolic Panel; Complete Time: 15:29                                 ms3 

                                                                                             

13:29 Order name: CBC with Diff; Complete Time: 15:29                                         ms3 

                                                                                             

13:29 Order name: Type And Screen; Complete Time: 15:29                                       ms3 

                                                                                             

15:26 Order name: ABO/RH no charge; Complete Time: 15:29                                      EDMS

                                                                                             

13:29 Order name: CT Head C Spine; Complete Time: 15:43                                       ms3 

                                                                                             

13:29 Order name: Ribs Left XRAY; Complete Time: 15:29                                        ms3 

                                                                                             

14:15 Order name: Chest Wo Con CT; Complete Time: 15:29                                       ms3 

                                                                                             

13:29 Order name: Labs collected and sent; Complete Time: 14:44                               ms3 

                                                                                                  

Administered Medications:                                                                         

13:39 Drug: HYDROcodone-acetaminophen PO 5 mg-325 mg 1 tabs Route: PO;                        nj1 

14:23 Follow up: Response: No adverse reaction; Pain is unchanged, physician notified         nj1 

14:23 Drug: morphine IVP or IV 4 mg Route: IVP; Infused Over: 4 mins; Site: left antecubital; nj1 

15:30 Follow up: Response: No adverse reaction; Pain is decreased                             nj1 

                                                                                                  

                                                                                                  

Disposition Summary:                                                                              

23 15:52                                                                                    

Discharge Ordered                                                                                 

      Location: Home                                                                          ms3 

      Condition: Stable                                                                       ms3 

      Diagnosis                                                                                   

        - Fall on same level, unspecified                                                     ms3 

        - Left sided rib pain                                                                 ms3 

        - Back pain                                                                           ms3 

      Followup:                                                                               ms3 

        - With: Jamil Her DO                                                                  

        - When: 2 - 3 days                                                                         

        - Reason: Recheck today's complaints                                                       

      Discharge Instructions:                                                                     

        - Discharge Summary Sheet                                                             ms3 

        - Fall Prevention in the Home, Adult                                                  ms3 

        - Musculoskeletal Pain                                                                ms3 

      Forms:                                                                                      

        - Medication Reconciliation Form                                                      ms3 

        - Thank You Letter                                                                    ms3 

        - Antibiotic Education                                                                ms3 

        - Prescription Opioid Use                                                             ms3 

      Prescriptions:                                                                              

        - tizanidine 2 mg Oral capsule                                                             

            - take 1 capsule by ORAL route every 6 hours as needed for muscle spasticity; do  ms3 

      not exceed 3 doses per 24 hrs; 12 capsule; Refills: 0, Product Selection Permitted          

        - Ibuprofen 600 mg Oral Tablet                                                             

            - take 1 tablet by ORAL route every 6 hours As needed take with food; 30 tablet;  ms3 

      Refills: 0, Product Selection Permitted                                                     

Signatures:                                                                                       

Dispatcher [employer]                           Katie Parker RN RN ss Sims, Marcus, DO                        DO   ms3                                                  

Gianna Quick RN                         RN   nj1                                                  

                                                                                                  

**************************************************************************************************